# Patient Record
Sex: FEMALE | Race: BLACK OR AFRICAN AMERICAN | NOT HISPANIC OR LATINO | Employment: FULL TIME | URBAN - METROPOLITAN AREA
[De-identification: names, ages, dates, MRNs, and addresses within clinical notes are randomized per-mention and may not be internally consistent; named-entity substitution may affect disease eponyms.]

---

## 2017-04-12 ENCOUNTER — OFFICE VISIT (OUTPATIENT)
Dept: FAMILY MEDICINE | Facility: CLINIC | Age: 47
End: 2017-04-12

## 2017-04-12 VITALS
DIASTOLIC BLOOD PRESSURE: 142 MMHG | TEMPERATURE: 97.9 F | HEART RATE: 98 BPM | OXYGEN SATURATION: 100 % | SYSTOLIC BLOOD PRESSURE: 166 MMHG

## 2017-04-12 DIAGNOSIS — R30.0 DYSURIA: Primary | ICD-10-CM

## 2017-04-12 DIAGNOSIS — J45.42 MODERATE PERSISTENT ASTHMA WITH STATUS ASTHMATICUS: ICD-10-CM

## 2017-04-12 DIAGNOSIS — R82.81 PYURIA: ICD-10-CM

## 2017-04-12 DIAGNOSIS — N30.90 BLADDER INFECTION: ICD-10-CM

## 2017-04-12 DIAGNOSIS — J98.01 ACUTE BRONCHOSPASM: ICD-10-CM

## 2017-04-12 LAB
BACTERIA: NORMAL
BILIRUBIN UR: NEGATIVE
BLOOD UR: NEGATIVE
CASTS: NORMAL /LPF
CRYSTAL URINE: NORMAL /LPF
EPITHELIAL CELLS UR: NORMAL /LPF (ref 0–2)
GLUCOSE URINE: NEGATIVE
KETONES UR QL: NEGATIVE
LEUKOCYTE ESTERASE UR: ABNORMAL
MUCOUS URINE: NORMAL LPF
NITRITE UR QL STRIP: NEGATIVE
PH UR STRIP: 7 [PH] (ref 5–7)
PROTEIN UR: NEGATIVE
RBC URINE: NORMAL /HPF
SP GR UR STRIP: 1.01
UROBILINOGEN UR STRIP-ACNC: ABNORMAL
WBC URINE: NORMAL /HPF

## 2017-04-12 RX ORDER — FLUTICASONE PROPIONATE 220 UG/1
1 AEROSOL, METERED RESPIRATORY (INHALATION) 2 TIMES DAILY
Qty: 1 INHALER | Refills: 3 | Status: SHIPPED | OUTPATIENT
Start: 2017-04-12 | End: 2017-05-08

## 2017-04-12 RX ORDER — PREDNISONE 20 MG/1
40 TABLET ORAL DAILY
Qty: 10 TABLET | Refills: 0 | Status: SHIPPED | OUTPATIENT
Start: 2017-04-12 | End: 2017-04-17

## 2017-04-12 RX ORDER — NITROFURANTOIN 25; 75 MG/1; MG/1
100 CAPSULE ORAL 2 TIMES DAILY
Qty: 14 CAPSULE | Refills: 0 | Status: SHIPPED | OUTPATIENT
Start: 2017-04-12 | End: 2017-05-08

## 2017-04-12 RX ORDER — ALBUTEROL SULFATE 90 UG/1
2 AEROSOL, METERED RESPIRATORY (INHALATION) EVERY 4 HOURS PRN
Qty: 2 INHALER | Refills: 11 | Status: SHIPPED | OUTPATIENT
Start: 2017-04-12 | End: 2018-12-11

## 2017-04-12 NOTE — Clinical Note
Please review and close this encounter on behalf of the preceptor that is away for greater than 7 days. No additional attestation statement needed. Thank you, La

## 2017-04-12 NOTE — PROGRESS NOTES
Preceptor attestation:  Patient seen and discussed with the resident. Assessment and plan reviewed with resident and agreed upon.  Supervising physician: Rishi Latif  [facility]

## 2017-04-12 NOTE — LETTER
April 14, 2017      Kelly Harris  [address]  [place] [address]        Dear Kelly,    Please see below for your test results.  Your urine did not grow out any specific organisms, but keep taking the antibiotic and see if your burning with urination improves. Sometimes , we just can't get the bugs causing the infection to grow in the lab. If you continue having pain with urination even after taking the antibiotic for a week then please return to clinic. Hope you are well.        Resulted Orders   Urinalysis, Micro If ([facility])   Result Value Ref Range    Specific Gravity Urine 1.015 1.005 - 1.030    pH Urine 7.0 4.5 - 8.0    Leukocyte Esterase UR 2+ (A) NEGATIVE    Nitrite Urine Negative NEGATIVE    Protein UR Negative NEGATIVE    Glucose Urine Negative NEGATIVE    Ketones Urine Negative NEGATIVE    Urobilinogen mg/dL 0.2 E.U./dL 0.2 E.U./dL    Bilirubin UR Negative NEGATIVE    Blood UR Negative NEGATIVE   Urine Microscopic ([facility])   Result Value Ref Range    WBC Urine 10-25 <5 /hpf    RBC Urine 2-5 <5 /hpf    Epithelial Cells UR 10-25 0 - 2 /lpf    Mucous Urine None NONE lpf    Casts Urine None NONE /lpf    Crystal Urine None NONE /lpf    Bacteria Wet Prep Moderate None   Urine Culture ([facility])   Result Value Ref Range    Culture SEE RESULTS BELOW       Comment:      CULTURE, URINE   SOURCE: Urine, Random   CULTURE RESULTS:    No Growth      Narrative    Test performed by:  [facility]  [address] [place] [address]       If you have any questions, please call the clinic to make an appointment.    Sincerely,    Jesus Myers, DO

## 2017-04-12 NOTE — PATIENT INSTRUCTIONS
Bladder infection: macrobid 100 mg twice a day for one week  Asthma: prednisone 40 mg a day for 5 days  - flovent : use twice a day  - albuterol with spacer: using as needed      - follow up in 2-3 weeks for breahting

## 2017-04-12 NOTE — PROGRESS NOTES
There are no exam notes on file for this visit.  Chief Complaint   Patient presents with     Shortness of Breath     Pt. has had SOB really bad the last couple days, wheezing cant catch her breath      Urine Problem     Burning while urinating      Blood pressure (!) 166/142, pulse 98, temperature 97.9  F (36.6  C), temperature source Oral, SpO2 100 %.    Assessment and Plan     Continued dictation on Kelly Harris:     ASSESSMENT AND PLAN:     1.  Asthma exacerbation:  This seemed to be triggered by the recent weather.  There was a possible contribution from seasonal allergies, although these aren't typically worse for the patient in the spring. We'll recommend that patient use a spacer w/the albuterol inhaler.  We'll also give patient prednisone 40-50 mg to take daily x five days.  We'll start patient on maintenance inhaler, Flovent  with one puff b.i.d.   We'll also have patient use Zyrtec daily when her seasonal allergies flare-up.  I spoke with patient about signs and symptoms that should prompt her to return to clinic.  If asthma exacerbation resolves (which we expect), she should come back in two weeks for possible PFTs.   2.  Bladder infection.  Positive leukocyte esterase and pyuria were noted on UA.  UC will be sent.  We'll treat w/Macrobid 100 mg b.i.d. x one week.             Options for treatment and follow-up care were reviewed with the patient and/or guardian. Kelly Harris and/or guardian engaged in the decision making process and verbalized understanding of the options discussed and agreed with the final plan.  Patient discussed with Dr. Latif.   Jesus Myers,          HPI       Kelly Harris is a 46 year old  female SUBJECTIVE:  Kelly Harris is a 46-year-old female w/past medical history of asthma, who presented today for SOB, wheezing, and bladder pain.   Patient noted that  since January, she has used her albuterol inhaler two or three times a day.  She currently didn't  have a spacer.  She noted that she was here in November and given an albuterol inhaler and prednisone, both of which helped her breathing.  She said she was diagnosed with asthma as a very young kid, but she hadn't used an inhaler prior to 11/2016.  She said that her breathing was usually pretty good in the fall and spring, but it tended to be worse when it was very hot or very cold out or if she was around smoking.  She felt that she might have seasonal allergies in the summer or early fall, but not in the late fall.  It sounds like she took Flonase in the past for this.  No recent hospitalizations.  She didn't believe that she was ever intubated for the asthma.  She denied recent illnesses.  Currently, she only endorsed SOB and having trouble completing more than a few sentences in a row; however, there was no significant dizziness, lightheadedness, fevers, chills, vomiting, or diarrhea at this time.   Patient also noted about a week of dysuria and urinary frequency.  No history of bladder infection.  No flank pain.  No nausea, vomiting, fever, or chills.  She was hydrating well.            Patient Active Problem List   Diagnosis     Sickle-cell/Hb-C disease without crisis (H)     Bilateral low back pain with left-sided sciatica     Thrombosis of ovarian vein     Current Outpatient Prescriptions   Medication Sig Dispense Refill     albuterol (PROAIR HFA/PROVENTIL HFA/VENTOLIN HFA) 108 (90 BASE) MCG/ACT Inhaler Inhale 2 puffs into the lungs every 4 hours as needed for shortness of breath / dyspnea or wheezing 2 Inhaler 11     order for DME Equipment being ordered: spacer for inhaler, please fit io inhaler size 1 Device 1     fluticasone (FLOVENT HFA) 220 MCG/ACT Inhaler Inhale 1 puff into the lungs 2 times daily 1 Inhaler 3     nitrofurantoin, macrocrystal-monohydrate, (MACROBID) 100 MG capsule Take 1 capsule (100 mg) by mouth 2 times daily 14 capsule 0     predniSONE (DELTASONE) 20 MG tablet Take 2 tablets (40 mg)  by mouth daily for 5 days 10 tablet 0     gabapentin (NEURONTIN) 100 MG capsule Take 100 mg (1 capsule) in the morning, 100 mg (1 capsule) at noon and 300 mg (3 capsule) in the evening for back pain. 150 capsule 1     amitriptyline (ELAVIL) 50 MG tablet Take 1 tablet (50 mg) by mouth At Bedtime 90 tablet 3     acetaminophen (TYLENOL) 500 MG tablet Take 2 tablets (1,000 mg) by mouth 3 times daily 100 tablet 3     cetirizine (ZYRTEC) 10 MG tablet Take 1 tablet (10 mg) by mouth daily 90 tablet 3     fluticasone (FLONASE) 50 MCG/ACT nasal spray Spray 1-2 sprays into both nostrils daily 16 g 3     ASPIRIN PO        [DISCONTINUED] albuterol (PROAIR HFA, PROVENTIL HFA, VENTOLIN HFA) 108 (90 BASE) MCG/ACT inhaler Inhale 2 puffs into the lungs every 4 hours as needed for shortness of breath / dyspnea or wheezing 2 Inhaler 11     Allergies   Allergen Reactions     Iodine Rash     Family History   Problem Relation Age of Onset     DIABETES Father      Coronary Artery Disease Father      Hypertension Father      CANCER No family hx of      Results for orders placed or performed in visit on 04/12/17 (from the past 24 hour(s))   Urinalysis, Micro If ([facility])   Result Value Ref Range    Specific Gravity Urine 1.015 1.005 - 1.030    pH Urine 7.0 4.5 - 8.0    Leukocyte Esterase UR 2+ (A) NEGATIVE    Nitrite Urine Negative NEGATIVE    Protein UR Negative NEGATIVE    Glucose Urine Negative NEGATIVE    Ketones Urine Negative NEGATIVE    Urobilinogen mg/dL 0.2 E.U./dL 0.2 E.U./dL    Bilirubin UR Negative NEGATIVE    Blood UR Negative NEGATIVE   Urine Microscopic ([facility])   Result Value Ref Range    WBC Urine 10-25 <5 /hpf    RBC Urine 2-5 <5 /hpf    Epithelial Cells UR 10-25 0 - 2 /lpf    Mucous Urine None NONE lpf    Casts Urine None NONE /lpf    Crystal Urine None NONE /lpf    Bacteria Wet Prep Moderate None            Review of Systems:   As Above             Physical Exam:     Vitals:    04/12/17 1018   BP: (!) 166/142   Pulse: 98    Temp: 97.9  F (36.6  C)   TempSrc: Oral   SpO2: 100%     There is no height or weight on file to calculate BMI. BP on recheck after neb was 140s/84.    Continued dictation on Kelly Harris:     GENERAL: Prior to nebulization, patient was alert and in mild distress.  After nebulization, patient was doing well at rest and could walk up and down the hallway w/out trouble breathing.   HEENT:  No significant erythema of posterior pharynx.  No adenopathy, anteriorly or posteriorly.  Ear canals had no evidence for erythema around the TMs.   LUNGS:  Prior to nebulization, patient had diffused wheezing and much decreased air movement.  After nebulization, there was still mild wheezing, but air movement was much improved.  No increased work of breathing was noted.   HEART:  Regular rate and rhythm w/out murmurs.   ABDOMEN:  There was slight discomfort w/palpation over the bladder.  No CVA tenderness was noted.           Office Visit on 11/09/2015   Component Date Value Ref Range Status     WBC 11/09/2015 7.9  4.0 - 11.0 K/uL Final     RBC 11/09/2015 4.6  3.8 - 5.2 M/uL Final     Hemoglobin 11/09/2015 12.6  11.7 - 15.7 g/dL Final     Hematocrit 11/09/2015 38.4  35.0 - 47.0 % Final     MCV 11/09/2015 82.9  78.0 - 100.0 fL Final     MCH 11/09/2015 27.2  26.5 - 35.0 Final     MCHC 11/09/2015 32.8  32.0 - 36.0 g/dL Final     Platelets 11/09/2015 314.0  150.0 - 450.0 K/uL Final     Sodium 11/09/2015 143  136 - 145 mmol/L Final     Potassium 11/09/2015 4.5  3.5 - 5.0 mmol/L Final     Chloride 11/09/2015 108* 98 - 107 mmol/L Final     CO2, Total 11/09/2015 25  22 - 31 mmol/L Final     Anion Gap 11/09/2015 10  5 - 18 mmol/L Final     Glucose 11/09/2015 80  70 - 125 mg/dL Final     Calcium 11/09/2015 10.2  8.5 - 10.5 mg/dL Final     Urea Nitrogen 11/09/2015 14  8 - 22 mg/dL Final     Creatinine 11/09/2015 0.89  0.60 - 1.10 mg/dL Final     GFR Estimate If Black 11/09/2015 >60  >60 mL/min/1.73m2 Final     GFR Estimate 11/09/2015 >60   >60 mL/min/1.73m2 Final

## 2017-04-12 NOTE — MR AVS SNAPSHOT
After Visit Summary   2017    Kelly Harris    MRN: [id number]           Patient Information     Date Of Birth          1970        Visit Information        Provider Department      2017 10:20 AM Jesus Myers,  [facility]        Today's Diagnoses     Dysuria    -  1    Pyuria        Acute bronchospasm        Moderate persistent asthma with status asthmaticus        Bladder infection          Care Instructions    Bladder infection: macrobid 100 mg twice a day for one week  Asthma: prednisone 40 mg a day for 5 days  - flovent : use twice a day  - albuterol with spacer: using as needed      - follow up in 2-3 weeks for breahting          Follow-ups after your visit        Who to contact     Please call your clinic at [phone] to:    Ask questions about your health    Make or cancel appointments    Discuss your medicines    Learn about your test results    Speak to your doctor   If you have compliments or concerns about an experience at your clinic, or if you wish to file a complaint, please contact [facility] Patient Relations at [phone] or email us at [email or web address]         Additional Information About Your Visit        MyChart Information     [email or web address] is an electronic gateway that provides easy, online access to your medical records. With [email or web address], you can request a clinic appointment, read your test results, renew a prescription or communicate with your care team.     To sign up for [email or web address] visit the website at [email or web address]   You will be asked to enter the access code listed below, as well as some personal information. Please follow the directions to create your username and password.     Your access code is: [email or web address]  Expires: 2017 11:32 AM     Your access code will  in 90 days. If you need help or a new code, please contact your [facility] or call  [phone] for assistance.        Care EveryWhere ID     This is your Care EveryWhere ID. This could be used by other organizations to access your [facility] medical records  [id number]        Your Vitals Were     Pulse Temperature Pulse Oximetry             98 97.9  F (36.6  C) (Oral) 100%          Blood Pressure from Last 3 Encounters:   04/12/17 (!) 166/142   11/23/16 128/83   01/25/16 124/74    Weight from Last 3 Encounters:   01/25/16 141 lb (64 kg)   12/11/15 139 lb (63 kg)   11/20/15 140 lb 12.8 oz (63.9 kg)              We Performed the Following     Urinalysis, Micro If ([facility])     Urine Culture ([facility])     Urine Microscopic ([facility])          Today's Medication Changes          These changes are accurate as of: 4/12/17 11:35 AM.  If you have any questions, ask your nurse or doctor.               Start taking these medicines.        Dose/Directions    fluticasone 220 MCG/ACT Inhaler   Commonly known as:  FLOVENT HFA   Used for:  Moderate persistent asthma with status asthmaticus   Started by:  Jesus Myers DO        Dose:  1 puff   Inhale 1 puff into the lungs 2 times daily   Quantity:  1 Inhaler   Refills:  3       nitrofurantoin (macrocrystal-monohydrate) 100 MG capsule   Commonly known as:  MACROBID   Used for:  Bladder infection   Started by:  Jesus Myers DO        Dose:  100 mg   Take 1 capsule (100 mg) by mouth 2 times daily   Quantity:  14 capsule   Refills:  0       order for DME   Used for:  Acute bronchospasm   Started by:  Jesus Myers DO        Equipment being ordered: spacer for inhaler, please fit io inhaler size   Quantity:  1 Device   Refills:  1       predniSONE 20 MG tablet   Commonly known as:  DELTASONE   Used for:  Moderate persistent asthma with status asthmaticus, Acute bronchospasm   Started by:  Jesus Myers DO        Dose:  40 mg   Take 2 tablets (40 mg) by mouth daily for 5 days   Quantity:  10 tablet   Refills:  0             Where to get your medicines      These medications were sent to [id number] - [place], [place] - [address] AT [facility] [address], [place] [address]    Hours:  called pharm.  they do accept faxes Phone:  [phone]     albuterol 108 (90 BASE) MCG/ACT Inhaler    fluticasone 220 MCG/ACT Inhaler    nitrofurantoin (macrocrystal-monohydrate) 100 MG capsule    predniSONE 20 MG tablet         Some of these will need a paper prescription and others can be bought over the counter.  Ask your nurse if you have questions.     Bring a paper prescription for each of these medications     order for DME                Primary Care Provider Office Phone # Fax #    Misty Bernstein [phone]       [address] [address]        Thank you!     Thank you for choosing [facility]  for your care. Our goal is always to provide you with excellent care. Hearing back from our patients is one way we can continue to improve our services. Please take a few minutes to complete the written survey that you may receive in the mail after your visit with us. Thank you!             Your Updated Medication List - Protect others around you: Learn how to safely use, store and throw away your medicines at www.disposemymeds.org.          This list is accurate as of: 4/12/17 11:35 AM.  Always use your most recent med list.                   Brand Name Dispense Instructions for use    acetaminophen 500 MG tablet    TYLENOL    100 tablet    Take 2 tablets (1,000 mg) by mouth 3 times daily       albuterol 108 (90 BASE) MCG/ACT Inhaler    PROAIR HFA/PROVENTIL HFA/VENTOLIN HFA    2 Inhaler    Inhale 2 puffs into the lungs every 4 hours as needed for shortness of breath / dyspnea or wheezing       amitriptyline 50 MG tablet    ELAVIL    90 tablet    Take 1 tablet (50 mg) by mouth At Bedtime       ASPIRIN PO          cetirizine 10 MG  tablet    zyrTEC    90 tablet    Take 1 tablet (10 mg) by mouth daily       fluticasone 220 MCG/ACT Inhaler    FLOVENT HFA    1 Inhaler    Inhale 1 puff into the lungs 2 times daily       fluticasone 50 MCG/ACT spray    FLONASE    16 g    Spray 1-2 sprays into both nostrils daily       gabapentin 100 MG capsule    NEURONTIN    150 capsule    Take 100 mg (1 capsule) in the morning, 100 mg (1 capsule) at noon and 300 mg (3 capsule) in the evening for back pain.       nitrofurantoin (macrocrystal-monohydrate) 100 MG capsule    MACROBID    14 capsule    Take 1 capsule (100 mg) by mouth 2 times daily       order for DME     1 Device    Equipment being ordered: spacer for inhaler, please fit io inhaler size       predniSONE 20 MG tablet    DELTASONE    10 tablet    Take 2 tablets (40 mg) by mouth daily for 5 days

## 2017-04-13 LAB — CULTURE: NORMAL

## 2017-05-08 ENCOUNTER — OFFICE VISIT (OUTPATIENT)
Dept: FAMILY MEDICINE | Facility: CLINIC | Age: 47
End: 2017-05-08

## 2017-05-08 VITALS
TEMPERATURE: 98.5 F | OXYGEN SATURATION: 99 % | HEIGHT: 63 IN | SYSTOLIC BLOOD PRESSURE: 126 MMHG | WEIGHT: 143 LBS | HEART RATE: 103 BPM | BODY MASS INDEX: 25.34 KG/M2 | DIASTOLIC BLOOD PRESSURE: 86 MMHG

## 2017-05-08 DIAGNOSIS — J45.42 MODERATE PERSISTENT ASTHMA WITH STATUS ASTHMATICUS: ICD-10-CM

## 2017-05-08 DIAGNOSIS — R35.0 URINARY FREQUENCY: Primary | ICD-10-CM

## 2017-05-08 DIAGNOSIS — I82.890 THROMBOSIS OF OVARIAN VEIN: ICD-10-CM

## 2017-05-08 LAB
BACTERIA: NORMAL
BILIRUBIN UR: NEGATIVE
BLOOD UR: ABNORMAL
CASTS: NORMAL /LPF
CRYSTAL URINE: NORMAL /LPF
EPITHELIAL CELLS UR: NORMAL /LPF (ref 0–2)
GLUCOSE URINE: NEGATIVE
KETONES UR QL: NEGATIVE
LEUKOCYTE ESTERASE UR: ABNORMAL
MUCOUS URINE: NORMAL LPF
NITRITE UR QL STRIP: NEGATIVE
PH UR STRIP: 5 [PH] (ref 5–7)
PROTEIN UR: NEGATIVE
RBC URINE: NORMAL /HPF
SP GR UR STRIP: 1.01
UROBILINOGEN UR STRIP-ACNC: ABNORMAL
WBC URINE: NORMAL /HPF

## 2017-05-08 RX ORDER — PREDNISONE 20 MG/1
40 TABLET ORAL DAILY
Qty: 10 TABLET | Refills: 0 | Status: SHIPPED | OUTPATIENT
Start: 2017-05-08 | End: 2017-05-13

## 2017-05-08 RX ORDER — FLUTICASONE PROPIONATE 220 UG/1
2 AEROSOL, METERED RESPIRATORY (INHALATION) 2 TIMES DAILY
Qty: 1 INHALER | Refills: 11 | Status: SHIPPED | OUTPATIENT
Start: 2017-05-08 | End: 2017-11-29

## 2017-05-08 RX ORDER — ASPIRIN 325 MG
325 TABLET ORAL DAILY
COMMUNITY
Start: 2017-05-08 | End: 2022-04-07 | Stop reason: SINTOL

## 2017-05-08 RX ORDER — SULFAMETHOXAZOLE/TRIMETHOPRIM 800-160 MG
1 TABLET ORAL 2 TIMES DAILY
Qty: 6 TABLET | Refills: 0 | Status: SHIPPED | OUTPATIENT
Start: 2017-05-08 | End: 2017-05-11

## 2017-05-08 NOTE — LETTER
May 10, 2017      Kelly Harris  [address]  [place] [address]        Dear Kelly,    Please see below for your test results.  Your urine showed signs of infection on the UA but the culture did not grow and bacteria.  If you continue to have trouble with these symptoms recurrently we may need to have you see a urology specialist and get further tests as it may not be from an infection.  I would still complete all of the antibiotics as I am sure you already have.  Let me know if you have further questions.     Resulted Orders   Urinalysis, Micro If ([facility])   Result Value Ref Range    Specific Gravity Urine 1.010 1.005 - 1.030    pH Urine 5.0 4.5 - 8.0    Leukocyte Esterase UR 2+ (A) NEGATIVE    Nitrite Urine Negative NEGATIVE    Protein UR Negative NEGATIVE    Glucose Urine Negative NEGATIVE    Ketones Urine Negative NEGATIVE    Urobilinogen mg/dL 0.2 E.U./dL 0.2 E.U./dL    Bilirubin UR Negative NEGATIVE    Blood UR Trace-lysed (A) NEGATIVE   Urine Culture ([facility])   Result Value Ref Range    Culture SEE RESULTS BELOW       Comment:      CULTURE, URINE   SOURCE: Urine, Clean Catch   CULTURE RESULTS:    No Growth      Narrative    Test performed by:  [facility]  [address] [place] [address]   Urine Microscopic ([facility])   Result Value Ref Range    WBC Urine 10-25 <5 /hpf    RBC Urine None <5 /hpf    Epithelial Cells UR 5-10 0 - 2 /lpf    Mucous Urine None NONE lpf    Casts Urine None NONE /lpf    Crystal Urine None NONE /lpf    Bacteria Wet Prep Few None       If you have any questions, please call the clinic to make an appointment.    Sincerely,    Misty Bernstein MD

## 2017-05-08 NOTE — PATIENT INSTRUCTIONS
We will send you to allergist to check for more allergens.    Increase Flovent to 2 puffs twice a day.    Can continue albuterol as needed.    Bactrim twice a day for 3 days for urine symptoms.

## 2017-05-08 NOTE — PROGRESS NOTES
"    There are no exam notes on file for this visit.  Chief Complaint   Patient presents with     RECHECK     come here today to follow up from [facility] on Asthma per pt.      other     want to check for UTI because have to go to bathroom very often and have sharp pain when almost done pee per pt.      Blood pressure 126/86, pulse 103, temperature 98.5  F (36.9  C), temperature source Oral, height 5' 3\" (160 cm), weight 143 lb (64.9 kg), SpO2 99 %.                 HPI     Kelly Harris is a 46 year old  female with a PMH significant for:     Patient Active Problem List   Diagnosis     Sickle-cell/Hb-C disease without crisis (H)     Bilateral low back pain with left-sided sciatica     Thrombosis of ovarian vein     She presents with follow up of asthma.  Went to 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  She has to leave when they are cleaning the floors as well. Works at [employer].  No other history of allergies in the past expect to iodine.  Reviewed ER record in detail.  The ambulance gave her duo nebs ×2 and magnesium on the way to the hospital.  In the ER she also received Ativan and prednisone.  She was feeling better and discharged with 5 day course of prednisone and instructions to use albuterol 4 times a day.  She is also using Flovent more than twice a day because she was confused about how often to use it.  She thought they told her to use that one 4 times a day as well.  Discussed that twice a day is the maximum for Flovent.  Chest x-ray was also done and negative.    UTI sxs about a week ago.  Burning with urination when finishing and have to go more frequently.  No hematuria, odor, vaginal discharge.  Does not feel like she is emptying fully.  Hard to get it all out.  Her symptoms did go away for awhile with the last course of antibiotics last month, had macrobid and urine culture was negative.    PMH, Medications and Allergies " "were reviewed and updated as needed.                Physical Exam:     Vitals:    05/08/17 1403 05/08/17 1405   BP: (!) 124/94 126/86   Pulse: 103 103   Temp: 98.5  F (36.9  C)    TempSrc: Oral    SpO2: 99%    Weight: 143 lb (64.9 kg)    Height: 5' 3\" (160 cm)      Body mass index is 25.33 kg/(m^2).    Exam:  Constitutional: healthy, alert and no distress  Cardiovascular:RRR. No murmurs, clicks gallops or rub  Respiratory:  normal respiratory rate and rhythm, lungs with end expiratory wheezes bilaterally. No crackles.  Abdomen: +BS, soft, nontender, nondistended. Some suprapubic tenderness, neg CVA tenderness.  Extremities: No C/C/E in BLE. 2+DP pulses.    Skin: no rashes.  Psychiatric: mentation appears normal and affect normal/bright        Results for orders placed or performed in visit on 05/08/17   Urinalysis, Micro If ([facility])   Result Value Ref Range    Specific Gravity Urine 1.010 1.005 - 1.030    pH Urine 5.0 4.5 - 8.0    Leukocyte Esterase UR 2+ (A) NEGATIVE    Nitrite Urine Negative NEGATIVE    Protein UR Negative NEGATIVE    Glucose Urine Negative NEGATIVE    Ketones Urine Negative NEGATIVE    Urobilinogen mg/dL 0.2 E.U./dL 0.2 E.U./dL    Bilirubin UR Negative NEGATIVE    Blood UR Trace-lysed (A) NEGATIVE   Urine Culture ([facility])   Result Value Ref Range    Culture SEE RESULTS BELOW     Narrative    Test performed by:  [facility] [address] [place] [address]   Urine Microscopic ([facility])   Result Value Ref Range    WBC Urine 10-25 <5 /hpf    RBC Urine None <5 /hpf    Epithelial Cells UR 5-10 0 - 2 /lpf    Mucous Urine None NONE lpf    Casts Urine None NONE /lpf    Crystal Urine None NONE /lpf    Bacteria Wet Prep Few None       Assessment and Plan     Kelly was seen today for recheck and other.    Diagnoses and all orders for this visit:    Urinary frequency: sounds like UTI and UA with WBCs.  Treat with bactrim empirically.  Culture again as well.  If continues to be an issue " with negative cultures, may need urology work up.  -     Urinalysis, Micro If ([facility])  -     Urine Culture ([facility])  -     sulfamethoxazole-trimethoprim (BACTRIM DS/SEPTRA DS) 800-160 MG per tablet; Take 1 tablet by mouth 2 times daily for 3 days  -     Urine Microscopic ([facility])    Thrombosis of ovarian vein: on full aspirin daily.    Moderate persistent asthma with status asthmaticus: Still very tight and end expiratory wheezing today with deep breaths. Increase flovent to 2 puffs twice a day.  Input from allergy on triggers to avoid and additional medications that may help. Another steroid course now to help with exacerbation.  Filled out FMLA for recent absence and to cover if has more absences for appointments or flares at work due to chemicals.  Close follow up.  -     fluticasone (FLOVENT HFA) 220 MCG/ACT Inhaler; Inhale 2 puffs into the lungs 2 times daily  -     predniSONE (DELTASONE) 20 MG tablet; Take 2 tablets (40 mg) by mouth daily for 5 days  -     ALLERGY/ASTHMA ADULT REFERRAL; Future        Patient Instructions   We will send you to allergist to check for more allergens.    Increase Flovent to 2 puffs twice a day.    Can continue albuterol as needed.    Bactrim twice a day for 3 days for urine symptoms.    Follow up one week.     Options for treatment and/or follow-up care were reviewed with the patient. Kelly Harris was engaged and actively involved in the decision making process. She verbalized understanding of the options discussed and was satisfied with the final plan.    Misty Bernstein MD

## 2017-05-08 NOTE — MR AVS SNAPSHOT
After Visit Summary   2017    Kelly Harris    MRN: [id number]           Patient Information     Date Of Birth          1970        Visit Information        Provider Department      2017 1:50 PM Misty Bernstein MD [facility]        Today's Diagnoses     Urinary frequency    -  1    Thrombosis of ovarian vein        Moderate persistent asthma with status asthmaticus          Care Instructions    We will send you to allergist to check for more allergens.    Increase Flovent to 2 puffs twice a day.    Can continue albuterol as needed.    Bactrim twice a day for 3 days for urine symptoms.        Follow-ups after your visit        Who to contact     Please call your clinic at [phone] to:    Ask questions about your health    Make or cancel appointments    Discuss your medicines    Learn about your test results    Speak to your doctor   If you have compliments or concerns about an experience at your clinic, or if you wish to file a complaint, please contact [facility] Patient Relations at [phone] or email us at [email or web address]         Additional Information About Your Visit        MyChart Information     [employer] is an electronic gateway that provides easy, online access to your medical records. With [employer], you can request a clinic appointment, read your test results, renew a prescription or communicate with your care team.     To sign up for [employer] visit the website at [email or web address]   You will be asked to enter the access code listed below, as well as some personal information. Please follow the directions to create your username and password.     Your access code is: [email or web address]  Expires: 2017 11:32 AM     Your access code will  in 90 days. If you need help or a new code, please contact your [facility] or call [phone] for assistance.        Care EveryWhere ID     This is  "your Care EveryWhere ID. This could be used by other organizations to access your [place] medical records  [id number]        Your Vitals Were     Pulse Temperature Height Pulse Oximetry BMI (Body Mass Index)       103 98.5  F (36.9  C) (Oral) 5' 3\" (160 cm) 99% 25.33 kg/m2        Blood Pressure from Last 3 Encounters:   05/08/17 126/86   04/12/17 (!) 166/142   11/23/16 128/83    Weight from Last 3 Encounters:   05/08/17 143 lb (64.9 kg)   01/25/16 141 lb (64 kg)   12/11/15 139 lb (63 kg)              We Performed the Following     Urinalysis, Micro If ([facility])     Urine Culture ([facility])          Today's Medication Changes          These changes are accurate as of: 5/8/17  2:41 PM.  If you have any questions, ask your nurse or doctor.               Start taking these medicines.        Dose/Directions    predniSONE 20 MG tablet   Commonly known as:  DELTASONE   Used for:  Moderate persistent asthma with status asthmaticus   Started by:  Misty Bernstein MD        Dose:  40 mg   Take 2 tablets (40 mg) by mouth daily for 5 days   Quantity:  10 tablet   Refills:  0       sulfamethoxazole-trimethoprim 800-160 MG per tablet   Commonly known as:  BACTRIM DS/SEPTRA DS   Used for:  Urinary frequency   Started by:  Misty Bernstein MD        Dose:  1 tablet   Take 1 tablet by mouth 2 times daily for 3 days   Quantity:  6 tablet   Refills:  0         These medicines have changed or have updated prescriptions.        Dose/Directions    aspirin 325 MG tablet   This may have changed:    - medication strength  - how much to take  - when to take this   Used for:  Thrombosis of ovarian vein   Changed by:  Misty Bernstein MD        Dose:  325 mg   Take 1 tablet (325 mg) by mouth daily   Refills:  0       fluticasone 220 MCG/ACT Inhaler   Commonly known as:  FLOVENT HFA   This may have changed:  how much to take   Used for:  Moderate persistent asthma with status asthmaticus   Changed by:  Misty Bernstein MD        Dose:  2 " puff   Inhale 2 puffs into the lungs 2 times daily   Quantity:  1 Inhaler   Refills:  11         Stop taking these medicines if you haven't already. Please contact your care team if you have questions.     amitriptyline 50 MG tablet   Commonly known as:  ELAVIL   Stopped by:  Misty Bernstein MD           gabapentin 100 MG capsule   Commonly known as:  NEURONTIN   Stopped by:  Misty Bernstein MD           nitrofurantoin (macrocrystal-monohydrate) 100 MG capsule   Commonly known as:  MACROBID   Stopped by:  Misty Bernstein MD                Where to get your medicines      These medications were sent to [id number] - [place], [place] - [address] AT [facility] OF [facility] [address], [facility] [address]    Hours:  called pharm.  they do accept faxes Phone:  [phone]     fluticasone 220 MCG/ACT Inhaler    predniSONE 20 MG tablet    sulfamethoxazole-trimethoprim 800-160 MG per tablet                Primary Care Provider Office Phone # Fax #    Misty Bernstein [phone]       [address] [address]        Thank you!     Thank you for choosing [facility]  for your care. Our goal is always to provide you with excellent care. Hearing back from our patients is one way we can continue to improve our services. Please take a few minutes to complete the written survey that you may receive in the mail after your visit with us. Thank you!             Your Updated Medication List - Protect others around you: Learn how to safely use, store and throw away your medicines at www.disposemymeds.org.          This list is accurate as of: 5/8/17  2:41 PM.  Always use your most recent med list.                   Brand Name Dispense Instructions for use    acetaminophen 500 MG tablet    TYLENOL    100 tablet    Take 2 tablets (1,000 mg) by mouth 3 times daily       albuterol 108 (90 BASE) MCG/ACT Inhaler    PROAIR  HFA/PROVENTIL HFA/VENTOLIN HFA    2 Inhaler    Inhale 2 puffs into the lungs every 4 hours as needed for shortness of breath / dyspnea or wheezing       aspirin 325 MG tablet      Take 1 tablet (325 mg) by mouth daily       cetirizine 10 MG tablet    zyrTEC    90 tablet    Take 1 tablet (10 mg) by mouth daily       fluticasone 220 MCG/ACT Inhaler    FLOVENT HFA    1 Inhaler    Inhale 2 puffs into the lungs 2 times daily       fluticasone 50 MCG/ACT spray    FLONASE    16 g    Spray 1-2 sprays into both nostrils daily       order for DME     1 Device    Equipment being ordered: spacer for inhaler, please fit io inhaler size       predniSONE 20 MG tablet    DELTASONE    10 tablet    Take 2 tablets (40 mg) by mouth daily for 5 days       sulfamethoxazole-trimethoprim 800-160 MG per tablet    BACTRIM DS/SEPTRA DS    6 tablet    Take 1 tablet by mouth 2 times daily for 3 days

## 2017-05-09 LAB — CULTURE: NORMAL

## 2017-05-09 ASSESSMENT — ASTHMA QUESTIONNAIRES: ACT_TOTALSCORE: 9

## 2017-05-09 NOTE — PROGRESS NOTES
Your urine showed signs of infection on the UA but the culture did not grow and bacteria.  If you continue to have trouble with these symptoms recurrently we may need to have you see a urology specialist and get further tests as it may not be from an infection.  I would still complete all of the antibiotics as I am sure you already have.  Let me know if you have further questions.

## 2017-05-16 ENCOUNTER — OFFICE VISIT (OUTPATIENT)
Dept: FAMILY MEDICINE | Facility: CLINIC | Age: 47
End: 2017-05-16

## 2017-05-16 VITALS
OXYGEN SATURATION: 99 % | HEIGHT: 63 IN | SYSTOLIC BLOOD PRESSURE: 127 MMHG | WEIGHT: 143.6 LBS | DIASTOLIC BLOOD PRESSURE: 79 MMHG | BODY MASS INDEX: 25.45 KG/M2 | HEART RATE: 85 BPM | TEMPERATURE: 98.5 F

## 2017-05-16 DIAGNOSIS — J45.41 MODERATE PERSISTENT ASTHMA WITH ACUTE EXACERBATION: Primary | ICD-10-CM

## 2017-05-16 NOTE — MR AVS SNAPSHOT
After Visit Summary   5/16/2017    Kelly Harris    MRN: [id number]           Patient Information     Date Of Birth          1970        Visit Information        Provider Department      5/16/2017 3:30 PM Misty Bernstein MD [facility]        Today's Diagnoses     Moderate persistent asthma with acute exacerbation    -  1       Follow-ups after your visit        Additional Services     Pulmonary Function Test (PFT) Referral       Patient to stop at the [facility] Desk    Reason for Referral: Acute worsening of asthma. Full PFTs to rule out chronic obstruction.     needed: No  Language: English    May leave message on voicemail: Yes    ([place] Only) Referral should be tracked (Yes/No)?                  Follow-up notes from your care team     Return in about 4 weeks (around 6/13/2017) for Asthma follow up, come back sooner if worsening.      Future tests that were ordered for you today     Open Future Orders        Priority Expected Expires Ordered    Pulmonary Function Test (PFT) Referral Routine  5/16/2018 5/16/2017            Who to contact     Please call your clinic at [phone] to:    Ask questions about your health    Make or cancel appointments    Discuss your medicines    Learn about your test results    Speak to your doctor   If you have compliments or concerns about an experience at your clinic, or if you wish to file a complaint, please contact [facility] Patient Relations at [phone] or email us at [email or web address]         Additional Information About Your Visit        MyChart Information     [employer] is an electronic gateway that provides easy, online access to your medical records. With Electronic Payment and Services (EPS), you can request a clinic appointment, read your test results, renew a prescription or communicate with your care team.     To sign up for [employer] visit the website at [email or web address]   You will be asked to enter the  "access code listed below, as well as some personal information. Please follow the directions to create your username and password.     Your access code is: [email or web address]  Expires: 2017 11:32 AM     Your access code will  in 90 days. If you need help or a new code, please contact your [facility] or call [phone] for assistance.        Care EveryWhere ID     This is your Care EveryWhere ID. This could be used by other organizations to access your [place] medical records  [id number]        Your Vitals Were     Pulse Temperature Height Last Period Pulse Oximetry BMI (Body Mass Index)    85 98.5  F (36.9  C) 5' 2.99\" (160 cm) 2017 99% 25.44 kg/m2       Blood Pressure from Last 3 Encounters:   17 127/79   17 126/86   17 (!) 166/142    Weight from Last 3 Encounters:   17 143 lb 9.6 oz (65.1 kg)   17 143 lb (64.9 kg)   16 141 lb (64 kg)               Primary Care Provider Office Phone # Fax #    Misty Bernstein [phone]       [place] [address]        Thank you!     Thank you for choosing [facility]  for your care. Our goal is always to provide you with excellent care. Hearing back from our patients is one way we can continue to improve our services. Please take a few minutes to complete the written survey that you may receive in the mail after your visit with us. Thank you!             Your Updated Medication List - Protect others around you: Learn how to safely use, store and throw away your medicines at www.disposemymeds.org.          This list is accurate as of: 17  4:30 PM.  Always use your most recent med list.                   Brand Name Dispense Instructions for use    acetaminophen 500 MG tablet    TYLENOL    100 tablet    Take 2 tablets (1,000 mg) by mouth 3 times daily       albuterol 108 (90 BASE) MCG/ACT Inhaler    PROAIR HFA/PROVENTIL HFA/VENTOLIN HFA    2 " Inhaler    Inhale 2 puffs into the lungs every 4 hours as needed for shortness of breath / dyspnea or wheezing       aspirin 325 MG tablet      Take 1 tablet (325 mg) by mouth daily       cetirizine 10 MG tablet    zyrTEC    90 tablet    Take 1 tablet (10 mg) by mouth daily       fluticasone 220 MCG/ACT Inhaler    FLOVENT HFA    1 Inhaler    Inhale 2 puffs into the lungs 2 times daily       fluticasone 50 MCG/ACT spray    FLONASE    16 g    Spray 1-2 sprays into both nostrils daily       order for DME     1 Device    Equipment being ordered: spacer for inhaler, please fit io inhaler size

## 2017-05-16 NOTE — PROGRESS NOTES
"    There are no exam notes on file for this visit.  Chief Complaint   Patient presents with     Asthma     questions about referral     Blood pressure 127/79, pulse 85, temperature 98.5  F (36.9  C), height 5' 2.99\" (160 cm), weight 143 lb 9.6 oz (65.1 kg), last menstrual period 04/12/2017, SpO2 99 %.                 HPI     Kelly Harris is a 46 year old  female with a PMH significant for:     Patient Active Problem List   Diagnosis     Sickle-cell/Hb-C disease without crisis (H)     Bilateral low back pain with left-sided sciatica     Thrombosis of ovarian vein     She presents with follow up of recent asthma exacerbation.  She was doing much better until this AM she had a little relapse and needed her inhaler twice this AM.  Perhaps rainy humid weather was the trigger, but she is not sure.  She has not heard about allergist referral yet and would like to get that set up today.    Urinary sxs are better after the antibiotics. Discuss culture showed no growth.  If recurrent infection frequent may need urology follow up.    PMH, Medications and Allergies were reviewed and updated as needed.             Physical Exam:     Vitals:    05/16/17 1559   BP: 127/79   Pulse: 85   Temp: 98.5  F (36.9  C)   SpO2: 99%   Weight: 143 lb 9.6 oz (65.1 kg)   Height: 5' 2.99\" (160 cm)     Body mass index is 25.44 kg/(m^2).    Exam:  Constitutional: healthy, alert and no distress  Neck: Neck supple. No adenopathy.   Eyes:  conjunctiva are clear.  ENT: No nasal discharge. Oropharynx clear with no erythema or exudates.  Cardiovascular:RRR. No murmurs, clicks gallops or rub  Respiratory:  normal respiratory rate and rhythm, lungs a little tight with end expiratory wheezes intermittently, better than last visit.. No crackles.  Psychiatric: mentation appears normal and affect normal/bright    ACT Total Scores 5/8/2017 5/16/2017   ACT TOTAL SCORE (Goal Greater than or Equal to 20) 9 12   In the past 12 months, how many times did you " visit the emergency room for your asthma without being admitted to the hospital? 1 1   In the past 12 months, how many times were you hospitalized overnight because of your asthma? 0 0       Results for orders placed or performed in visit on 05/08/17   Urinalysis, Micro If ([facility])   Result Value Ref Range    Specific Gravity Urine 1.010 1.005 - 1.030    pH Urine 5.0 4.5 - 8.0    Leukocyte Esterase UR 2+ (A) NEGATIVE    Nitrite Urine Negative NEGATIVE    Protein UR Negative NEGATIVE    Glucose Urine Negative NEGATIVE    Ketones Urine Negative NEGATIVE    Urobilinogen mg/dL 0.2 E.U./dL 0.2 E.U./dL    Bilirubin UR Negative NEGATIVE    Blood UR Trace-lysed (A) NEGATIVE   Urine Culture ([facility])   Result Value Ref Range    Culture SEE RESULTS BELOW     Narrative    Test performed by:  [facility] [address] [place] [address]   Urine Microscopic ([facility])   Result Value Ref Range    WBC Urine 10-25 <5 /hpf    RBC Urine None <5 /hpf    Epithelial Cells UR 5-10 0 - 2 /lpf    Mucous Urine None NONE lpf    Casts Urine None NONE /lpf    Crystal Urine None NONE /lpf    Bacteria Wet Prep Few None       Assessment and Plan     Kelly was seen today for asthma.    Diagnoses and all orders for this visit:    Moderate persistent asthma with acute exacerbation: Severity of symptoms without clear triggers or asthma diagnosis in the past is concerning.  She is much better today but with a little wheezing.  Will get formal PFTs to ensure correct diagnosis.  Also should follow up with allergist as we have unclear triggers. Continue higher dose of controller medication.  -     Pulmonary Function Test (PFT) Referral; Future  Return in about 4 weeks (around 6/13/2017) for Asthma follow up, come back sooner if worsening.        Patient Instructions   [facility]  [phone]  Referral has been faxed with med list they will contact pt to schedule  Sindy Johnson 10:32 AM 5/17/2017             Options for  treatment and/or follow-up care were reviewed with the patient. Kelly Harris was engaged and actively involved in the decision making process. She verbalized understanding of the options discussed and was satisfied with the final plan.    Misty Bernstein MD

## 2017-05-16 NOTE — Clinical Note
I also wanted this patient to go to Allergy.  I discussed this with Viv whent he patient was here.  I just want to make sure this was done too.  It was ordered on 5/8.  The patient was frustrated that she called specifically about this and told that they didn't see the order for this. The order is there.  Please make sure that the patient knows about PFTs and Allergy referral.

## 2017-05-17 ASSESSMENT — ASTHMA QUESTIONNAIRES: ACT_TOTALSCORE: 12

## 2017-05-17 NOTE — PROGRESS NOTES
Allergy Referral  Per case information was given to patient in clinic to call and schedule  Sindy Johnson 10:55 AM 5/17/2017

## 2017-05-17 NOTE — PATIENT INSTRUCTIONS
[facility] Lab  [phone]  Referral has been faxed with med list they will contact pt to schedule  Sindy Johnson 10:32 AM 5/17/2017

## 2017-05-24 ENCOUNTER — OFFICE VISIT - HEALTHEAST (OUTPATIENT)
Dept: ALLERGY | Facility: CLINIC | Age: 47
End: 2017-05-24

## 2017-05-24 ENCOUNTER — TELEPHONE (OUTPATIENT)
Dept: FAMILY MEDICINE | Facility: CLINIC | Age: 47
End: 2017-05-24

## 2017-05-24 DIAGNOSIS — J45.40 MODERATE PERSISTENT ASTHMA, UNCOMPLICATED: ICD-10-CM

## 2017-05-24 DIAGNOSIS — R09.81 NASAL CONGESTION: ICD-10-CM

## 2017-05-24 ASSESSMENT — MIFFLIN-ST. JEOR: SCORE: 1234.59

## 2017-06-28 ENCOUNTER — OFFICE VISIT - HEALTHEAST (OUTPATIENT)
Dept: ALLERGY | Facility: CLINIC | Age: 47
End: 2017-06-28

## 2017-06-28 DIAGNOSIS — R06.02 SHORTNESS OF BREATH: ICD-10-CM

## 2017-06-29 ENCOUNTER — COMMUNICATION - HEALTHEAST (OUTPATIENT)
Dept: ADMINISTRATIVE | Facility: CLINIC | Age: 47
End: 2017-06-29

## 2017-08-02 ENCOUNTER — RECORDS - HEALTHEAST (OUTPATIENT)
Dept: ADMINISTRATIVE | Facility: OTHER | Age: 47
End: 2017-08-02

## 2017-08-02 ENCOUNTER — OFFICE VISIT - HEALTHEAST (OUTPATIENT)
Dept: ALLERGY | Facility: CLINIC | Age: 47
End: 2017-08-02

## 2017-08-02 DIAGNOSIS — J38.3 PARADOXICAL VOCAL CORD MOTION: ICD-10-CM

## 2017-08-02 DIAGNOSIS — J45.40 MODERATE PERSISTENT ASTHMA, UNCOMPLICATED: ICD-10-CM

## 2017-08-16 ENCOUNTER — OFFICE VISIT - HEALTHEAST (OUTPATIENT)
Dept: ALLERGY | Facility: CLINIC | Age: 47
End: 2017-08-16

## 2017-09-03 ENCOUNTER — HEALTH MAINTENANCE LETTER (OUTPATIENT)
Age: 47
End: 2017-09-03

## 2017-09-12 ENCOUNTER — RECORDS - HEALTHEAST (OUTPATIENT)
Dept: ADMINISTRATIVE | Facility: OTHER | Age: 47
End: 2017-09-12

## 2017-09-12 ENCOUNTER — COMMUNICATION - HEALTHEAST (OUTPATIENT)
Dept: ADMINISTRATIVE | Facility: CLINIC | Age: 47
End: 2017-09-12

## 2017-11-29 ENCOUNTER — OFFICE VISIT (OUTPATIENT)
Dept: FAMILY MEDICINE | Facility: CLINIC | Age: 47
End: 2017-11-29

## 2017-11-29 VITALS
TEMPERATURE: 98.4 F | SYSTOLIC BLOOD PRESSURE: 133 MMHG | HEIGHT: 64 IN | DIASTOLIC BLOOD PRESSURE: 82 MMHG | HEART RATE: 71 BPM | BODY MASS INDEX: 24.38 KG/M2 | WEIGHT: 142.8 LBS

## 2017-11-29 DIAGNOSIS — Z23 NEED FOR PROPHYLACTIC VACCINATION AND INOCULATION AGAINST INFLUENZA: ICD-10-CM

## 2017-11-29 DIAGNOSIS — Z12.31 VISIT FOR SCREENING MAMMOGRAM: ICD-10-CM

## 2017-11-29 DIAGNOSIS — Z00.00 ROUTINE GENERAL MEDICAL EXAMINATION AT A HEALTH CARE FACILITY: Primary | ICD-10-CM

## 2017-11-29 DIAGNOSIS — R53.83 FATIGUE, UNSPECIFIED TYPE: ICD-10-CM

## 2017-11-29 LAB
HCT VFR BLD AUTO: 41.6 % (ref 35–47)
HEMOGLOBIN: 13.1 G/DL (ref 11.7–15.7)
MCH RBC QN AUTO: 26.7 PG (ref 26.5–35)
MCHC RBC AUTO-ENTMCNC: 31.5 G/DL (ref 32–36)
MCV RBC AUTO: 84.8 FL (ref 78–100)
PLATELET # BLD AUTO: 374 K/UL (ref 150–450)
RBC # BLD AUTO: 4.9 M/UL (ref 3.8–5.2)
TSH SERPL DL<=0.05 MIU/L-ACNC: 0.65 UIU/ML (ref 0.3–5)
WBC # BLD AUTO: 8.4 K/UL (ref 4–11)

## 2017-11-29 RX ORDER — BUDESONIDE AND FORMOTEROL FUMARATE DIHYDRATE 160; 4.5 UG/1; UG/1
2 AEROSOL RESPIRATORY (INHALATION) 2 TIMES DAILY
COMMUNITY
End: 2019-01-11 | Stop reason: DRUGHIGH

## 2017-11-29 NOTE — PATIENT INSTRUCTIONS
Get your labs and mammogram.    If your periods are giving you more trouble, come back for a check up.  If your joints are giving you more trouble come back for irina work up.    Preventive Health Recommendations  Female Ages 40 to 49    Yearly exam:     See your health care provider every year in order to  1. Review health changes.   2. Discuss preventive care.    3. Review your medicines if your doctor prescribed any.      Get a Pap test every three years (unless you have an abnormal result and your provider advises testing more often).      If you get Pap tests with HPV test, you only need to test every 5 years, unless you have an abnormal result. You do not need a Pap test if your uterus was removed (hysterectomy) and you have not had cancer.      You should be tested each year for STDs (sexually transmitted diseases), if you're at risk.       Ask your doctor if you should have a mammogram.      Have a colonoscopy (test for colon cancer) if someone in your family has had colon cancer or polyps before age 50.       Have a cholesterol test every 5 years.       Have a diabetes test (fasting glucose) after age 45. If you are at risk for diabetes, you should have this test every 3 years.    Shots: Get a flu shot each year. Get a tetanus shot every 10 years.     Nutrition:     Eat at least 5 servings of fruits and vegetables each day.    Eat whole-grain bread, whole-wheat pasta and brown rice instead of white grains and rice.    Talk to your provider about Calcium and Vitamin D.     Lifestyle    Exercise at least 150 minutes a week (an average of 30 minutes a day, 5 days a week). This will help you control your weight and prevent disease.    Limit alcohol to one drink per day.    No smoking.     Wear sunscreen to prevent skin cancer.    See your dentist every six months for an exam and cleaning.    MAMMOGRAM REFERRAL:  Left voicemail message for patient to call referral clinic to schedule.  Viv  Nolvia  11/29/17    Left voicemail message for patient to call referral clinic to schedule.  Viv De Souza  11/30/17    [facility]  [address] [place] [address]  [phone]  Date:   Monday December 11, 2017  Time:  7:15 AM  Scheduled with patient via phone today.  Viv De Souza  11/30/17

## 2017-11-29 NOTE — LETTER
December 8, 2017      Kelly Harris  [address]  [place] [address]    Please see below for your test results.    Resulted Orders   CBC with Plt ([facility])   Result Value Ref Range    WBC 8.4 4.0 - 11.0 K/uL    RBC 4.9 3.8 - 5.2 M/uL    Hemoglobin 13.1 11.7 - 15.7 g/dL    Hematocrit 41.6 35.0 - 47.0 %    MCV 84.8 78.0 - 100.0 fL    MCH 26.7 26.5 - 35.0    MCHC 31.5 (L) 32.0 - 36.0 g/dL    Platelets 374.0 150.0 - 450.0 K/uL   Thyroid [place] ([facility])   Result Value Ref Range    TSH 0.65 0.30 - 5.00 uIU/mL    Narrative    Test performed by:  [facility] [address] [place] [address]   GYN Cytology ([facility])   Result Value Ref Range    Lab AP Case Report SEE RESULTS BELOW       Comment:      Gynecologic Cytology Report                       Case: [id number]                                     Authorizing Provider:  Misty Bernstein MD       Collected:             11/29/2017 0910              First Screen:          TIFFANIE Barrios   Received:              11/30/2017 1017                                     (ASCP)                                                                         Rescreen:              TIFFANIE Vasquez (ASCP)                                                       Specimen:    SUREPATH PAP, SCREENING, Endocervical/cervical                                               Lab AP Gyn Interpretation SEE RESULTS BELOW       Comment:      Negative for squamous intraepithelial lesion or malignancy  Electronically signed by TIFFANIE Vasquez (ASCP) on 12/6/2017 at  2:56 PM      Lab AP Malignant? Normal Normal    Specimen adequacy:       Satisfactory for evaluation, endocervical/transformation zone component present    HPV Reflex? Yes regardless of result     High Risk? No     Last Mens Period September 2017     Lab AP Abnormal Bleeding No     Lab AP Patient Status na     Lab AP Birth Control/Hormones None     Lab AP Previous Normal 2014     Lab AP Previous Abnl  none     Lab AP Cervical Appearance normal     Narrative    Test performed by:  [facility] [address] [place] [address]   HPV [place] PCR ([facility])   Result Value Ref Range    Interpretation No High Risk HPV Type(s) Detected No HPV Type(s) Detected, No High Risk HP     Mook Yang MD, [facility]       Comment:         Testing was performed at [facility], [address] [place] [address], with final verification at the indicated laboratory.    Interpretation was performed at [facility] [address] [place], [place] [id number].      Narrative    Test performed by:  [facility] [address] [place] [address]  No High Risk HPV Type(s) Detected  Interpretation: The sample is negative for the presence of DNA from one or   more of the following high risk HPV types (16, 18, 31, 33, 35, 39, 45, 51, 52,   56, 58, 59, 66, and 68) The following HPV type(s) are identified in the   submitted sample (53). High risk types are classified by the IARC as   carcinogenic, probably, or possibly carcinogenic to humans.  These results do   not exclude the possibility of additional low or high risk HPV types not   detected due to adequacy and representativeness of sampling or assay   sensitivity.  Comments: The absence of high risk HPV types reduces the collective risk for   the development of cervical dysplasia or neoplasia.  This result, in   association with the morphology assessment of the Pap sample are butt   information for the determination of follow-up testing and in the clinical   management of the patient.  Methodology:  Genomic DNA was extracted from the submitted specimen and   amplified by the polymerase chain reaction (PCR) using consensus   oligonucleotide primers for the L1 region of the human papillomavirus (HPV)   genome.  Concurrently, the integrity of the extracted DNA was evaluated by the   amplification of  beta-globin, a common housekeeping gene.  Result   interpretation is performed by analysis of peak height and size data generated   through fluorescence detection by automated electrophoresis.  HPV DNA   positive PCR products were subjected to digestion by the restriction   endonucleases Hae III, Pst 1, and Rsa 1.  Digested DNA fragments were    by automated electrophoresis.  Digital electropherograms and gel   images of data were generated and the specific HPV type was determined by   matching the restriction fragment patterns of the respective specimens to that   of known HPV restriction fragment patterns.  The analytical and performance   characteristics of this laboratory-developed test (LDT) were determined by   [facility] pursuant to Clinical Laboratory Improvement Amendments (CLIA 88)   requirements.  It has not been cleared or approved by the U.S. Food and Drug   Administration (FDA).  The FDA has determined that such clearance or approval   is not a requirement prior to use for clinical purposes.     Your pap smear and HPV test were both normal.  I recommend a repeat pap smear in 5 years.      If you have any questions, please call the clinic to make an appointment.    Sincerely,    Misty Bernstein MD

## 2017-11-29 NOTE — LETTER
November 30, 2017      Kelly Harris  [address]  [place] [address]        Dear Kelly,    Please see below for your test results.    Resulted Orders   CBC with Plt ([facility])   Result Value Ref Range    WBC 8.4 4.0 - 11.0 K/uL    RBC 4.9 3.8 - 5.2 M/uL    Hemoglobin 13.1 11.7 - 15.7 g/dL    Hematocrit 41.6 35.0 - 47.0 %    MCV 84.8 78.0 - 100.0 fL    MCH 26.7 26.5 - 35.0    MCHC 31.5 (L) 32.0 - 36.0 g/dL    Platelets 374.0 150.0 - 450.0 K/uL   Thyroid [place] ([facility])   Result Value Ref Range    TSH 0.65 0.30 - 5.00 uIU/mL    Narrative    Test performed by:  [facility] [address] [place] [address]     Your blood counts and thyroid function were all normal.  This is reassuring.  If your fatigue or joint issues get worse, please come back to see me.      If you have any questions, please call the clinic to make an appointment.    Sincerely,    Misty Bernstein MD

## 2017-11-29 NOTE — MR AVS SNAPSHOT
After Visit Summary   11/29/2017    Kelly Harris    MRN: [id number]           Patient Information     Date Of Birth          1970        Visit Information        Provider Department      11/29/2017 8:00 AM Misty Bernstein MD [facility]        Today's Diagnoses     Routine general medical examination at a health care facility    -  1    Visit for screening mammogram        Fatigue, unspecified type          Care Instructions    Get your labs and mammogram.    If your periods are giving you more trouble, come back for a check up.  If your joints are giving you more trouble come back for irina work up.    Preventive Health Recommendations  Female Ages 40 to 49    Yearly exam:     See your health care provider every year in order to  1. Review health changes.   2. Discuss preventive care.    3. Review your medicines if your doctor prescribed any.      Get a Pap test every three years (unless you have an abnormal result and your provider advises testing more often).      If you get Pap tests with HPV test, you only need to test every 5 years, unless you have an abnormal result. You do not need a Pap test if your uterus was removed (hysterectomy) and you have not had cancer.      You should be tested each year for STDs (sexually transmitted diseases), if you're at risk.       Ask your doctor if you should have a mammogram.      Have a colonoscopy (test for colon cancer) if someone in your family has had colon cancer or polyps before age 50.       Have a cholesterol test every 5 years.       Have a diabetes test (fasting glucose) after age 45. If you are at risk for diabetes, you should have this test every 3 years.    Shots: Get a flu shot each year. Get a tetanus shot every 10 years.     Nutrition:     Eat at least 5 servings of fruits and vegetables each day.    Eat whole-grain bread, whole-wheat pasta and brown rice instead of white grains and rice.    Talk to your provider about Calcium and  Vitamin D.     Lifestyle    Exercise at least 150 minutes a week (an average of 30 minutes a day, 5 days a week). This will help you control your weight and prevent disease.    Limit alcohol to one drink per day.    No smoking.     Wear sunscreen to prevent skin cancer.    See your dentist every six months for an exam and cleaning.          Follow-ups after your visit        Follow-up notes from your care team     Return in about 1 year (around 2018) for Physical Exam.      Future tests that were ordered for you today     Open Future Orders        Priority Expected Expires Ordered    PCS Use: Screening Digital Bilateral Mammogram Routine  2018            Who to contact     Please call your clinic at [phone] to:    Ask questions about your health    Make or cancel appointments    Discuss your medicines    Learn about your test results    Speak to your doctor   If you have compliments or concerns about an experience at your clinic, or if you wish to file a complaint, please contact [facility] Patient Relations at [phone] or email us at [email or web address]         Additional Information About Your Visit        [employer] Information     [employer] is an electronic gateway that provides easy, online access to your medical records. With [employer], you can request a clinic appointment, read your test results, renew a prescription or communicate with your care team.     To sign up for [employer] visit the website at [email or web address]   You will be asked to enter the access code listed below, as well as some personal information. Please follow the directions to create your username and password.     Your access code is: [email or web address]  Expires: 2018  8:55 AM     Your access code will  in 90 days. If you need help or a new code, please contact your [facility] Clinic or call [phone] for assistance.        Care  "EveryWhere ID     This is your Care EveryWhere ID. This could be used by other organizations to access your [facility] medical records  [id number]        Your Vitals Were     Pulse Temperature Height BMI (Body Mass Index)          71 98.4  F (36.9  C) 5' 3.5\" (161.3 cm) 24.9 kg/m2         Blood Pressure from Last 3 Encounters:   11/29/17 133/82   05/16/17 127/79   05/08/17 126/86    Weight from Last 3 Encounters:   11/29/17 142 lb 12.8 oz (64.8 kg)   05/16/17 143 lb 9.6 oz (65.1 kg)   05/08/17 143 lb (64.9 kg)              We Performed the Following     CBC with Plt ([facility])     GYN Cytology ([facility])     Thyroid [place] ([facility])          Today's Medication Changes          These changes are accurate as of: 11/29/17  8:55 AM.  If you have any questions, ask your nurse or doctor.               Stop taking these medicines if you haven't already. Please contact your care team if you have questions.     fluticasone 220 MCG/ACT Inhaler   Commonly known as:  FLOVENT HFA   Stopped by:  Misty Bernstein MD                    Primary Care Provider Office Phone # Fax #    Misty Bernstein [phone]       [place] [address]        Equal Access to Services     KELLY VIGIL AH: Hadii angie zavalao Soleonor, waaxda luqadaha, qaybta kaalmada martin, reyna arizmendi. So [facility] [phone].    ATENCIÓN: Si habla español, tiene a manzano disposición servicios gratuitos de asistencia lingüística. Llame al [phone].    We comply with applicable federal civil rights laws and [place] laws. We do not discriminate on the basis of race, color, national origin, age, disability, sex, sexual orientation, or gender identity.            Thank you!     Thank you for choosing [facility]  for your care. Our goal is always to provide you with excellent care. Hearing back from our patients is one way we can continue to improve our services. Please take a few " minutes to complete the written survey that you may receive in the mail after your visit with us. Thank you!             Your Updated Medication List - Protect others around you: Learn how to safely use, store and throw away your medicines at www.disposemymeds.org.          This list is accurate as of: 11/29/17  8:55 AM.  Always use your most recent med list.                   Brand Name Dispense Instructions for use Diagnosis    acetaminophen 500 MG tablet    TYLENOL    100 tablet    Take 2 tablets (1,000 mg) by mouth 3 times daily    Pain of left lower extremity, Left-sided low back pain with left-sided sciatica       albuterol 108 (90 BASE) MCG/ACT Inhaler    PROAIR HFA/PROVENTIL HFA/VENTOLIN HFA    2 Inhaler    Inhale 2 puffs into the lungs every 4 hours as needed for shortness of breath / dyspnea or wheezing    Acute bronchospasm       aspirin 325 MG tablet      Take 1 tablet (325 mg) by mouth daily    Thrombosis of ovarian vein       budesonide-formoterol 160-4.5 MCG/ACT Inhaler    SYMBICORT     Inhale 2 puffs into the lungs 2 times daily        cetirizine 10 MG tablet    zyrTEC    90 tablet    Take 1 tablet (10 mg) by mouth daily    Seasonal allergies       fluticasone 50 MCG/ACT spray    FLONASE    16 g    Spray 1-2 sprays into both nostrils daily    Seasonal allergies       order for DME     1 Device    Equipment being ordered: spacer for inhaler, please fit io inhaler size    Acute bronchospasm

## 2017-11-29 NOTE — PROGRESS NOTES
Female Physical Note         HPI         Concerns today: Knees and back have intermittent pain.  Knee recently got very swollen after 7 hour car trip, now better.  Back hurting more on the left side, better with stretching.  No injury. No other joints have pain or swelling.  Some more tiredness than usual.             Review of Systems:     CONSTITUTIONAL: increasing fatigue, no unexpected change in weight  SKIN: no worrisome rashes, no worrisome moles, no worrisome lesions  EYES: no acute vision problems or changes  ENT: no ear problems, no mouth problems, no throat problems  RESP: no significant cough, no shortness of breath  BREAST: no masses, no tenderness, no discharge  CV: no chest pain, no palpitations, no new or worsening peripheral edema  GI: no nausea, no vomiting, no constipation, no diarrhea  : no frequency, no dysuria, no hematuria  MS: no claudication, no myalgias, no joint aches  MUSCULOSKELETAL:as above  NEURO: no weakness, no dizziness, no syncope, no headaches  ENDOCRINE: no temperature intolerance, no skin/hair changes  HEME: no easy bruising, no bleeding problems  PSYCHIATRIC: NEGATIVE for changes in mood or affect      Sexually Active: No  Sexual concerns: No   Contraception:not needed  Pregnancy History: No obstetric history on file.  Menses: No LMP recorded. Patient is not currently having periods (Reason: Irregular Periods).  Irregular LMP September  STD History: Neg  Last Pap Smear Date: 2014? Normal.  2011 normal.    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  Social History   Substance Use Topics     Smoking status: Never Smoker     Smokeless tobacco: Not on file     Alcohol use No       Children ? yes two daughters ages 11 and 16.     Has anyone hurt you physically, for example by pushing, hitting, slapping or kicking you or forcing you to have sex? Denies  Do you feel threatened or controlled by a partner, ex-partner or anyone in your life? Denies    RISK BEHAVIORS AND HEALTHY HABITS:  Tobacco Use/Smoking: None  Illicit Drug Use: None  Do you use alcohol? No  Diet (5-7 servings of fruits/veg daily): Yes   Exercise (30 min accumulated most days):No  Dental Care: Yes   Calcium 1500 mg/d:  Yes  Seat Belt Use: Yes     Cholesterol Level (>44 yo or at risk):  Testing not indicated , ASA use (>3% risk in 5 y): on full dose for h/o ovarian vein thrombosis. and Pap/HPV cotest every 5 years for women 30-65   Recommended and patient accepted testing.  Breast CA Screening (>41 yo or 10 y before 1st degree relative diagnosis): Recommended and patient accepted testing.    Immunization History   Administered Date(s) Administered     HEPA 12/17/2008, 06/19/2009     Influenza (IIV3) PF 10/08/2010     Influenza Vaccine, 3 YRS +, IM (QUADRIVALENT W/PRESERVATIVES) 11/09/2015, 11/29/2017     MMR 03/06/2009, 06/17/2009     Meningococcal (Menomune ) 12/17/2008     Poliovirus, inactivated (IPV) 12/17/2008     TD (ADULT, 7+) 04/23/2015     Tdap (Adacel,Boostrix) 12/17/2008    Reviewed Immunization Record Today         Physical Exam:     /82  Pulse 71  Temp 98.4  F (36.9  C)  Ht 5' 3.5\" (161.3 cm)  Wt 142 lb 12.8 oz (64.8 kg)  BMI 24.9 kg/m2  GENERAL: healthy, alert and no distress  EYES: Eyes grossly normal to inspection, extraocular movements - intact, and PERRL  HENT: ear canals- normal; TMs- normal; Nose- normal; Mouth- no ulcers, no lesions  NECK: no tenderness, " no adenopathy, no asymmetry, no masses, no stiffness; thyroid- normal to palpation  RESP: lungs clear to auscultation - no rales, no rhonchi, no wheezes  BREAST: no masses, no tenderness, no nipple discharge, no palpable axillary masses or adenopathy  CV: regular rates and rhythm, normal S1 S2, no S3 or S4 and no murmur, no click or rub -  ABDOMEN: soft, no tenderness, no  hepatosplenomegaly, no masses, normal bowel sounds  MS: extremities- no gross deformities noted, no edema  SKIN: no suspicious lesions, no rashes  NEURO: strength and tone- normal, sensory exam- grossly normal, mentation- intact, speech- normal, reflexes- symmetric  BACK: no CVA tenderness, no paralumbar tenderness  - female: cervix- normal, adnexae- normal; uterus- normal, no masses, no discharge  PSYCH: Alert and oriented times 3; speech- coherent , normal rate and volume; able to articulate logical thoughts, able to abstract reason, no tangential thoughts, no hallucinations or delusions, affect- normal  LYMPHATICS: ant. cervical- normal, post. cervical- normal, axillary- normal, supraclavicular- normal, inguinal- normal    Assessment and Plan     Kelly was seen today for physical and radiology visit.    Diagnoses and all orders for this visit:    Routine general medical examination at a health care facility: otherwise up to date on screening.  Screen for diabetes next year due to family hx.  -     GYN Cytology ([facility])    Visit for screening mammogram  -     PCS Use: Screening Digital Bilateral Mammogram; Future    Fatigue, unspecified type: knee swelling likely from car trip.  Follow closely. H/o sickle cell trait.  Check cbc and thyroid due to fatigue.  -     CBC with Plt ([facility])  -     Thyroid [place] ([facility])    Need for prophylactic vaccination and inoculation against influenza  -     ADMIN VACCINE, INITIAL  -     FLU VAC QUADRIVLENT SPLIT VIRUS IM 0.5ml dosage      Return in about 1 year (around 11/29/2018) for Physical  Exam.    Total of 40 minutes was spent in face to face contact with patient with > 50% in counseling and coordination of care.  Options for treatment and/or follow-up care were reviewed with the patient. Kelly Harris was engaged and actively involved in the decision making process. She verbalized understanding of the options discussed and was satisfied with the final plan.    Misty Bernstein MD

## 2017-11-30 NOTE — PROGRESS NOTES
Your blood counts and thyroid function were all normal.  This is reassuring.  If your fatigue or joint issues get worse, please come back to see me.

## 2017-12-04 LAB
INTERPRETATION: NORMAL
INTERPRETER: NORMAL

## 2017-12-06 ENCOUNTER — RECORDS - HEALTHEAST (OUTPATIENT)
Dept: ADMINISTRATIVE | Facility: OTHER | Age: 47
End: 2017-12-06

## 2017-12-06 LAB
CYTOLOGY CVX/VAG DOC THIN PREP: NORMAL
HIGH RISK?: NO
HPV REFLEX?: NORMAL
LAB AP ABNORMAL BLEEDING: NO
LAB AP BIRTH CONTROL/HORMONES: NORMAL
LAB AP CASE REPORT: NORMAL
LAB AP CERVICAL APPEARANCE: NORMAL
LAB AP MALIGNANT?: NORMAL
LAB AP PATIENT STATUS: NORMAL
LAB AP PREVIOUS ABNL: NORMAL
LAB AP PREVIOUS NORMAL: 2014
LAST MENS PERIOD: NORMAL
SPECIMEN ADEQUACY:: NORMAL

## 2017-12-11 ENCOUNTER — RECORDS - HEALTHEAST (OUTPATIENT)
Dept: MAMMOGRAPHY | Facility: CLINIC | Age: 47
End: 2017-12-11

## 2017-12-11 DIAGNOSIS — Z12.31 ENCOUNTER FOR SCREENING MAMMOGRAM FOR MALIGNANT NEOPLASM OF BREAST: ICD-10-CM

## 2017-12-15 DIAGNOSIS — Z12.31 VISIT FOR SCREENING MAMMOGRAM: ICD-10-CM

## 2017-12-15 LAB — MAMMOGRAM: NORMAL

## 2017-12-15 NOTE — LETTER
December 19, 2017      Kelly Harris  [address]  [place] [address]        Dear Kelly,    Please see below for your test results.    Resulted Orders   PCS Use: Screening Digital Bilateral Mammogram   Result Value Ref Range    MAMMOGRAM         Your mammogram was negative.  I recommend yearly scans.    If you have any questions, please call the clinic to make an appointment.    Sincerely,    Misty Bernstein MD

## 2017-12-18 NOTE — PROGRESS NOTES
MAMMOGRAM REFERRAL  [facility]  Address   [address], [address]  [place] [address]    Telephone   [phone]    1/4/18 11:40 AM  No lotions, perfumes or powders.  Mailed to patient  Lima Medeiros CMA

## 2018-01-22 ENCOUNTER — OFFICE VISIT - HEALTHEAST (OUTPATIENT)
Dept: ALLERGY | Facility: CLINIC | Age: 48
End: 2018-01-22

## 2018-01-22 DIAGNOSIS — J38.3 VOCAL CORD DYSFUNCTION: ICD-10-CM

## 2018-01-22 DIAGNOSIS — J45.40 MODERATE PERSISTENT ASTHMA, UNCOMPLICATED: ICD-10-CM

## 2018-05-17 ENCOUNTER — OFFICE VISIT (OUTPATIENT)
Dept: FAMILY MEDICINE | Facility: CLINIC | Age: 48
End: 2018-05-17
Payer: COMMERCIAL

## 2018-05-17 VITALS
RESPIRATION RATE: 16 BRPM | OXYGEN SATURATION: 100 % | BODY MASS INDEX: 24.38 KG/M2 | SYSTOLIC BLOOD PRESSURE: 117 MMHG | HEART RATE: 84 BPM | WEIGHT: 142.8 LBS | TEMPERATURE: 98.3 F | HEIGHT: 64 IN | DIASTOLIC BLOOD PRESSURE: 75 MMHG

## 2018-05-17 DIAGNOSIS — J30.2 CHRONIC SEASONAL ALLERGIC RHINITIS, UNSPECIFIED TRIGGER: ICD-10-CM

## 2018-05-17 DIAGNOSIS — J45.31 ASTHMA, CHRONIC, MILD PERSISTENT, WITH ACUTE EXACERBATION: Primary | ICD-10-CM

## 2018-05-17 DIAGNOSIS — M79.641 PAIN OF RIGHT HAND: ICD-10-CM

## 2018-05-17 RX ORDER — EPINASTINE HCL 0.05 %
1 DROPS OPHTHALMIC (EYE) 2 TIMES DAILY
Qty: 1 BOTTLE | Refills: 11 | Status: SHIPPED | OUTPATIENT
Start: 2018-05-17 | End: 2019-05-22

## 2018-05-17 RX ORDER — PREDNISONE 20 MG/1
40 TABLET ORAL DAILY
Qty: 10 TABLET | Refills: 0 | Status: SHIPPED | OUTPATIENT
Start: 2018-05-17 | End: 2019-02-13

## 2018-05-17 RX ORDER — FLUTICASONE PROPIONATE 50 MCG
1-2 SPRAY, SUSPENSION (ML) NASAL DAILY
Qty: 16 G | Refills: 11 | Status: SHIPPED | OUTPATIENT
Start: 2018-05-17 | End: 2019-02-13

## 2018-05-17 NOTE — PROGRESS NOTES
"    There are no exam notes on file for this visit.  Chief Complaint   Patient presents with     Musculoskeletal Problem     right hand pain     Allergies     nasal drainage, throat scratchy, eyes itchy and red.     Blood pressure 117/75, pulse 84, temperature 98.3  F (36.8  C), resp. rate 16, height 5' 3.5\" (161.3 cm), weight 142 lb 12.8 oz (64.8 kg), SpO2 100 %.                 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  Felt like a paper cut.  Now it is still hurting and hurting with grasping and lifting. Tender to the touch.  Pain is between the right index and middle finger finger webspace.  Non-radiating.  No injury that she remembers, never had this before. It is flaring up and worsening. Does wake at night at times. No weakness, numbness or tingling.  No skin rashes.  No swelling but thinks she may have a felt a cystic structure there periodically. She is left handed, but writes with her right hand.    Allergies and asthma is out of control.  Symbicort two puffs twice a day.  Albuterol up to four times a day for the last week.  Allergies flaring over the past few weeks and flaring a lot this week especially. Using zyrtec but stopped flonase since her allergy symptoms stopped over the winter.  She is having nasal congestion, watery itching eyes,     PMH, Medications and Allergies were reviewed and updated as needed.                Physical Exam:     Vitals:    05/17/18 1113   BP: 117/75   Pulse: 84   Resp: 16   Temp: 98.3  F (36.8  C)   SpO2: 100%   Weight: 142 lb 12.8 oz (64.8 kg)   Height: 5' 3.5\" (161.3 cm)     Body mass index is 24.9 kg/(m^2).    Exam:  Constitutional: Clearly having tight breathing.  Audible wheeze while talking to her.  Short " phrases of speech.  Neck: Neck supple. No adenopathy. Thyroid symmetric, normal size,  Eyes: PERRLA, EOMI, conjunctiva are clear. Some cobble stoning of lower lids bilaterally. Mild conjunctival erythema, minimal clear discharge.  ENT: Clear nasal discharge, turbinates swollen and erythema. TMs clear, Oropharynx clear with no erythema or exudates.  Cardiovascular:RRR. No murmurs, clicks gallops or rub  Respiratory:  normal respiratory rate and rhythm, Lungs very tight.  Prolonged expiratory phase.  Expiratory wheezing through all lung fields. Wheezes cleared with albuterol neb in clinic.  Right hand: No skin findings.  Full ROM but pain and tightness with flexion.  Tender to touch between index and middle MCPs on right hand,  Feels rincon in that area than the left side. No firmness or masses, no clear cyst palpated.  No tenderness or erythema of joints.    Psychiatric: mentation appears normal and affect normal/bright    ACT Total Scores 5/8/2017 5/16/2017 5/17/2018   ACT TOTAL SCORE (Goal Greater than or Equal to 20) 9 12 9   In the past 12 months, how many times did you visit the emergency room for your asthma without being admitted to the hospital? 1 1 -   In the past 12 months, how many times were you hospitalized overnight because of your asthma? 0 0 -     No flowsheet data found.  Results for orders placed or performed in visit on 12/15/17   PCS Use: Screening Digital Bilateral Mammogram   Result Value Ref Range    MAMMOGRAM         Assessment and Plan     Kelly was seen today for musculoskeletal problem and allergies.    Diagnoses and all orders for this visit:    Asthma, chronic, mild persistent, with acute exacerbation:  Asthma exacerbation today lungs better with albuterol  Steroid burst schedule albuterol 4 times a day for the next few days.  Continue symbicort.  Treating allergies. Consider singulair in the future.  -     predniSONE (DELTASONE) 20 MG tablet; Take 2 tablets (40 mg) by mouth daily for 5  days    Chronic seasonal allergic rhinitis, unspecified trigger: continue zyrtec. Restart flonase, add antihistamine eye drop.  Prednisone should also help calm these symptoms.   -     fluticasone (FLONASE) 50 MCG/ACT spray; Spray 1-2 sprays into both nostrils daily  -     epinastine HCl (ELESTAT) 0.05 % SOLN; Place 1 drop into both eyes 2 times daily    Pain of right hand: likely ganglion cyst causing mass effect type pain.  Xray not likely helpful as does not seem to be a bony problem.  Orthopedic hand for their opinion and possible MRI to confirm what is causing pain.  If it is a cyst it is quite symptomatic and will need removal.  -     ORTHOPEDICS ADULT REFERRAL; Future      Patient Instructions   -Restart Flonase, use every day.  -Start eyedrop for allergies as well.  -Will give you Prednisone 40mg for 5 days for your asthma  -Will send you to the hand specialist    RTC prn     Options for treatment and/or follow-up care were reviewed with the patient. Kelly Harris was engaged and actively involved in the decision making process. She verbalized understanding of the options discussed and was satisfied with the final plan.    Misty Bernstein MD

## 2018-05-17 NOTE — PATIENT INSTRUCTIONS
-Restart Flonase, use every day.    -Start eyedrop for allergies as well.    -Will give you Prednisone 40mg for 5 days for your asthma    -Will send you to the hand specialist.    -Use Albuterol 4 times a day scheduled while on the Prednisone.    [facility]  Main number:[phone]    [facility]  [address] [place] [address]    Appointment  Date:5/25/18  Time:12:45pm arrival   Provider:melissa     Please contact the above clinic if you need to cancel or reschedule. Feel free to contact me with any questions. Thanks!    Heidy  Care Coordinator  [phone]

## 2018-05-17 NOTE — MR AVS SNAPSHOT
After Visit Summary   5/17/2018    Kelly Harris    MRN: [id number]           Patient Information     Date Of Birth          1970        Visit Information        Provider Department      5/17/2018 11:00 AM Misty Bernstein MD [facility]        Today's Diagnoses     Asthma, chronic, mild persistent, with acute exacerbation    -  1    Chronic seasonal allergic rhinitis, unspecified trigger        Pain of right hand          Care Instructions    -Restart Flonase, use every day.    -Start eyedrop for allergies as well.    -Will give you Prednisone 40mg for 5 days for your asthma    -Will send you to the hand specialist.    -Use Albuterol 4 times a day scheduled while on the Prednisone.              Follow-ups after your visit        Additional Services     ORTHOPEDICS ADULT REFERRAL       Patient prefers to be called    Reason for Referral: Pain in between index and middle finger mcp right, suspect cyst due to pain with bending.  Referral Location: [facility]: [phone]     needed: No  Language: English    May leave message on voicemail: Yes    ([place] Only) Referral should be tracked (Yes/No)?                  Future tests that were ordered for you today     Open Future Orders        Priority Expected Expires Ordered    ORTHOPEDICS ADULT REFERRAL Routine  8/15/2018 5/17/2018            Who to contact     Please call your clinic at [phone] to:    Ask questions about your health    Make or cancel appointments    Discuss your medicines    Learn about your test results    Speak to your doctor            Additional Information About Your Visit        MyChart Information     NewsiTt is an electronic gateway that provides easy, online access to your medical records. With [employer], you can request a clinic appointment, read your test results, renew a prescription or communicate with your care team.     To sign up for NewsiTt visit the website at [email or web address]  "  You will be asked to enter the access code listed below, as well as some personal information. Please follow the directions to create your username and password.     Your access code is: [email or web address]  Expires: 8/15/2018 11:51 AM     Your access code will  in 90 days. If you need help or a new code, please contact your [facility] or call [phone] for assistance.        Care EveryWhere ID     This is your Care EveryWhere ID. This could be used by other organizations to access your [place] medical records  [id number]        Your Vitals Were     Pulse Temperature Respirations Height Pulse Oximetry BMI (Body Mass Index)    84 98.3  F (36.8  C) 16 5' 3.5\" (161.3 cm) 100% 24.9 kg/m2       Blood Pressure from Last 3 Encounters:   18 117/75   17 133/82   17 127/79    Weight from Last 3 Encounters:   18 142 lb 12.8 oz (64.8 kg)   17 142 lb 12.8 oz (64.8 kg)   17 143 lb 9.6 oz (65.1 kg)              We Performed the Following     ALBUTEROL UNIT DOSE, 1 MG     INHALATION/NEBULIZER TREATMENT, INITIAL          Today's Medication Changes          These changes are accurate as of 18 11:51 AM.  If you have any questions, ask your nurse or doctor.               Start taking these medicines.        Dose/Directions    epinastine HCl 0.05 % Soln   Commonly known as:  ELESTAT   Used for:  Chronic seasonal allergic rhinitis, unspecified trigger   Started by:  Misty Bernstein MD        Dose:  1 drop   Place 1 drop into both eyes 2 times daily   Quantity:  1 Bottle   Refills:  11       predniSONE 20 MG tablet   Commonly known as:  DELTASONE   Used for:  Asthma, chronic, mild persistent, with acute exacerbation   Started by:  Misty Bernstein MD        Dose:  40 mg   Take 2 tablets (40 mg) by mouth daily for 5 days   Quantity:  10 tablet   Refills:  0            Where to get your medicines      These medications were sent to [id number] - " [place], [place] - [address] AT [facility] [address]  [address], [place] [address]    Hours:  called pharm.  they do accept faxes Phone:  [phone]     epinastine HCl 0.05 % Soln    fluticasone 50 MCG/ACT spray    predniSONE 20 MG tablet                Primary Care Provider Office Phone # Fax #    Misty Bernstein [phone]       [address] [place] [id number]        Equal Access to Services     [facility]: Hadii aad ku hadasho Soomaali, waaxda luqadaha, qaybta kaalmada adeegyada, waxelena esteban haykelvin matt . So [facility] [phone].    ATENCIÓN: Si habla español, tiene a manzano disposición servicios gratuitos de asistencia lingüística. [facility] [phone].    We comply with applicable federal civil rights laws and [place] laws. We do not discriminate on the basis of race, color, national origin, age, disability, sex, sexual orientation, or gender identity.            Thank you!     Thank you for choosing [facility]  for your care. Our goal is always to provide you with excellent care. Hearing back from our patients is one way we can continue to improve our services. Please take a few minutes to complete the written survey that you may receive in the mail after your visit with us. Thank you!             Your Updated Medication List - Protect others around you: Learn how to safely use, store and throw away your medicines at www.disposemymeds.org.          This list is accurate as of 5/17/18 11:51 AM.  Always use your most recent med list.                   Brand Name Dispense Instructions for use Diagnosis    acetaminophen 500 MG tablet    TYLENOL    100 tablet    Take 2 tablets (1,000 mg) by mouth 3 times daily    Pain of left lower extremity, Left-sided low back pain with left-sided sciatica       albuterol 108 (90 Base) MCG/ACT Inhaler    PROAIR HFA/PROVENTIL HFA/VENTOLIN HFA    2 Inhaler    Inhale 2 puffs into the lungs  every 4 hours as needed for shortness of breath / dyspnea or wheezing    Acute bronchospasm       aspirin 325 MG tablet      Take 1 tablet (325 mg) by mouth daily    Thrombosis of ovarian vein       budesonide-formoterol 160-4.5 MCG/ACT Inhaler    SYMBICORT     Inhale 2 puffs into the lungs 2 times daily        cetirizine 10 MG tablet    zyrTEC    90 tablet    Take 1 tablet (10 mg) by mouth daily    Seasonal allergies       epinastine HCl 0.05 % Soln    ELESTAT    1 Bottle    Place 1 drop into both eyes 2 times daily    Chronic seasonal allergic rhinitis, unspecified trigger       fluticasone 50 MCG/ACT spray    FLONASE    16 g    Spray 1-2 sprays into both nostrils daily    Chronic seasonal allergic rhinitis, unspecified trigger       order for DME     1 Device    Equipment being ordered: spacer for inhaler, please fit io inhaler size    Acute bronchospasm       PLAVIX PO           predniSONE 20 MG tablet    DELTASONE    10 tablet    Take 2 tablets (40 mg) by mouth daily for 5 days    Asthma, chronic, mild persistent, with acute exacerbation

## 2018-12-07 ENCOUNTER — COMMUNICATION - HEALTHEAST (OUTPATIENT)
Dept: ALLERGY | Facility: CLINIC | Age: 48
End: 2018-12-07

## 2018-12-07 DIAGNOSIS — R06.02 SHORTNESS OF BREATH: ICD-10-CM

## 2018-12-10 DIAGNOSIS — J98.01 ACUTE BRONCHOSPASM: ICD-10-CM

## 2018-12-11 DIAGNOSIS — J98.01 ACUTE BRONCHOSPASM: ICD-10-CM

## 2018-12-11 RX ORDER — ALBUTEROL SULFATE 90 UG/1
2 AEROSOL, METERED RESPIRATORY (INHALATION) EVERY 4 HOURS PRN
Qty: 2 INHALER | Refills: 11 | Status: SHIPPED | OUTPATIENT
Start: 2018-12-11 | End: 2018-12-14

## 2018-12-14 RX ORDER — ALBUTEROL SULFATE 90 UG/1
2 AEROSOL, METERED RESPIRATORY (INHALATION) EVERY 4 HOURS PRN
Qty: 2 INHALER | Refills: 11 | Status: SHIPPED | OUTPATIENT
Start: 2018-12-14 | End: 2020-02-10

## 2018-12-27 ENCOUNTER — COMMUNICATION - HEALTHEAST (OUTPATIENT)
Dept: ALLERGY | Facility: CLINIC | Age: 48
End: 2018-12-27

## 2018-12-27 DIAGNOSIS — R06.02 SHORTNESS OF BREATH: ICD-10-CM

## 2019-01-11 ENCOUNTER — OFFICE VISIT (OUTPATIENT)
Dept: FAMILY MEDICINE | Facility: CLINIC | Age: 49
End: 2019-01-11
Payer: COMMERCIAL

## 2019-01-11 VITALS
HEIGHT: 64 IN | DIASTOLIC BLOOD PRESSURE: 74 MMHG | HEART RATE: 73 BPM | OXYGEN SATURATION: 99 % | WEIGHT: 140.8 LBS | TEMPERATURE: 97.9 F | RESPIRATION RATE: 14 BRPM | SYSTOLIC BLOOD PRESSURE: 127 MMHG | BODY MASS INDEX: 24.04 KG/M2

## 2019-01-11 DIAGNOSIS — Z00.00 ROUTINE GENERAL MEDICAL EXAMINATION AT A HEALTH CARE FACILITY: Primary | ICD-10-CM

## 2019-01-11 DIAGNOSIS — G89.29 CHRONIC LEFT-SIDED LOW BACK PAIN WITH LEFT-SIDED SCIATICA: ICD-10-CM

## 2019-01-11 DIAGNOSIS — J45.40 MODERATE PERSISTENT ASTHMA WITHOUT COMPLICATION: ICD-10-CM

## 2019-01-11 DIAGNOSIS — Z23 NEED FOR PROPHYLACTIC VACCINATION AND INOCULATION AGAINST INFLUENZA: ICD-10-CM

## 2019-01-11 DIAGNOSIS — M54.42 CHRONIC LEFT-SIDED LOW BACK PAIN WITH LEFT-SIDED SCIATICA: ICD-10-CM

## 2019-01-11 PROBLEM — J45.50 SEVERE PERSISTENT ASTHMA WITHOUT COMPLICATION (H): Status: ACTIVE | Noted: 2019-01-11

## 2019-01-11 RX ORDER — FLUTICASONE PROPIONATE AND SALMETEROL XINAFOATE 230; 21 UG/1; UG/1
2 AEROSOL, METERED RESPIRATORY (INHALATION) 2 TIMES DAILY
Qty: 1 INHALER | Refills: 11 | Status: SHIPPED | OUTPATIENT
Start: 2019-01-11 | End: 2019-02-13

## 2019-01-11 ASSESSMENT — MIFFLIN-ST. JEOR: SCORE: 1240.72

## 2019-01-11 NOTE — NURSING NOTE
Injectable influenza vaccine documentation    1. Has the patient received the information for the influenza vaccine? YES    2. Does the patient have a severe allergy to eggs (Patients with a severe egg allergy should be assessed by a medical provider, RN, or clinical pharmacist. If they receive the influenza vaccine, please have them observed for 15 minutes.)? No    3. Has the patient had an allergic reaction to previous influenza vaccines? No    4. Has the patient had any severe allergic reactions to past influenza vaccines ? No       5. Does patient have a history of Guillain-Frederick syndrome? No      Based on responses above, I administered the influenza vaccine.  Patt Lozano, CMA

## 2019-01-11 NOTE — PATIENT INSTRUCTIONS
Preventive Health Recommendations  Female Ages 40 to 49    Yearly exam:     See your health care provider every year in order to  1. Review health changes.   2. Discuss preventive care.    3. Review your medicines if your doctor prescribed any.      Get a Pap test every three years (unless you have an abnormal result and your provider advises testing more often).      If you get Pap tests with HPV test, you only need to test every 5 years, unless you have an abnormal result. You do not need a Pap test if your uterus was removed (hysterectomy) and you have not had cancer.      You should be tested each year for STDs (sexually transmitted diseases), if you're at risk.     Ask your doctor if you should have a mammogram.      Have a colonoscopy (test for colon cancer) if someone in your family has had colon cancer or polyps before age 50.       Have a cholesterol test every 5 years.       Have a diabetes test (fasting glucose) after age 45. If you are at risk for diabetes, you should have this test every 3 years.    Shots: Get a flu shot each year. Get a tetanus shot every 10 years.     Nutrition:     Eat at least 5 servings of fruits and vegetables each day.    Eat whole-grain bread, whole-wheat pasta and brown rice instead of white grains and rice.    Get adequate Calcium and Vitamin D.      Lifestyle    Exercise at least 150 minutes a week (an average of 30 minutes a day, 5 days a week). This will help you control your weight and prevent disease.    Limit alcohol to one drink per day.    No smoking.     Wear sunscreen to prevent skin cancer.    See your dentist every six months for an exam and cleaning.               Low Back Pain            What is low back pain?   Low back pain is pain and stiffness in the lower back. It is one of the most common reasons people miss work.   How does it occur?   Your lower back is called your lumbar spine. It is made up of 5 bones called lumbar vertebrae. In between the vertebrae  are shock absorbers called disks. Back pain can occur from an injury to the vertebrae or when a disk bulges or herniates.   Low back pain is usually caused when a ligament or muscle holding a vertebra in its proper position is strained. Vertebrae are bones that make up the spinal column through which the spinal cord passes. When these muscles or ligaments become weak or strained, the spine loses its stability, resulting in pain.   Low back pain can occur if your job involves lifting and carrying heavy objects, or if you spend a lot of time sitting or standing in one position or bending over. It can be caused by a fall or by unusually strenuous exercise. It can be brought on by the tension and stress that cause headaches in some people. It can even be brought on by violent sneezing or coughing.   People who are overweight may have low back pain because of the added stress on their back.   Back pain may occur when the muscles, joints, bones, and connective tissues of the back become inflamed as a result of an infection or an immune system problem. Arthritic disorders as well as some congenital and degenerative conditions may cause back pain.   Back pain accompanied by loss of bladder or bowel control, trouble moving your legs, or numbness or tingling in your arms or legs requires immediate medical treatment.   What are the symptoms?   Symptoms include:   pain in the back or legs   stiffness, spasm, or limited motion   The pain may be constant or may happen only in certain positions. It may get worse when you cough, sneeze, bend, twist, or strain during a bowel movement. The pain may be in only one spot or may spread to other areas, most commonly down the buttocks and into the back of the thigh.   A low back strain typically does not produce pain past the knee into the calf or foot. Tingling or numbness in the calf or foot may indicate a herniated disk or pinched nerve.   Be sure to see your healthcare provider if:    You have weakness in your leg, especially if you cannot lift your foot, because this may be a sign of nerve damage.   You have new bowel or bladder problems as well as back pain, which may be a sign of severe injury to your spinal cord.   You have pain that gets worse despite treatment.   How is it diagnosed?   Your healthcare provider will review your medical history and examine you. You may have X-rays, an MRI, CT scan, or a bone scan.   How is it treated?   To treat this condition:   Put an ice pack, gel pack, or package of frozen vegetables, wrapped in a cloth on the area every 3 to 4 hours, for up to 20 minutes at a time for the first 2 or 3 days.   Use a heating pad or hot water bottle. Don't let the heating pad get too hot, and don't fall asleep with it. You could get a burn.   Rest in bed on a firm mattress. Often it helps to lie on your back with your knees raised on a pillow. However, some people prefer to lie on their side with their knees bent. It's best to try to stay active, so try not to rest in bed longer than 1 to 2 days.   Take muscle relaxants as recommended by your healthcare provider.   Take an anti-inflammatory such as ibuprofen, or other medicine as directed by your provider. Nonsteroidal anti-inflammatory medicines (NSAIDs) may cause stomach bleeding and other problems. These risks increase with age. Read the label and take as directed. Unless recommended by your healthcare provider, do not take for more than 10 days.   Get a back massage by a trained person.   Wear a belt or corset to support your back.   Do the exercises recommended by your provider. Your provider may also prescribe physical therapy.   Talk with a counselor, if your back pain is related to tension caused by emotional problems.   When the pain is gone, ask your healthcare provider about starting an exercise program such as the following:   Exercise moderately every day, using stretching and warm-up exercises suggested by  your provider or physical therapist.   Exercise vigorously for about 30 minutes 3 times a week by walking, swimming, using a stationary bicycle, or doing low-impact aerobics.   Exercising regularly will not only help your back, it will also help keep you healthier overall.   How long will the effects last?   The effects of back pain last as long as the cause exists or until your body recovers from the strain, usually a day or two but sometimes weeks.   How can I take care of myself?   In addition to the treatment described above, keep in mind these suggestions:   Practice good posture. Stand with your head up, shoulders straight, chest forward, weight balanced evenly on both feet, and pelvis tucked in.   Lose weight if you are overweight   Keep your core muscles strong. These are your abdominal and back muscles.   Sleep without a pillow under your head.   Pain is the best way to  the pace you should set in increasing your activity and exercise. Minor discomfort, stiffness, soreness, and mild aches need not interfere with activity. However, limit your activities temporarily if:   Your symptoms return.   The pain increases when you are more active.   The pain increases within 24 hours after a new or higher level of activity.   When can I return to my normal activities?   Everyone recovers from an injury at a different rate. Return to your activities depends on how soon your back recovers, not by how many days or weeks it has been since your injury has occurred. In general, the longer you have symptoms before you start treatment, the longer it will take to get better. The goal is to return to your normal activities as soon as is safely possible. If you return too soon you may worsen your injury.   It is important that you have fully recovered from your low back pain before you return to any strenuous activity. You must be able to have the same range of motion that you had before your injury. You must be able to  walk and twist without pain.   What can I do to help prevent low back pain?   You can reduce the strain on your back by doing the following:   Don't push with your arms when you move a heavy object. Turn around and push backwards so the strain is taken by your legs.   Whenever you sit, sit in a straight-backed chair and hold your spine against the back of the chair.   Bend your knees and hips and keep your back straight when you lift a heavy object.   Avoid lifting heavy objects higher than your waist.   Hold packages you carry close to your body, with your arms bent.   Use a footrest for one foot when you stand or sit in one spot for a long time. This keeps your back straight.   Bend your knees when you bend over.   Sit close to the pedals when you drive and use your seat belt and a hard backrest or pillow.   Lie on your side with your knees bent when you sleep or rest. It may help to put a pillow between your knees.   Put a pillow under your knees when you sleep on your back.   Raise the foot of the bed 8 inches to discourage sleeping on your stomach unless you have other problems that require that you keep your head elevated.   To rest your back, hold each of these positions for 5?minutes or longer:   Lie on your back, bend your knees, and put pillows under your knees.   Lie on your back on the floor with a pillow under your neck. Bend your knees to a 90-degree angle, and put your lower legs and feet on a chair.   Lie on your back, bend your knees, and bring one knee up to your chest and hold it there. Repeat with the other knee, then bring both knees to your chest. When holding your knee to your chest, grab your thigh rather than your lower leg to avoid over flexing your knee.     Published by [employer].  This content is reviewed periodically and is subject to change as new health information becomes available. The information is intended to inform and educate and is not a replacement for medical evaluation,  advice, diagnosis or treatment by a healthcare professional.   Developed by Guadalupe Good RN, [place], and [facility].   ? 2010 [facility] and/or its affiliates. All Rights Reserved.           Low Back Pain Exercise          Standing hamstring stretch: Put the heel of one leg on a stool about 15 inches high. Keep your leg straight. Lean forward, bending at the hips until you feel a mild stretch in the back of your thigh. Make sure you do not roll your shoulders or bend at the waist when doing this. You want to stretch your leg, not your lower back. Hold the stretch for 15 to 30 seconds. Repeat with each leg 3 times.   Cat and camel: Get down on your hands and knees. Let your stomach sag, allowing your back to curve downward. Hold this position for 5 seconds. Then arch your back and hold for 5 seconds. Do 3 sets of 10.   Quadruped arm and leg raise: Get down on your hands and knees. Pull in your belly button and tighten your abdominal muscles to stiffen your spine. While keeping your abdominals tight, raise one arm and the opposite leg away from you. Hold this position for 5 seconds. Lower your arm and leg slowly and change sides. Do this 10 times on each side.   Pelvic tilt: Lie on your back with your knees bent and your feet flat on the floor. Tighten your abdominal muscles and push your lower back into the floor. Hold this position for 5 seconds, then relax. Do 3 sets of 10.   Partial curl: Lie on your back with your knees bent and your feet flat on the floor. Tighten your stomach muscles. Tuck your chin to your chest. With your hands stretched out in front of you, curl your upper body forward until your shoulders clear the floor. Hold this position for 3 seconds. Don't hold your breath. It helps to breathe out as you lift your shoulders up. Relax back to the floor. Repeat 10 times. Build to 3 sets of 10. To challenge yourself, clasp your hands behind your head and keep your elbows out to the side.   Gluteal  stretch: Lie on your back with both knees bent. Rest the ankle of one leg over the knee of your other leg. Grasp the thigh of the bottom leg and pull toward your chest. You will feel a stretch along the buttocks and possibly along the outside of your hip. Hold the stretch for 15 to 30 seconds. Repeat 3 times with each leg.   Extension exercise:   0. Lie face down on the floor for 5 minutes. If this hurts too much, lie face down with a pillow under your stomach. This should relieve your leg or back pain. When you can lie on your stomach for 5 minutes without a pillow, you can continue with Part B of this exercise.   0. After lying on your stomach for 5 minutes, prop yourself up on your elbows for another 5 minutes. If you can do this without having more leg or buttock pain, you can start doing part C of this exercise.   0. Lie on your stomach with your hands under your shoulders. Then press down on your hands and extend your elbows while keeping your hips flat on the floor. Hold for 1 second and lower yourself to the floor. Do 3 to 5 sets of 10 repetitions. Rest for 1 minute between sets. You should have no pain in your legs when you do this, but it is normal to feel some pain in your lower back.   Do this exercise several times a day.   Side plank: Lie on your side with your legs, hips, and shoulders in a straight line. Prop yourself up onto your forearm so your elbow is directly under your shoulder. Lift your hips off the floor and balance on your forearm and the outside of your foot. Try to hold this position for 15 seconds, then slowly lower your hip to the ground. Switch sides and repeat. Work up to holding for 1 minute or longer. This exercise can be made easier by starting with your knees and hips flexed toward your chest.   Published by [employer].  This content is reviewed periodically and is subject to change as new health information becomes available. The information is intended to inform and educate and  is not a replacement for medical evaluation, advice, diagnosis or treatment by a healthcare professional.   Written by Odalys Bautista, MS, PT, and Guadalupe Correa PT, [facility], [facility], for [facility]   ? 2010 [facility] and/or its affiliates. All Rights Reserved.         Copyright   [employer] 2011

## 2019-01-11 NOTE — PROGRESS NOTES
Female Physical Note    Concerns today: LEft back pain and asthma.    Saw Allergist, Dr. Noel, last in July.Did speech therapy and still doing home exercises.  Stated consider ENT consult if vocal cord dysfunction continues to be an issue.  Using albuterol quite a bit and it does help SOB.      Left back pain chronic, but worsening over the last few weeks.  No new injury. No weakness, numbness tingling.  Normal bowel and bladder function.    ROS:  CONSTITUTIONAL: no fatigue, no unexpected change in weight  SKIN: no worrisome rashes, no worrisome moles, no worrisome lesions  EYES: no acute vision problems or changes  ENT: no ear problems, no mouth problems, no throat problems  RESP: no significant cough, no shortness of breath  BREAST: no masses, no tenderness, no discharge  CV: no chest pain, no palpitations, no new or worsening peripheral edema  GI: no nausea, no vomiting, no constipation, no diarrhea  : no frequency, no dysuria, no hematuria  MS: no claudication, no myalgias, no joint aches  MUSCULOSKELETAL:see above  NEURO: no weakness, no dizziness, no syncope, no headaches  ENDOCRINE: no temperature intolerance, no skin/hair changes  HEME: no easy bruising, no bleeding problems  PSYCHIATRIC: NEGATIVE for changes in mood or affect    Sexually Active: No  Sexual concerns: No   Contraception:not needed   P: 2   No LMP recorded. Patient is not currently having periods (Reason: Irregular Periods). 2 periods in 2018.   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)     Bilateral low back pain with left-sided sciatica     Thrombosis of ovarian vein     Moderate persistent asthma without complication       Current Outpatient Medications   Medication Sig Dispense Refill     acetaminophen (TYLENOL) 500 MG tablet Take 2 tablets (1,000 mg) by mouth 3 times daily 100 tablet 3     albuterol  (PROAIR HFA/PROVENTIL HFA/VENTOLIN HFA) 108 (90 Base) MCG/ACT inhaler Inhale 2 puffs into the lungs every 4 hours as needed for shortness of breath / dyspnea or wheezing 2 Inhaler 11     aspirin 325 MG tablet Take 1 tablet (325 mg) by mouth daily       cetirizine (ZYRTEC) 10 MG tablet Take 1 tablet (10 mg) by mouth daily 90 tablet 3     epinastine HCl (ELESTAT) 0.05 % SOLN Place 1 drop into both eyes 2 times daily 1 Bottle 11     fluticasone (FLONASE) 50 MCG/ACT spray Spray 1-2 sprays into both nostrils daily 16 g 11     fluticasone-salmeterol (ADVAIR-HFA) 230-21 MCG/ACT inhaler Inhale 2 puffs into the lungs 2 times daily 1 Inhaler 11     order for DME Equipment being ordered: spacer for inhaler, please fit io inhaler size 1 Device 1       Past Medical History:   Diagnosis Date     Uncomplicated asthma         Family History     Problem (# of Occurrences) Relation (Name,Age of Onset)    Asthma (2) Daughter (Jess), Daughter (Rosaura)    Coronary Artery Disease (1) Father    Diabetes (1) Father    Heart Disease (1) Daughter (Rosaura): Heart valve regurgitation, abnormal vein with shunt    Hypertension (1) Father    Sickle Cell Trait (2) Daughter (Jess): S/C, Daughter (Rosaura): S/C       Negative family history of: Cancer          Problem List Medication List and Allergy List were reviewed.    Patient is an established patient of this clinic..    Social History     Tobacco Use     Smoking status: Never Smoker     Smokeless tobacco: Never Used   Substance Use Topics     Alcohol use: No     Single  Children ? yes 2 daughters    Has anyone hurt you physically, for example by pushing, hitting, slapping or kicking you or forcing you to have sex? Denies  Do you feel threatened or controlled by a partner, ex-partner or anyone in your life? Denies    RISK BEHAVIORS AND HEALTHY HABITS:  Tobacco Use/Smoking: None  Illicit Drug Use: None  Do you use alcohol? No  Diet (5-7 servings of fruits/veg daily): Yes   Exercise (30 min  "accumulated most days):Yes  Dental Care: Yes   Calcium 1500 mg/d:  Yes  Seat Belt Use: Yes     Cholesterol Level (>46 yo or at risk):  Date done 2015  Result(s) normal, ASA use (>3% risk in 5 y):  Testing not indicated , Pap/HPV cotest every 5 years for women 30-65   Date done 2017  Result(s) normal and HIV screening:  Date done 2018  Result(s) negative  Breast CA Screening (>41 yo or 10 y before 1st degree relative diagnosis): Date done 2017  Result(s) normal, due later this year.      Immunization History   Administered Date(s) Administered     HEPA 12/17/2008, 06/19/2009     Influenza (IIV3) PF 10/08/2010     Influenza Vaccine IM 3yrs+ 4 Valent IIV4 01/11/2019     Influenza Vaccine, 3 YRS +, IM (QUADRIVALENT W/PRESERVATIVES) 11/09/2015, 11/29/2017     MMR 03/06/2009, 06/17/2009     Meningococcal (Menomune ) 12/17/2008     Poliovirus, inactivated (IPV) 12/17/2008     TD (ADULT, 7+) 04/23/2015     Tdap (Adacel,Boostrix) 12/17/2008    Reviewed Immunization Record Today    EXAMINATION:   /74   Pulse 73   Temp 97.9  F (36.6  C)   Resp 14   Ht 1.613 m (5' 3.5\")   Wt 63.9 kg (140 lb 12.8 oz)   SpO2 99%   BMI 24.55 kg/m    GENERAL: healthy, alert and no distress  EYES: Eyes grossly normal to inspection, extraocular movements - intact, and PERRL  HENT: ear canals- normal; TMs- normal; Nose- normal; Mouth- no ulcers, no lesions  NECK: no tenderness, no adenopathy, no asymmetry, no masses, no stiffness; thyroid- normal to palpation  RESP: lungs clear to auscultation - no rales, no rhonchi, no wheezes  BREAST: no masses, no tenderness, no nipple discharge, no palpable axillary masses or adenopathy  CV: regular rates and rhythm, normal S1 S2, no S3 or S4 and no murmur, no click or rub -  ABDOMEN: soft, no tenderness, no  hepatosplenomegaly, no masses, normal bowel sounds  MS: extremities- no gross deformities noted, no edema  SKIN: no suspicious lesions, no rashes  NEURO: strength and tone- normal, sensory exam- " grossly normal, mentation- intact, speech- normal, reflexes- symmetric  BACK: no CVA tenderness, Left lumbar paraspinal muscle tenderness.  Left SI joint and sciatic area tenderness to deep palpation.  Negative straight leg raises.  PSYCH: Alert and oriented times 3; speech- coherent , normal rate and volume; able to articulate logical thoughts, able to abstract reason, no tangential thoughts, no hallucinations or delusions, affect- normal  LYMPHATICS: ant. cervical- normal, post. cervical- normal, axillary- normal, supraclavicular- normal, inguinal- normal    Assessment/Plan:    Kelly was seen today for physical and pain.    Diagnoses and all orders for this visit:    Routine general medical examination at a health care facility: up to date on screening tests. Normal exam today. Mammo due later this year. Health maintenance is up to date.    Moderate persistent asthma without complication: Increase controller inhaler from medium to high dose ICS.  Consider ENT and/or adding Singulair at future visit to improve control.  -     fluticasone-salmeterol (ADVAIR-HFA) 230-21 MCG/ACT inhaler; Inhale 2 puffs into the lungs 2 times daily    Chronic left-sided low back pain with left-sided sciatica: No red flags.  Work on home exercises, heat and ice.  Formal PT if not helping.  IF worsening, consider MRI in follow up.    Need for prophylactic vaccination and inoculation against influenza  -     ADMIN VACCINE, INITIAL  -     FLU VAC PRESRV FREE QUAD SPLIT VIR IM, 0.5 mL dosage    Return in about 4 weeks (around 2/8/2019) for Back pain and Asthma.    Misty Bernstein MD

## 2019-01-12 ASSESSMENT — ASTHMA QUESTIONNAIRES: ACT_TOTALSCORE: 11

## 2019-01-30 ENCOUNTER — OFFICE VISIT (OUTPATIENT)
Dept: FAMILY MEDICINE | Facility: CLINIC | Age: 49
End: 2019-01-30
Payer: COMMERCIAL

## 2019-01-30 VITALS
TEMPERATURE: 99.7 F | SYSTOLIC BLOOD PRESSURE: 111 MMHG | RESPIRATION RATE: 20 BRPM | HEART RATE: 106 BPM | DIASTOLIC BLOOD PRESSURE: 76 MMHG | OXYGEN SATURATION: 97 %

## 2019-01-30 DIAGNOSIS — J10.1 INFLUENZA A: ICD-10-CM

## 2019-01-30 DIAGNOSIS — R06.2 WHEEZING: ICD-10-CM

## 2019-01-30 DIAGNOSIS — M79.10 MYALGIA: Primary | ICD-10-CM

## 2019-01-30 LAB
FLUAV AG UPPER RESP QL IA.RAPID: POSITIVE
FLUBV AG UPPER RESP QL IA.RAPID: NEGATIVE

## 2019-01-30 RX ORDER — PREDNISONE 20 MG/1
40 TABLET ORAL DAILY
Qty: 10 TABLET | Refills: 0 | Status: SHIPPED | OUTPATIENT
Start: 2019-01-30 | End: 2019-02-13

## 2019-01-30 RX ORDER — OSELTAMIVIR PHOSPHATE 75 MG/1
75 CAPSULE ORAL 2 TIMES DAILY
Qty: 10 CAPSULE | Refills: 0 | Status: SHIPPED | OUTPATIENT
Start: 2019-01-30 | End: 2019-02-13

## 2019-01-30 NOTE — PROGRESS NOTES
Preceptor Attestation:   Patient seen, evaluated and discussed with the resident. I have verified the content of the note, which accurately reflects my assessment of the patient and the plan of care.   Supervising Physician:  Lopez Ocasio MD

## 2019-01-30 NOTE — LETTER
To whom it may concern,      Kelly Steven needs to be excused from work for medical reasons from 1/28 to 2/3/19. OK to return on 2/4/19. Please contact our clinic if any concerns.      Sincerely,          Dr. Anca Mirza

## 2019-01-30 NOTE — PATIENT INSTRUCTIONS
Dr. Anca Mirza      2. Influenza A    - oseltamivir (TAMIFLU) 75 MG capsule; Take 1 capsule (75 mg) by mouth 2 times daily  Dispense: 10 capsule; Refill: 0    - predniSONE (DELTASONE) 20 MG tablet; Take 40 mg by mouth daily for 5 days.  Dispense: 10 tablet; Refill: 0

## 2019-01-31 ASSESSMENT — ASTHMA QUESTIONNAIRES: ACT_TOTALSCORE: 11

## 2019-02-13 ENCOUNTER — OFFICE VISIT (OUTPATIENT)
Dept: FAMILY MEDICINE | Facility: CLINIC | Age: 49
End: 2019-02-13
Payer: COMMERCIAL

## 2019-02-13 VITALS
SYSTOLIC BLOOD PRESSURE: 107 MMHG | HEART RATE: 84 BPM | DIASTOLIC BLOOD PRESSURE: 72 MMHG | OXYGEN SATURATION: 98 % | TEMPERATURE: 98.8 F | BODY MASS INDEX: 23.78 KG/M2 | WEIGHT: 136.4 LBS | RESPIRATION RATE: 16 BRPM

## 2019-02-13 DIAGNOSIS — J45.40 MODERATE PERSISTENT ASTHMA WITHOUT COMPLICATION: ICD-10-CM

## 2019-02-13 DIAGNOSIS — J30.89 SEASONAL ALLERGIC RHINITIS DUE TO OTHER ALLERGIC TRIGGER: Primary | ICD-10-CM

## 2019-02-13 DIAGNOSIS — J38.3 VOCAL CORD DYSFUNCTION: ICD-10-CM

## 2019-02-13 RX ORDER — MONTELUKAST SODIUM 10 MG/1
10 TABLET ORAL AT BEDTIME
Qty: 90 TABLET | Refills: 3 | Status: SHIPPED | OUTPATIENT
Start: 2019-02-13 | End: 2020-02-10

## 2019-02-13 RX ORDER — FLUTICASONE PROPIONATE AND SALMETEROL XINAFOATE 230; 21 UG/1; UG/1
2 AEROSOL, METERED RESPIRATORY (INHALATION) 2 TIMES DAILY
Qty: 1 INHALER | Refills: 11 | Status: SHIPPED | OUTPATIENT
Start: 2019-02-13 | End: 2020-10-12

## 2019-02-13 RX ORDER — FLUTICASONE PROPIONATE 50 MCG
2 SPRAY, SUSPENSION (ML) NASAL 2 TIMES DAILY
Qty: 16 G | Refills: 1 | Status: SHIPPED | OUTPATIENT
Start: 2019-02-13 | End: 2020-02-13

## 2019-02-13 NOTE — PATIENT INSTRUCTIONS
1. Seasonal allergic rhinitis due to other allergic trigger  Back to ENT in 1 month if not getting better    - montelukast (SINGULAIR) 10 MG tablet; Take 1 tablet (10 mg) by mouth At Bedtime  Dispense: 90 tablet; Refill: 3  - fluticasone (FLONASE) 50 MCG/ACT nasal spray; Spray 2 sprays into both nostrils 2 times daily  Dispense: 16 g; Refill: 1

## 2019-02-13 NOTE — PROGRESS NOTES
Preceptor Attestation:   Patient seen, evaluated and discussed with the resident. I have verified the content of the note, which accurately reflects my assessment of the patient and the plan of care.   Supervising Physician:  Juwan Ruiz MD

## 2019-02-13 NOTE — PROGRESS NOTES
SUBJECTIVE     Chief Complaint   Patient presents with     Asthma     Pt is here to follow up on her asthma today.     Flu     Pt is also here to follow up on influenza today.     Medication Reconciliation     Complete.        Subjective: Kelly Harris is a 48 year old female with hemoglobin S.C. disease, moderate persistent asthma possibly, and vocal cord dysfunction here for follow-up of back pain and asthma.      I recently saw patient on January 30, she tested positive for the flu, and I gave her a burst of prednisone as well.  No one else in her family is gotten sick, she feels much better, she feels like her prednisone really helped, no more fevers.    Her back pain is also significantly improved and she continues to do exercises.  It does not stop her from what she needs to do during the day.    Her shortness of breath is still a problem for her.  On January 11, her primary doctor increased her to high dose inhaled corticosteroids.  She thinks this does help.  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  She had a bad episode on Friday where she had to leave the office and the episode resolved in a couple hours.  Her albuterol inhaler is very helpful and she does use it multiple times per day.  She also wakes up every night feeling short of breath and wheezy, again the albuterol inhaler helps.  She notes that she has a sensation of fluid running down her throat night that wakes her up.  She does use a daily Zyrtec and occasionally uses Flonase.  She also has itchy eyes daily.  Triggers for her include cold, dust.    She did have formal PFTs done in 2017 without evidence of significant obstructive or restrictive pathology.  She was diagnosed with vocal cord dysfunction by ENT, she was seeing speech therapy for this and did have some benefit, but has not seen them in a while.    ACT of 12 today.    General: No  fevers or weight loss  Skin: No new rashes or lesions.  CV: No chest pain or palpitations.    PMH/PSH, FamHx, Meds, Allergies reviewed and updated as needed.    Social History     Socioeconomic History     Marital status:      Spouse name: None     Number of children: None     Years of education: None     Highest education level: None   Social Needs     Financial resource strain: None     Food insecurity - worry: None     Food insecurity - inability: None     Transportation needs - medical: None     Transportation needs - non-medical: None   Occupational History     None   Tobacco Use     Smoking status: Never Smoker     Smokeless tobacco: Never Used   Substance and Sexual Activity     Alcohol use: No     Drug use: No     Sexual activity: Yes     Partners: Male   Other Topics Concern     None   Social History Narrative     None           OBJECTIVE     /72 (BP Location: Left arm, Patient Position: Sitting, Cuff Size: Adult Regular)   Pulse 84   Temp 98.8  F (37.1  C) (Oral)   Resp 16   Wt 61.9 kg (136 lb 6.4 oz)   SpO2 98%   Breastfeeding? No   BMI 23.78 kg/m    Body mass index is 23.78 kg/m .    Physical exam:  Gen: No acute distress  HEENT: No conjunctival injection. +hyperemia and enlarged boggy middle turbinates of nose bilaterally, no discharge. +mucoid drainage in oropharynx, no pharyngeal erythema. No visible tonsils.   Neck: No cervical lymphadenopathy. Thyroid is normal to palpation.  CV: Regular rate and rhythm, no murmurs/rubs/gallops  Resp: Clear to auscultation bilaterally, no crackles or wheezes  Skin: no suspicious lesions or rashes  Psych: Mood and affect appropriate, mentation appears normal.      No results found for this or any previous visit (from the past 24 hour(s)).      ASSESSMENT AND PLAN     Kelly Harris is a 48 year old female here for shortness of breath.     1. Seasonal allergic rhinitis due to other allergic trigger  2. Moderate persistent asthma without  complication  3. Vocal cord dysfunction  Has had normal PFTs in the last 2 years but did see some relief with increased ICS to high dose. Will continue this. Does endorse significant allergic symptoms, suspect environmental allergies with worsening at work so will add singulair and increase flonase with more use at night to see if this helps. If no improvement in the next month, she will call her ENT provider and schedule a follow-up.     - montelukast (SINGULAIR) 10 MG tablet; Take 1 tablet (10 mg) by mouth At Bedtime  Dispense: 90 tablet; Refill: 3  - fluticasone (FLONASE) 50 MCG/ACT nasal spray; Spray 2 sprays into both nostrils 2 times daily  Dispense: 16 g; Refill: 1  - fluticasone-salmeterol (ADVAIR-HFA) 230-21 MCG/ACT inhaler; Inhale 2 puffs into the lungs 2 times daily  Dispense: 1 Inhaler; Refill: 11      Anca Mirza MD, PGY-3  I precepted today with Juwan Ruiz MD.

## 2019-02-14 ASSESSMENT — ASTHMA QUESTIONNAIRES: ACT_TOTALSCORE: 12

## 2019-05-20 DIAGNOSIS — J30.2 CHRONIC SEASONAL ALLERGIC RHINITIS: ICD-10-CM

## 2019-05-22 RX ORDER — EPINASTINE HCL 0.05 %
1 DROPS OPHTHALMIC (EYE) 2 TIMES DAILY
Qty: 1 BOTTLE | Refills: 11 | Status: SHIPPED | OUTPATIENT
Start: 2019-05-22 | End: 2020-10-12

## 2020-01-02 ENCOUNTER — TRANSFERRED RECORDS (OUTPATIENT)
Dept: MULTI SPECIALTY CLINIC | Facility: CLINIC | Age: 50
End: 2020-01-02

## 2020-01-02 ENCOUNTER — OFFICE VISIT (OUTPATIENT)
Dept: FAMILY MEDICINE | Facility: CLINIC | Age: 50
End: 2020-01-02
Payer: COMMERCIAL

## 2020-01-02 ENCOUNTER — RECORDS - HEALTHEAST (OUTPATIENT)
Dept: ADMINISTRATIVE | Facility: OTHER | Age: 50
End: 2020-01-02

## 2020-01-02 VITALS
OXYGEN SATURATION: 99 % | SYSTOLIC BLOOD PRESSURE: 123 MMHG | WEIGHT: 140.8 LBS | RESPIRATION RATE: 20 BRPM | TEMPERATURE: 98.2 F | HEART RATE: 73 BPM | BODY MASS INDEX: 24.95 KG/M2 | HEIGHT: 63 IN | DIASTOLIC BLOOD PRESSURE: 73 MMHG

## 2020-01-02 DIAGNOSIS — Z23 NEED FOR PROPHYLACTIC VACCINATION AND INOCULATION AGAINST INFLUENZA: ICD-10-CM

## 2020-01-02 DIAGNOSIS — J45.40 MODERATE PERSISTENT ASTHMA WITHOUT COMPLICATION: ICD-10-CM

## 2020-01-02 DIAGNOSIS — Z00.00 ENCOUNTER FOR PREVENTATIVE ADULT HEALTH CARE EXAMINATION: Primary | ICD-10-CM

## 2020-01-02 RX ORDER — PREDNISONE 20 MG/1
40 TABLET ORAL DAILY
Qty: 10 TABLET | Refills: 1 | Status: SHIPPED | OUTPATIENT
Start: 2020-01-02 | End: 2020-01-07

## 2020-01-02 ASSESSMENT — MIFFLIN-ST. JEOR: SCORE: 1227.79

## 2020-01-02 NOTE — LETTER
February 13, 2020      Kelly Harris  [address]  [place] [address]        Dear Kelly,    Good news! Your colonoscopy was normal.  We should repeat it in 10 years.    Sincerely,    Misty Bernstein MD

## 2020-01-02 NOTE — PROGRESS NOTES
Female Physical Note         HPI         Concerns today: Asthma flared up since it is winter.  She is using her controllers as prescribed.  Also has history of vocal cord dysfunction for which she had some speech therapy.  This has helped.  She is not sure what all of her triggers are.  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  She is wondering if there is chemical exposures there.               Review of Systems:     CONSTITUTIONAL: no fatigue, no unexpected change in weight  SKIN: no worrisome rashes, no worrisome moles, no worrisome lesions  EYES: no acute vision problems or changes  ENT: no ear problems, no mouth problems, no throat problems  RESP: no significant cough, no shortness of breath  BREAST: no masses, no tenderness, no discharge  CV: no chest pain, no palpitations, no new or worsening peripheral edema  GI: no nausea, no vomiting, no constipation, no diarrhea  : no frequency, no dysuria, no hematuria  MS: no claudication, no myalgias, no joint aches  MUSCULOSKELETAL:some intermittent joint aches.  NEURO: no weakness, no dizziness, no syncope, no headaches  ENDOCRINE: no temperature intolerance, no skin/hair changes  HEME: no easy bruising, no bleeding problems  PSYCHIATRIC: NEGATIVE for changes in mood or affect      Sexually Active: No  Sexual concerns: No   Contraception:not needed  Pregnancy History: No obstetric history on file.  Menses: Patient's last menstrual period was 04/12/2017.   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)     Bilateral low back pain with left-sided sciatica     Thrombosis of ovarian vein     Moderate persistent asthma without complication     Vocal cord dysfunction       Past Medical History:   Diagnosis Date     Uncomplicated asthma        Past Surgical History:   Procedure Laterality Date      MOUTH SURGERY  09/07/2019    Removed benign mouth tumor        Family History   Problem Relation Age of Onset     Diabetes Father      Coronary Artery Disease Father      Hypertension Father      Asthma Daughter      Sickle Cell Trait Daughter         S/C     Asthma Daughter      Sickle Cell Trait Daughter         S/C     Heart Disease Daughter         Heart valve regurgitation, abnormal vein with shunt     Hypertension Mother      Cancer No family hx of      Reviewed no other significant FH    Family History and past Medical History reviewed and unchanged/updated.  Social History     Tobacco Use     Smoking status: Never Smoker     Smokeless tobacco: Never Used   Substance Use Topics     Alcohol use: No       Children ? yes 2 daughter    Has anyone hurt you physically, for example by pushing, hitting, slapping or kicking you or forcing you to have sex? Denies  Do you feel threatened or controlled by a partner, ex-partner or anyone in your life? Denies    RISK BEHAVIORS AND HEALTHY HABITS:  Tobacco Use/Smoking: None  Illicit Drug Use: None  Do you use alcohol? No  Diet (5-7 servings of fruits/veg daily): Yes   Exercise (30 min accumulated most days):Yes  Dental Care: Yes   Calcium 1500 mg/d:  Yes  Seat Belt Use: Yes     Cholesterol Level (>46 yo or at risk):  Testing not indicated , ASA use (>3% risk in 5 y):  Recommended and patient accepted testing., Pap/HPV cotest every 5 years for women 30-65   Testing not indicated  and HIV screening:  Testing not indicated   Colon CA Screening (>50-75 ):  Recommended and patient accepted testing. and Breast CA Screening (>41 yo or 10 y before 1st degree relative diagnosis): Recommended and patient accepted testing.    Immunization History   Administered Date(s) Administered     HEPA 12/17/2008, 06/19/2009     Influenza (IIV3) PF 10/08/2010     Influenza Vaccine IM > 6 months Valent IIV4 01/11/2019     Influenza Vaccine, 3 YRS +, IM (QUADRIVALENT W/PRESERVATIVES)  "11/09/2015, 11/29/2017, 01/02/2020     MMR 03/06/2009, 06/17/2009     Meningococcal (Menomune ) 12/17/2008     Pneumococcal 23 valent 01/02/2020     Poliovirus, inactivated (IPV) 12/17/2008     TD (ADULT, 7+) 04/23/2015     Tdap (Adacel,Boostrix) 12/17/2008             Physical Exam:     /73   Pulse 73   Temp 98.2  F (36.8  C)   Resp 20   Ht 1.6 m (5' 3\")   Wt 63.9 kg (140 lb 12.8 oz)   LMP 04/12/2017   SpO2 99%   BMI 24.94 kg/m    GENERAL: healthy, alert and no distress  EYES: Eyes grossly normal to inspection, extraocular movements - intact, and PERRL  HENT: ear canals- normal; TMs- normal; Nose- normal; Mouth- no ulcers, no lesions  NECK: no tenderness, no adenopathy, no asymmetry, no masses, no stiffness; thyroid- normal to palpation  RESP: lungs clear to auscultation - no rales, no rhonchi, no wheezes  CV: regular rates and rhythm, normal S1 S2, no S3 or S4 and no murmur, no click or rub -  ABDOMEN: soft, no tenderness, no  hepatosplenomegaly, no masses, normal bowel sounds  MS: extremities- no gross deformities noted, no edema  SKIN: no suspicious lesions, no rashes  NEURO: strength and tone- normal, sensory exam- grossly normal, mentation- intact, speech- normal, reflexes- symmetric  BACK: no CVA tenderness, no paralumbar tenderness  LYMPHATICS: ant. cervical- normal, post. cervical- normal, axillary- normal, supraclavicular- normal, inguinal- normal    Assessment and Plan     Kelly was seen today for physical, asthma and imm/inj.    Diagnoses and all orders for this visit:    Encounter for preventative adult health care examination: Overall normal exam.  Due for mammogram and colorectal cancer screening.  She is agrees to both of these although will likely do the colonoscopy  until a few months from now.  -     PCS Use: Screening Digital Bilateral Mammogram; Future  -     GASTROENTEROLOGY ADULT REF PROCEDURE ONLY Other    Moderate persistent asthma without complication: A little bit worse " this winter but overall doing well.  We will give her prednisone to have on hand for exacerbation.  -     predniSONE (DELTASONE) 20 MG tablet; Take 2 tablets (40 mg) by mouth daily for 5 days    Need for prophylactic vaccination and inoculation against influenza  -     Fluzone quad, multidose 0.5ml, 6+ months [05236]    Other orders  -     Pneumococcal vaccine 23 valent PPSV23  (Pneumovax) [58987]      Follow-up 1 year sooner if problems.    Total of 40 minutes was spent in face to face contact with patient with > 50% in counseling and coordination of care.  Options for treatment and/or follow-up care were reviewed with the patient. Kelly Harris was engaged and actively involved in the decision making process. She verbalized understanding of the options discussed and was satisfied with the final plan.    Misty Bernstein MD

## 2020-01-02 NOTE — PATIENT INSTRUCTIONS
My Asthma Action Plan  Name: Kelly Harris  YOB: 1970  Date: 1/2/2020   My doctor: Misty Bernstein   My clinic:   [facility] [address] [place] [address]  [phone]    My Asthma Severity: Moderate Persistent Avoid your asthma triggers: strong odors and fumes and cold air      GREEN ZONE   Good Control    I feel good    No cough or wheeze    Can work, sleep and play without asthma symptoms       Take your asthma control medicine every day.  Take the medications listed below daily.    Advair  /21 mcg 2 puffs twice a day  Singulair  Tablet 10 mg once daily    1. If exercise triggers your asthma, take your rescue medication (2 puffs of albuterol, Ventolin/Pro-Air) 15 minutes before exercise or sports, and during exercise if you have asthma symptoms.  2. Spacer to use with inhaler: If you have a spacer, make sure to use it with your inhaler.              YELLOW ZONE Getting Worse  I have ANY of these:    I do not feel good    Cough or wheeze    Chest feels tight    Wake up at night   1. Keep taking your Green Zone medications.  2. Start taking your rescue medicine (1-2 puffs of albuterol - Ventolin/Pro-Air) every 4-6 hours as needed.  3. If symptoms are not controlled with above, can take 2 puffs every 20 minutes for up to 1 hour, then continue every 4 hours if needed.   4. If you do not return to the Green Zone in 12-24 hours or you get worse, call the clinic.         RED ZONE Medical Alert - Get Help  I have ANY of these:    I feel awful    Medicine is not helping    Breathing getting harder    Trouble walking or talking    Nose opens wide to breathe       1. Take your rescue medicine NOW (6-8 puffs of albuterol - Ventolin/Pro-Air) for every 20 minutes for up to 1 hour.  2. If your provider has prescribed an oral steroid medicine (Prednisone 40 mg), start taking it NOW.  3. Call your doctor NOW.  4. If you are still in the Red Zone after 20 minutes and you have not reached your  doctor:    Take your rescue medicine again (6-8 puffs of albuterol - Ventolin/Pro-Air) and    Call 911 or go to the emergency room right away    See your regular doctor within 1 weeks of an Emergency Room or Urgent Care visit for follow-up treatment.        This Asthma Action Plan provides authorization for the administration of medication described in the AAP.  YES  This child has the knowledge and skills to self-administer rescue medication at school or  with approval of the school nurse.  YES    Electronically signed by: Misty Bernstein MD    Annual Reminders:  Meet with Asthma Educator,  Flu Shot in the Fall, Pneumonia Shot  Pharmacy:    [id number] - [place], [place] - [address] [place] [address] DRUG STORE #[id number] [facility] [address] AT [facility] [address]    20   MAMMO SCREENING  [facility]   [phone]  Fax Orders to [phone]     Order faxed to [phone], they will contact patient to schedule.     20   GASTROENTEROLOGY ADULT REF PROCEDURE ONLY  Online referral placed with [facility] who will contact patient to schedule.     [facility]  Phone [phone]  Fax: [phone]    Dali Hammonds

## 2020-01-03 ASSESSMENT — ASTHMA QUESTIONNAIRES: ACT_TOTALSCORE: 11

## 2020-01-23 ENCOUNTER — RECORDS - HEALTHEAST (OUTPATIENT)
Dept: MAMMOGRAPHY | Facility: CLINIC | Age: 50
End: 2020-01-23

## 2020-01-23 DIAGNOSIS — Z12.31 ENCOUNTER FOR SCREENING MAMMOGRAM FOR MALIGNANT NEOPLASM OF BREAST: ICD-10-CM

## 2020-01-24 DIAGNOSIS — Z00.00 ENCOUNTER FOR PREVENTATIVE ADULT HEALTH CARE EXAMINATION: ICD-10-CM

## 2020-01-24 LAB — MAMMOGRAM: NORMAL

## 2020-01-24 NOTE — LETTER
January 24, 2020      Kelly Harris  [address]  [place] [address]        Dear Kelly,      Your mammogram was normal and reassuring. I recommend continuing to have mammogram every year.    Please see below for your test results.    Resulted Orders   PCS Use: Screening Digital Bilateral Mammogram   Result Value Ref Range    MAMMOGRAM         If you have any questions, please call the clinic to make an appointment.    Sincerely,    Misty Bernstein MD

## 2020-02-10 DIAGNOSIS — J98.01 ACUTE BRONCHOSPASM: ICD-10-CM

## 2020-02-10 DIAGNOSIS — J30.89 SEASONAL ALLERGIC RHINITIS DUE TO OTHER ALLERGIC TRIGGER: ICD-10-CM

## 2020-02-10 RX ORDER — MONTELUKAST SODIUM 10 MG/1
10 TABLET ORAL AT BEDTIME
Qty: 90 TABLET | Refills: 3 | Status: SHIPPED | OUTPATIENT
Start: 2020-02-10 | End: 2020-10-12 | Stop reason: SINTOL

## 2020-02-10 RX ORDER — ALBUTEROL SULFATE 90 UG/1
AEROSOL, METERED RESPIRATORY (INHALATION)
Qty: 17 G | Refills: 11 | Status: SHIPPED | OUTPATIENT
Start: 2020-02-10 | End: 2020-10-12

## 2020-04-03 ENCOUNTER — TELEPHONE (OUTPATIENT)
Dept: FAMILY MEDICINE | Facility: CLINIC | Age: 50
End: 2020-04-03

## 2020-04-03 NOTE — TELEPHONE ENCOUNTER
Reached out to patient during COVID19 Clinic outreach. Reassured patient that [facility] is still open and has started implementing phone and video appointments to help patient remain safe at home.           Offered MyChart. Patient will call back if wants to sign up        Patt Lozano [facility]

## 2020-10-12 ENCOUNTER — OFFICE VISIT (OUTPATIENT)
Dept: FAMILY MEDICINE | Facility: CLINIC | Age: 50
End: 2020-10-12
Payer: COMMERCIAL

## 2020-10-12 VITALS
TEMPERATURE: 98.2 F | BODY MASS INDEX: 25.33 KG/M2 | WEIGHT: 143 LBS | SYSTOLIC BLOOD PRESSURE: 132 MMHG | HEART RATE: 68 BPM | DIASTOLIC BLOOD PRESSURE: 83 MMHG | OXYGEN SATURATION: 99 % | RESPIRATION RATE: 20 BRPM

## 2020-10-12 DIAGNOSIS — Z23 NEED FOR PROPHYLACTIC VACCINATION AND INOCULATION AGAINST INFLUENZA: ICD-10-CM

## 2020-10-12 DIAGNOSIS — J30.89 SEASONAL ALLERGIC RHINITIS DUE TO OTHER ALLERGIC TRIGGER: ICD-10-CM

## 2020-10-12 DIAGNOSIS — J98.01 ACUTE BRONCHOSPASM: ICD-10-CM

## 2020-10-12 DIAGNOSIS — J30.2 CHRONIC SEASONAL ALLERGIC RHINITIS: ICD-10-CM

## 2020-10-12 DIAGNOSIS — J45.40 MODERATE PERSISTENT ASTHMA WITHOUT COMPLICATION: Primary | ICD-10-CM

## 2020-10-12 DIAGNOSIS — J30.2 SEASONAL ALLERGIES: ICD-10-CM

## 2020-10-12 PROCEDURE — 90471 IMMUNIZATION ADMIN: CPT | Performed by: STUDENT IN AN ORGANIZED HEALTH CARE EDUCATION/TRAINING PROGRAM

## 2020-10-12 PROCEDURE — 99214 OFFICE O/P EST MOD 30 MIN: CPT | Mod: 25 | Performed by: STUDENT IN AN ORGANIZED HEALTH CARE EDUCATION/TRAINING PROGRAM

## 2020-10-12 PROCEDURE — 90686 IIV4 VACC NO PRSV 0.5 ML IM: CPT | Performed by: STUDENT IN AN ORGANIZED HEALTH CARE EDUCATION/TRAINING PROGRAM

## 2020-10-12 RX ORDER — FLUTICASONE PROPIONATE 50 MCG
1 SPRAY, SUSPENSION (ML) NASAL DAILY
Qty: 15.8 ML | Refills: 3 | Status: SHIPPED | OUTPATIENT
Start: 2020-10-12 | End: 2021-03-08

## 2020-10-12 RX ORDER — CETIRIZINE HYDROCHLORIDE 10 MG/1
10 TABLET ORAL DAILY
Qty: 90 TABLET | Refills: 3 | Status: SHIPPED | OUTPATIENT
Start: 2020-10-12 | End: 2022-02-28

## 2020-10-12 RX ORDER — FLUTICASONE PROPIONATE AND SALMETEROL XINAFOATE 230; 21 UG/1; UG/1
2 AEROSOL, METERED RESPIRATORY (INHALATION) 2 TIMES DAILY
Qty: 1 INHALER | Refills: 11 | Status: SHIPPED | OUTPATIENT
Start: 2020-10-12 | End: 2022-04-07

## 2020-10-12 RX ORDER — EPINASTINE HCL 0.05 %
1 DROPS OPHTHALMIC (EYE) 2 TIMES DAILY
Qty: 1 BOTTLE | Refills: 11 | Status: SHIPPED | OUTPATIENT
Start: 2020-10-12 | End: 2021-12-08

## 2020-10-12 RX ORDER — ALBUTEROL SULFATE 90 UG/1
AEROSOL, METERED RESPIRATORY (INHALATION)
Qty: 17 G | Refills: 11 | Status: SHIPPED | OUTPATIENT
Start: 2020-10-12 | End: 2021-12-08

## 2020-10-12 NOTE — PROGRESS NOTES
"       SUBJECTIVE       Kelly Harris is a 49 year old  female with a PMH significant for:     Patient Active Problem List   Diagnosis     Sickle-cell/Hb-C disease without crisis (H)     Bilateral low back pain with left-sided sciatica     Thrombosis of ovarian vein     Moderate persistent asthma without complication     Vocal cord dysfunction     She presents with follow up of asthma.    She has been having issues with her asthma. Her insurance was messed up during COVID. She has been having trouble breathing for the past few weeks. She realized she's been sayting every day is \"one of those days\" for too long. She has been using her albuterol 4 times a day. She is out of all of her other medications. She has been out of her Adviair for about 2 months because she lost her insurance before she could get a refill. She now has insurance again, [employer].      She would not like to continue on her Singulair as she states it is activating for her. She worked with Dr. Bernstein to change the timing to morning but still finds it impossible to sleep on the days she takes this medication.    No fevers or chills, no coughing. No nausea or vomiting. Nasal drainainge and sore sinuses and ears when the weather changes/seasonal allergies.     Needs to see an oncologist to follow up on needing Aspirin. She is working with her PCP, Dr. Bernstein on this. She would not like a refill on Aspirin at this time.    She does not smoke and is not around anyone who smokes.     PMH, Medications and Allergies were reviewed and updated as needed.        REVIEW OF SYSTEMS     Pertinent systems discussed in HPI.        OBJECTIVE     Vitals:    10/12/20 1008   BP: 132/83   BP Location: Left arm   Patient Position: Sitting   Cuff Size: Adult Regular   Pulse: 68   Resp: 20   Temp: 98.2  F (36.8  C)   TempSrc: Oral   SpO2: 99%   Weight: 64.9 kg (143 lb)     Body mass index is 25.33 kg/m .    Gen: Alert, oriented, well appearing, no distress. " Speaks in full, but short sentences.  CV: RRR, no rubs/murmurs/gallops, 2+ radial pulses  Lungs: CTAB, normal work of breathing. No wheezes. Short expiratory burst. Able to speak in full sentences.   Abd: NBS, soft, non-tender, non-distended      No results found for this or any previous visit (from the past 24 hour(s)).      ASSESSMENT AND PLAN     1. Moderate persistent asthma without complication  2. Acute bronchospasm  Kelly has uncontrolled asthma because she has not been on her controller medication for over 2 months. She is on a high dose ICS/LABA dose and she was able to state her correct regimen. Will have patient return in 6 weeks and do ACT at that time when she has been on her regimen. Singulair is not normally an activating medicine but will discontinue as per patient preference and because it is not currently indicated. If patient's symptoms uncontrolled in 6 weeks, will consider adding a tiotropium or other LAMA.  - fluticasone-salmeterol (ADVAIR-HFA) 230-21 MCG/ACT inhaler; Inhale 2 puffs into the lungs 2 times daily  Dispense: 1 Inhaler; Refill: 11  - albuterol (PROAIR HFA/PROVENTIL HFA/VENTOLIN HFA) 108 (90 Base) MCG/ACT inhaler; INHALE 2 PUFFS INTO THE LUNGS EVERY 4 HOURS AS NEEDED FOR SHORTNESS OF BREATH OR DIFFICULT BREATHING OR WHEEZING  Dispense: 17 g; Refill: 11    3. Seasonal allergies  4. Chronic seasonal allergic rhinitis  5. Seasonal allergic rhinitis due to other allergic trigger  Kelly describes a post nasal drip along with sinus tenderness and plugged ears. Will add a intranasal steroid to her allergy regimen.  - cetirizine (ZYRTEC) 10 MG tablet; Take 1 tablet (10 mg) by mouth daily  Dispense: 90 tablet; Refill: 3  - epinastine HCl (ELESTAT) 0.05 % SOLN; Place 1 drop into both eyes 2 times daily  Dispense: 1 Bottle; Refill: 11  - fluticasone (FLONASE) 50 MCG/ACT nasal spray; Spray 1 spray into both nostrils daily  Dispense: 15.8 mL; Refill: 3    6. Need for prophylactic vaccination  and inoculation against influenza  - INFLUENZA VACCINE IM > 6 MONTHS VALENT IIV4 [69030]      RTC in 6 weeks for follow up of asthma or sooner if develops new or worsening symptoms.    This patient was discussed with Juwan Ruiz MD, who agrees with the above assessment and plan.    Suad Macias, PGY2  [facility]

## 2020-10-12 NOTE — PATIENT INSTRUCTIONS
Thank you for discussing your health with us today!    We discussed the following during your visit:    1. Asthma Control: I have refilled your Advair and stopped your Singulair. Please take your Advair as 2 puffs, twice a day. It may take a week for you to see the full effects. Please follow up within us in about 6 weeks to discuss your asthma.  2. Allergy control: I have refilled your medications and added Flonase nasal spray.    Please make an appointment in 6-8 weks to follow up on asthma.    As always, please call the clinic if you have any questions or concerns.

## 2020-10-12 NOTE — PROGRESS NOTES
Preceptor Attestation:    Patient seen and evaluated in person. I discussed the patient with the resident. I have verified the content of the note, which accurately reflects my assessment of the patient and the plan of care.   Supervising Physician:  Juwan Ruiz MD.

## 2020-10-13 ASSESSMENT — ASTHMA QUESTIONNAIRES: ACT_TOTALSCORE: 8

## 2020-12-04 ENCOUNTER — VIRTUAL VISIT (OUTPATIENT)
Dept: FAMILY MEDICINE | Facility: CLINIC | Age: 50
End: 2020-12-04
Payer: COMMERCIAL

## 2020-12-04 ENCOUNTER — RECORDS - HEALTHEAST (OUTPATIENT)
Dept: ADMINISTRATIVE | Facility: OTHER | Age: 50
End: 2020-12-04

## 2020-12-04 DIAGNOSIS — J30.89 SEASONAL ALLERGIC RHINITIS DUE TO OTHER ALLERGIC TRIGGER: ICD-10-CM

## 2020-12-04 DIAGNOSIS — G44.209 TENSION HEADACHE: ICD-10-CM

## 2020-12-04 DIAGNOSIS — J38.3 VOCAL CORD DYSFUNCTION: ICD-10-CM

## 2020-12-04 DIAGNOSIS — J45.40 MODERATE PERSISTENT ASTHMA WITHOUT COMPLICATION: Primary | ICD-10-CM

## 2020-12-04 DIAGNOSIS — D57.20 SICKLE-CELL/HB-C DISEASE WITHOUT CRISIS (H): ICD-10-CM

## 2020-12-04 PROCEDURE — 99214 OFFICE O/P EST MOD 30 MIN: CPT | Mod: 95 | Performed by: FAMILY MEDICINE

## 2020-12-04 NOTE — PATIENT INSTRUCTIONS
Ask to change to oral COVID tests to help prevent headaches.    Keep using Advair as your are and add on Tiotropium to help with control of asthma.    Pharmacist will call to schedule a video visit to go through your medicines and teach you the new inhaler.    12/11/20   PULMONARY REFERRAL    [facility]  [address] [place] [address], [address]  [place] [address]  Phone: [phone]  Fax: [phone]    Referral, demographics, medication list and office notes faxed to [facility] at [phone]. They will contact pt to schedule.     Dali Hammonds

## 2020-12-04 NOTE — PROGRESS NOTES
"Family Medicine Telephone Visit Note               Telephone Visit Consent   Patient was verbally read the following and verbal consent was obtained.    \"Telephone visits are billed at different rates depending on your insurance coverage. During this emergency period, for some insurers they may be billed the same as an in-person visit.  Please reach out to your insurance provider with any questions.  If during the course of the call the physician/provider feels a telephone visit is not appropriate, you will not be charged for this service.\"    Name person giving consent:  Patient   Date verbal consent given:  12/4/2020  Time verbal consent given:  4:32 PM           Chief Complaint   Patient presents with     RECHECK     Followup.  One medication is giving headaches     Sleep Problem                 HPI   Patients name: Kelly  Appointment start time:  4:55 PM    Headache everyday not sure if it is allergy.  Taking allergy medicine every day.   Covid test causing headaches?  Having NP swabs 4 times a week. Headaches happen right after swabs. This is a significant increase in headaches. Tylenol helps her some but headaches.   Right forehead and cheek are tender sometimes.  Work in a nursing home.      Trouble with sob at night and wheezing 4 nights a weeks.  Asthma still not well controlled. She is on maximum ICS/Laba. Vocal cord issue better, this contributed to her SOB sxs in the past..  But previously has caused issues with breathing and wheezing/stridor.    Sickle cell has been stable.  She has some muscle aches occassionally but no ongoing symptoms.         Review of Systems:     Constitutional, HEENT, cardiovascular, pulmonary, gi and gu systems are negative, except as otherwise noted.         Physical Exam:     [facility] 04/12/2017   Estimated body mass index is 25.33 kg/m  as calculated from the following:    Height as of 1/2/20: 1.6 m (5' 3\").    Weight as of 10/12/20: 64.9 kg (143 lb).    Exam:  Constitutional: " healthy, alert and no distress  Psychiatric: mentation appears normal and affect normal/bright            Assessment and Plan   Kelly was seen today for recheck and sleep problem.    Diagnoses and all orders for this visit:    Moderate persistent asthma without complication: still poorly controlled. Had to stop singuliair due to sleep issue.  Will add Spiriva and have her come for MTM and inhaler teaching with PharmD.  -     tiotropium (SPIRIVA RESPIMAT) 2.5 MCG/ACT inhaler; Inhale 2 puffs into the lungs daily  -     MED THERAPY MANAGE REFERRAL    Vocal cord dysfunction: States it is much better but potentially could be still contributing to SOB sxs.    Sickle-cell/Hb-C disease without crisis (H): stable. Continue to follow. Get labs to follow hgb at next in person visit.    Tension headache: Likely due to COVID swabs.  Ask if she can it a OP or saliva swab instead due to medical reasons. Continue prn medication.    Seasonal allergic rhinitis due to other allergic trigger: could be contributing to headache Continue current meds.        After Visit Information:  Will print and mail AVS     Return in about 6 weeks (around 1/15/2021) for Follow up, with me, using a video visit asthma and headaches.    Appointment end time: 5:19 PM  This is a telephone visit that took 24 minutes.      Clinician location:  [facility]     Misty Bernstein MD

## 2020-12-10 ENCOUNTER — RECORDS - HEALTHEAST (OUTPATIENT)
Dept: ADMINISTRATIVE | Facility: OTHER | Age: 50
End: 2020-12-10

## 2020-12-10 ENCOUNTER — OFFICE VISIT (OUTPATIENT)
Dept: PHARMACY | Facility: CLINIC | Age: 50
End: 2020-12-10

## 2020-12-10 VITALS
OXYGEN SATURATION: 95 % | SYSTOLIC BLOOD PRESSURE: 129 MMHG | DIASTOLIC BLOOD PRESSURE: 83 MMHG | TEMPERATURE: 98.4 F | RESPIRATION RATE: 16 BRPM | HEART RATE: 66 BPM

## 2020-12-10 DIAGNOSIS — J45.40 MODERATE PERSISTENT ASTHMA WITHOUT COMPLICATION: Primary | ICD-10-CM

## 2020-12-10 DIAGNOSIS — J30.89 SEASONAL ALLERGIC RHINITIS DUE TO OTHER ALLERGIC TRIGGER: ICD-10-CM

## 2020-12-10 DIAGNOSIS — D57.20 SICKLE-CELL/HB-C DISEASE WITHOUT CRISIS (H): ICD-10-CM

## 2020-12-10 PROCEDURE — 99606 MTMS BY PHARM EST 15 MIN: CPT | Performed by: PHARMACIST

## 2020-12-10 PROCEDURE — 99605 MTMS BY PHARM NP 15 MIN: CPT | Performed by: PHARMACIST

## 2020-12-10 NOTE — PATIENT INSTRUCTIONS
Use Spiriva Respimat daily at night to control asthma.    Use Albuterol inhaler for when experiencing shortness or breath or wheezing.

## 2020-12-11 ENCOUNTER — RECORDS - HEALTHEAST (OUTPATIENT)
Dept: ADMINISTRATIVE | Facility: OTHER | Age: 50
End: 2020-12-11

## 2020-12-15 ENCOUNTER — COMMUNICATION - HEALTHEAST (OUTPATIENT)
Dept: PULMONOLOGY | Facility: OTHER | Age: 50
End: 2020-12-15

## 2020-12-24 ENCOUNTER — TELEPHONE (OUTPATIENT)
Dept: FAMILY MEDICINE | Facility: CLINIC | Age: 50
End: 2020-12-24

## 2021-02-08 ENCOUNTER — OFFICE VISIT - HEALTHEAST (OUTPATIENT)
Dept: ALLERGY | Facility: CLINIC | Age: 51
End: 2021-02-08

## 2021-02-08 DIAGNOSIS — J45.40 MODERATE PERSISTENT ASTHMA WITHOUT COMPLICATION: ICD-10-CM

## 2021-03-01 ENCOUNTER — OFFICE VISIT (OUTPATIENT)
Dept: FAMILY MEDICINE | Facility: CLINIC | Age: 51
End: 2021-03-01
Payer: COMMERCIAL

## 2021-03-01 VITALS
HEART RATE: 94 BPM | HEIGHT: 63 IN | TEMPERATURE: 98.1 F | SYSTOLIC BLOOD PRESSURE: 144 MMHG | DIASTOLIC BLOOD PRESSURE: 88 MMHG | BODY MASS INDEX: 24.52 KG/M2 | RESPIRATION RATE: 20 BRPM | WEIGHT: 138.4 LBS | OXYGEN SATURATION: 99 %

## 2021-03-01 DIAGNOSIS — S46.819A STRAIN OF TRAPEZIUS MUSCLE, UNSPECIFIED LATERALITY, INITIAL ENCOUNTER: ICD-10-CM
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
## 2021-03-01 DIAGNOSIS — H53.022 REFRACTIVE AMBLYOPIA OF LEFT EYE: ICD-10-CM

## 2021-03-01 PROCEDURE — 99214 OFFICE O/P EST MOD 30 MIN: CPT | Mod: GC | Performed by: STUDENT IN AN ORGANIZED HEALTH CARE EDUCATION/TRAINING PROGRAM

## 2021-03-01 RX ORDER — TIZANIDINE HYDROCHLORIDE 4 MG/1
4 CAPSULE, GELATIN COATED ORAL 3 TIMES DAILY PRN
Qty: 30 CAPSULE | Refills: 0 | Status: SHIPPED | OUTPATIENT
Start: 2021-03-01 | End: 2021-03-08

## 2021-03-01 ASSESSMENT — MIFFLIN-ST. JEOR: SCORE: 1215.52

## 2021-03-01 ASSESSMENT — PAIN SCALES - GENERAL: PAINLEVEL: EXTREME PAIN (8)
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
## 2021-03-02 NOTE — PROGRESS NOTES
Preceptor Attestation:    I discussed the patient with the resident and evaluated the patient in person. I have verified the content of the note, which accurately reflects my assessment of the patient and the plan of care.   Supervising Physician:  Juwan Ruiz MD.

## 2021-03-04 ENCOUNTER — MEDICAL CORRESPONDENCE (OUTPATIENT)
Dept: HEALTH INFORMATION MANAGEMENT | Facility: CLINIC | Age: 51
End: 2021-03-04

## 2021-03-06 DIAGNOSIS — J30.2 CHRONIC SEASONAL ALLERGIC RHINITIS: ICD-10-CM

## 2021-03-06 DIAGNOSIS — J30.89 SEASONAL ALLERGIC RHINITIS DUE TO OTHER ALLERGIC TRIGGER: ICD-10-CM

## 2021-03-08 ENCOUNTER — OFFICE VISIT (OUTPATIENT)
Dept: FAMILY MEDICINE | Facility: CLINIC | Age: 51
End: 2021-03-08
Payer: COMMERCIAL

## 2021-03-08 VITALS
OXYGEN SATURATION: 97 % | DIASTOLIC BLOOD PRESSURE: 83 MMHG | RESPIRATION RATE: 18 BRPM | TEMPERATURE: 98.5 F | SYSTOLIC BLOOD PRESSURE: 124 MMHG | BODY MASS INDEX: 24.83 KG/M2 | HEART RATE: 102 BPM | WEIGHT: 141.2 LBS

## 2021-03-08 DIAGNOSIS — S46.819A STRAIN OF TRAPEZIUS MUSCLE, UNSPECIFIED LATERALITY, INITIAL ENCOUNTER: ICD-10-CM
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
## 2021-03-08 PROCEDURE — 99213 OFFICE O/P EST LOW 20 MIN: CPT | Mod: GC | Performed by: STUDENT IN AN ORGANIZED HEALTH CARE EDUCATION/TRAINING PROGRAM

## 2021-03-08 RX ORDER — NAPROXEN 500 MG/1
500 TABLET ORAL 2 TIMES DAILY WITH MEALS
Qty: 14 TABLET | Refills: 0 | Status: SHIPPED | OUTPATIENT
Start: 2021-03-08 | End: 2021-05-17

## 2021-03-08 RX ORDER — TIZANIDINE HYDROCHLORIDE 4 MG/1
4 CAPSULE, GELATIN COATED ORAL 3 TIMES DAILY PRN
Qty: 21 CAPSULE | Refills: 0 | Status: SHIPPED | OUTPATIENT
Start: 2021-03-08 | End: 2021-05-17

## 2021-03-08 RX ORDER — FLUTICASONE PROPIONATE 50 MCG
SPRAY, SUSPENSION (ML) NASAL
Qty: 16 ML | Refills: 3 | Status: SHIPPED | OUTPATIENT
Start: 2021-03-08 | End: 2022-05-25

## 2021-03-08 NOTE — PATIENT INSTRUCTIONS
1. START naproxen 500 mg (1 tablet), two times a day. Take with food (breakfast and dinner)    2. Can continue to use the muscle relaxant as needed. Be careful, it can make you sleepy. Do not use prior to driving.    3. Physical therapy will call you    4. Our behavioral health team will call you    5. Watching for any symptoms of worsening weakness in your hands and arms.     Thank you for coming to [facility] for your care. It was a pleasure to take care of you!    Sudha Melchor MD    03/10/21   MENTAL HEALTH REFERRAL    Mental Health referral routed to behavioral health team for recommendations. See Documentation Only encounter for more information.    Dali Hammonds

## 2021-03-08 NOTE — PROGRESS NOTES
Preceptor Attestation:    I discussed the patient with the resident and evaluated the patient in person. I have verified the content of the note, which accurately reflects my assessment of the patient and the plan of care.   Supervising Physician:  Fermin Grey MD.

## 2021-03-09 ENCOUNTER — AMBULATORY - HEALTHEAST (OUTPATIENT)
Dept: ADMINISTRATIVE | Facility: REHABILITATION | Age: 51
End: 2021-03-09

## 2021-03-09 DIAGNOSIS — S46.819A STRAIN OF TRAPEZIUS MUSCLE, UNSPECIFIED LATERALITY, INITIAL ENCOUNTER: ICD-10-CM

## 2021-03-10 ENCOUNTER — DOCUMENTATION ONLY (OUTPATIENT)
Dept: PSYCHOLOGY | Facility: CLINIC | Age: 51
End: 2021-03-10

## 2021-03-10 NOTE — PROGRESS NOTES
Behavioral Health Team,    Patient is being referred for mental health services by their provider, Dr. Melchor.  Please advise if we are able to see patient for in house treatment or if a community option would be best.    Thank you,    Dali Hammonds  3/10/2021
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
## 2021-03-12 ENCOUNTER — OFFICE VISIT - HEALTHEAST (OUTPATIENT)
Dept: PHYSICAL THERAPY | Facility: REHABILITATION | Age: 51
End: 2021-03-12

## 2021-03-12 DIAGNOSIS — M54.2 ACUTE NECK PAIN: ICD-10-CM

## 2021-03-12 DIAGNOSIS — M79.18 MYOFASCIAL PAIN: ICD-10-CM

## 2021-03-12 DIAGNOSIS — R29.3 POOR POSTURE: ICD-10-CM

## 2021-03-15 ENCOUNTER — TRANSFERRED RECORDS (OUTPATIENT)
Dept: HEALTH INFORMATION MANAGEMENT | Facility: CLINIC | Age: 51
End: 2021-03-15

## 2021-03-17 ENCOUNTER — OFFICE VISIT - HEALTHEAST (OUTPATIENT)
Dept: PHYSICAL THERAPY | Facility: REHABILITATION | Age: 51
End: 2021-03-17

## 2021-03-17 DIAGNOSIS — M54.2 ACUTE NECK PAIN: ICD-10-CM

## 2021-03-17 DIAGNOSIS — R29.3 POOR POSTURE: ICD-10-CM

## 2021-03-17 DIAGNOSIS — M79.18 MYOFASCIAL PAIN: ICD-10-CM

## 2021-03-17 NOTE — PROGRESS NOTES
Patient scheduled    Appointment: Wednesday March 31st  Arrival Time: 8:00am   Provider: Dr. Mir

## 2021-03-18 ENCOUNTER — TRANSFERRED RECORDS (OUTPATIENT)
Dept: HEALTH INFORMATION MANAGEMENT | Facility: CLINIC | Age: 51
End: 2021-03-18

## 2021-03-29 ENCOUNTER — AMBULATORY - HEALTHEAST (OUTPATIENT)
Dept: ALLERGY | Facility: CLINIC | Age: 51
End: 2021-03-29

## 2021-03-29 ENCOUNTER — HOSPITAL ENCOUNTER (OUTPATIENT)
Dept: RESPIRATORY THERAPY | Facility: CLINIC | Age: 51
Discharge: HOME | End: 2021-03-29
Attending: ALLERGY & IMMUNOLOGY | Admitting: ALLERGY & IMMUNOLOGY

## 2021-03-29 ENCOUNTER — COMMUNICATION - HEALTHEAST (OUTPATIENT)
Dept: ALLERGY | Facility: CLINIC | Age: 51
End: 2021-03-29

## 2021-03-29 DIAGNOSIS — J45.30 MILD PERSISTENT ASTHMA WITHOUT COMPLICATION: ICD-10-CM

## 2021-03-29 DIAGNOSIS — J45.40 MODERATE PERSISTENT ASTHMA WITHOUT COMPLICATION: ICD-10-CM

## 2021-03-29 LAB — HGB BLD-MCNC: 11.4 G/DL (ref 12–16)

## 2021-03-31 ENCOUNTER — OFFICE VISIT (OUTPATIENT)
Dept: PSYCHOLOGY | Facility: CLINIC | Age: 51
End: 2021-03-31
Payer: COMMERCIAL

## 2021-03-31 DIAGNOSIS — F43.0 ACUTE STRESS DISORDER: Primary | ICD-10-CM

## 2021-03-31 PROCEDURE — 90791 PSYCH DIAGNOSTIC EVALUATION: CPT | Mod: HN | Performed by: STUDENT IN AN ORGANIZED HEALTH CARE EDUCATION/TRAINING PROGRAM

## 2021-03-31 ASSESSMENT — ANXIETY QUESTIONNAIRES
GAD7 TOTAL SCORE: 5
IF YOU CHECKED OFF ANY PROBLEMS ON THIS QUESTIONNAIRE, HOW DIFFICULT HAVE THESE PROBLEMS MADE IT FOR YOU TO DO YOUR WORK, TAKE CARE OF THINGS AT HOME, OR GET ALONG WITH OTHER PEOPLE: SOMEWHAT DIFFICULT
1. FEELING NERVOUS, ANXIOUS, OR ON EDGE: SEVERAL DAYS
2. NOT BEING ABLE TO STOP OR CONTROL WORRYING: NOT AT ALL
6. BECOMING EASILY ANNOYED OR IRRITABLE: NOT AT ALL
3. WORRYING TOO MUCH ABOUT DIFFERENT THINGS: SEVERAL DAYS
5. BEING SO RESTLESS THAT IT IS HARD TO SIT STILL: NOT AT ALL
4. TROUBLE RELAXING: SEVERAL DAYS
7. FEELING AFRAID AS IF SOMETHING AWFUL MIGHT HAPPEN: MORE THAN HALF THE DAYS

## 2021-03-31 ASSESSMENT — PATIENT HEALTH QUESTIONNAIRE - PHQ9: SUM OF ALL RESPONSES TO PHQ QUESTIONS 1-9: 5

## 2021-03-31 NOTE — PATIENT INSTRUCTIONS
Thank you for coming in today.    Just a reminder that if you experience an increase in symptoms or feel you are in crisis, you can call the clinic number ([phone]).      If you are having an acute psychiatric emergency, please call 911 or have someone drive you directly to the nearest emergency room or call 911.  Please follow-up in 2 weeks.  Again thank you for choosing [facility] and please let us know how we can best partner with you to improve your and your family's health.    If you have thoughts about self harm or if you just need additional support and care, here are some resources for you:    Crisis Lines:    [facility] Adult Crisis:  [phone]     [facility] Adult Crisis:  [phone]    Crisis Text Line: Text [place] to 488160. Free support at your fingertips 24/7  People who text [place] to 707094 will be connected with a counselor. Crisis Text Line is available 24 hours a day, seven days a week.    You can also consider going to the [facility] at the following address.  Walk ins are welcome:    [address] [place] [address]   [phone] (for 24-hour crisis consultation)    Monday - Friday 8:00am - 7:00pm  Saturday:  11:00am - 3:00pm  Sunday and Holidays Closed    COVID-19 UPDATE (3/20/2020): Hours are: Monday - Friday 7:30am - 5:00 pm  Weekends and holidays closed    If you feel at risk of immediate harm, go directly to the Emergency Department.      Deep Breathing    What Is Deep Breathing?  Deep breathing involves using your diaphragm muscle to help bring about a state of physiological relaxation. The diaphragm is a large muscle that rests across the bottom of your rib cage. When you inhale, the diaphragm muscle drops, opening up space so air can come in. When watching someone do this it looks like your stomach is filling with air. This type of breathing helps activate the part of your nervous system that controls relaxation. It can lead to  decreased heart rate, blood pressure, decreased muscle tension, and overall feelings of relaxation.     Why Be Concerned With How I m Breathing?    To increase your awareness of the role that breathing plays in increased physical tension and in contributing to increasing your body s stress response.    To lower your level of stress-related arousal and tension.    To give you a method of taking calm, relaxing breaths to break the cycle of increasing arousal during stressful situations.    What Is the Best Way To Use Deep Breathing Exercises?    Use deep breathing frequently.    Take deep breaths at the first signs of stress, anxiety, physical tension, or other symptoms.    Schedule time for relaxation. My scheduled time for deep breathing will be _________.    TIFF Ross., AMINA Canales., Devonte, M. S., & MALLORIE Ocampo (2009). Integrated Behavioral Pavan in Primary Care: Step-by-Step Guidance for Assessment and Intervention. American Psychological Association.     Today we talked about using deep breathing as one strategy to help you to relax and manage your anxiety better. Here's a reminder of the exercise we did together in the visit today:    Sit or lay down comfortably.  You can close your eyes if you want or keep them open.  Start by taking a nice, gentle breath in. Don't force it. Stop when you feel full.  Follow by taking a nice, deep breath out until you feel empty. The breath out should be slightly longer than your breath in most of the time.  Just notice how that feels.  We talked about starting with 5-10 minutes of practice 3-4 days per week.   We talked about practicing at a calm location  Our measure for success is the practice, not the outcome (feeling better).     You don't have to like it, you don't have to be good at it, you just have to do it! :)
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
## 2021-04-01 ENCOUNTER — TELEPHONE (OUTPATIENT)
Dept: FAMILY MEDICINE | Facility: CLINIC | Age: 51
End: 2021-04-01

## 2021-04-01 ASSESSMENT — ANXIETY QUESTIONNAIRES: GAD7 TOTAL SCORE: 5
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
## 2021-04-05 ENCOUNTER — RECORDS - HEALTHEAST (OUTPATIENT)
Dept: MAMMOGRAPHY | Facility: CLINIC | Age: 51
End: 2021-04-05

## 2021-04-05 DIAGNOSIS — Z12.31 ENCOUNTER FOR SCREENING MAMMOGRAM FOR MALIGNANT NEOPLASM OF BREAST: ICD-10-CM

## 2021-04-05 NOTE — PROGRESS NOTES
I have reviewed and agree with the behavioral health fellow's documentation for this visit.  I did not personally see the patient.  Michelle Wagner, PhD., LP     12-Jan-2018 05:38

## 2021-04-06 ENCOUNTER — RECORDS - HEALTHEAST (OUTPATIENT)
Dept: ADMINISTRATIVE | Facility: OTHER | Age: 51
End: 2021-04-06

## 2021-04-06 ENCOUNTER — MEDICAL CORRESPONDENCE (OUTPATIENT)
Dept: HEALTH INFORMATION MANAGEMENT | Facility: CLINIC | Age: 51
End: 2021-04-06

## 2021-04-06 ENCOUNTER — RECORDS - HEALTHEAST (OUTPATIENT)
Dept: MAMMOGRAPHY | Facility: CLINIC | Age: 51
End: 2021-04-06

## 2021-04-06 ENCOUNTER — HOSPITAL ENCOUNTER (OUTPATIENT)
Dept: MAMMOGRAPHY | Facility: CLINIC | Age: 51
Discharge: HOME | End: 2021-04-06
Attending: FAMILY MEDICINE

## 2021-04-06 DIAGNOSIS — Z12.31 VISIT FOR SCREENING MAMMOGRAM: Primary | ICD-10-CM

## 2021-04-06 DIAGNOSIS — R92.0 BREAST MICROCALCIFICATIONS: ICD-10-CM

## 2021-04-06 DIAGNOSIS — Z12.31 VISIT FOR SCREENING MAMMOGRAM: ICD-10-CM

## 2021-04-06 LAB — MAMMOGRAM: NORMAL

## 2021-04-06 NOTE — LETTER
April 8, 2021      Kelly Harris  [address]  [place] [address]        Dear ,    We are writing to inform you of your test results.    They saw what is most likely benign calcifications on the extra mammogram pictures.  They recommend repeating the mammogram in 6 months to make sure this does not change.  Let me know if you have questions.    Resulted Orders   MA SCREENING DIGITAL BILAT   Result Value Ref Range    MAMMOGRAM         If you have any questions or concerns, please call the clinic at the number listed above.       Sincerely,      Misty Bernstein MD

## 2021-04-06 NOTE — RESULT ENCOUNTER NOTE
The breast center recommends some additional views of your right breast.  They will follow up to schedule this if they have not already done so.

## 2021-04-07 ENCOUNTER — OFFICE VISIT - HEALTHEAST (OUTPATIENT)
Dept: PHYSICAL THERAPY | Facility: REHABILITATION | Age: 51
End: 2021-04-07

## 2021-04-07 DIAGNOSIS — R29.3 POOR POSTURE: ICD-10-CM

## 2021-04-07 DIAGNOSIS — M54.2 ACUTE NECK PAIN: ICD-10-CM

## 2021-04-07 DIAGNOSIS — M79.18 MYOFASCIAL PAIN: ICD-10-CM

## 2021-04-08 ENCOUNTER — MEDICAL CORRESPONDENCE (OUTPATIENT)
Dept: HEALTH INFORMATION MANAGEMENT | Facility: CLINIC | Age: 51
End: 2021-04-08

## 2021-04-08 NOTE — RESULT ENCOUNTER NOTE
They saw what is most likely benign calcifications on the extra mammogram pictures.  They recommend repeating the mammogram in 6 months to make sure this does not change.  Let me know if you have questions.

## 2021-04-14 ENCOUNTER — OFFICE VISIT (OUTPATIENT)
Dept: PSYCHOLOGY | Facility: CLINIC | Age: 51
End: 2021-04-14
Payer: COMMERCIAL

## 2021-04-14 DIAGNOSIS — F43.0 ACUTE STRESS DISORDER: Primary | ICD-10-CM

## 2021-04-14 PROCEDURE — 90834 PSYTX W PT 45 MINUTES: CPT | Mod: HN | Performed by: STUDENT IN AN ORGANIZED HEALTH CARE EDUCATION/TRAINING PROGRAM
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
## 2021-04-16 ENCOUNTER — OFFICE VISIT - HEALTHEAST (OUTPATIENT)
Dept: PHYSICAL THERAPY | Facility: REHABILITATION | Age: 51
End: 2021-04-16

## 2021-04-16 ENCOUNTER — RECORDS - HEALTHEAST (OUTPATIENT)
Dept: ADMINISTRATIVE | Facility: OTHER | Age: 51
End: 2021-04-16

## 2021-04-16 DIAGNOSIS — R29.3 POOR POSTURE: ICD-10-CM

## 2021-04-16 DIAGNOSIS — M79.18 MYOFASCIAL PAIN: ICD-10-CM

## 2021-04-16 DIAGNOSIS — M54.2 ACUTE NECK PAIN: ICD-10-CM

## 2021-04-23 ENCOUNTER — OFFICE VISIT - HEALTHEAST (OUTPATIENT)
Dept: PHYSICAL THERAPY | Facility: REHABILITATION | Age: 51
End: 2021-04-23

## 2021-04-23 DIAGNOSIS — R29.3 POOR POSTURE: ICD-10-CM

## 2021-04-23 DIAGNOSIS — M54.2 ACUTE NECK PAIN: ICD-10-CM

## 2021-04-23 DIAGNOSIS — M79.18 MYOFASCIAL PAIN: ICD-10-CM

## 2021-04-27 NOTE — PROGRESS NOTES
I have reviewed and agree with the behavioral health fellow's documentation for this visit.  I did not personally see the patient.  Michelle Wagner, PhD., LP

## 2021-04-30 ENCOUNTER — OFFICE VISIT - HEALTHEAST (OUTPATIENT)
Dept: PHYSICAL THERAPY | Facility: REHABILITATION | Age: 51
End: 2021-04-30

## 2021-04-30 DIAGNOSIS — M79.18 MYOFASCIAL PAIN: ICD-10-CM

## 2021-04-30 DIAGNOSIS — M54.2 ACUTE NECK PAIN: ICD-10-CM

## 2021-04-30 DIAGNOSIS — R29.3 POOR POSTURE: ICD-10-CM

## 2021-05-05 ENCOUNTER — OFFICE VISIT (OUTPATIENT)
Dept: PSYCHOLOGY | Facility: CLINIC | Age: 51
End: 2021-05-05
Payer: COMMERCIAL

## 2021-05-05 DIAGNOSIS — F43.0 ACUTE STRESS DISORDER: Primary | ICD-10-CM

## 2021-05-05 PROCEDURE — 90834 PSYTX W PT 45 MINUTES: CPT | Mod: HN | Performed by: STUDENT IN AN ORGANIZED HEALTH CARE EDUCATION/TRAINING PROGRAM
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
## 2021-05-12 ENCOUNTER — OFFICE VISIT - HEALTHEAST (OUTPATIENT)
Dept: PHYSICAL THERAPY | Facility: REHABILITATION | Age: 51
End: 2021-05-12

## 2021-05-12 DIAGNOSIS — M54.2 ACUTE NECK PAIN: ICD-10-CM

## 2021-05-12 DIAGNOSIS — M79.18 MYOFASCIAL PAIN: ICD-10-CM

## 2021-05-12 DIAGNOSIS — R29.3 POOR POSTURE: ICD-10-CM

## 2021-05-17 ENCOUNTER — OFFICE VISIT (OUTPATIENT)
Dept: FAMILY MEDICINE | Facility: CLINIC | Age: 51
End: 2021-05-17
Payer: COMMERCIAL

## 2021-05-17 VITALS
SYSTOLIC BLOOD PRESSURE: 148 MMHG | RESPIRATION RATE: 16 BRPM | DIASTOLIC BLOOD PRESSURE: 96 MMHG | TEMPERATURE: 98.2 F | BODY MASS INDEX: 24.87 KG/M2 | WEIGHT: 141.4 LBS | OXYGEN SATURATION: 98 % | HEART RATE: 93 BPM

## 2021-05-17 DIAGNOSIS — G89.29 CHRONIC BILATERAL LOW BACK PAIN WITH LEFT-SIDED SCIATICA: ICD-10-CM

## 2021-05-17 DIAGNOSIS — Z00.00 ENCOUNTER FOR ROUTINE ADULT HEALTH EXAMINATION WITHOUT ABNORMAL FINDINGS: Primary | ICD-10-CM

## 2021-05-17 DIAGNOSIS — H35.63 RETINAL HEMORRHAGE OF BOTH EYES: ICD-10-CM

## 2021-05-17 DIAGNOSIS — M53.3 SACROILIAC JOINT PAIN: ICD-10-CM

## 2021-05-17 DIAGNOSIS — M54.42 CHRONIC BILATERAL LOW BACK PAIN WITH LEFT-SIDED SCIATICA: ICD-10-CM

## 2021-05-17 DIAGNOSIS — Z23 NEED FOR VACCINATION: ICD-10-CM

## 2021-05-17 DIAGNOSIS — J45.40 MODERATE PERSISTENT ASTHMA WITHOUT COMPLICATION: ICD-10-CM

## 2021-05-17 DIAGNOSIS — D57.20 SICKLE-CELL/HB-C DISEASE WITHOUT CRISIS (H): ICD-10-CM

## 2021-05-17 LAB
CHOLEST SERPL-MCNC: 168 MG/DL
ERYTHROCYTE [DISTWIDTH] IN BLOOD BY AUTOMATED COUNT: 14 % (ref 10–15)
FASTING?: NORMAL
HCT VFR BLD AUTO: 34 % (ref 35–47)
HDLC SERPL-MCNC: 67 MG/DL
HEMOGLOBIN: 12.2 G/DL (ref 11.7–15.7)
LDLC SERPL CALC-MCNC: 84 MG/DL
MCH RBC QN AUTO: 26.2 PG (ref 26.5–35)
MCHC RBC AUTO-ENTMCNC: 35.9 G/DL (ref 32–36)
MCV RBC AUTO: 73.1 FL (ref 78–100)
PLATELET # BLD AUTO: 334 10E9/L (ref 150–450)
RBC # BLD AUTO: 4.7 10E12/L (ref 3.8–5.2)
TRIGL SERPL-MCNC: 83 MG/DL
WBC # BLD AUTO: 9 10E9/L (ref 4–11)

## 2021-05-17 PROCEDURE — 90471 IMMUNIZATION ADMIN: CPT | Performed by: FAMILY MEDICINE

## 2021-05-17 PROCEDURE — 85027 COMPLETE CBC AUTOMATED: CPT | Performed by: FAMILY MEDICINE

## 2021-05-17 PROCEDURE — 36415 COLL VENOUS BLD VENIPUNCTURE: CPT | Performed by: FAMILY MEDICINE

## 2021-05-17 PROCEDURE — 90670 PCV13 VACCINE IM: CPT | Performed by: FAMILY MEDICINE

## 2021-05-17 PROCEDURE — 99396 PREV VISIT EST AGE 40-64: CPT | Mod: 25 | Performed by: FAMILY MEDICINE

## 2021-05-17 ASSESSMENT — ASTHMA QUESTIONNAIRES
QUESTION_5 LAST FOUR WEEKS HOW WOULD YOU RATE YOUR ASTHMA CONTROL: SOMEWHAT CONTROLLED
QUESTION_2 LAST FOUR WEEKS HOW OFTEN HAVE YOU HAD SHORTNESS OF BREATH: ONCE OR TWICE A WEEK
ACT_TOTALSCORE: 17
QUESTION_4 LAST FOUR WEEKS HOW OFTEN HAVE YOU USED YOUR RESCUE INHALER OR NEBULIZER MEDICATION (SUCH AS ALBUTEROL): TWO OR THREE TIMES PER WEEK
QUESTION_3 LAST FOUR WEEKS HOW OFTEN DID YOUR ASTHMA SYMPTOMS (WHEEZING, COUGHING, SHORTNESS OF BREATH, CHEST TIGHTNESS OR PAIN) WAKE YOU UP AT NIGHT OR EARLIER THAN USUAL IN THE MORNING: ONCE OR TWICE
QUESTION_1 LAST FOUR WEEKS HOW MUCH OF THE TIME DID YOUR ASTHMA KEEP YOU FROM GETTING AS MUCH DONE AT WORK, SCHOOL OR AT HOME: SOME OF THE TIME

## 2021-05-17 NOTE — LETTER
May 19, 2021      Kelly Harris  [address]  [place] [address]        Dear ,    We are writing to inform you of your test results.    Overall your blood tests were normal and reassuring.  Your red blood cell counts were normal but the cell size was a little smaller than normal (MCV 73).  This could be from your Hgb s/C or from a little bit or iron deficiency.  This is very mild.  I recommend you make sure to get enough iron in your diet.  We can follow this up every 6 to 12 months to make sure it is improving.    Resulted Orders   Hepatitis C Antibody ([employer])   Result Value Ref Range    Hepatitis C Antibody Screen Negative Negative    Narrative    Test performed by:  [facility]  [address] [place] [address]   CBC with Plt ([facility])   Result Value Ref Range    WBC 9.0 4.0 - 11.0 10e9/L    RBC 4.7 3.8 - 5.2 10e12/L    Hemoglobin 12.2 11.7 - 15.7 g/dL    Hematocrit 34.0 (L) 35.0 - 47.0 %    MCV 73.1 (L) 78.0 - 100.0 fL    MCH 26.2 (L) 26.5 - 35.0 pg    MCHC 35.9 32.0 - 36.0 g/dL    RDW 14 10 - 15 %    Platelets 334.0 150.0 - 450.0 10e9/L   Lipid [place] [facility] - Results > 1 hr   Result Value Ref Range    Cholesterol 168 <=199 mg/dL    Triglycerides 83 <=149 mg/dL    HDL Cholesterol 67 >=50 mg/dL    LDL Cholesterol Calculated 84 <=129 mg/dL    Fasting? Unknown     Narrative    Test performed by:  [facility]  [address] [place] [address]       If you have any questions or concerns, please call the clinic at the number listed above.       Sincerely,      Misty Bernstein MD

## 2021-05-17 NOTE — PROGRESS NOTES
Female Physical Note    Assessment and Plan     Kelly was seen today for physical.    Diagnoses and all orders for this visit:    Encounter for routine adult health examination without abnormal findings  Repeat mammo for slight abnormality due in October. Screening labs ordered.  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 Recheck today due to this and h/o sickle cell/hb-C  -     CBC with Plt ([facility])    Retinal hemorrhage of both eyes: following closely with retinal specialist and should see them in 6 months as well.    Asthma Moderate persistent: much better control than in the past and normal exam today even though it is allergy season. She is on maximum asthma medications and we will leave those the same.  Elevated BP without HTN: recheck in two weeks with virtual visit with home BP readings done regularly.     SI joint pain vs left sciatica: Gave back exercises to restart.  Need for vaccination  -     Pneumococcal vaccine 13 valent PCV13 IM (Prevnar) [[id number]]        Follow up virtual visit in 2 weeks for BP.              Misty Bernstein MD         HPI         Concerns today: No special concerns today.             Review of Systems:     CONSTITUTIONAL: no fatigue, no unexpected change in weight  SKIN: no worrisome rashes, no worrisome moles, no worrisome lesions  EYES: vision not as good especially in left eye, following with eye doctor.  ENT: no ear problems, no mouth problems, no throat problems  RESP: no significant cough, no shortness of breath  BREAST: no masses, no tenderness, no discharge  CV: no chest pain, no palpitations, no new or worsening peripheral edema  GI: no nausea, no vomiting, no constipation, no diarrhea  : no frequency, no dysuria, no hematuria  MS: no claudication, no myalgias, Chronic left SI joint pain with some radiation down the left  leg.  NEURO: no weakness, no dizziness, no syncope, no headaches  ENDOCRINE: no temperature intolerance, no skin/hair changes  HEME: no easy bruising, no bleeding problems  PSYCHIATRIC: NEGATIVE for changes in mood or affect      Sexually Active: Yes  Sexual concerns: No   Contraception:not needed  Pregnancy History: No obstetric history on file.  Menses: Patient's last menstrual period was 04/12/2017.   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 No pertinent past medical history.    Past Surgical History:   Procedure Laterality Date     MOUTH SURGERY  09/07/2019    Removed benign mouth tumor        Family History   Problem Relation Age of Onset     Diabetes Father      Coronary Artery Disease Father      Hypertension Father      Asthma Daughter      Sickle Cell Trait Daughter         S/C     Asthma Daughter      Sickle Cell Trait Daughter         S/C     Heart Disease Daughter         Heart valve regurgitation, abnormal vein with shunt     Hypertension Mother      Cancer No family hx of      Reviewed no other significant FH    Family History and past Medical History reviewed and unchanged/updated.  Social History     Tobacco Use     Smoking status: Never Smoker     Smokeless tobacco: Never Used   Substance Use Topics     Alcohol use: No     Single  Children ? yes 2 daughter    Has anyone hurt you physically, for example by pushing, hitting, slapping or kicking you or forcing you to have sex? Denies  Do you feel threatened or controlled by a partner,  ex-partner or anyone in your life? Denies    RISK BEHAVIORS AND HEALTHY HABITS:  Tobacco Use/Smoking: None  Illicit Drug Use: None  Do you use alcohol? No  Diet (5-7 servings of fruits/veg daily): Yes   Exercise (30 min accumulated most days):Yes  Dental Care: Yes   Calcium 1500 mg/d:  Yes  Seat Belt Use: Yes     Cholesterol Level (>44 yo or at risk):  Recommended and patient accepted testing., Pap/HPV cotest every 5 years for women 30-65   Testing not indicated  and HIV screening:  Recommended and patient accepted testing.  Colon CA Screening (>50-75 ):  Testing not indicated  and Breast CA Screening (>39 yo or 10 y before 1st degree relative diagnosis): Testing not indicated     Immunization History   Administered Date(s) Administered     HEPA 12/17/2008, 06/19/2009     Influenza (IIV3) PF 10/08/2010     Influenza Vaccine IM > 6 months Valent IIV4 01/11/2019, 10/12/2020     Influenza Vaccine, 6+MO IM (QUADRIVALENT W/PRESERVATIVES) 11/09/2015, 11/29/2017, 01/02/2020     MMR 03/06/2009, 06/17/2009     Meningococcal (Menomune ) 12/17/2008     Pneumo Conj 13-V (2010&after) 05/17/2021     Pneumococcal 23 valent 01/02/2020     Poliovirus, inactivated (IPV) 12/17/2008     TD (ADULT, 7+) 04/23/2015     Tdap (Adacel,Boostrix) 12/17/2008    Reviewed Immunization Record Today         Physical Exam:     BP (!) 148/96 (BP Location: Left arm, Patient Position: Sitting, Cuff Size: Adult Regular)   Pulse 93   Temp 98.2  F (36.8  C) (Oral)   Resp 16   Wt 64.1 kg (141 lb 6.4 oz)   LMP 04/12/2017   SpO2 98%   BMI 24.87 kg/m    GENERAL: healthy, alert and no distress  EYES: Eyes grossly normal to inspection, extraocular movements - intact, and PERRL  HENT: ear canals- normal; TMs- normal; Nose- normal; Mouth- no ulcers, no lesions  NECK: no tenderness, no adenopathy, no asymmetry, no masses, no stiffness; thyroid- normal to palpation  RESP: lungs clear to auscultation - no rales, no rhonchi, no wheezes  BREAST: no masses, no  tenderness, no nipple discharge, no palpable axillary masses or adenopathy  CV: regular rates and rhythm, normal S1 S2, no S3 or S4 and no murmur, no click or rub -  ABDOMEN: soft, no tenderness, no  hepatosplenomegaly, no masses, normal bowel sounds  MS: extremities- no gross deformities noted, no edema  SKIN: no suspicious lesions, no rashes  NEURO: strength and tone- normal, sensory exam- grossly normal, mentation- intact, speech- normal, reflexes- symmetric  BACK: no CVA tenderness, no paralumbar tenderness. Left SI joint tender to palpation.  +REBECA on left. +SLR on the left, neg on right.  PSYCH: Alert and oriented times 3; speech- coherent , normal rate and volume; able to articulate logical thoughts, able to abstract reason, no tangential thoughts, no hallucinations or delusions, affect- normal  LYMPHATICS: ant. cervical- normal, post. cervical- normal, axillary- normal, supraclavicular- normal, inguinal- normal

## 2021-05-18 LAB — HCV AB SER QL: NEGATIVE

## 2021-05-18 ASSESSMENT — ASTHMA QUESTIONNAIRES: ACT_TOTALSCORE: 17

## 2021-05-19 ENCOUNTER — COMMUNICATION - HEALTHEAST (OUTPATIENT)
Dept: PHYSICAL THERAPY | Facility: REHABILITATION | Age: 51
End: 2021-05-19

## 2021-05-19 ENCOUNTER — OFFICE VISIT - HEALTHEAST (OUTPATIENT)
Dept: PHYSICAL THERAPY | Facility: REHABILITATION | Age: 51
End: 2021-05-19

## 2021-05-19 ENCOUNTER — OFFICE VISIT (OUTPATIENT)
Dept: PSYCHOLOGY | Facility: CLINIC | Age: 51
End: 2021-05-19
Payer: COMMERCIAL

## 2021-05-19 DIAGNOSIS — F43.0 ACUTE STRESS DISORDER: Primary | ICD-10-CM

## 2021-05-19 DIAGNOSIS — M79.18 MYOFASCIAL PAIN: ICD-10-CM

## 2021-05-19 DIAGNOSIS — M54.2 ACUTE NECK PAIN: ICD-10-CM

## 2021-05-19 DIAGNOSIS — R29.3 POOR POSTURE: ICD-10-CM

## 2021-05-19 PROCEDURE — 90834 PSYTX W PT 45 MINUTES: CPT | Mod: U7 | Performed by: STUDENT IN AN ORGANIZED HEALTH CARE EDUCATION/TRAINING PROGRAM

## 2021-05-19 NOTE — PATIENT INSTRUCTIONS
I hope that you have a good trip home with family! Get lots of rest.     Grounding Technique:    After a trauma, it s normal to experience flashbacks, anxiety, and other uncomfortable symptoms. Grounding techniques help control these symptoms by turning attention away from thoughts, memories, or worries, and refocusing on the present moment. In this article, you will learn four powerful grounding techniques for managing the symptoms of trauma.    5-4-3-2-1 Technique  Using the 5-4-3-2-1 technique, you will purposefully take in the details of your surroundings using each of your senses. Strive to notice small details that your mind would usually tune out, such as distant sounds, or the texture of an ordinary object.    In this, you identify     5 things you can see  4 things you can feel  3 things you can hear  2 things you can smell  1 thing you can taste  Taste is sometimes hard to identify, so you could substitute that by thinking of your favorite thing to taste. Some versions of the 71[address] grounding method say to name one thing you like about yourself. Regardless of how you approach this, the goal is to identify elements in the world around you. As your mind begins to focus on these things, it will be less focused on the sudden rush of anxiety. This will help slow your heart rate, control your breathing, and make you feel better overall.    Talk Yourself through the Anxiety Attack  Another form of grounding involves self-therapy. When you feel the anxiety attack coming on, talk to yourself (either out oud or in your head). Tell yourself that you are having an anxiety attack and that it is going to be OK. You have gone through this before, and you can get through it again. You are strong enough to handle your emotions, and the anxiety attack will not last long. Keep repeating these positive statements until you feel yourself calming down.    Play a Game with Yourself  You could get your mind off the anxiety attack  by playing a quick game. Ask yourself a question that has several answers:    Name as many states as you can  Name as many dog breeds as you can  Name as many cities as you can  Recite the alphabet backwards  Practice simple times tables  You may respond better to some questions than others, but the idea is to make yourself think about something other than anxiety. All grounding methods for anxiety attacks are mind over matter, but they do work.

## 2021-05-19 NOTE — RESULT ENCOUNTER NOTE
Overall your blood tests were normal and reassuring.  Your red blood cell counts were normal but the cell size was a little smaller than normal (MCV 73).  This could be from your Hgb s/C or from a little bit or iron deficiency.  This is very mild.  I recommend you make sure to get enough iron in your diet.  We can follow this up every 6 to 12 months to make sure it is improving.
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
## 2021-05-25 NOTE — PROGRESS NOTES
I have reviewed and agree with the behavioral health fellow's documentation for this visit.  I did not personally see the patient.  Michelle Wagner, ., LP

## 2021-05-26 ENCOUNTER — OFFICE VISIT - HEALTHEAST (OUTPATIENT)
Dept: PHYSICAL THERAPY | Facility: REHABILITATION | Age: 51
End: 2021-05-26

## 2021-05-26 DIAGNOSIS — M79.18 MYOFASCIAL PAIN: ICD-10-CM

## 2021-05-26 DIAGNOSIS — M54.2 ACUTE NECK PAIN: ICD-10-CM

## 2021-05-26 DIAGNOSIS — R29.3 POOR POSTURE: ICD-10-CM

## 2021-05-31 ENCOUNTER — RECORDS - HEALTHEAST (OUTPATIENT)
Dept: ADMINISTRATIVE | Facility: CLINIC | Age: 51
End: 2021-05-31

## 2021-05-31 VITALS — BODY MASS INDEX: 25.4 KG/M2 | HEIGHT: 63 IN

## 2021-05-31 VITALS — BODY MASS INDEX: 25.3 KG/M2 | HEIGHT: 63 IN

## 2021-05-31 VITALS — BODY MASS INDEX: 25.41 KG/M2 | HEIGHT: 63 IN | WEIGHT: 143.4 LBS

## 2021-06-01 ENCOUNTER — RECORDS - HEALTHEAST (OUTPATIENT)
Dept: ADMINISTRATIVE | Facility: CLINIC | Age: 51
End: 2021-06-01

## 2021-06-04 ENCOUNTER — VIRTUAL VISIT (OUTPATIENT)
Dept: FAMILY MEDICINE | Facility: CLINIC | Age: 51
End: 2021-06-04
Payer: COMMERCIAL

## 2021-06-04 DIAGNOSIS — R03.0 ELEVATED BP WITHOUT DIAGNOSIS OF HYPERTENSION: Primary | ICD-10-CM

## 2021-06-04 DIAGNOSIS — D57.20 SICKLE-CELL/HB-C DISEASE WITHOUT CRISIS (H): ICD-10-CM

## 2021-06-04 PROCEDURE — 99213 OFFICE O/P EST LOW 20 MIN: CPT | Mod: 95 | Performed by: FAMILY MEDICINE

## 2021-06-04 NOTE — PROGRESS NOTES
Kelly is a 50 year old who is being evaluated via a billable video visit.      How would you like to obtain your AVS? Mail a copy  If the video visit is dropped, the invitation should be resent by: Text to cell phone: [phone]   Will anyone else be joining your video visit? No      Video Start Time: 8:43 AM, patient's internet did not support, converted to phone visit.  Phone Visit was completed.    Assessment & Plan     Elevated BP without diagnosis of hypertension  Ambulatory pressure are mostly normal.  She has strong association with stress and higher BPs.  No treatment at this time. Discussed DASH diet, exercise and health lifestyle to control BP. Take BP when relax and low stress time for most accurate baseline.    Sickle cell/HgbC disease: No anemia at this time, MCV is a little low likely form hemoglobinopathy. Follow blood counts regularly. Discussed lab results in detail.                   Follow up prn if BPs remain high.  Misty Bernstein MD  [facility]   Kelly is a 50 year old who presents for the following health issues     HPI     Hypertension Follow-up:       Do you check your blood pressure regularly outside of the clinic? Yes     Are you following a low salt diet? Yes    Are your blood pressures ever more than 140 on the top number (systolic) OR more   than 90 on the bottom number (diastolic), for example 140/90? Yes 140/80s at times.    Most of the time in the 120/70s.  LAst one: 127/78.  She takes it most days at work.  Works at a nursing home.  She has been stressed with work and with father's recent death and  preparations.  She thinks that these factors have caused the occasional high BPs.      Discussed labs including cbc and follow up plan.      Review of Systems         Objective         Vitals:  Patient reported /78    Physical Exam   GENERAL: Healthy, alert and no distress  PSYCH: Mentation appears normal, affect normal/bright,  judgement and insight intact, normal speech and appearance well-groomed.    Office Visit on 05/17/2021   Component Date Value Ref Range Status     Hepatitis C Antibody Screen 05/17/2021 Negative  Negative Final     WBC 05/17/2021 9.0  4.0 - 11.0 10e9/L Final     RBC 05/17/2021 4.7  3.8 - 5.2 10e12/L Final     Hemoglobin 05/17/2021 12.2  11.7 - 15.7 g/dL Final     Hematocrit 05/17/2021 34.0* 35.0 - 47.0 % Final     MCV 05/17/2021 73.1* 78.0 - 100.0 fL Final     MCH 05/17/2021 26.2* 26.5 - 35.0 pg Final     MCHC 05/17/2021 35.9  32.0 - 36.0 g/dL Final     RDW 05/17/2021 14  10 - 15 % Final     Platelets 05/17/2021 334.0  150.0 - 450.0 10e9/L Final     Cholesterol 05/17/2021 168  <=199 mg/dL Final     Triglycerides 05/17/2021 83  <=149 mg/dL Final     HDL Cholesterol 05/17/2021 67  >=50 mg/dL Final     LDL Cholesterol Calculated 05/17/2021 84  <=129 mg/dL Final     Fasting? 05/17/2021 Unknown   Final                Phone Visit time: 8 min

## 2021-06-10 NOTE — PROGRESS NOTES
Assessment:    History of persistent asthma with current poor control.  Triggers include illness, exertion, cold air, air pollution    Plan:    Hold the Flovent and replaced with Symbicort 1 60-2 puffs a.m. and p.m.  Albuterol 2 puffs every 4 hours as needed    Return in 4 weeks for repeat spirometry.  ____________________________________________________________________________     Kelly is here because her asthma has worsened over the past year.  She reports having a long history of asthma.  She describes frequent coughing, wheezing and shortness of breath.  Recently her asthma has significantly worsened.  Beginning of April she was started have increased symptoms and was placed on prednisone by her primary.  It did seem to improve however on April 27 symptoms worsened and she went to the emergency room.  There she was given albuterol via nebulizer, prednisone and a chest x-ray was done.  No report of pneumonia.  Over the past month she is also placed on unknown antibiotic which did seem to help.  She does have chronic nasal congestion, rhinorrhea and headaches that seem to be worse at night.  She identifies exertion, strong smells such as chemicals, cold air, cigarette smoke in her workplace as triggers.  No previous hospitalization.  It has been approximately 1 year since she has been on prednisone prior to this.  She does have an albuterol inhaler which helps somewhat.  She has been on Flovent since April which helps somewhat.  She takes Zyrtec off and on for allergy symptoms.    Review of symptoms: Headache.  As above, otherwise negative    Past medical history: Sickle cell trait    Allergies: No known allergies to medications, latex, foods or hymenoptera venom    Family history: Uncle with asthma.  A mother and sister with allergies.    Social history: Currently lives in a house that was built in 2005.  She has been in this house for 1 year.  There is new carpets placed before moving in.  No visible mold in  the home.  No pets in the home.  Patient works as a nutritionist.  Her workplace is an older building.  She is not a cigarette smoker.    Medications: reviewed in chart    Physical Exam:  General:  Alert and Oriented X 3.  Eyes:  Sclera clear.  Ears: TMs translucent grey with bony landmarks visible. Nose: Pale, boggy mucosal membranes.  Throat: Pink, moist.  No lesions.  Neck: Supple.  No lymphadenopathy.  Lungs: CTA.  CV: Regular rate and rhythm. Extremities: Well perfused.  No clubbing or cyanosis. Skin: No rash    Spirometry: FEV1 to FVC ratio is 76%.  FEV1 is 1.59 L which is 69% of predicted.  FVC is 2.08 L which is 73% of predicted.  There are concerns regarding reproducibility of spirometry and accuracy of testing.    Physical Exam:  General:  Alert and Oriented X 3.  Eyes:  Sclera clear.  Ears: TMs translucent grey with bony landmarks visible. Nose: Pale, boggy mucosal membranes.  Throat: Pink, moist.  No lesions.  Neck: Supple.  No lymphadenopathy.  Lungs: CTA.  CV: Regular rate and rhythm. Extremities: Well perfused.  No clubbing or cyanosis. Skin: No rash    Last Percutaneous Allergy Test Results  Trees  [place], White  1:20 H  (W/F in mm): 0 (05/24/17 1525)  Birch Mix 1:20 H (W/F in mm): 0 (05/24/17 1525)  [place], Common 1:20 H (W/F in mm): 0 (05/24/17 1525)  Elm, American 1:20 H (W/F in mm): 0 (05/24/17 1525)  [place], Shagbark 1:20 H (W/F in mm): 0 (05/24/17 1525)  Maple, Hard/Sugar 1:20 H (W/F in mm): 0 (05/24/17 1525)  [place] Mix 1:20 H (W/F in mm): 0 (05/24/17 1525)  [place], Red 1:20 H (W/F in mm): 0 (05/24/17 1525)  [place], American 1:20 H (W/F in mm): 0 (05/24/17 1525)  [place] Tree 1:20 H (W/F in mm): 0 (05/24/17 1525)  Dust Mites  D. Pteronyssinus Mite 30,000 AU/ML H (W/F in mm): 0 (05/24/17 1525)  D. Farinae Mite 30,000 AU/ML H (W/F in mm: 0 (05/24/17 1525)  Grasses  Grass Mix #4 10,000 BAU/ML H: 0 (05/24/17 1525)  Mazin Grass 1:20 H (W/F in mm): 0 (05/24/17 1525)  Cockroach  Cockroach Mix  1:10 H (W/F in mm): 0 (05/24/17 1525)  Molds/Fungi  Alternaria Tenuis 1:10 H (W/F in mm): 0 (05/24/17 1525)  Aspergillus Fumigatus 1:10 H (W/F in mm): 0 (05/24/17 1525)  Homodendrum Cladosporioides 1:10 H (W/F in mm): 0 (05/24/17 1525)  Penicillin Notatum 1:10 H (W/F in mm): 0 (05/24/17 1525)  Epicoccum 1:10 H (W/F in mm): 0 (05/24/17 1525)  Weeds  Ragweed, Short 1:20 H (W/F in mm): 0 (05/24/17 1525)  Dock, Sorrel 1:20 H (W/F in mm): 0 (05/24/17 1525)  Lamb's Quarter 1:20 H (W/F in mm): 0 (05/24/17 1525)  Pigweed, Rough Red Root 1:20 H  (W/F in mm): 0 (05/24/17 1525)  Plantain, English 1:20 H  (W/F in mm): 0 (05/24/17 1525)  Sagebrush, Mugwort 1:20 H  (W/F in mm): 0 (05/24/17 1525)  Animal  Cat 10,000 BAU/ML H (W/F in mm): 0 (05/24/17 1525)  Dog 1:10 H (W/F in mm): 0 (05/24/17 1525)  Controls  Device Type: QUINTIP (05/24/17 1525)  Neg. control: 50% Glycerine/Saline H (W/F in mm): 0/0 (05/24/17 1525)  Pos. control: Histamine 6mg/ML (W/F in mms): 8/13 (05/24/17 1525)     45 min spent in direct contact with the patient.  More than 50% in counseling and coordination of care.  This transcription uses voice recognition software, which may contain typographical errors.

## 2021-06-11 NOTE — PROGRESS NOTES
Assessment:    History of asthma with exacerbation today.  Restrictive process on spirometry.  History concerning for vocal cord dysfunction.  I believe she has both asthma and vocal cord dysfunction.    Plan:     Recommend continuing Symbicort 160- 60-2 puffs twice daily.  Will send a new prescription    Prednisone 40 mg daily for 3 days.    Albuterol 2 puffs every 4 hours as needed    Chest x-ray PA and lateral.    Follow-up in 4-6 months.  Again repeat spirometry.  If persistent restrictive process consider pulmonary evaluation.  ____________________________________________________________________________     Kelly is here for follow-up.  She has a history of persistent asthma with recent poor control.  She was last seen on May 24.  At that time she was started on Symbicort 1 60-2 puffs twice a day.  Previously had been on Flovent.  Patient reports that over the past month since seen her asthma has been improved significantly however in the past day she has had significant flare with wheezing and shortness of breath.  She is used albuterol 4 times today.  She feels that the humidity today has triggered symptoms.  She does report hoarse voice when she is short of breath and throat tightness.  No recent illness.  No significant flare of allergies.  No fever.  Over the past month she has been using it rarely.  She did undergo pulmonary function testing since last seen    .IMPRESSIONS  Normal spirometry and flow-volume loop. There is an elevation in residual volume, suggesting an element of air trapping. There is a reduction in diffusion capacity which could be related to the patient's inability to perform testing. A pulmonary vascular interstitial process is not ruled out. Clinical correlation is necessary. The patient was apparently audibly wheezing and complaining of chest tightness with this. There is some fluttering of the flow-volume loop, suggesting a component of vocal cord dysfunction. Clinical correlation  is necessary.     Physical Exam:  General:  Alert and Oriented X 3.  Eyes:  Sclera clear. Nose: pale boggy mucosal membranes.  Throat:  pink moist, no lesions.  Lungs: Wheezing heard diffusely.  Good air volume movement    Chest x-ray PA and lateral obtained.  Film within normal limits.  Spirometry today shows FEV1 to FVC ratio of 83%.  FEV1 is 1.68 L which is 73% of predicted.  FVC is 2.02 L which is 71% of predicted.  This is consistent with a Mild restrictive process.  Inspiratory loop done without evidence of flattening seen.    25 min spent in direct contact with the patient.  More than 50% in counseling and coordination of care.  This transcription uses voice recognition software, which may contain typographical errors.

## 2021-06-12 NOTE — PROGRESS NOTES
Assessment:     Moderate persistent asthma.  Vocal cord dysfunction    Currently active symptoms of vocal cord dysfunction.  History of GERD which may be contributing.  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     Plan:      Recommend continuing Symbicort 160-2 puffs twice daily.       Albuterol 2 puffs every 4 hours as needed     Letter for work.    Referral to speech therapy for evaluation and treatment.    Ranitidine 150 mg daily    Follow-up in 4 months if doing well.  Follow-up in 1-2 weeks if no improvement    ____________________________________________________________________________       Kelly comes in today for scheduled follow-up however she has had an exacerbation in her symptoms over the last day.  Since last seen she started using Symbicort which seemed to help.  In the past day her symptoms have worsened.  She feels that recent remodeling and construction work seem to be triggering symptoms.  She did describes shortness of breath.  She describes a hoarse voice.  She describes throat tightness.  Symptoms are not very responsive to albuterol.  No fever but she does report some fatigue.  She does have a history of reflux approximately weekly.    Physical Exam:  General:  Alert and Oriented X 3.  Eyes:  Sclera clear. Nose: pale boggy mucosal membranes.  Throat:  pink moist, no lesions.  Lungs:  clear to auscultation    Spirometry: FEV1 to FVC ratio is 73%.  FEV1 is 1.69 L which is 73% of predicted.  FVC is 2.31 L which is 81% predicted.  This is borderline mild airway obstruction.

## 2021-06-12 NOTE — PROGRESS NOTES
Assessment:      Moderate persistent asthma.  Currently active symptoms of vocal cord dysfunction.  History of GERD which may be contributing.  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      Plan:       Symbicort 160-2 puffs twice daily.      Albuterol 2 puffs every 4 hours as needed  FMLA paperwork completed  Referral to speech therapy for evaluation and treatment-appointment is scheduled.  Patient has a 1 month weight.     Stop ranitidine and start Prilosec 20 mg daily.  Ranitidine caused nausea.   Follow-up in 4 months if doing well.  Follow-up in 1-2 weeks if no improvement  ____________________________________________________________________________     Kelly returns today with continued shortness of breath hoarse voice and throat tightness.  Does come and go.  Talking seems to exacerbated.  When I last saw her she trialed ranitidine however it caused nausea and she only used it for 4 days.  She does have a speech therapy appointment scheduled for September 12.  She continues to use Symbicort daily.    Physical Exam:  General:  Alert and Oriented X 3.  Eyes:  Sclera clear. Nose: pale boggy mucosal membranes.  Throat:  pink moist, no lesions.  Lungs:  clear to auscultation    This transcription uses voice recognition software, which may contain typographical errors.

## 2021-06-15 NOTE — PROGRESS NOTES
Kelly Harris is a 50 y.o. female who is being evaluated via a billable video visit.      How would you like to obtain your AVS?mail  If dropped from the video visit, the video invitation should be resent by: [id number]  Will anyone else be joining your video visit? no      Video Start Time:815      Subjective     Kelly Harris is 50 y.o. and presents to clinic today for the following health issues   HPI   Chief complaint: Follow-up asthma      History of present illness: This is a pleasant 50-year-old woman previous patient of Dr. Noel here today for follow-up of asthma.  Last seen by Dr. Noel in January 2018.  She reports that she has been doing well with her asthma into the last few months.  She states that in November or December she started to have difficulty with her asthma.  She reported that she was using albuterol 4-5 times per day.  She states she was waking up at night short of breath.  She was on Advair 230/21 2 puffs twice daily.  Spiriva was then added to her regimen 2 puffs daily.  With this, she feels quite a bit better and is no longer having any difficulty at night.  She states she still using albuterol inhaler, however, 2 times per day.  She reports that she becomes breathless with talking for long periods of time and sometimes with small activities.  She states follow-up this summer is worse for her.  States she does better in the summer typically.  She has not had a recent breathing test.  Does have a previous history of vocal cord dysfunction.  No ER visits or hospitalizations since he was last seen.  She does report that prednisone does improve her symptoms greatly but she has not been on this in quite some time.          Review of Systems  Performed and otherwise negative      Objective       Vitals:  No vitals were obtained today due to virtual visit.    Physical Exam  Gen: Pleasant female not in acute distress  HEENT: Eyes no erythema of the bulbar or palpebral conjunctiva, no  edema.  Skin: No rashes or lesions  Psych: Alert and oriented times 3      Impression report and plan:  1.  Moderate persistent asthma  2.  History of vocal cord dysfunction    Continue with Advair and Spiriva.  Of left obtain a complete primary function test.  Pending this, could consider evaluation for vocal cord dysfunction or treatment for reflux if she is still using albuterol 2 times per day.  Goal use for albuterol is 2 times per week.  I will contact the patient once testing returns.    Video-Visit Details    Type of service:  Video Visit    Video End Time (time video stopped): 836  Originating Location (pt. Location): home    Distant Location (provider location):  [facility]     Platform used for Video Visit: charleswell

## 2021-06-15 NOTE — PATIENT INSTRUCTIONS - HE
Advair 2 puffs twice daily    Spiriva 2 puffs daily     Pulmonary function test    ?vocal cord dysfunction/reflux/sinus disease causing worsening symptoms    Goal use albuterol less than 2 times per week
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
## 2021-06-15 NOTE — PROGRESS NOTES
Assessment:      Moderate persistent asthma and vocal cord dysfunction.  I am suspicious of silent reflux as triggering both asthma and VCD at night.        Plan:       Symbicort 160-2 puffs twice daily.      Albuterol 2 puffs every 4 hours as needed   Prednisone 20 mg twice daily for 5 days.    Prilosec 20 mg daily.      Follow-up in 6 months if doing well.  Follow-up in 1-2 weeks if no improvement.  Consider ENT evaluation.  Consider return to [facility].  ____________________________________________________________________________     Kelly comes in today with increased shortness of breath.  She is waking up at night often 2-3 times with coughing and shortness of breath.  She feels tightness in her chest and her throat.  She has a history of both asthma and vocal cord dysfunction.  She was last seen this summer.  Since then she seen speech therapy clinic with video stroboscopy.  They identified vocal cord dysfunction.  They taught Kelly techniques for alleviating her vocal cord dysfunction symptoms.  She said since then her symptoms have been under pretty good control.  No clear trigger with this recent flare.    Physical Exam:  General:  Alert and Oriented.  Eyes:  Sclera clear. Nose: pale boggy mucosal membranes.  Throat:  pink moist, no lesions.  Lungs: Expiratory wheezes heard.  Unclear whether this may be coming from her upper airway.   Skin:  no rashes    Spirometry: FEV1 to FVC ratio is 82%.  FEV1 is 1.91 L which is 84% of predicted.  FVC is 2.35 L which is 83% of predicted. Unfortunately we are unable to get reproducible spirometry readings.    Nitric oxide: 16 ppb    25 min spent in direct contact with the patient apart from testing.  More than 50% in counseling and coordination of care.
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
## 2021-06-16 NOTE — TELEPHONE ENCOUNTER
Patient feeling better on Spiriva.  PFT normal.  Continue Advair and Spiriva for 6 months.  Patient to check in with primary for anemia.
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
## 2021-06-16 NOTE — TELEPHONE ENCOUNTER
Dr. Archuleta    This person called and is requesting a call back:    Name Of Person Who Called: Patient    Why Did The Person Call: Patient returning Dr Archuleta's call about PFT results    Best Phone Number To Call Back: [phone]    Okay To Leave A Detailed Voicemail? Yes    Thank you.
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
## 2021-06-16 NOTE — PROGRESS NOTES
RESPIRATORY CARE NOTE     Patient Name: Kelly Harris  Today's Date: 3/29/2021     Complete PFT done. Pt performed tests with good effort. Test results meet ATS criteria. Results scanned into epic. Pt left in no distress.       Luz Polk RRT
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
## 2021-06-17 NOTE — TELEPHONE ENCOUNTER
Left message regarding patient's no show PT visit today. Reminded patient of next appointment.    Chandrakant Argueta, PT, DPT

## 2021-06-18 NOTE — PATIENT INSTRUCTIONS - HE
Patient Instructions by Chandrakant Argueta PT at 3/12/2021 12:00 PM     Author: Chandrakant Argueta PT Service: -- Author Type: Physical Therapist    Filed: 3/12/2021 12:35 PM Encounter Date: 3/12/2021 Status: Signed    : Chandrakant Argueta PT (Physical Therapist)       Continue to use ice or heat as needed if these help decrease pain.    Get up and get moving as you're able. Try not to sit for more than 30-45 minutes at a time.     Try to go for a walk each day (weather permitting).    Use as much of your motion in your neck as tolerated so the muscles can get back to working as normal as possible.     CERVICAL FLEXION    Tilt your head downwards, then return back to looking straight ahead. Hold for 5 seconds. Do 10 repetitions at a time. Perform 3-5x/day    CERVICAL EXTENSION    Tilt your head upwards, then return back to looking straight ahead. Hold for 5 seconds. Do 10 repetitions at a time. Perform 3-5x/day    CERVICAL SIDE BEND    Tilt your head towards the side, then return back to looking straight ahead.  (Be sure to keep you eyes and nose pointed straight ahead the entire time). Hold for 5 seconds. Do 10 repetitions at a time. Perform 3-5x/day    CERVICAL ROTATION    Turn your head towards the side, then return back to looking straight ahead. Hold for 5 seconds. Do 10 repetitions at a time. Perform 3-5x/day
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
## 2021-07-21 ENCOUNTER — RECORDS - HEALTHEAST (OUTPATIENT)
Dept: ADMINISTRATIVE | Facility: CLINIC | Age: 51
End: 2021-07-21

## 2021-10-07 ENCOUNTER — HOSPITAL ENCOUNTER (OUTPATIENT)
Dept: MAMMOGRAPHY | Facility: CLINIC | Age: 51
Discharge: HOME | End: 2021-10-07
Attending: FAMILY MEDICINE | Admitting: FAMILY MEDICINE
Payer: COMMERCIAL

## 2021-10-07 DIAGNOSIS — R92.0 BREAST MICROCALCIFICATIONS: ICD-10-CM

## 2021-10-07 PROCEDURE — 77065 DX MAMMO INCL CAD UNI: CPT | Mod: RT

## 2021-10-07 NOTE — LETTER
Kelly Harris  [address]  [place] [address]        October 7, 2021  Date of Exam: 10/7/21    Dear Kelly:    Thank you for your recent visit.    Breast Imaging Result: Your breast imaging examination showed an area that we believe is benign (not cancer). We recommend that you come back again prior to your next yearly breast imaging for short term follow-up, or in certain instances, biopsy. If you have not already scheduled this follow-up exam, please call [phone].     Breast Density: Your breast imaging shows that you have dense breast tissue. This means you have a slightly higher risk of getting breast cancer. It also means your breast imaging will be harder to read, but it doesn't mean that breast imaging isn't useful. In fact, yearly breast imaging is even more important for individuals at higher risk. Additional testing may be warranted depending on your overall risk.    As you know, early detection of cancer is very important. Although not all cancers are found through breast imaging it is the most accurate method for early detection. A thorough examination includes a combination of breast imaging and a physical examination by your health care professional. Therefore, if you have not had a recent physical exam of your breasts see your health care team.    A report of your breast imaging results was sent to: Misty Bernstein    Your breast imaging will become part of your medical file here at [facility] for at least 10 years. You are responsible for informing any new health care team or breast imaging facility of the date and location of this examination.    We appreciate the opportunity to participate in your health care.    Sincerely,  Dr. Jasmin ANDERSEN [facility]

## 2021-10-07 NOTE — LETTER
October 11, 2021      Kelly Steven  [address]  [place] [address]        Dear ,    We are writing to inform you of your test results.    Your mammogram was reassuring and likely benign. They recommend bilateral mammogram for screening.       Resulted Orders   MA Diagnostic Digital Right    Narrative    EXAM: MA DIAGNOSTIC DIGITAL RIGHT  LOCATION: [facility]  DATE/TIME: 10/7/2021 2:49 PM    INDICATION: Short-term follow-up of right breast microcalcifications which are most likely benign  COMPARISON: 04/06/2021, 04/05/2021, 01/23/2020, priors to 11/17/2008    MAMMOGRAPHIC FINDINGS: Right full-field digital diagnostic mammogram performed. The breasts are heterogeneously dense, which may obscure small masses. Images evaluated with the assistance of CAD. Grouping of microcalcifications right 10:00 posterior   depth is slightly smaller overall measuring 3 x 2 x 2 mm, previously 4 x 3 x 3 mm and remain amorphous on both views, previously showing layering on the lateral view. Again, these are considered most likely benign however as they do not show layering as   before, recommend an additional six-month mammogram follow-up with magnification views, at which time patient is due for bilateral mammogram. No new findings.      Impression    IMPRESSION:  Right 10:00 posterior depth calcifications again thought to be most likely benign and recommend 6 month mammogram follow-up.    ACR BI-RADS Category 3: Probably Benign.    Results given to the patient.       If you have any questions or concerns, please call the clinic at the number listed above.       Sincerely,      Misty Bernstein MD

## 2021-10-18 ENCOUNTER — ALLIED HEALTH/NURSE VISIT (OUTPATIENT)
Dept: FAMILY MEDICINE | Facility: CLINIC | Age: 51
End: 2021-10-18
Payer: COMMERCIAL

## 2021-10-18 VITALS — TEMPERATURE: 98.3 F

## 2021-10-18 DIAGNOSIS — Z23 NEED FOR PROPHYLACTIC VACCINATION AND INOCULATION AGAINST INFLUENZA: Primary | ICD-10-CM

## 2021-10-18 PROCEDURE — 90471 IMMUNIZATION ADMIN: CPT

## 2021-10-18 PROCEDURE — 90686 IIV4 VACC NO PRSV 0.5 ML IM: CPT

## 2021-12-07 ENCOUNTER — TELEPHONE (OUTPATIENT)
Dept: FAMILY MEDICINE | Facility: CLINIC | Age: 51
End: 2021-12-07
Payer: COMMERCIAL

## 2021-12-07 DIAGNOSIS — J98.01 ACUTE BRONCHOSPASM: ICD-10-CM

## 2021-12-07 DIAGNOSIS — J30.2 CHRONIC SEASONAL ALLERGIC RHINITIS: ICD-10-CM

## 2021-12-07 DIAGNOSIS — J45.40 MODERATE PERSISTENT ASTHMA WITHOUT COMPLICATION: ICD-10-CM

## 2021-12-08 RX ORDER — ALBUTEROL SULFATE 90 UG/1
AEROSOL, METERED RESPIRATORY (INHALATION)
Qty: 18 G | Refills: 0 | Status: SHIPPED | OUTPATIENT
Start: 2021-12-08 | End: 2022-07-05 | Stop reason: ALTCHOICE

## 2021-12-08 RX ORDER — EPINASTINE HCL 0.05 %
DROPS OPHTHALMIC (EYE)
Qty: 15 ML | Refills: 3 | Status: SHIPPED | OUTPATIENT
Start: 2021-12-08 | End: 2021-12-20 | Stop reason: ALTCHOICE

## 2021-12-08 RX ORDER — TIOTROPIUM BROMIDE INHALATION SPRAY 3.12 UG/1
SPRAY, METERED RESPIRATORY (INHALATION)
Qty: 4 G | Refills: 0 | Status: SHIPPED | OUTPATIENT
Start: 2021-12-08 | End: 2022-01-13

## 2021-12-09 NOTE — TELEPHONE ENCOUNTER
Called and left a message for patient to give us a call back to schedule an appointment with you.  Thank you.  LOLITA Yeager
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
## 2021-12-20 ENCOUNTER — OFFICE VISIT (OUTPATIENT)
Dept: FAMILY MEDICINE | Facility: CLINIC | Age: 51
End: 2021-12-20
Payer: COMMERCIAL

## 2021-12-20 ENCOUNTER — TELEPHONE (OUTPATIENT)
Dept: FAMILY MEDICINE | Facility: CLINIC | Age: 51
End: 2021-12-20

## 2021-12-20 VITALS
TEMPERATURE: 97.9 F | SYSTOLIC BLOOD PRESSURE: 152 MMHG | BODY MASS INDEX: 25.4 KG/M2 | DIASTOLIC BLOOD PRESSURE: 109 MMHG | HEART RATE: 80 BPM | RESPIRATION RATE: 18 BRPM | WEIGHT: 144.4 LBS | OXYGEN SATURATION: 98 %

## 2021-12-20 DIAGNOSIS — R03.0 ELEVATED BLOOD PRESSURE READING WITHOUT DIAGNOSIS OF HYPERTENSION: Primary | ICD-10-CM

## 2021-12-20 DIAGNOSIS — J30.2 SEASONAL ALLERGIES: ICD-10-CM

## 2021-12-20 DIAGNOSIS — J45.40 MODERATE PERSISTENT ASTHMA WITHOUT COMPLICATION: ICD-10-CM

## 2021-12-20 PROCEDURE — 99215 OFFICE O/P EST HI 40 MIN: CPT | Mod: GC | Performed by: STUDENT IN AN ORGANIZED HEALTH CARE EDUCATION/TRAINING PROGRAM

## 2021-12-20 RX ORDER — BUDESONIDE AND FORMOTEROL FUMARATE DIHYDRATE 160; 4.5 UG/1; UG/1
2 AEROSOL RESPIRATORY (INHALATION) 2 TIMES DAILY
Qty: 10.2 G | Refills: 0 | Status: SHIPPED | OUTPATIENT
Start: 2021-12-20 | End: 2022-01-13

## 2021-12-20 RX ORDER — TETRAHYDROZOLINE HCL 0.05 %
1 DROPS OPHTHALMIC (EYE) 3 TIMES DAILY
Qty: 30 ML | Refills: 0 | Status: SHIPPED | OUTPATIENT
Start: 2021-12-20 | End: 2022-07-05

## 2021-12-20 NOTE — PROGRESS NOTES
Assessment & Plan     Elevated blood pressure reading without diagnosis of hypertension  Discussed her blood pressure today, it could be related to her morning anxiety with her employer.  I recommended that she come back in 2 weeks for follow-up with this for reassessment.    Moderate persistent asthma without complication  Although patient notes that she has asthma, the diagnosis is unclear.  It appears that she was seen by ENT for similar symptoms in the past.  ACT is 15 today's indicating that she has poor control of her breathing.  Discussed at length that she has different options available to her regarding her breathing.  We opted to follow guideline changes regarding asthma with using Symbicort as controller and as rescue.  Counseled her to trial this for 2 weeks to see if this will help improve her breathing and to hold off on taking her 3 other medications.  I counseled her that if symptoms do not improve, we would probably need to consider referral to pulmonology for further clarification of her breathing needs.  Patient was agreeable to this plan.  - budesonide-formoterol (SYMBICORT) 160-4.5 MCG/ACT Inhaler  Dispense: 10.2 g; Refill: 0    Seasonal allergies  Patient has bilateral injection of the conjunctivae today.  - tetrahydrozoline (VISINE) 0.05 % ophthalmic solution  Dispense: 30 mL; Refill: 0      Review of the result(s) of each unique test - CXR  60 minutes spent on the date of the encounter doing chart review, history and exam, documentation and further activities per the note         Return in about 2 weeks (around 1/3/2022) for Follow up.    Eun Chacon MD  [facility]  Precepted with Dr. Amarjit Morales   Kelly is a 50 year old who presents for the following health issues     HPI     Presents for follow up of asthma. She is on Spiriva, Advair, albuterol as needed.  She reports that she was put on Spiriva as her asthma is not well controlled.  Patient says that  she was formally diagnosed with asthma.  She says that her triggers are cold weather, dust, pollen, and she has to wear a mask to do her laundry, and she has to sleep with a fan on to help with air circulation.  Last hospitalization was approximately 2 years ago for asthma exacerbation.  She has not been hospitalized in the ED since.  She does not have any nighttime awakenings; however, she states she will wake up at night she forgets to turn the fan on.  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  She notes that people around her smoke, but she does not smoke or self, and smoking does trigger her shortness of breath.  Over the week, she uses her albuterol 3 days to help with rescue.  She is also using Zyrtec for allergy control, and she notes some itchy eyes for which her current eye drops are not helping with.    We also discussed her blood pressure today because it was elevated 2 times.  Patient notes that she was waking out of sleep by her employer due to scheduling, and this was a source of anxiety for her.  She says that she has not been formally diagnosed with high blood pressure, and she is not taking any medications for this.  She says her blood pressures are fine at previous office visits.    Finally, she notes having ongoing itchy eyes, and her eye drops have not helped.    Review of Systems   Complete ROS negative aside noted in HPI      Objective    BP (!) 152/109 (BP Location: Left arm, Patient Position: Sitting, Cuff Size: Adult Regular)   Pulse 80   Temp 97.9  F (36.6  C) (Oral)   Resp 18   Wt 65.5 kg (144 lb 6.4 oz)   LMP 04/12/2017   SpO2 98%   BMI 25.40 kg/m    Body mass index is 25.4 kg/m .  Physical Exam   GENERAL: healthy, alert and no distress  EYES: bilateral conjunctivae.   RESP: lungs clear to auscultation - no rales, rhonchi or wheezes  CV: regular rate and rhythm, normal S1 S2, no S3 or S4, no murmur, click or rub, no peripheral edema  and peripheral pulses strong  MS: no gross musculoskeletal defects noted, no edema      ----- Service Performed and Documented by Resident or Fellow ------

## 2021-12-20 NOTE — TELEPHONE ENCOUNTER
Central Prior Authorization Team   Phone: [phone]    PA Initiation    Medication: budesonide-formoterol (SYMBICORT) 160-4.5 MCG/ACT Inhaler  Insurance Company:    Pharmacy Filling the Rx: [id number] - [place], [place] - [address]  Filling Pharmacy Phone: [phone]  Filling Pharmacy Fax: [phone]  Start Date: 12/20/2021

## 2021-12-20 NOTE — TELEPHONE ENCOUNTER
Prior Authorization Retail Medication Request    Medication/Dose: budesonide-formoterol (SYMBICORT) 160-4.5 MCG/ACT Inhaler    ICD code (if different than what is on RX):  Moderate persistent asthma without complication [J45.40]     Previously Tried and Failed: albuterol (PROAIR HFA/PROVENTIL HFA/VENTOLIN HFA) 108 (90 Base) MCG/ACT inhaler, SPIRIVA RESPIMAT 2.5 MCG/ACT inhaler AND fluticasone-salmeterol (ADVAIR-HFA) 230-21 MCG/ACT inhaler    Rationale:      Insurance Name:  [employer] OUT OF STATE [[id number]]  Insurance ID:  [id number]       Pharmacy Information (if different than what is on RX)  [facility]   Phone:  [phone]

## 2021-12-20 NOTE — PROGRESS NOTES
Preceptor Attestation:    I discussed the patient with the resident and evaluated the patient in person.  I have verified the content of the note, which accurately reflects my assessment of the patient and the plan of care.   Supervising Physician:  Lopez Ocasio MD.

## 2021-12-20 NOTE — PATIENT INSTRUCTIONS
Your new inhaler is called Symbicort, and is either 160/4.5 mcg or 80/4.5 mcg strength. It contains 2 medicines: a steroid (budesonide) to help prevent asthma attacks, and a bronchodilator (formoterol) to help relieve asthma symptoms when they happen. The bronchodilator works fast to relieve your symptoms right away, and lasts a long time to help you feel better for a longer time than albuterol. This is what it looks like. If your insurance only covers the generic inhaler, it may look different.     This inhaler leads to less asthma attacks, and better control of asthma overall.   Use 2 puff 2 daily as your maintenance dose. You can also use this inhaler as a rescue inhaler for shortness of breath or wheezing. Do not use more than 12 puffs in 1 day.    You can keep track of how many puffs are left in the inhaler by using the dose counter on the top of the inhaler. Your prescription is for 2 inhalers -- This should last you at least 30 days.

## 2021-12-21 ASSESSMENT — ASTHMA QUESTIONNAIRES: ACT_TOTALSCORE: 15

## 2022-01-06 ENCOUNTER — OFFICE VISIT (OUTPATIENT)
Dept: FAMILY MEDICINE | Facility: CLINIC | Age: 52
End: 2022-01-06
Payer: COMMERCIAL

## 2022-01-06 VITALS
TEMPERATURE: 97.8 F | RESPIRATION RATE: 20 BRPM | HEART RATE: 75 BPM | OXYGEN SATURATION: 100 % | BODY MASS INDEX: 25.47 KG/M2 | DIASTOLIC BLOOD PRESSURE: 89 MMHG | WEIGHT: 144.8 LBS | SYSTOLIC BLOOD PRESSURE: 135 MMHG

## 2022-01-06 DIAGNOSIS — J45.40 MODERATE PERSISTENT ASTHMA WITHOUT COMPLICATION: Primary | ICD-10-CM

## 2022-01-06 DIAGNOSIS — J38.3 VOCAL CORD DYSFUNCTION: ICD-10-CM

## 2022-01-06 DIAGNOSIS — R03.0 ELEVATED BLOOD PRESSURE READING WITHOUT DIAGNOSIS OF HYPERTENSION: ICD-10-CM

## 2022-01-06 PROCEDURE — 99214 OFFICE O/P EST MOD 30 MIN: CPT | Mod: GC | Performed by: STUDENT IN AN ORGANIZED HEALTH CARE EDUCATION/TRAINING PROGRAM

## 2022-01-06 NOTE — PROGRESS NOTES
Preceptor attestation:  Vital signs reviewed: /89 (BP Location: Right arm, Patient Position: Sitting, Cuff Size: Adult Regular)   Pulse 75   Temp 97.8  F (36.6  C) (Oral)   Resp 20   Wt 65.7 kg (144 lb 12.8 oz)   LMP 04/12/2017   SpO2 100%   BMI 25.47 kg/m      Patient seen, evaluated, and discussed with the resident.  I have verified the content of the note, which accurately reflects my assessment of the patient and the plan of care.    Supervising physician: Maci Fritz MD  [facility]

## 2022-01-06 NOTE — PATIENT INSTRUCTIONS
Please continue either the symbicort or your other medications to help with your breathing. Continue to monitor you blood pressure at home.

## 2022-01-06 NOTE — PROGRESS NOTES
Assessment & Plan     Moderate persistent asthma without complication  Hx Vocal cord dysfunction  Worsening ACT may be exacerbated by weather, and more importantly, that the Symbicort alone is not helping her symptoms. She notes wanting to continue to try the Symbicort alone for an additional 2 weeks to see if it will improve. I did discuss with her that at this point, the Symbicort should have been working, and that she could try to go back to her other regimen. Patient preferred to continue the Symbicort for now, and will follow up as needed. Reviewed her PFT, which was read as normal, and as such, findings are inconsistent with her inhaler regimen. Discussed with her to consider discussion further with pulm, roger as there may be another component to her breathing needs. I also did note that she has been seeing an allergist and at one point ENT for vocal cord dysfunction. As such, it may be possible that her breathing needs may not be completely related to asthma. She preferred to continue to monitor symptoms for now.  - continue to monitor symptoms  - return PRN to discuss referral options    Elevated blood pressure reading without diagnosis of hypertension  BP elevated x2 this visit. There may be a component of white coat HTN, roger as it seems her pressures are elevated when she is in the office vs at home. Reinforced with her to continue to monitor pressures at home and continue with lifestyle modifications.      Review of the result(s) of each unique test - PFT  40 minutes spent on the date of the encounter doing chart review, history and exam, documentation and further activities per the note         Eun Chacon MD  [facility]  Precepted with Dr. Fritz      Subjective   Kelly Harris is a 51 year old who presents for the following health issues     HPI     Presents for follow up of her breathing. At last visit, changed her regimen to symbicort for daily and rescue. Her ACT at  that time was 15, it is 12 now. Patient notes that cold weather really exacerbates her symptoms, and in the summer, her breathing is much improved. She says that the Symbicort has somewhat helped her chest tightness, but not as well as Spiriva.     We also followed up on her blood pressure. During her last visit, she had elevated blood pressure. At that time, she notes it was due to scheduling and with work. Patient says that she checks her pressure at home and they are usually under 130s systolic, and she has occasional readings in the 150s when she stressed out or coming home from work. Patient notes that she is trying to cut back on salt intake and improve her lifestyle to prevent elevated pressures. She also notes that office settings do increase her pressures as well.     Review of Systems   Complete ROS normal aside noted in HPI      Objective    /89 (BP Location: Right arm, Patient Position: Sitting, Cuff Size: Adult Regular)   Pulse 75   Temp 97.8  F (36.6  C) (Oral)   Resp 20   Wt 65.7 kg (144 lb 12.8 oz)   LMP 04/12/2017   SpO2 100%   BMI 25.47 kg/m    Body mass index is 25.47 kg/m .    Physical Exam   GENERAL: healthy, alert and no distress  RESP: lungs clear to auscultation - no rales, rhonchi or wheezes  CV: regular rate and rhythm, normal S1 S2, no S3 or S4, no murmur, click or rub, no peripheral edema and peripheral pulses strong  MS: no gross musculoskeletal defects noted, no edema      ----- Service Performed and Documented by Resident or Fellow ------           Patient was pre-oxygenated. An endotracheal tube (ETT) was placed through the vocal cords into the trachea.  ETT position was confirmed by auscultation of bilateral breath sounds to all lung fields. ETCO2 level was appropriate./Patient connected to ventilator with settings as ordered.

## 2022-01-07 ASSESSMENT — ASTHMA QUESTIONNAIRES: ACT_TOTALSCORE: 12

## 2022-01-13 DIAGNOSIS — J45.40 MODERATE PERSISTENT ASTHMA WITHOUT COMPLICATION: ICD-10-CM

## 2022-01-13 RX ORDER — BUDESONIDE AND FORMOTEROL FUMARATE DIHYDRATE 160; 4.5 UG/1; UG/1
2 AEROSOL RESPIRATORY (INHALATION) 2 TIMES DAILY
Qty: 10.2 G | Refills: 0 | Status: SHIPPED | OUTPATIENT
Start: 2022-01-13 | End: 2022-02-07

## 2022-01-13 RX ORDER — TIOTROPIUM BROMIDE INHALATION SPRAY 3.12 UG/1
SPRAY, METERED RESPIRATORY (INHALATION)
Qty: 4 G | Refills: 0 | Status: SHIPPED | OUTPATIENT
Start: 2022-01-13 | End: 2022-02-07

## 2022-02-07 DIAGNOSIS — J45.40 MODERATE PERSISTENT ASTHMA WITHOUT COMPLICATION: ICD-10-CM

## 2022-02-07 RX ORDER — BUDESONIDE AND FORMOTEROL FUMARATE DIHYDRATE 160; 4.5 UG/1; UG/1
AEROSOL RESPIRATORY (INHALATION)
Qty: 10.2 G | Refills: 3 | Status: SHIPPED | OUTPATIENT
Start: 2022-02-07 | End: 2022-07-05 | Stop reason: ALTCHOICE

## 2022-02-07 RX ORDER — TIOTROPIUM BROMIDE INHALATION SPRAY 3.12 UG/1
SPRAY, METERED RESPIRATORY (INHALATION)
Qty: 4 G | Refills: 3 | Status: SHIPPED | OUTPATIENT
Start: 2022-02-07 | End: 2023-11-14

## 2022-02-07 NOTE — TELEPHONE ENCOUNTER
Chief Complaint   Patient presents with   • Congestion     stuffy and runny x 2 weeks    • Cough     x 3 days with mucus        HISTORY OF PRESENT ILLNESS:   The patient is a 3 year old male who presents with clear rhinorrhea and congestion mom states for up to 2 weeks.  In the last 2 or 3 days he has cough and has had some clear mucus.  There has not been any fever but she said he “felt hot”.  There has not been signs of respiratory distress, headache, sore throat, GI symptoms.  I did see him in early October for URI symptoms as well as some mild rash and she states he was better in a few days.    PAST MEDICAL HISTORY, PAST SURGICAL HISTORY, SOCIAL HISTORY, MEDICATIONS, AND ALLERGIES:  Reviewed per electronic chart.    REVIEW OF SYSTEMS:   Constitutional:  Denies fever or chills   Eyes:  Denies discharge  Respiratory:  Denies persistent or productive cough or shortness of breath   Cardiovascular:  Denies chest pain   Gastrointestinal:  Denies abdominal pain, nausea, vomiting, or diarrhea     Integument:  Denies rash   Neurologic:  Denies headache,  Endocrine:  Denies polyuria or polydipsia   Lymphatic:  Denies swollen glands       PHYSICAL EXAM:  Vitals:   Vitals:    10/26/21 1931   BP: 112/66   BP Location: LU - Left upper extremity   Patient Position: Sitting   Cuff Size: Regular   Pulse: 108   Temp: 97.3 °F (36.3 °C)   TempSrc: Temporal   SpO2: 95%   Weight: 21.3 kg (47 lb)      Constitutional:  No acute distress, nontoxic appearance.  No respiratory distress  HENT: Normocephalic, atraumatic. Bilateral external ears normal. Oropharynx moist. No oral exudates.  He has got some clear rhinorrhea without purulence or foreign body  Eyes: , EOMI (extraocular movements are intact). Conjunctivae normal. No discharge.  Neck:  Normal range of motion. No tenderness. Supple. No lymphadenopathy. No stridor.  Cardiovascular:  Normal heart rate. Normal rhythm. No murmurs. No rubs. No gallops.  Pulmonary/Chest:  Normal breath  Refilled meds, patient needs ACT   sounds. No respiratory distress. No wheezing.      Skin:  Warm, dry. No erythema. No rash.  Neuro:  Patient awake, alert, mentation normal, nor neuro deficits.  Psychiatric:  Affect normal. Judgment normal. Mood normal.    Labs/Radiology:  Orders Placed This Encounter   • cetirizine (ZyrTEC Childrens Allergy) 5 MG/5ML solution       ASSESSMENT:   1. Viral URI with cough        PLAN:  Mom mostly was looking for reassurance that the child's respiratory status did not suggest pneumonia.  He has not had a fever, sputum production or any abnormal findings on auscultation.  His room air sats are fine.  I think he has got a viral URI.  No specific testing is recommended.  Detailed instructions in care and follow-up were provided.  She states in the past when he has been congested that Zyrtec is help so we provided a prescription for that for a week.    Followup with primary if no improvement of symptoms or worsening. Patient acknowledged understanding of the instructions.

## 2022-02-09 ENCOUNTER — OFFICE VISIT (OUTPATIENT)
Dept: FAMILY MEDICINE | Facility: CLINIC | Age: 52
End: 2022-02-09
Payer: COMMERCIAL

## 2022-02-09 VITALS
HEART RATE: 92 BPM | DIASTOLIC BLOOD PRESSURE: 84 MMHG | HEIGHT: 64 IN | SYSTOLIC BLOOD PRESSURE: 154 MMHG | OXYGEN SATURATION: 98 % | WEIGHT: 147.6 LBS | RESPIRATION RATE: 20 BRPM | TEMPERATURE: 98.1 F | BODY MASS INDEX: 25.2 KG/M2

## 2022-02-09 DIAGNOSIS — S91.002A: Primary | ICD-10-CM

## 2022-02-09 DIAGNOSIS — I82.890 THROMBOSIS OF OVARIAN VEIN: ICD-10-CM

## 2022-02-09 PROCEDURE — 12001 RPR S/N/AX/GEN/TRNK 2.5CM/<: CPT | Mod: GC | Performed by: STUDENT IN AN ORGANIZED HEALTH CARE EDUCATION/TRAINING PROGRAM

## 2022-02-09 PROCEDURE — 90471 IMMUNIZATION ADMIN: CPT | Performed by: STUDENT IN AN ORGANIZED HEALTH CARE EDUCATION/TRAINING PROGRAM

## 2022-02-09 PROCEDURE — 90715 TDAP VACCINE 7 YRS/> IM: CPT | Performed by: STUDENT IN AN ORGANIZED HEALTH CARE EDUCATION/TRAINING PROGRAM

## 2022-02-09 ASSESSMENT — MIFFLIN-ST. JEOR: SCORE: 1269.27

## 2022-02-09 NOTE — PROGRESS NOTES
"[facility] - Office Visit - laceration    SUBJECTIVE: 51 year old female sustained laceration of left ankle 5 hours ago.   Nature of injury: Tripped while getting out of bed, then noticed ws bleeding when turned lights on.  Light bulb is broken so likey was what cuased the cut.  Takes aspirin 325 mg daily due to h/o blood clot in abdomen.   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    Doesn't usually hurt herself so doesn't usally bleed like this.    Tetanus vaccination status reviewed: last tetanus booster 13 years ago, Td vaccination indicated and given today.     OBJECTIVE:  BP (!) 154/84   Pulse 92   Temp 98.1  F (36.7  C) (Oral)   Resp 20   Ht 1.633 m (5' 4.3\")   Wt 67 kg (147 lb 9.6 oz)   LMP 04/12/2017   SpO2 98%   BMI 25.10 kg/m     Patient appears well, vitals are normal. Laceration 2 cm noted, with oozing blood when hemostatic clot was removed. Good hemostasis after sutures placed.  Description: clean wound edges, no foreign bodies. Neurovascular and tendon structures are intact.    ASSESSMENT:     Laceration as described.    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  Has not followed-up with Heme Onc or had any further discussion of anticoagulation plan.    PLAN:     Anesthesia with Lidocaine 1% with epinephrine. Wound cleansed, debrided of visible foreign material and necrotic tissue, and sutured. Dressing applied.  Wound care instructions provided.      Tdap booster given today    Observe for any signs of infection or other problems.      Return for suture removal in 10 days and discuss anticoagulation with PCP    Consider referral to hematology    Patient dicussed with attending physician, Dr.Edward AMINA Demarco, who agrees with the plan and was present for entire suture procedure.  -----  Candice Vegas MD  PGY-3  Family " Medicine Resident

## 2022-02-09 NOTE — PROGRESS NOTES
Preceptor Attestation:    I discussed the patient with the resident and evaluated the patient in person. I was present for and supervised the entire procedure. I have verified the content of the note, which accurately reflects my assessment of the patient and the plan of care.   Supervising Physician:  Smooth Demarco DO.

## 2022-02-09 NOTE — Clinical Note
Geovany Haq,  This pt will follow-up with you on 2/17 for suture removal and to discuss anticoagulation plan.  Likely needs heme referral, but we decided not  to do that while she was acutely bleeding.

## 2022-02-17 ENCOUNTER — OFFICE VISIT (OUTPATIENT)
Dept: FAMILY MEDICINE | Facility: CLINIC | Age: 52
End: 2022-02-17
Payer: COMMERCIAL

## 2022-02-17 VITALS
SYSTOLIC BLOOD PRESSURE: 149 MMHG | HEART RATE: 103 BPM | BODY MASS INDEX: 25.47 KG/M2 | WEIGHT: 149.8 LBS | OXYGEN SATURATION: 97 % | RESPIRATION RATE: 18 BRPM | DIASTOLIC BLOOD PRESSURE: 92 MMHG | TEMPERATURE: 98.2 F

## 2022-02-17 DIAGNOSIS — S91.012D LACERATION OF LEFT ANKLE, SUBSEQUENT ENCOUNTER: Primary | ICD-10-CM

## 2022-02-17 DIAGNOSIS — Z79.01 ANTICOAGULATED: ICD-10-CM

## 2022-02-17 PROBLEM — S91.012A LACERATION OF LEFT ANKLE: Status: ACTIVE | Noted: 2022-02-17

## 2022-02-17 PROCEDURE — 99213 OFFICE O/P EST LOW 20 MIN: CPT | Mod: GC | Performed by: STUDENT IN AN ORGANIZED HEALTH CARE EDUCATION/TRAINING PROGRAM

## 2022-02-17 ASSESSMENT — ASTHMA QUESTIONNAIRES: ACT_TOTALSCORE: 16

## 2022-02-17 NOTE — PROGRESS NOTES
Preceptor Attestation:    I discussed the patient with the resident and evaluated the patient in person. I have verified the content of the note, which accurately reflects my assessment of the patient and the plan of care.   Supervising Physician:  Kurt Richmond MD.

## 2022-02-17 NOTE — PATIENT INSTRUCTIONS
We will schedule you to have the sutures out on 2/21, and we will look more into the aspirin question

## 2022-02-17 NOTE — PROGRESS NOTES
[facility] - Office Visit    ASSESSMENT & PLAN     Ankle laceration:  Recovering from a medial L ankle laceration on 02/09/22 that required 2 stitches to be placed in clinic, she says she has had no complications. On exam the wound is healing appropriately and there is no bleeding, oozing, or hematoma present. Though healing appropriately the scab still hasn't reached full strength and she is not quite ready for sutures to be removed, should be ready in the next 3-4 days.   -   Return to clinic on Monday 02/21/22 for suture removal (scheduled before leaving clinic)    Bleeding concern:   No signs of continued bleeding on examination of her ankle, no history of excessive bleeding or bruising. Discussed with her that she could schedule an appointment with her heme/onc doctor about the necessity of ongoing anticoagulation, and if this is warranted, whether 325mg asa is appropriate. Patient preferred to continue the asa for now and will revisit this topic at next visit. Could consider re-referral to heme/onc   -   Continue taking aspirin as prescribed   -   Reach out to original aspirin prescriber to discuss changing or stopping medication      Patient Instructions   We will schedule you to have the sutures out on 2/21, and we will look more into the aspirin question      There are no discontinued medications.    Follow-up: Return in 4 days (on 2/21/2022) for Suture removal.    -----  Klaus Wells MS3  [employer]    Patient seen in conjunction with medical student Klaus Wells MS3  Personally performed physical exam and medical decision making.  Reviewed labs and discussed with medical student and patient.    Eun Chacon MD PGY2  Precepted with Dr. Basilio GUNTER       Kelly Harris is a 51 year old  female who presents to clinic today for the following:  Chief Complaint   Patient presents with     Suture Removal     suture removal on the left foot         Ankle laceration:   She was seen in clinic 9 days ago for laceration of her medial L ankle. Received 2 stitches under local anesthetic. She has tolerated the stitches well and has been very careful to keep the site clean and covered since. She had some small spotting on the bandages for 1-2 days after, but no consistent bleeding, no hematoma. No pain at the site today.    Bleeding concerns:   Concerned about increased bleeding after recent ankle laceration, felt that she bled more than expected and had trouble getting it to stop without medical intervention at the clinic. Her primary concern is that this might be due to her daily 325mg aspirin that she has been taking for over a decade due to ovarian vein thrombosis in 2008. She noted that she has not followed up with the specialists on this topic and is unsure how long she needs to be on this regimen.       Review of Systems   Constitutional: Negative for chills and fever.   Respiratory: Negative for cough and shortness of breath.    Cardiovascular: Negative for chest pain, palpitations and peripheral edema.   Musculoskeletal: Negative for arthralgias, joint swelling and myalgias.   Skin: Negative for rash  Hematologic: Negative for uncontrollable bleeding or frequent unexplained bruises  Neurological: Negative for dizziness, weakness, headaches and paresthesias.   Psychiatric/Behavioral: Negative for mood changes. The patient is not nervous/anxious.          OBJECTIVE   BP (!) 149/92 (BP Location: Left arm, Patient Position: Sitting, Cuff Size: Adult Regular)   Pulse 103   Temp 98.2  F (36.8  C) (Oral)   Resp 18   Wt 67.9 kg (149 lb 12.8 oz)   LMP 04/12/2017   SpO2 97%   BMI 25.47 kg/m       General:  Normal appearance, no apparent distress  Cardiovascular: Normal rate and regular rhythm, normal heart sounds  Lungs: Pulmonary effort is normal, normal breath sounds  Extremities: Laceration on L medial ankle healing well, 2 stitches in place, no  bleeding, leakage, or hematoma noted. no lower extremity edema  Pulses:  2+ radial, dorsalis pedis  Skin: Warm and dry. No concerning rashes or lesions.  Neurologic: No focal deficit, alert and oriented x3  Psychiatric: normal mood and affect, cooperative

## 2022-02-21 ENCOUNTER — OFFICE VISIT (OUTPATIENT)
Dept: FAMILY MEDICINE | Facility: CLINIC | Age: 52
End: 2022-02-21
Payer: COMMERCIAL

## 2022-02-21 VITALS
TEMPERATURE: 98.1 F | HEART RATE: 100 BPM | RESPIRATION RATE: 20 BRPM | DIASTOLIC BLOOD PRESSURE: 86 MMHG | OXYGEN SATURATION: 97 % | WEIGHT: 150 LBS | BODY MASS INDEX: 25.51 KG/M2 | SYSTOLIC BLOOD PRESSURE: 138 MMHG

## 2022-02-21 DIAGNOSIS — D57.1 SICKLE CELL DISEASE WITHOUT CRISIS (H): ICD-10-CM

## 2022-02-21 DIAGNOSIS — Z48.02 VISIT FOR SUTURE REMOVAL: Primary | ICD-10-CM

## 2022-02-21 DIAGNOSIS — Z86.718 HISTORY OF THROMBOSIS: ICD-10-CM

## 2022-02-21 PROCEDURE — 99214 OFFICE O/P EST MOD 30 MIN: CPT | Mod: GC | Performed by: STUDENT IN AN ORGANIZED HEALTH CARE EDUCATION/TRAINING PROGRAM

## 2022-02-21 NOTE — PROGRESS NOTES
Assessment & Plan     Visit for suture removal  The scar today appears to be well-healed, no concerns for infection, wound dehiscence, or that the wound still requires sutures.  As such, removed two sutures with minimal bleeding afterwards.  Recommended patient continue to monitor to ensure that her wound continues to heal well.  Educated her to return to clinic for further evaluation if she notices profuse/ongoing bleeding.    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  It is unclear whether this was provoked versus unprovoked.  Based on this discussion today, it seems as though she was started on 325 mg of aspirin as perhaps her hematologist may have thought that she could not be on warfarin.  Reviewed care with pharmacy today, and discussed a couple options with the patient.  We do believe that based on reviewing current literature, patient may not need to be on 325 mg of aspirin at this time, and it is unclear whether or not she needs to be on anticoagulation indefinitely as it is unclear whether this clot was provoked or unprovoked.  Given that she also has a history of sickle cell disease, and she has not been seen for quite some time regarding this diagnosis, she was open to seeing hematology formally, and accepts hematology referral today.  For now, recommended that she continue taking the 325 mg of aspirin until she can be seen and evaluated for this question.  - Adult Oncology/Hematology Referral; Future    Review of prior external note(s) from - office visit for lac repair and 2008 office visit regarding anticoagulation   Discussed care with pharmacist regarding anticoagulation today  30 minutes spent on the date of the encounter doing chart review, history and exam, documentation and further activities per the note       BMI:   Estimated body mass index is 25.51 kg/m  as calculated from the following:    Height as of 2/9/22: 1.633 m (5'  "4.3\").    Weight as of this encounter: 68 kg (150 lb).   Will discuss at later visit    Recommended follow-up at a later visit for reassessment of laceration site, preventative care visit, following up from hematology after she is able to visit with them.  Eun Chacon MD PGY2  [facility]  Precepted with Dr. Gifford    Subjective   Kelly Harris is a 51 year old who presents for the following health issues    HPI     Presents for suture removal.  Patient was seen most recently on 2/18/2022 for this; however, she did still have some more open skin.  Since this last visit, she has had no issues with bleeding, wound, drainage.  We also discussed her anticoagulation regimen at length, again, in 2008 she was diagnosed with an ovarian vein thrombosis, and was started on 325 mg of aspirin.  Patient says that she was started on this regimen as her doctor at that time believed her young age was a potential contraindication to other forms of anticoagulation.  Patient also mentions that she does have a personal history of sickle cell disease, and her children currently see hematology for their management of sickle cell disease.    Review of Systems   Complete ROS normal aside noted in HPI      Objective    /86   Pulse 100   Temp 98.1  F (36.7  C) (Oral)   Resp 20   Wt 68 kg (150 lb)   LMP 04/12/2017   SpO2 97%   BMI 25.51 kg/m    Body mass index is 25.51 kg/m .    Physical Exam   GENERAL: healthy, alert and no distress  MS: no gross musculoskeletal defects noted, no edema  SKIN: Left medial malleolus with well-healing scar, minimal serosanguineous fluid draining.  2 sutures noted.  Skin is nonerythematous, nontender to palpation, no purulent drainage noted.      ----- Service Performed and Documented by Resident or Fellow ------            "

## 2022-02-21 NOTE — PROGRESS NOTES
Preceptor Attestation:    I discussed the patient with the resident and evaluated the patient in person. I was present for and supervised the entire procedure. I have verified the content of the note, which accurately reflects my assessment of the patient and the plan of care.   Supervising Physician:  Ky Gifford MD.

## 2022-02-27 DIAGNOSIS — J30.2 SEASONAL ALLERGIES: ICD-10-CM

## 2022-02-28 RX ORDER — CETIRIZINE HYDROCHLORIDE 10 MG/1
TABLET ORAL
Qty: 90 TABLET | Refills: 3 | Status: SHIPPED | OUTPATIENT
Start: 2022-02-28 | End: 2023-03-04

## 2022-03-01 ENCOUNTER — PATIENT OUTREACH (OUTPATIENT)
Dept: ONCOLOGY | Facility: CLINIC | Age: 52
End: 2022-03-01
Payer: COMMERCIAL

## 2022-03-01 NOTE — PROGRESS NOTES
Writer received referral for SCD. Reviewed for urgency based on labs and symptomology. Appropriate scheduling instructions added and referral sent to New Patient Scheduling for completion.

## 2022-03-10 ENCOUNTER — TRANSFERRED RECORDS (OUTPATIENT)
Dept: HEALTH INFORMATION MANAGEMENT | Facility: CLINIC | Age: 52
End: 2022-03-10
Payer: COMMERCIAL

## 2022-04-07 ENCOUNTER — ONCOLOGY VISIT (OUTPATIENT)
Dept: ONCOLOGY | Facility: HOSPITAL | Age: 52
End: 2022-04-07
Attending: STUDENT IN AN ORGANIZED HEALTH CARE EDUCATION/TRAINING PROGRAM
Payer: COMMERCIAL

## 2022-04-07 ENCOUNTER — PRE VISIT (OUTPATIENT)
Dept: ONCOLOGY | Facility: HOSPITAL | Age: 52
End: 2022-04-07

## 2022-04-07 VITALS
WEIGHT: 150.1 LBS | HEART RATE: 86 BPM | RESPIRATION RATE: 16 BRPM | TEMPERATURE: 99.3 F | DIASTOLIC BLOOD PRESSURE: 89 MMHG | BODY MASS INDEX: 26.59 KG/M2 | HEIGHT: 63 IN | SYSTOLIC BLOOD PRESSURE: 149 MMHG | OXYGEN SATURATION: 98 %

## 2022-04-07 DIAGNOSIS — Z86.718 HISTORY OF THROMBOSIS: ICD-10-CM

## 2022-04-07 DIAGNOSIS — I82.890 THROMBOSIS OF OVARIAN VEIN: Primary | ICD-10-CM

## 2022-04-07 DIAGNOSIS — D57.1 SICKLE CELL DISEASE WITHOUT CRISIS (H): ICD-10-CM

## 2022-04-07 PROCEDURE — G0463 HOSPITAL OUTPT CLINIC VISIT: HCPCS

## 2022-04-07 PROCEDURE — 99204 OFFICE O/P NEW MOD 45 MIN: CPT | Performed by: INTERNAL MEDICINE

## 2022-04-07 ASSESSMENT — PAIN SCALES - GENERAL: PAINLEVEL: EXTREME PAIN (8)

## 2022-04-07 NOTE — PROGRESS NOTES
[facility] Hematology and Oncology Consult Note    Patient: Kelly Harris  MRN: [id number]  Date of Service: Apr 7, 2022       Reason for Visit    I was consulted by Dr.a Chacon for history of sickle cell disease and question of continuing aspirin therapy    Assessment/Plan    Recent prolonged bleeding episode from ankle laceration while on aspirin  History of ovarian vein thrombosis, possibly 2008  Hemoglobin SC disease    Patient with prolonged bleeding after recent ankle ulceration which required suturing.  No other significant bleeding issues to suggest a bleeding problem.  Patient has been on aspirin since 2008 when she possibly had ovarian vein thrombosis but I am not able to access records at this time.    She was not on any anticoagulation that she can recall but only on aspirin.  No other personal or family history of anticoagulation.  Will attempt to review previous records.  For now would recommend stopping aspirin as she has also had abdominal discomfort and the risks with continued aspirin therapy appear to outweigh any benefits.    Patient has diagnosis of hemoglobin SC disease.  Overall has milder crises related to her hemoglobin SC disease which is fairly typical.  No indication for hydroxyurea.  She does follow with ophthalmology as she has had vision issues.    Apart from that I would recommend that she get appropriate vaccinations through her primary care including those for Streptococcus pneumonia, Neisseria species, Haemophilus influenza B, hepatitis B and annual flu vaccine.    Also recommend folic acid supplementation, multivitamin without iron, vitamin D and calcium supplementation.    At this time no other indication for follow-up in hematology.    Questions answered.    Plan: Recommendations as above      Addendum:  Did obtain and review previous Hem/Onc consultation by KYLE Parks.  Patient had ruptured ovarian cyst on CT with subsequent MRI showing ovarian vein thrombosis.   Normal Pap and pelvic exam.  Thrombophilia work up including AT3, Protein C/S, NERISSA, FV Leiden and Prothrombin gene mutation were all negative.    There was no recommendation for anticoagulation    Ok to stop Aspririn.          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      History    Ms. Kelly Harris is a 51 year old with past history of hemoglobin SC disease, retinal hemorrhages, asthma, ovarian vein thrombosis, recent left ankle laceration with prolonged bleeding who was referred for further evaluation and question about continuing aspirin therapy and also review of her sickle cell disease.    Back in 2018 she had left lower abdominal pain and felt ill and was ultimately diagnosed with some type of blood clot.  I cannot review previous records but on her problem list is diagnosis of ovarian vein thrombosis.  Patient does not recall being on anticoagulation but just on aspirin.    Recently she suffered a laceration to the left ankle and had prolonged bleeding and this required stitches.  Seen by primary and now referred to us.  Occasional nosebleeds especially in the winter when it is dry.  No blood in the stool or urine.  No menses for the last 3 to 4 years.    No previous problems with root canal, 2 jaw tumor surgeries.    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  Has joint pains and crises every 2 to 3 months but manages with Tylenol and hydration.  No history of stroke.  Has had vision issues and does follow with ophthalmology.    No abdominal symptoms.  No change with  bowels or urine.  No infectious problems.    She is originally from [place].  She is single with 2 kids who also have hemoglobin SC disease.  She works as a nutritionist.    Other personal or family history of thrombosis.                Review of systems.  No fever or night sweats.  No loss of weight.  No lumps or bumps anywhere.  No unusual headaches or eyesight issues.  No dizziness.  No bleeding from the nose.  No sores in the mouth. No problems with swallowing.  No chest pain. No shortness of breath. No cough.  No abdominal pain. No nausea or vomiting.  No diarrhea or constipation.  No blood in stool or black colored stools.  No problems passing urine.  No numbness or tingling in hands or feet.  No skin rashes.  A 14 point review of systems is otherwise negative.        Past History    History reviewed. No pertinent past medical history.  Past Surgical History:   Procedure Laterality Date     MOUTH SURGERY  09/07/2019    Removed benign mouth tumor     Family History   Problem Relation Age of Onset     Diabetes Father      Coronary Artery Disease Father      Hypertension Father      Asthma Daughter      Sickle Cell Trait Daughter         S/C     Asthma Daughter      Sickle Cell Trait Daughter         S/C     Heart Disease Daughter         Heart valve regurgitation, abnormal vein with shunt     Hypertension Mother      Lung Cancer Paternal Cousin      Cancer No family hx of      Social History     Socioeconomic History     Marital status:      Spouse name: None     Number of children: None     Years of education: None     Highest education level: None   Occupational History     None   Tobacco Use     Smoking status: Never Smoker     Smokeless tobacco: Never Used   Substance and Sexual Activity     Alcohol use: No     Drug use: No     Sexual activity: Yes     Partners: Male   Other Topics Concern     None   Social History Narrative     None     Social Determinants of Health     Financial Resource Strain: Not  "on file   Food Insecurity: Not on file   Transportation Needs: Not on file   Physical Activity: Not on file   Stress: Not on file   Social Connections: Not on file   Intimate Partner Violence: Not on file   Housing Stability: Not on file         Allergies    Allergies   Allergen Reactions     Iodine Rash           Physical Exam    BP (!) 149/89 (BP Location: Left arm, Patient Position: Sitting, Cuff Size: Adult Regular)   Pulse 86   Temp 99.3  F (37.4  C) (Oral)   Resp 16   Ht 1.607 m (5' 3.25\")   Wt 68.1 kg (150 lb 1.6 oz)   LMP 04/12/2017   SpO2 98%   BMI 26.38 kg/m        GENERAL: Alert and oriented to time place and person. Seated comfortably. In no distress.    HEAD: Atraumatic and normocephalic.    EYES: SETH, EOMI.  No pallor.  No icterus.    Oral cavity: no mucosal lesion or tonsillar enlargement.    NECK: supple. JVP normal.  No thyroid enlargement.    LYMPH NODES: No palpable, cervical, axillary or inguinal lymphadenopathy.    CHEST: clear to auscultation bilaterally.  Resonant to percussion throughout bilaterally.  Symmetrical breath movements bilaterally.    CVS: S1 and S2 are heard. Regular rate and rhythm.  No murmur or gallop or rub heard.  No peripheral edema.    ABDOMEN: Soft. Not tender. Not distended.  No palpable hepatomegaly or splenomegaly.  No other mass palpable.  Bowel sounds heard.    EXTREMITIES: Warm.    SKIN: no rash, or bruising or purpura.  Has a full head of hair.    Lab Results    No results found for this or any previous visit (from the past 168 hour(s)).    Imaging Results    No results found.      Signed by: Shari Bartlett MD  "

## 2022-04-07 NOTE — LETTER
"    4/7/2022         RE: Kelly Harris  [address] [facility] [address]        Dear Colleague,    Thank you for referring your patient, Kelly Harris, to the [facility] [place]. Please see a copy of my visit note below.    Oncology Rooming Note    April 7, 2022 9:10 AM   Kelly Harris is a 51 year old female who presents for:    Chief Complaint   Patient presents with     Oncology Clinic Visit     New Patient - Sickle cell disease without crisis, History of thrombosis     Initial Vitals: BP (!) 149/89 (BP Location: Left arm, Patient Position: Sitting, Cuff Size: Adult Regular)   Pulse 86   Temp 99.3  F (37.4  C) (Oral)   Resp 16   Ht 1.607 m (5' 3.25\")   Wt 68.1 kg (150 lb 1.6 oz)   LMP 04/12/2017   SpO2 98%   BMI 26.38 kg/m   Estimated body mass index is 26.38 kg/m  as calculated from the following:    Height as of this encounter: 1.607 m (5' 3.25\").    Weight as of this encounter: 68.1 kg (150 lb 1.6 oz). Body surface area is 1.74 meters squared.  Extreme Pain (8) Comment: Data Unavailable   Patient's last menstrual period was 04/12/2017.  Allergies reviewed: Yes  Medications reviewed: Yes    Medications: Medication refills not needed today.  Pharmacy name entered into Billdesk:      [id number] [place], [place] - [address]    Clinical concerns: New Patient - Sickle cell disease without crisis, History of thrombosis  Marjan Vernon, [facility] Hematology and Oncology Consult Note    Patient: Kelly Harris  MRN: [id number]  Date of Service: Apr 7, 2022       Reason for Visit    I was consulted by Dr.a Chacon for history of sickle cell disease and question of continuing aspirin therapy    Assessment/Plan    Recent prolonged bleeding episode from ankle laceration while on aspirin  History of ovarian vein thrombosis, 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  No other significant bleeding issues to suggest a bleeding problem.  Patient has been on aspirin since 2008 when she possibly had ovarian vein thrombosis but I am not able to access records at this time.    She was not on any anticoagulation that she can recall but only on aspirin.  No other personal or family history of anticoagulation.  Will attempt to review previous records.  For now would recommend stopping aspirin as she has also had abdominal discomfort and the risks with continued aspirin therapy appear to outweigh any benefits.    Patient has diagnosis of hemoglobin SC disease.  Overall has milder crises related to her hemoglobin SC disease which is fairly typical.  No indication for hydroxyurea.  She does follow with ophthalmology as she has had vision issues.    Apart from that I would recommend that she get appropriate vaccinations through her primary care including those for Streptococcus pneumonia, Neisseria species, Haemophilus influenza B, hepatitis B and annual flu vaccine.    Also recommend folic acid supplementation, multivitamin without iron, vitamin D and calcium supplementation.    At this time no other indication for follow-up in hematology.    Questions answered.    Plan: Recommendations as above      Addendum:  Did obtain and review previous Hem/Onc consultation by KYLE Parks.  Patient had ruptured ovarian cyst on CT with subsequent MRI showing ovarian vein thrombosis.  Normal Pap and pelvic exam.  Thrombophilia work up including AT3, Protein C/S, NERISSA, FV Leiden and Prothrombin gene mutation were all negative.    There was no recommendation for anticoagulation    Ok to stop Aspririn.          ECOG Performance    0 - Independent    Encounter Diagnoses:    Problem List Items Addressed This Visit        Circulatory    Thrombosis of ovarian vein - Primary      Other Visit Diagnoses     History of thrombosis        Sickle cell disease without crisis (H)                   ______________________________________________________________________________    Staging History  Cancer Staging  No matching staging information was found for the patient.      History    Ms. Kelly Harris is a 51 year old with past history of hemoglobin SC disease, retinal hemorrhages, asthma, ovarian vein thrombosis, recent left ankle laceration with prolonged bleeding who was referred for further evaluation and question about continuing aspirin therapy and also review of her sickle cell disease.    Back in 2018 she had left lower abdominal pain and felt ill and was ultimately diagnosed with some type of blood clot.  I cannot review previous records but on her problem list is diagnosis of ovarian vein thrombosis.  Patient does not recall being on anticoagulation but just on aspirin.    Recently she suffered a laceration to the left ankle and had prolonged bleeding and this required stitches.  Seen by primary and now referred to us.  Occasional nosebleeds especially in the winter when it is dry.  No blood in the stool or urine.  No menses for the last 3 to 4 years.    No previous problems with root canal, 2 jaw tumor surgeries.    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  Has joint pains and crises every 2 to 3 months but manages with Tylenol and hydration.  No history of stroke.  Has had vision issues and does follow with ophthalmology.    No abdominal symptoms.  No change with bowels or urine.  No infectious problems.    She is originally from [place].  She is single with 2 kids who also have hemoglobin SC disease.  She works as a nutritionist.    Other personal or family history of thrombosis.                Review of systems.  No fever or night sweats.  No loss of weight.  No lumps or bumps anywhere.  No unusual headaches or eyesight issues.  No dizziness.  No bleeding from the nose.  No sores in the mouth. No problems with swallowing.  No chest pain. No shortness of  breath. No cough.  No abdominal pain. No nausea or vomiting.  No diarrhea or constipation.  No blood in stool or black colored stools.  No problems passing urine.  No numbness or tingling in hands or feet.  No skin rashes.  A 14 point review of systems is otherwise negative.        Past History    History reviewed. No pertinent past medical history.  Past Surgical History:   Procedure Laterality Date     MOUTH SURGERY  09/07/2019    Removed benign mouth tumor     Family History   Problem Relation Age of Onset     Diabetes Father      Coronary Artery Disease Father      Hypertension Father      Asthma Daughter      Sickle Cell Trait Daughter         S/C     Asthma Daughter      Sickle Cell Trait Daughter         S/C     Heart Disease Daughter         Heart valve regurgitation, abnormal vein with shunt     Hypertension Mother      Lung Cancer Paternal Cousin      Cancer No family hx of      Social History     Socioeconomic History     Marital status:      Spouse name: None     Number of children: None     Years of education: None     Highest education level: None   Occupational History     None   Tobacco Use     Smoking status: Never Smoker     Smokeless tobacco: Never Used   Substance and Sexual Activity     Alcohol use: No     Drug use: No     Sexual activity: Yes     Partners: Male   Other Topics Concern     None   Social History Narrative     None     Social Determinants of Health     Financial Resource Strain: Not on file   Food Insecurity: Not on file   Transportation Needs: Not on file   Physical Activity: Not on file   Stress: Not on file   Social Connections: Not on file   Intimate Partner Violence: Not on file   Housing Stability: Not on file         Allergies    Allergies   Allergen Reactions     Iodine Rash           Physical Exam    BP (!) 149/89 (BP Location: Left arm, Patient Position: Sitting, Cuff Size: Adult Regular)   Pulse 86   Temp 99.3  F (37.4  C) (Oral)   Resp 16   Ht 1.607 m (5'  "3.25\")   Wt 68.1 kg (150 lb 1.6 oz)   LMP 04/12/2017   SpO2 98%   BMI 26.38 kg/m        GENERAL: Alert and oriented to time place and person. Seated comfortably. In no distress.    HEAD: Atraumatic and normocephalic.    EYES: SETH, EOMI.  No pallor.  No icterus.    Oral cavity: no mucosal lesion or tonsillar enlargement.    NECK: supple. JVP normal.  No thyroid enlargement.    LYMPH NODES: No palpable, cervical, axillary or inguinal lymphadenopathy.    CHEST: clear to auscultation bilaterally.  Resonant to percussion throughout bilaterally.  Symmetrical breath movements bilaterally.    CVS: S1 and S2 are heard. Regular rate and rhythm.  No murmur or gallop or rub heard.  No peripheral edema.    ABDOMEN: Soft. Not tender. Not distended.  No palpable hepatomegaly or splenomegaly.  No other mass palpable.  Bowel sounds heard.    EXTREMITIES: Warm.    SKIN: no rash, or bruising or purpura.  Has a full head of hair.    Lab Results    No results found for this or any previous visit (from the past 168 hour(s)).    Imaging Results    No results found.      Signed by: Shari Bartlett MD      Again, thank you for allowing me to participate in the care of your patient.        Sincerely,        Shari Bartlett MD    "

## 2022-04-07 NOTE — PROGRESS NOTES
"Oncology Rooming Note    April 7, 2022 9:10 AM   Kelly Harris is a 51 year old female who presents for:    Chief Complaint   Patient presents with     Oncology Clinic Visit     New Patient - Sickle cell disease without crisis, History of thrombosis     Initial Vitals: BP (!) 149/89 (BP Location: Left arm, Patient Position: Sitting, Cuff Size: Adult Regular)   Pulse 86   Temp 99.3  F (37.4  C) (Oral)   Resp 16   Ht 1.607 m (5' 3.25\")   Wt 68.1 kg (150 lb 1.6 oz)   LMP 04/12/2017   SpO2 98%   BMI 26.38 kg/m   Estimated body mass index is 26.38 kg/m  as calculated from the following:    Height as of this encounter: 1.607 m (5' 3.25\").    Weight as of this encounter: 68.1 kg (150 lb 1.6 oz). Body surface area is 1.74 meters squared.  Extreme Pain (8) Comment: Data Unavailable   Patient's last menstrual period was 04/12/2017.  Allergies reviewed: Yes  Medications reviewed: Yes    Medications: Medication refills not needed today.  Pharmacy name entered into Pocket Social:      [id number] [place] [place] - [address]    Clinical concerns: New Patient - Sickle cell disease without crisis, History of thrombosis  Marjan Vernon CMA            "

## 2022-04-07 NOTE — PATIENT INSTRUCTIONS
Consider Folic acid 1mg daily; MVT without iron, Calcium/Vit D replacement; vaccinations;     STOP ASPIRIN

## 2022-04-08 ENCOUNTER — ANCILLARY PROCEDURE (OUTPATIENT)
Dept: MAMMOGRAPHY | Facility: CLINIC | Age: 52
End: 2022-04-08
Attending: STUDENT IN AN ORGANIZED HEALTH CARE EDUCATION/TRAINING PROGRAM
Payer: COMMERCIAL

## 2022-04-08 DIAGNOSIS — Z12.31 VISIT FOR SCREENING MAMMOGRAM: ICD-10-CM

## 2022-04-08 PROCEDURE — 77067 SCR MAMMO BI INCL CAD: CPT | Mod: TC | Performed by: RADIOLOGY

## 2022-04-08 PROCEDURE — 77063 BREAST TOMOSYNTHESIS BI: CPT | Mod: TC | Performed by: RADIOLOGY

## 2022-05-24 DIAGNOSIS — J30.2 CHRONIC SEASONAL ALLERGIC RHINITIS: ICD-10-CM

## 2022-05-24 DIAGNOSIS — J30.89 SEASONAL ALLERGIC RHINITIS DUE TO OTHER ALLERGIC TRIGGER: ICD-10-CM

## 2022-05-25 RX ORDER — FLUTICASONE PROPIONATE 50 MCG
SPRAY, SUSPENSION (ML) NASAL
Qty: 48 ML | Refills: 1 | Status: SHIPPED | OUTPATIENT
Start: 2022-05-25 | End: 2023-03-04

## 2022-06-28 NOTE — NURSING NOTE
Kelly Harris      1.  Has the patient received the information for the influenza vaccine? YES    2.  Does the patient have any of the following contraindications?     Allergy to eggs? No     Allergic reaction to previous influenza vaccines? No     Any other problems to previous influenza vaccines? No     Paralyzed by Guillain-Guin syndrome? No     Currently pregnant? NO     Current moderate or severe illness? No     Allergy to contact lens solution? No    3.  The vaccine has been administered in the usual fashion and the patient was instructed to wait 20 minutes before leaving the building in the event of an allergic reaction: YES    Vaccination given by Patt Lozano CMA.  Recorded by Patt Lozano        at bedside with pt's friend.

## 2022-07-05 ENCOUNTER — OFFICE VISIT (OUTPATIENT)
Dept: FAMILY MEDICINE | Facility: CLINIC | Age: 52
End: 2022-07-05
Payer: COMMERCIAL

## 2022-07-05 ENCOUNTER — OFFICE VISIT (OUTPATIENT)
Dept: PHARMACY | Facility: CLINIC | Age: 52
End: 2022-07-05
Payer: COMMERCIAL

## 2022-07-05 VITALS
TEMPERATURE: 98.3 F | SYSTOLIC BLOOD PRESSURE: 144 MMHG | HEIGHT: 64 IN | WEIGHT: 149 LBS | HEART RATE: 67 BPM | RESPIRATION RATE: 16 BRPM | BODY MASS INDEX: 25.44 KG/M2 | DIASTOLIC BLOOD PRESSURE: 90 MMHG | OXYGEN SATURATION: 99 %

## 2022-07-05 DIAGNOSIS — Z23 HIGH PRIORITY FOR 2019-NCOV VACCINE: Primary | ICD-10-CM

## 2022-07-05 DIAGNOSIS — Z00.00 ROUTINE GENERAL MEDICAL EXAMINATION AT A HEALTH CARE FACILITY: ICD-10-CM

## 2022-07-05 DIAGNOSIS — J45.40 MODERATE PERSISTENT ASTHMA WITHOUT COMPLICATION: Primary | ICD-10-CM

## 2022-07-05 DIAGNOSIS — J45.40 MODERATE PERSISTENT ASTHMA WITHOUT COMPLICATION: ICD-10-CM

## 2022-07-05 PROCEDURE — 91306 COVID-19,PF,MODERNA (18+ YRS BOOSTER .25ML): CPT

## 2022-07-05 PROCEDURE — 99207 PR NO CHARGE LOS: CPT | Performed by: PHARMACIST

## 2022-07-05 PROCEDURE — 99396 PREV VISIT EST AGE 40-64: CPT | Mod: 25

## 2022-07-05 PROCEDURE — 99213 OFFICE O/P EST LOW 20 MIN: CPT | Mod: 25

## 2022-07-05 PROCEDURE — 0064A COVID-19,PF,MODERNA (18+ YRS BOOSTER .25ML): CPT

## 2022-07-05 RX ORDER — BUDESONIDE AND FORMOTEROL FUMARATE DIHYDRATE 160; 4.5 UG/1; UG/1
AEROSOL RESPIRATORY (INHALATION)
Qty: 20.4 G | Refills: 11 | Status: SHIPPED | OUTPATIENT
Start: 2022-07-05 | End: 2023-07-17

## 2022-07-05 ASSESSMENT — ENCOUNTER SYMPTOMS
ARTHRALGIAS: 1
DIZZINESS: 1
BREAST MASS: 0

## 2022-07-05 ASSESSMENT — ASTHMA QUESTIONNAIRES
ACT_TOTALSCORE: 18
QUESTION_2 LAST FOUR WEEKS HOW OFTEN HAVE YOU HAD SHORTNESS OF BREATH: ONCE OR TWICE A WEEK
ACT_TOTALSCORE: 18
QUESTION_5 LAST FOUR WEEKS HOW WOULD YOU RATE YOUR ASTHMA CONTROL: SOMEWHAT CONTROLLED
QUESTION_4 LAST FOUR WEEKS HOW OFTEN HAVE YOU USED YOUR RESCUE INHALER OR NEBULIZER MEDICATION (SUCH AS ALBUTEROL): TWO OR THREE TIMES PER WEEK
QUESTION_3 LAST FOUR WEEKS HOW OFTEN DID YOUR ASTHMA SYMPTOMS (WHEEZING, COUGHING, SHORTNESS OF BREATH, CHEST TIGHTNESS OR PAIN) WAKE YOU UP AT NIGHT OR EARLIER THAN USUAL IN THE MORNING: ONCE OR TWICE
QUESTION_1 LAST FOUR WEEKS HOW MUCH OF THE TIME DID YOUR ASTHMA KEEP YOU FROM GETTING AS MUCH DONE AT WORK, SCHOOL OR AT HOME: A LITTLE OF THE TIME
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
## 2022-07-05 NOTE — PATIENT INSTRUCTIONS
Your new inhaler regimen is called SMART therapy (single maintenance and reliever therapy) with Symbicort, 160/4.5 mcg. It contains 2 medicines: a steroid (budesonide) to help prevent asthma attacks, and a bronchodilator (formoterol) to help relieve asthma symptoms when they happen. The bronchodilator works fast to relieve your symptoms right away, and lasts a long time to help you feel better for a longer time than albuterol. This is what it looks like. If your insurance only covers the generic inhaler, it may look different.     This inhaler, when used as both a controller and rescue, leads to less asthma attacks, and better control of asthma overall.   Use 2 puffs twice daily as your maintenance dose. You can also use this inhaler as a rescue inhaler for shortness of breath or wheezing. Do not use more than 12 puffs in 1 day. STOP ALBUTEROL.    You can keep track of how many puffs are left in the inhaler by using the dose counter on the top of the inhaler. Your prescription is for 2 inhalers -- This should last you at least 30 days.                07/07/22   EYE REFERRAL  [facility]  [address]  [place] [address]    Phone: [phone]  Fax: [phone]    Faxed demographics, referral, and office note. They will contact pt to schedule.     Ann-Marie Sheridan
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
## 2022-07-14 ENCOUNTER — TRANSFERRED RECORDS (OUTPATIENT)
Dept: HEALTH INFORMATION MANAGEMENT | Facility: CLINIC | Age: 52
End: 2022-07-14

## 2022-08-08 ENCOUNTER — OFFICE VISIT (OUTPATIENT)
Dept: FAMILY MEDICINE | Facility: CLINIC | Age: 52
End: 2022-08-08
Payer: COMMERCIAL

## 2022-08-08 VITALS
HEART RATE: 89 BPM | DIASTOLIC BLOOD PRESSURE: 99 MMHG | BODY MASS INDEX: 25.95 KG/M2 | RESPIRATION RATE: 20 BRPM | OXYGEN SATURATION: 100 % | TEMPERATURE: 97.8 F | WEIGHT: 148.8 LBS | SYSTOLIC BLOOD PRESSURE: 165 MMHG

## 2022-08-08 DIAGNOSIS — G47.30 SLEEP APNEA, UNSPECIFIED TYPE: Primary | ICD-10-CM

## 2022-08-08 PROCEDURE — 99213 OFFICE O/P EST LOW 20 MIN: CPT | Mod: GC

## 2022-08-08 ASSESSMENT — ASTHMA QUESTIONNAIRES: ACT_TOTALSCORE: 16

## 2022-08-08 NOTE — PROGRESS NOTES
Preceptor attestation:  Vital signs reviewed: BP (!) 165/99   Pulse 89   Temp 97.8  F (36.6  C) (Oral)   Resp 20   Wt 67.5 kg (148 lb 12.8 oz)   LMP 04/12/2017   SpO2 100%   BMI 25.95 kg/m      Patient seen, evaluated, and discussed with the resident.  I have verified the content of the note, which accurately reflects my assessment of the patient and the plan of care.    Supervising physician: Maci Fritz MD  [facility]

## 2022-08-08 NOTE — PROGRESS NOTES
[facility] Medicine Clinic Visit    Assessment & Plan   Follow-up HTN  Blood pressure at home has been systolic 111-137 and diastolic 69-84. Highest level usually in the morning. She had elevated BP at the eye doctor too to 150s/90s. Not having any related symptoms including headache or vision changes.  Given the normal blood pressures at home and elevated in the clinic setting I believe this to be whitecoat hypertension.  I will have her bring her home blood pressure monitor clinic with her next time and will make sure it's well calibrated to ensure the diagnosis.  -Follow-up in 1 month    Asthma follow-up  Started Symbicort last visit.  Symptoms appear to be fairly well controlled.  ACT score was 16 which is less than last time mostly due to symptoms of difficulty sleeping at night due to trouble breathing.  She says it is worse on her back and resolves when she rolls over to her side which sounds more consistent with sleep apnea versus asthma.  We discussed getting a sleep study to decide which is causing this nighttime breathing disturbance and she was agreeable to trying this.  -Sleep study   -Continue Symbicort      RTC in 1 month for follow up of blood pressure and sleep apnea or sooner if develops new or worsening symptoms.    Options for treatment and follow-up care were reviewed with the patient who was engaged and actively involved in the decision making process, verbalized understanding of the options discussed, and satisfied with the final plan.    Patient was staffed with supervising physician, Dr. Fritz.     Arcenio Alcantara MD, PGY2  [facility] Medicine    Subjective   Kelly Harris is a 51 year old female with a history including asthma, seasonal allergies, sickle cell disease who presents for follow-up of blood pressure and asthma.    Asthma Follow Up   Concerns Mostly having symptoms at night and when spending time around grassy areas.     Description:  Cough: no  Wheezing:  Yes  Details: sometimes  Shortness of breath:  Yes Details: sometimes  Fevers:  no  Fatigue: Yes Details: mostly by the end of the day.     Decreased appetite:no  Chest pain or discomfort: no  Leg (swelling) edema: no  Nasal congestion: Yes Details: uses flonase  Sore throat: no    How often are current asthma medications being used:                  Controller medicine daily:  Yes          Rescue nebulizer or Inhaler during an average week: 2-3 times.            Night time symptoms during an average week: 2-3 times           Triggers: pollens        History of Urgent care/ER visits:  no  History of hospitalizations:  no                                                   Asthma Control Test score for today:   ACT Total Scores 8/8/2022   ACT TOTAL SCORE (Goal Greater than or Equal to 20) 16   In the past 12 months, how many times did you visit the emergency room for your asthma without being admitted to the hospital? 0   In the past 12 months, how many times were you hospitalized overnight because of your asthma? 0     Asthma Action Plan done this year?: YES     ,   Hypertension Follow-up  <130/80    Outpatient blood pressures are being checked at home.  Results are systolic 111-137 and diastolic 69-84.    Chest Pain? :No     Low Salt Diet: no added salt    Daily NSAID Use?No     Did patient take their HTN pills today/last night as usual?  Yes    Last Basic Metabolic Panel:  Lab Results   Component Value Date     11/09/2015      Lab Results   Component Value Date    POTASSIUM 4.5 11/09/2015     Lab Results   Component Value Date    CHLORIDE 108 11/09/2015     Lab Results   Component Value Date    KULWANT 10.2 11/09/2015     No results found for: CO2  Lab Results   Component Value Date    BUN 14 11/09/2015     Lab Results   Component Value Date    CR 0.89 11/09/2015     Lab Results   Component Value Date    GLC 80 11/09/2015       Adherence and Exercise  Medication side effects: no  How often is a medication missed?  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  Saw hematology who said daily aspirin.       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 She has never used smokeless tobacco. She reports that she does not drink alcohol and does not use drugs.    There are no exam notes on file for this visit.    Objective     Vitals:    08/08/22 0809 08/08/22 0813   BP: (!) 158/86 (!) 165/99   Pulse: 74 89   Resp: 20    Temp: 97.8  F (36.6  C)    TempSrc: Oral    SpO2: 100%    Weight: 67.5 kg (148 lb 12.8 oz)      Body mass index is 25.95 kg/m .    GEN: NAD, healthy, alert  Constitutional: Awake, alert, cooperative, no acute distress, and appears stated age.  Eyes: Cataract present bilaterally.  Sclera and conjunctive are clear.  Lungs: No increased WOB. CTABL, no crackles or wheezing appreciated.  Cardiovascular: Appears well perfused. RRR, normal S1 and S2, no S3 or S4, and no murmur appreciated.  Neurologic: A&Ox3.  Cranial nerves II-XII are grossly intact.    Neuropsychiatric: Normal affect, mood, orientation, memory and insight.  Skin: No visible rashes, erythema, pallor, petechia or purpura.    Labs:  No visits with results within 1 Month(s) from this visit.   Latest known visit with results is:   Office Visit on 05/17/2021   Component  Date Value Ref Range Status     Hepatitis C Antibody Screen 05/17/2021 Negative  Negative Final     WBC 05/17/2021 9.0  4.0 - 11.0 10e9/L Final     RBC 05/17/2021 4.7  3.8 - 5.2 10e12/L Final     Hemoglobin 05/17/2021 12.2  11.7 - 15.7 g/dL Final     Hematocrit 05/17/2021 34.0 (A) 35.0 - 47.0 % Final     MCV 05/17/2021 73.1 (A) 78.0 - 100.0 fL Final     MCH 05/17/2021 26.2 (A) 26.5 - 35.0 pg Final     MCHC 05/17/2021 35.9  32.0 - 36.0 g/dL Final     RDW 05/17/2021 14  10 - 15 % Final     Platelets 05/17/2021 334.0  150.0 - 450.0 10e9/L Final     Cholesterol 05/17/2021 168  <=199 mg/dL Final     Triglycerides 05/17/2021 83  <=149 mg/dL Final     HDL Cholesterol 05/17/2021 67  >=50 mg/dL Final     LDL Cholesterol Calculated 05/17/2021 84  <=129 mg/dL Final     Fasting? 05/17/2021 Unknown   Final

## 2022-09-15 ENCOUNTER — OFFICE VISIT (OUTPATIENT)
Dept: FAMILY MEDICINE | Facility: CLINIC | Age: 52
End: 2022-09-15
Payer: COMMERCIAL

## 2022-09-15 VITALS
OXYGEN SATURATION: 99 % | BODY MASS INDEX: 25.16 KG/M2 | SYSTOLIC BLOOD PRESSURE: 149 MMHG | WEIGHT: 147.4 LBS | TEMPERATURE: 98.2 F | RESPIRATION RATE: 20 BRPM | HEIGHT: 64 IN | DIASTOLIC BLOOD PRESSURE: 91 MMHG | HEART RATE: 100 BPM

## 2022-09-15 DIAGNOSIS — Z12.4 CERVICAL CANCER SCREENING: ICD-10-CM

## 2022-09-15 DIAGNOSIS — Z12.31 VISIT FOR SCREENING MAMMOGRAM: ICD-10-CM

## 2022-09-15 DIAGNOSIS — R07.9 CHEST PAIN, UNSPECIFIED TYPE: Primary | ICD-10-CM

## 2022-09-15 LAB — D DIMER PPP FEU-MCNC: 3.07 UG/ML FEU (ref 0–0.5)

## 2022-09-15 PROCEDURE — 85379 FIBRIN DEGRADATION QUANT: CPT

## 2022-09-15 PROCEDURE — 36415 COLL VENOUS BLD VENIPUNCTURE: CPT

## 2022-09-15 PROCEDURE — 99212 OFFICE O/P EST SF 10 MIN: CPT | Mod: 25

## 2022-09-15 NOTE — PROGRESS NOTES
[facility] Medicine Clinic Visit    Assessment & Plan   Follow up BP  Patient presents for follow-up of elevated blood pressure.  She saw me 1 month ago after having an elevated blood pressure at the eye clinic.  Her blood pressure at the last clinic visit was 165/99.  Today it was 159/89.  Her blood pressures at home range from 111-132 systolic and 68-84 diastolic.  Not currently on any medications.  She is currently avoiding adding salt to her diet.  No exercise regimen at this time.  We discussed whitecoat hypertension versus essential hypertension and follow up treatment options at this point given no clear diagnosis. She prefers lifestyle modification at this time including diet changes and increased activity.   - Information on diet changes for lowering BP provided  - Instructed patient to bring in their BP cuff to next appointment    Chest pain  Patient started having chest pain two days ago that resolved today after taking 325 mg aspirin. She reports that symptoms started after n overseas airplane ride from [place]. Pain prevented her from taking a full breath which caused some SOB. She had a nose bleed this morning which may have been from the aspirin. No DVT symptoms. Not on hormonal contraception. No cardiac history. Vitals WNL. We will check a d-dimer today and follow up accordingly with CT PE and anticoagulation with a DOAC  -D-dimer        RTC in 3 months for follow up of blood pressure or sooner if develops new or worsening symptoms.    Options for treatment and follow-up care were reviewed with the patient who was engaged and actively involved in the decision making process, verbalized understanding of the options discussed, and satisfied with the final plan.    Patient was staffed with supervising physician, Dr. Ruiz.     Arcenio Alcantara MD, PGY2  [facility] Medicine    Subjective   Kelly Harris is a 51 year old female with a history including low back pain and seasonal allergies who  presents with chest pain and follow up for blood pressure.     Chest Pain     Onset: 9/13/2022 following overseas plane travel from [facility]    Description:   Location:  right side  Character: tight  Radiation: none  Duration: constant     Intensity: 9/10    Progression of Symptoms:  improving    Accompanying Signs & Symptoms:  Shortness of breath:  YES related to pain with inspiration  Sweating: No   Nausea/vomiting: No   Lightheadedness: No   Palpitations: No   Fever/Chills: No   Cough: No   Heartburn: No     History:   Family history of heart disease No   Tobacco use: No     Precipitating factors:   Worse with exercise/exertion:No   Worse with deep breaths :   YES       Alleviating factors:Patient took 325 mg aspirin which helped      Hypertension Follow-up  <130/80    Outpatient blood pressures are being checked at home.  Results are 111-132 systolic and 68-84 diastolic.    Chest Pain? : YES but only on the airplane     Low Salt Diet: no added salt    Daily NSAID Use?No     Did patient take their HTN pills today/last night as usual?  Yes Details: not on BP meds    Last Basic Metabolic Panel:  Lab Results   Component Value Date     11/09/2015      Lab Results   Component Value Date    POTASSIUM 4.5 11/09/2015     Lab Results   Component Value Date    CHLORIDE 108 11/09/2015     Lab Results   Component Value Date    KULWANT 10.2 11/09/2015     No results found for: CO2  Lab Results   Component Value Date    BUN 14 11/09/2015     Lab Results   Component Value Date    CR 0.89 11/09/2015     Lab Results   Component Value Date    GLC 80 11/09/2015       Adherence and Exercise  Exercise: No exercise      Patient Active Problem List    Diagnosis Date Noted     Laceration of left ankle 02/17/2022     Priority: Medium     Retinal hemorrhage of both eyes 05/17/2021     Priority: Medium     Seasonal allergic rhinitis due to other allergic trigger 12/04/2020     Priority: Medium     Vocal cord dysfunction 02/13/2019      "Priority: Medium     Moderate persistent asthma without complication 01/11/2019     Priority: Medium     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  Saw hematology who said daily aspirin.       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 She has never used smokeless tobacco. She reports that she does not drink alcohol and does not use drugs.    There are no exam notes on file for this visit.    Objective     Vitals:    09/15/22 1313 09/15/22 1315   BP: (!) 159/89 (!) 149/91   BP Location: Right arm Right arm   Patient Position: Sitting Sitting   Cuff Size: Adult Regular Adult Regular   Pulse: 100    Resp: 20    Temp: 98.2  F (36.8  C)    TempSrc: Oral    SpO2: 99%    Weight: 66.9 kg (147 lb 6.4 oz)    Height: 1.613 m (5' 3.5\")      Body mass index is 25.7 kg/m .    GEN: NAD, healthy, alert  Constitutional: Awake, alert, cooperative, no acute distress, and appears stated age.  Eyes: EOMI, sclera clear, conjunctiva normal.  Lungs: No increased WOB. CTABL, no crackles or wheezing appreciated.  Cardiovascular: Appears well perfused. RRR, normal S1 and S2, no S3 or S4, and no murmur appreciated.  Musculoskeletal: No redness, warmth, or swelling of the lower extremities. Negative Hillary sign.  Neurologic: Cranial nerves II-XII are grossly intact.  Neuropsychiatric: Normal affect, mood, orientation, memory and insight.  Skin: No visible rashes, erythema, pallor, petechia or purpura.    Labs:  No visits with results within 1 Month(s) from this visit.   Latest known visit with results is:   Office Visit on 05/17/2021   Component Date Value Ref Range Status     Hepatitis C Antibody Screen 05/17/2021 Negative  Negative Final     WBC 05/17/2021 9.0  4.0 - 11.0 10e9/L Final     RBC 05/17/2021 4.7  3.8 - 5.2 10e12/L Final "     Hemoglobin 05/17/2021 12.2  11.7 - 15.7 g/dL Final     Hematocrit 05/17/2021 34.0 (A) 35.0 - 47.0 % Final     MCV 05/17/2021 73.1 (A) 78.0 - 100.0 fL Final     MCH 05/17/2021 26.2 (A) 26.5 - 35.0 pg Final     MCHC 05/17/2021 35.9  32.0 - 36.0 g/dL Final     RDW 05/17/2021 14  10 - 15 % Final     Platelets 05/17/2021 334.0  150.0 - 450.0 10e9/L Final     Cholesterol 05/17/2021 168  <=199 mg/dL Final     Triglycerides 05/17/2021 83  <=149 mg/dL Final     HDL Cholesterol 05/17/2021 67  >=50 mg/dL Final     LDL Cholesterol Calculated 05/17/2021 84  <=129 mg/dL Final     Fasting? 05/17/2021 Unknown   Final

## 2022-09-19 ENCOUNTER — APPOINTMENT (OUTPATIENT)
Dept: CT IMAGING | Facility: CLINIC | Age: 52
End: 2022-09-19
Attending: EMERGENCY MEDICINE
Payer: COMMERCIAL

## 2022-09-19 ENCOUNTER — HOSPITAL ENCOUNTER (EMERGENCY)
Facility: CLINIC | Age: 52
Discharge: HOME | End: 2022-09-20
Attending: EMERGENCY MEDICINE | Admitting: EMERGENCY MEDICINE
Payer: COMMERCIAL

## 2022-09-19 ENCOUNTER — TELEPHONE (OUTPATIENT)
Dept: FAMILY MEDICINE | Facility: CLINIC | Age: 52
End: 2022-09-19

## 2022-09-19 DIAGNOSIS — R07.89 ATYPICAL CHEST PAIN: ICD-10-CM

## 2022-09-19 LAB
ANION GAP SERPL CALCULATED.3IONS-SCNC: 11 MMOL/L (ref 5–18)
BASOPHILS # BLD MANUAL: 0 10E3/UL (ref 0–0.2)
BASOPHILS NFR BLD MANUAL: 0 %
BNP SERPL-MCNC: <10 PG/ML (ref 0–76)
BUN SERPL-MCNC: 11 MG/DL (ref 8–22)
CALCIUM SERPL-MCNC: 9.5 MG/DL (ref 8.5–10.5)
CHLORIDE BLD-SCNC: 103 MMOL/L (ref 98–107)
CO2 SERPL-SCNC: 25 MMOL/L (ref 22–31)
CREAT SERPL-MCNC: 1.18 MG/DL (ref 0.6–1.1)
EOSINOPHIL # BLD MANUAL: 0.1 10E3/UL (ref 0–0.7)
EOSINOPHIL NFR BLD MANUAL: 1 %
ERYTHROCYTE [DISTWIDTH] IN BLOOD BY AUTOMATED COUNT: 14.3 % (ref 10–15)
GFR SERPL CREATININE-BSD FRML MDRD: 55 ML/MIN/1.73M2
GLUCOSE BLD-MCNC: 99 MG/DL (ref 70–125)
HCT VFR BLD AUTO: 31.5 % (ref 35–47)
HGB BLD-MCNC: 11.2 G/DL (ref 11.7–15.7)
HOLD SPECIMEN: NORMAL
LYMPHOCYTES # BLD MANUAL: 6.6 10E3/UL (ref 0.8–5.3)
LYMPHOCYTES NFR BLD MANUAL: 54 %
MCH RBC QN AUTO: 26 PG (ref 26.5–33)
MCHC RBC AUTO-ENTMCNC: 35.6 G/DL (ref 31.5–36.5)
MCV RBC AUTO: 73 FL (ref 78–100)
MONOCYTES # BLD MANUAL: 0.6 10E3/UL (ref 0–1.3)
MONOCYTES NFR BLD MANUAL: 5 %
NEUTROPHILS # BLD MANUAL: 4.9 10E3/UL (ref 1.6–8.3)
NEUTROPHILS NFR BLD MANUAL: 40 %
NRBC # BLD AUTO: 0.5 10E3/UL
NRBC BLD MANUAL-RTO: 4 %
PLAT MORPH BLD: ABNORMAL
PLATELET # BLD AUTO: 394 10E3/UL (ref 150–450)
POTASSIUM BLD-SCNC: 3.7 MMOL/L (ref 3.5–5)
RBC # BLD AUTO: 4.31 10E6/UL (ref 3.8–5.2)
RBC MORPH BLD: ABNORMAL
ROULEAUX BLD QL SMEAR: ABNORMAL
SODIUM SERPL-SCNC: 139 MMOL/L (ref 136–145)
TARGETS BLD QL SMEAR: ABNORMAL
TROPONIN I SERPL-MCNC: <0.01 NG/ML (ref 0–0.29)
VARIANT LYMPHS BLD QL SMEAR: PRESENT
WBC # BLD AUTO: 12.2 10E3/UL (ref 4–11)

## 2022-09-19 PROCEDURE — 250N000011 HC RX IP 250 OP 636: Performed by: EMERGENCY MEDICINE

## 2022-09-19 PROCEDURE — 85007 BL SMEAR W/DIFF WBC COUNT: CPT | Performed by: EMERGENCY MEDICINE

## 2022-09-19 PROCEDURE — 93005 ELECTROCARDIOGRAM TRACING: CPT | Performed by: EMERGENCY MEDICINE

## 2022-09-19 PROCEDURE — 99285 EMERGENCY DEPT VISIT HI MDM: CPT | Mod: 25

## 2022-09-19 PROCEDURE — 96374 THER/PROPH/DIAG INJ IV PUSH: CPT

## 2022-09-19 PROCEDURE — 85027 COMPLETE CBC AUTOMATED: CPT | Performed by: EMERGENCY MEDICINE

## 2022-09-19 PROCEDURE — 80048 BASIC METABOLIC PNL TOTAL CA: CPT | Performed by: EMERGENCY MEDICINE

## 2022-09-19 PROCEDURE — 83880 ASSAY OF NATRIURETIC PEPTIDE: CPT | Performed by: EMERGENCY MEDICINE

## 2022-09-19 PROCEDURE — 84484 ASSAY OF TROPONIN QUANT: CPT | Performed by: EMERGENCY MEDICINE

## 2022-09-19 PROCEDURE — 71275 CT ANGIOGRAPHY CHEST: CPT

## 2022-09-19 PROCEDURE — 36415 COLL VENOUS BLD VENIPUNCTURE: CPT | Performed by: EMERGENCY MEDICINE

## 2022-09-19 RX ORDER — IOPAMIDOL 755 MG/ML
100 INJECTION, SOLUTION INTRAVASCULAR ONCE
Status: COMPLETED | OUTPATIENT
Start: 2022-09-19 | End: 2022-09-19

## 2022-09-19 RX ORDER — ONDANSETRON 2 MG/ML
4 INJECTION INTRAMUSCULAR; INTRAVENOUS ONCE
Status: COMPLETED | OUTPATIENT
Start: 2022-09-19 | End: 2022-09-19

## 2022-09-19 RX ADMIN — ONDANSETRON 4 MG: 2 INJECTION INTRAMUSCULAR; INTRAVENOUS at 22:57

## 2022-09-19 RX ADMIN — IOPAMIDOL 100 ML: 755 INJECTION, SOLUTION INTRAVENOUS at 22:47

## 2022-09-19 ASSESSMENT — ACTIVITIES OF DAILY LIVING (ADL): ADLS_ACUITY_SCORE: 35

## 2022-09-19 NOTE — TELEPHONE ENCOUNTER
Noted that patient had elevated d-dimer, and was told by clinic provider to go to the ED earlier in the day. Reviewed chart, noted that she did not go to the ED. I called the patient, who reported that she continues to have shortness of breath, though feels somewhat better. She still cannot do normal level of activity, however. I counseled her on the importance of going to the ED, especially for concern of PE. She expressed understanding and said that she will go to [facility].    Eun Chacon MD PGY3

## 2022-09-19 NOTE — ED NOTES
Expected Patient Referral to ED  3:02 PM    Referring Clinic/Provider:  Dr. Alcantara, [place] residency [facility]    Reason for referral/Clinical facts:  Patient seen last Thursday after a long plane ride with shortness of breath  D-dimer was 3.07  Persistent symptoms    Recommendations provided:  Send to ED for further evaluation    Caller was informed that this institution does possess the capabilities and/or resources to provide for patient and should be transferred to our facility.    Discussed that if direct admit is sought and any hurdles are encountered, this ED would be happy to see the patient and evaluate.    Informed caller that recommendations provided are recommendations based only on the facts provided and that they responsible to accept or reject the advice, or to seek a formal in person consultation as needed and that this ED will see/treat patient should they arrive.      Nimco Lunsford MD  [facility] EMERGENCY ROOM  [address] [place] [id number]  [phone]       Nimco Lunsford MD  09/19/22 [id number]

## 2022-09-19 NOTE — RESULT ENCOUNTER NOTE
Patient was called and updated the positive D-dimer level.  She was advised to go to the [facility] emergency department for work-up of pulmonary embolism.

## 2022-09-20 VITALS
OXYGEN SATURATION: 100 % | WEIGHT: 147.27 LBS | TEMPERATURE: 97.8 F | RESPIRATION RATE: 14 BRPM | SYSTOLIC BLOOD PRESSURE: 139 MMHG | HEART RATE: 66 BPM | BODY MASS INDEX: 25.68 KG/M2 | DIASTOLIC BLOOD PRESSURE: 78 MMHG

## 2022-09-20 LAB
ATRIAL RATE - MUSE: 76 BPM
DIASTOLIC BLOOD PRESSURE - MUSE: NORMAL MMHG
INTERPRETATION ECG - MUSE: NORMAL
P AXIS - MUSE: 75 DEGREES
PR INTERVAL - MUSE: 148 MS
QRS DURATION - MUSE: 74 MS
QT - MUSE: 402 MS
QTC - MUSE: 452 MS
R AXIS - MUSE: 62 DEGREES
SYSTOLIC BLOOD PRESSURE - MUSE: NORMAL MMHG
T AXIS - MUSE: 60 DEGREES
VENTRICULAR RATE- MUSE: 76 BPM

## 2022-09-20 NOTE — ED PROVIDER NOTES
EMERGENCY DEPARTMENT ENCOUNTER      NAME: Kelly Harris  AGE: 51 year old female  YOB: 1970  MRN: [id number]  EVALUATION DATE & TIME: 9/19/2022 10:00 PM    PCP: Eun Chacon    ED PROVIDER: Jc Kwan M.D.      Chief Complaint   Patient presents with     Breathing Problem         FINAL IMPRESSION:  1. Atypical chest pain          ED COURSE & MEDICAL DECISION MAKING:    10:20 PM I met with the patient, obtained history, performed an initial exam, and discussed options and plan for diagnostics and treatment here in the ED. PPE worn: n95 mask, eye protection, gloves   11:50 PM Repeat exam is benign. Discussed findings and discharge. Recommended close follow-up.    Pertinent Labs & Imaging studies reviewed. (See chart for details)  51 year old female presents to the Emergency Department for evaluation of chest pain. Patient appears non toxic with stable vitals signs, patient is afebrile with no tachycardia or hypoxia, no increased work of breathing.  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  Considered PE though she is had symptoms for quite some time, her story certainly atypical for ACS, nothing to suggest dissection or esophageal rupture.  No fevers or cough to suggest COVID, pneumonia, no wheezing to suggest COPD, asthma or other restrictive lung process.  Troponin was negative and ECG nonischemic.  Rest the labs showed no acute concerning findings.  CT imaging reported no acute concerning findings, specifically no evidence for pulmonary embolism, no pericardial effusion, no aortic dissection or acute abnormality, no other acute concerning findings on CT.  Repeat exam is benign.  Again with duration of symptoms certainly nothing to suggest ACS and feel at this time she can safely be discharged and follow-up with primary care and then outpatient cardiology only as  "indicated.  Discussed these findings recommendations with the patient felt reassured and comfortable discharge.  Return precautions provided.        At the conclusion of the encounter I discussed the results of all of the tests and the disposition. The questions were answered and return precautions provided. The patient or family acknowledged understanding and was agreeable with the care plan.         MEDICATIONS GIVEN IN THE EMERGENCY:  Medications   iopamidol (ISOVUE-370) solution 100 mL (100 mLs Intravenous Given 9/19/22 2247)   ondansetron (ZOFRAN) injection 4 mg (4 mg Intravenous Given 9/19/22 2257)       NEW PRESCRIPTIONS STARTED AT TODAY'S ER VISIT  Discharge Medication List as of 9/19/2022 11:57 PM               =================================================================    HPI    Patient information was obtained from: Patient     Use of Intrepreter: N/A        Kelly Harris is a 51 year old female who presents chest pain and shortness of breath.     Since the beginning of September (~3 weeks ago), the patient reports of shortness of breath and localized right chest pain, described as \"tight.\" The patient states that she noticed that her symptoms became worse when she was on the plane flying back from [facility] on 9/12/22 (7 days ago). The patient was able to see a provider for her symptoms and they told her to come to the ED for worsening symptoms. She also notes having a history of blood clots and adds that she took an Aspirin for her symptoms. The patient states that she felt minimally better after taking Aspirin, but whenever she breathes, her pain worsens. Currently in the ED, she states that she \"feels better than before.\" She also endorses nausea, but denies abdominal pain, vomiting, bowel/bladder changes. The patient otherwise denies any other symptoms or complaints at this time.     REVIEW OF SYSTEMS   Constitutional:  Denies fever, chills  Respiratory: Positive for shortness of breath. Denies " productive cough  Cardiovascular: Positive for chest pain. Denies palpitations  GI: Positive for nausea. Denies abdominal pain, vomiting, or change in bowel or bladder habits   Musculoskeletal:  Denies any new muscle/joint swelling  Skin:  Denies rash   Neurologic:  Denies focal weakness  All systems negative except as marked.     PAST MEDICAL HISTORY:  History reviewed. No pertinent past medical history.    PAST SURGICAL HISTORY:  Past Surgical History:   Procedure Laterality Date     MOUTH SURGERY  09/07/2019    Removed benign mouth tumor         CURRENT MEDICATIONS:    Prior to Admission medications    Medication Sig Start Date End Date Taking? Authorizing Provider   budesonide-formoterol (SYMBICORT) 160-4.5 MCG/ACT Inhaler Inhale 2 puffs twice daily plus 1-2 puffs as needed. May use up to 12 puffs per day. 7/5/22   Lopez Ocasio MD   cetirizine (ZYRTEC) 10 MG tablet TAKE 1 TABLET BY MOUTH EVERY DAY 2/28/22   Maci Fritz MD   fluticasone (FLONASE) 50 MCG/ACT nasal spray INSTILL 1 SPRAY INTO BOTH NOSTRILS DAILY 5/25/22   Maci Fritz MD   SPIRIVA RESPIMAT 2.5 MCG/ACT inhaler INHALE 2 PUFFS BY MOUTH INTO THE LUNGS DAILY 2/7/22   Maci Fritz MD        ALLERGIES:  Allergies   Allergen Reactions     Iodine Rash       FAMILY HISTORY:  Family History   Problem Relation Age of Onset     Diabetes Father      Coronary Artery Disease Father      Hypertension Father      Asthma Daughter      Sickle Cell Trait Daughter         S/C     Asthma Daughter      Sickle Cell Trait Daughter         S/C     Heart Disease Daughter         Heart valve regurgitation, abnormal vein with shunt     Hypertension Mother      Lung Cancer Paternal Cousin      Cancer No family hx of        SOCIAL HISTORY:   Social History     Socioeconomic History     Marital status:    Tobacco Use     Smoking status: Never Smoker     Smokeless tobacco: Never Used   Substance and Sexual Activity     Alcohol use: No     Drug use: No      Sexual activity: Yes     Partners: Male       VITALS:  Patient Vitals for the past 24 hrs:   BP Temp Temp src Pulse Resp SpO2 Weight   09/20/22 0005 139/78 -- -- 66 -- 100 % --   09/19/22 2256 (!) 147/78 -- -- 69 -- 99 % --   09/19/22 2000 (!) 176/97 97.8  F (36.6  C) Oral 77 14 99 % 66.8 kg (147 lb 4.3 oz)        PHYSICAL EXAM    Constitutional:  Awake, alert, in no apparent distress  HENT:  Normocephalic, Atraumatic. Bilateral external ears normal. Oropharynx moist. Nose normal. Neck- Normal range of motion with no guarding, No midline cervical tenderness, Supple, No stridor.   Eyes:  PERRL, EOMI with no signs of entrapment, Conjunctiva normal, No discharge.   Respiratory:  No respiratory distress, No wheezing. Minimal breath sounds to right lobe.    Cardiovascular:  Normal heart rate, Normal rhythm, No appreciable rubs or gallops.   GI:  Soft, No tenderness, No distension, No palpable masses  Musculoskeletal:  Intact distal pulses, No edema. Good range of motion in all major joints. No tenderness to palpation or major deformities noted.  Integument:  Warm, Dry, No erythema, No rash.   Neurologic:  Alert & oriented, Normal motor function, Normal sensory function, No focal deficits noted.   Psychiatric:  Affect normal, Judgment normal, Mood normal.     LAB:  All pertinent labs reviewed and interpreted.  Results for orders placed or performed during the hospital encounter of 09/19/22   CT Chest Pulmonary Embolism w Contrast    Impression    IMPRESSION:  1.  No evidence for pulmonary embolism.   Basic metabolic panel   Result Value Ref Range    Sodium 139 136 - 145 mmol/L    Potassium 3.7 3.5 - 5.0 mmol/L    Chloride 103 98 - 107 mmol/L    Carbon Dioxide (CO2) 25 22 - 31 mmol/L    Anion Gap 11 5 - 18 mmol/L    Urea Nitrogen 11 8 - 22 mg/dL    Creatinine 1.18 (H) 0.60 - 1.10 mg/dL    Calcium 9.5 8.5 - 10.5 mg/dL    Glucose 99 70 - 125 mg/dL    GFR Estimate 55 (L) >60 mL/min/1.73m2   Result Value Ref Range     Troponin I <0.01 0.00 - 0.29 ng/mL   B-Type Natriuretic Peptide ([facility] Only)   Result Value Ref Range    BNP <10 0 - 76 pg/mL   CBC with platelets and differential   Result Value Ref Range    WBC Count 12.2 (H) 4.0 - 11.0 10e3/uL    RBC Count 4.31 3.80 - 5.20 10e6/uL    Hemoglobin 11.2 (L) 11.7 - 15.7 g/dL    Hematocrit 31.5 (L) 35.0 - 47.0 %    MCV 73 (L) 78 - 100 fL    MCH 26.0 (L) 26.5 - 33.0 pg    MCHC 35.6 31.5 - 36.5 g/dL    RDW 14.3 10.0 - 15.0 %    Platelet Count 394 150 - 450 10e3/uL   Extra Blue Top Tube   Result Value Ref Range    Hold Specimen JIC    Manual Differential   Result Value Ref Range    % Neutrophils 40 %    % Lymphocytes 54 %    % Monocytes 5 %    % Eosinophils 1 %    % Basophils 0 %    NRBCs per 100 WBC 4 (H) <=0 %    Absolute Neutrophils 4.9 1.6 - 8.3 10e3/uL    Absolute Lymphocytes 6.6 (H) 0.8 - 5.3 10e3/uL    Absolute Monocytes 0.6 0.0 - 1.3 10e3/uL    Absolute Eosinophils 0.1 0.0 - 0.7 10e3/uL    Absolute Basophils 0.0 0.0 - 0.2 10e3/uL    Absolute NRBCs 0.5 (H) <=0.0 10e3/uL    RBC Morphology Confirmed RBC Indices     Platelet Assessment  Automated Count Confirmed. Platelet morphology is normal.     Automated Count Confirmed. Platelet morphology is normal.    Reactive Lymphocytes Present (A) None Seen    R[place]      Target Cells Moderate (A) None Seen       RADIOLOGY:  CT Chest Pulmonary Embolism w Contrast   Final Result   IMPRESSION:   1.  No evidence for pulmonary embolism.             EKG:    Sinus rhythm, no specific ST acute ischemic changes, no concerning dysrhythmias or interval prolongation, no priors for comparison  I have independently reviewed and interpreted the EKG(s) documented above.    PROCEDURES:        I, Maykel Boo, am serving as a scribe to document services personally performed by Jc Kwan MD, based on my observation and the provider's statements to me. I, Jc Kwan MD attest that Maykel Boo is acting in a scribe capacity, has observed my  performance of the services and has documented them in accordance with my direction.    Jc Kwan M.D.  Emergency Medicine  [facility] EMERGENCY ROOM  [id number] [id number] [phone]  Dept: [phone]     Jc Kwan MD  09/20/22 0051

## 2022-09-20 NOTE — ED TRIAGE NOTES
pt flew back from [facility] on the 13th this month. Has had chest pain, right sided and back pain since arriving. States is sob as well. Denies light headedness nor dizziness. Hx of blood clots.

## 2022-09-20 NOTE — DISCHARGE INSTRUCTIONS
You can take 650 mg of tylenol every 6-8 hours (no more than 3000 mg in 24 hours).  You can take 400 mg of ibuprofen with food every 6-8 hours (no more than 3200 mg in 24 hours).   If needed you can alternate between the two (take tylenol, then 3 hours later take a dose of ibuprofen, then 3 hours later take a dose of tylenol)

## 2022-10-04 ENCOUNTER — ALLIED HEALTH/NURSE VISIT (OUTPATIENT)
Dept: FAMILY MEDICINE | Facility: CLINIC | Age: 52
End: 2022-10-04
Payer: COMMERCIAL

## 2022-10-04 DIAGNOSIS — Z23 NEED FOR PROPHYLACTIC VACCINATION AND INOCULATION AGAINST INFLUENZA: Primary | ICD-10-CM

## 2022-10-04 PROCEDURE — 90686 IIV4 VACC NO PRSV 0.5 ML IM: CPT

## 2022-10-04 PROCEDURE — 90471 IMMUNIZATION ADMIN: CPT

## 2022-11-27 NOTE — PROGRESS NOTES
Kelly is a 51 year old who is being evaluated via a billable video visit.      How would you like to obtain your AVS? Mail a copy  If the video visit is dropped, the invitation should be resent by: Text to cell phone: [phone]  Will anyone else be joining your video visit? No    Sudha Roberts, Kerry Facilitator/LPN      Video-Visit Details    Video Start Time: 8:32 AM    Type of service:  Video Visit    Video End Time:9:18 AM    Originating Location (pt. Location): Home        Distant Location (provider location):  Off-site    Platform used for Video Visit: [facility]    Outpatient Sleep Medicine Consultation:      Name: Kelly Harris MRN# [id number]   Age: 51 year old YOB: 1970     Date of Consultation: November 27, 2022  Consultation is requested by: Maci Fritz MD  [address] [place] [address] Maci Fritz  Primary care provider: Eun Chacon       Reason for Sleep Consult:     Kelly Harris is sent by Maci Fritz for a sleep consultation regarding possible sleep apnea.    Patient s Reason for visit  Kelly Harris main reason for visit:  Difficulty sleeping  Patient states problem(s) started:  Couple of years  Kelly Harris's goals for this visit:  Evaluate sleep           Assessment and Plan:     Summary Sleep Diagnoses and Recommendations:  (G47.30) Observed sleep apnea  (primary encounter diagnosis), (J45.40) Moderate persistent asthma without complication, (J38.3) Vocal cord dysfunction  Comment: Kelly presents with a chief concern of only being able to get a few hours of good sleep. She wakes with a choking or coughing, particularly if she tries to sleep on her back. She does not think she snores but is not often observed while sleeping. Her daughters have told her that she seems to stop breathing in her sleep though. She does have a history of asthma and vocal cord dysfunction complicating her picture. Her STOP BANG score is low at 2 (observed  apnea, age 51), but may be underestimated given she is not observed while sleeping for the most part. She denies daytime sleepiness (ESS 1/24) but will nap for 1-2 hours on days off. She is not treated for HTN. Her BMI is 24.7. We do not have a neck measurement.  Plan: HST-Home Sleep Apnea Test - Noxturnal Returnable            (F51.04) Chronic insomnia  Comment: For the last couple of years, Kelly has had difficulty falling asleep and staying asleep. It may take an hour or more to fall asleep. She also wakes after a few hours of sleep and then struggles to get back to sleep. Sometimes she feels partially asleep but partially aware of what is going on around her. She wakes with choking/coughing, particularly if she tries to sleep on her back. Her sleep schedule is a bit inconsistent, particularly her bedtime. She mentions sometimes going to bed shortly after getting home from work and then being awake by 10 PM. She usually goes to bed at 9 PM and reads for an hour. Her schedule is about the same on weekends but she may nap on days off.    Plan: She was advised to reduce her time in bed to no more than 8 hours. Keep a consistent bedtime and wake time on all days and avoid sleep outside of that schedule. We will work on this further after her sleep study.      Comorbid Diagnoses:  Vocal cord dysfunction, moderate asthma, sickle cell disease    Summary Counseling:    Sleep Testing Reviewed  Obstructive Sleep Apnea Reviewed  Complications of Untreated Sleep Apnea Reviewed      Patient will follow up 2 weeks after sleep study.  Bennett Goltz, PA-C      Total time spent reviewing medical records, history and physical examination, review of previous testing and interpretation as well as documentation on this date:58 min    CC: Maci Fritz          History of Present Illness:     Kelly can only sleep about 3 hours and then wakes to change position. She can't sleep more than that. When she rolls to her back for  comfort, she feels she can't breathe. She feels like her airway is closing. If she rolls back to her side, it takes a long time to fall back to sleep. This has been going on for a couple of years. Sometimes she can feel like she is partially asleep but also partially aware that her daughter's left the lights on.  She has a history of vocal cord dysfunction and occasionally has shortness of breath. Those two issues do not always co-occur. She has tried treating for silent reflux.      Past Sleep Evaluations: none    SLEEP-WAKE SCHEDULE:     Work/School Days: Patient goes to school/work:     Usually gets into bed at  9 PM, sometimes she may go to bed much earlier and then wake by 10 PM   Takes patient about 60+ min  to fall asleep. She will read in bed until she gets tired.  Has trouble falling asleep 5  nights per week  Wakes up in the middle of the night 3  times.  Wakes up due to choking/coughing.  She gets up for the restroom only if she drinks a lot of water.  She has trouble falling back asleep 7  times a week.   It usually takes over an hour if at all  to get back to sleep  Patient is usually up at 6:30 AM, sometimes as early as 4-5 AM if she gets called in to work early.   Uses alarm:  yes    Weekends/Non-work Days/All Other Days:  Usually gets into bed at  7 PM but go to sleep around 9 PM   Takes patient about 2 horus  to fall asleep  Patient is usually up at 6:30 AM   Uses alarm:  no    Sleep Need  Patient gets 3 hours of good  sleep on average   Patient thinks she needs about 6 hours sleep    Kelly Harris prefers to sleep in this position(s):  Sides/stomach, can't really sleep supine.   Patient states they do the following activities in bed:  Reads (usually book, sometimes phone). Rare TV in bed. She denies having a racing mind most of the time.    Naps  Patient takes a purposeful nap 1-2  times a week (if she has a day off) and naps are usually 1-2 hours  in duration  She feels better after a nap:   yes  She dozes off unintentionally 0-1  days per week, only if she wakes early and takes her allergy pill. Usually, she is pretty busy.  Patient has had a driving accident or near-miss due to sleepiness/drowsiness:  no      SLEEP DISRUPTIONS:    Breathing/Snoring  Patient snores: she does not think she snores but sometimes she wakes hearing herself wheezing. She is not generally observed while sleeping though  Other people complain about her snoring:  no  Patient has been told she stops breathing in her sleep:  Yes, daughters have observed this  She has issues with the following:  Sometimes wakes with headaches. No morning dry mouth. She sometimes has difficulty breathing through her nose. No nocturnal heartburn or reflux.    Movement:  Patient gets pain, discomfort, with an urge to move:   no  It happens when she is resting:     It happens more at night:     Patient has been told she kicks her legs at night:   no     Behaviors in Sleep:  Kelly Harris has experienced the following behaviors while sleeping:    Pt denies bruxism (but dentist may have mentioned that due to a crack in her tooth), sleep talking, sleep walking, and dream enactment behavior. Pt denies sleep paralysis, hypnagogue and cataplexy.     Is there anything else you would like your sleep provider to know:  no      CAFFEINE AND OTHER SUBSTANCES:    Patient consumes caffeinated beverages per day:   Infrequent soda  Last caffeine use is usually:    List of any prescribed or over the counter stimulants that patient takes:  none  List of any prescribed or over the counter sleep medication patient takes:  none  List of previous sleep medications that patient has tried:  none  Patient drinks alcohol to help them sleep:  no  Patient drinks alcohol near bedtime:  no    Family History:  Patient has a family member been diagnosed with a sleep disorder:  None known      Social History:  She lives with her 2 daughters (16 and 21).  She works with [employer] in [facility]. She works day shifts.    SCALES:    EPWORTH SLEEPINESS SCALE      Denniston Sleepiness Scale ( RAMONAMERRILLJohnson Morgan  1990-1997[facility] [place]/English - Final version - 21 Nov 07 - [facility] [place].) 11/28/2022   Sitting and reading Would never doze   Watching TV Would never doze   Sitting, inactive in a public place (e.g. a theatre or a meeting) Would never doze   As a passenger in a car for an hour without a break Slight chance of dozing   Lying down to rest in the afternoon when circumstances permit Would never doze   Sitting and talking to someone Would never doze   Sitting quietly after a lunch without alcohol Would never doze   In a car, while stopped for a few minutes in traffic Would never doze   Denniston Score (MC) 1   Denniston Score (Sleep) 1         INSOMNIA SEVERITY INDEX (DAVID)      Insomnia Severity Index (DAVID) 11/28/2022   Difficulty falling asleep 2   Difficulty staying asleep 4   Problems waking up too early 0   How SATISFIED/DISSATISFIED are you with your CURRENT sleep pattern? 3   How NOTICEABLE to others do you think your sleep problem is in terms of impairing the quality of your life? 4   How WORRIED/DISTRESSED are you about your current sleep problem? 2   To what extent do you consider your sleep problem to INTERFERE with your daily functioning (e.g. daytime fatigue, mood, ability to function at work/daily chores, concentration, memory, mood, etc.) CURRENTLY? 1   DAVID Total Score 16       Guidelines for Scoring/Interpretation:  Total score categories:  0-7 = No clinically significant insomnia   8-14 = Subthreshold insomnia   15-21 = Clinical insomnia (moderate severity)  22-28 = Clinical insomnia (severe)  Used via courtesy of www.myhealth.va.gov with permission from Brian Alanis PhD., [facility]      STOP BANG     STOP BANG Questionnaire (  2008, the American Society of Anesthesiologists, Inc. [employer] 9/20/2022   B/P Clinic: 139/78   BMI  "Clinic: -         GAD7    ANASTASIA-7  3/31/2021   1. Feeling nervous, anxious, or on edge 1   2. Not being able to stop or control worrying 0   3. Worrying too much about different things 1   4. Trouble relaxing 1   5. Being so restless that it is hard to sit still 0   6. Becoming easily annoyed or irritable 0   7. Feeling afraid, as if something awful might happen 2   ANASTASIA-7 Total Score 5   If you checked any problems, how difficult have they made it for you to do your work, take care of things at home, or get along with other people? Somewhat difficult         CAGE-AID    CAGE-AID Flowsheet 3/31/2021   Have you ever felt you should Cut down on your drinking or drug use? 0   Have people Annoyed you by criticizing your drinking or drug use? 0   Have you ever felt bad or Guilty about your drinking or drug use? 0   Have you ever had a drink or used drugs first thing in the morning to steady your nerves or to get rid of a hangover? (Eye opener) 0   CAGE-AID SCORE 0       CAGE-AID reprinted with permission from the [place] Medical Journal, HAYDEE Joe. and ASHLEE Tejeda, \"Conjoint screening questionnaires for alcohol and drug abuse\" [place] Medical Journal 94: 135-140, 1995.      PATIENT HEALTH QUESTIONNAIRE-9 (PHQ - 9)    PHQ-9 ([employer]) 3/31/2021   1.  Little interest or pleasure in doing things 1   2.  Feeling down, depressed, or hopeless 1   3.  Trouble falling or staying asleep, or sleeping too much 1   4.  Feeling tired or having little energy 1   5.  Poor appetite or overeating 1   6.  Feeling bad about yourself 0   7.  Trouble concentrating 0   8.  Moving slowly or restless 0   9.  Suicidal or self-harm thoughts 0   PHQ-9 Total Score 5   Difficulty at work, home, or with people Somewhat difficult       Developed by Valeria Hung, Kareen Riggs, Isai Wray and colleagues, with an educational coral from [employer] No permission required to reproduce, translate, display or distribute.        Allergies:  "   Allergies   Allergen Reactions     Iodine Rash       Medications:    Current Outpatient Medications   Medication Sig Dispense Refill     budesonide-formoterol (SYMBICORT) 160-4.5 MCG/ACT Inhaler Inhale 2 puffs twice daily plus 1-2 puffs as needed. May use up to 12 puffs per day. 20.4 g 11     cetirizine (ZYRTEC) 10 MG tablet TAKE 1 TABLET BY MOUTH EVERY DAY 90 tablet 3     fluticasone (FLONASE) 50 MCG/ACT nasal spray INSTILL 1 SPRAY INTO BOTH NOSTRILS DAILY 48 mL 1     SPIRIVA RESPIMAT 2.5 MCG/ACT inhaler INHALE 2 PUFFS BY MOUTH INTO THE LUNGS DAILY 4 g 3       Problem List:  Patient Active Problem List    Diagnosis Date Noted     Laceration of left ankle 02/17/2022     Priority: Medium     Retinal hemorrhage of both eyes 05/17/2021     Priority: Medium     Seasonal allergic rhinitis due to other allergic trigger 12/04/2020     Priority: Medium     Vocal cord dysfunction 02/13/2019     Priority: Medium     Moderate persistent asthma without complication 01/11/2019     Priority: Medium     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  Saw hematology who said daily aspirin.       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 No pertinent past medical history.  Past Surgical History:   Procedure Laterality Date     MOUTH SURGERY  09/07/2019    Removed benign mouth tumor       Social History:  Social History     Socioeconomic History     Marital status:      Spouse name: Not on file     Number of children: Not on file     Years of education: Not on file     Highest education level: Not on file   Occupational History     Not on file   Tobacco Use     Smoking status: Never     Smokeless tobacco: Never   Substance and Sexual Activity     Alcohol use: No     Drug use: No     Sexual activity: Yes     Partners:  "Male   Other Topics Concern     Not on file   Social History Narrative     Not on file     Social Determinants of Health     Financial Resource Strain: Not on file   Food Insecurity: Not on file   Transportation Needs: Not on file   Physical Activity: Not on file   Stress: Not on file   Social Connections: Not on file   Intimate Partner Violence: Not on file   Housing Stability: Not on file       Family History:  Family History   Problem Relation Age of Onset     Diabetes Father      Coronary Artery Disease Father      Hypertension Father      Asthma Daughter      Sickle Cell Trait Daughter         S/C     Asthma Daughter      Sickle Cell Trait Daughter         S/C     Heart Disease Daughter         Heart valve regurgitation, abnormal vein with shunt     Hypertension Mother      Lung Cancer Paternal Cousin      Cancer No family hx of        Review of Systems:  A complete review of systems reviewed by me is negative with the exeption of what has been mentioned in the history of present illness.  General: Negative for fevers, chills, night sweats   Eyes: Negative for change in vision lately but has hx of retinal hemorrhage  Ears/Nose/Throat: positive for occ nose bleed, negative for, purulent rhinorrhea, postnasal drainage, persistent sore throat  Respiratory: Positive for shortness of breath (chemical triggers) or using stairs, wheezing coughing.  Cardiovascular: negative for, tachycardia, irregular heart beat and chest pain, lower extremity swelling      Physical Examination:  Vitals: Ht 1.613 m (5' 3.5\")   Wt 64.4 kg (142 lb)   LMP 04/12/2017   BMI 24.76 kg/m             GENERAL APPEARANCE: healthy, alert, no distress and cooperative  EYES: Eyes grossly normal to inspection and wearing glasses  HENT: lighting was too poor to visualize  NECK: no asymmetry, masses, or scars  RESP: No respiratory distress, cough or wheeze           Data: All pertinent previous laboratory data reviewed     Recent Labs   Lab Test " 09/19/22  2224 11/09/15  0945    143   POTASSIUM 3.7 4.5   CHLORIDE 103 108*   CO2 25  --    ANIONGAP 11  --    GLC 99 80   BUN 11 14   CR 1.18* 0.89   KULWANT 9.5 10.2       Recent Labs   Lab Test 09/19/22  2224   WBC 12.2*   RBC 4.31   HGB 11.2*   HCT 31.5*   MCV 73*   MCH 26.0*   MCHC 35.6   RDW 14.3          No results for input(s): PROTTOTAL, ALBUMIN, BILITOTAL, ALKPHOS, AST, ALT, BILIDIRECT in the last 30402 hours.    No results found for: TSH    No results found for: UAMP, UBARB, BENZODIAZEUR, UCANN, UCOC, OPIT, UPCP    No results found for: IRONSAT, [id number], JANEL    No results found for: PH, PHARTERIAL, PO2, [id number], SAT, PCO2, HCO3, BASEEXCESS, ANTHONY, BEB    @LABRCNTIPR(phv:4,pco2v:4,po2v:4,hco3v:4,buffy:4,o2per:4)@    Echocardiology: No results found for this or any previous visit (from the past 4320 hour(s)).    Chest x-ray: No results found for this or any previous visit from the past 365 days.      Chest CT: CT Chest Pulmonary Embolism w Contrast 09/19/2022    Narrative  EXAM: CT CHEST PULMONARY EMBOLISM W CONTRAST  LOCATION: [facility]  DATE/TIME: 9/19/2022 10:49 PM    INDICATION: sob, cp  COMPARISON: None.  TECHNIQUE: CT chest pulmonary angiogram during arterial phase injection of IV contrast. Multiplanar reformats and MIP reconstructions were performed. Dose reduction techniques were used.  CONTRAST: 100ml Isovue 370    FINDINGS:  ANGIOGRAM CHEST: No evidence for pulmonary embolism. Pulmonary arteries normal in caliber. Thoracic aorta normal in caliber. No aortic dissection or other acute abnormality.    HEART: Cardiac chambers within normal limits. No pericardial effusion. No coronary artery calcification.    MEDIASTINUM: No adenopathy or mass.    LUNGS AND PLEURA: No pulmonary mass, consolidation, or suspicious pulmonary nodule. No pleural effusion or pneumothorax.    LIMITED UPPER ABDOMEN: Negative.    MUSCULOSKELETAL: Negative.    Impression  IMPRESSION:  1.  No  evidence for pulmonary embolism.      PFT: Most Recent [facility] Pulmonary Function Testing    No results found for: 20001      Bennett Ezra Goltz, PA-C, MY 11/27/2022

## 2022-11-28 ENCOUNTER — VIRTUAL VISIT (OUTPATIENT)
Dept: SLEEP MEDICINE | Facility: CLINIC | Age: 52
End: 2022-11-28
Attending: FAMILY MEDICINE
Payer: COMMERCIAL

## 2022-11-28 VITALS — HEIGHT: 64 IN | WEIGHT: 142 LBS | BODY MASS INDEX: 24.24 KG/M2

## 2022-11-28 DIAGNOSIS — G47.30 OBSERVED SLEEP APNEA: Primary | ICD-10-CM

## 2022-11-28 DIAGNOSIS — J45.40 MODERATE PERSISTENT ASTHMA WITHOUT COMPLICATION: ICD-10-CM

## 2022-11-28 DIAGNOSIS — J38.3 VOCAL CORD DYSFUNCTION: ICD-10-CM

## 2022-11-28 DIAGNOSIS — F51.04 CHRONIC INSOMNIA: ICD-10-CM

## 2022-11-28 PROCEDURE — 99244 OFF/OP CNSLTJ NEW/EST MOD 40: CPT | Mod: 95 | Performed by: PHYSICIAN ASSISTANT

## 2022-11-28 ASSESSMENT — SLEEP AND FATIGUE QUESTIONNAIRES
HOW LIKELY ARE YOU TO NOD OFF OR FALL ASLEEP IN A CAR, WHILE STOPPED FOR A FEW MINUTES IN TRAFFIC: WOULD NEVER DOZE
HOW LIKELY ARE YOU TO NOD OFF OR FALL ASLEEP WHEN YOU ARE A PASSENGER IN A CAR FOR AN HOUR WITHOUT A BREAK: SLIGHT CHANCE OF DOZING
HOW LIKELY ARE YOU TO NOD OFF OR FALL ASLEEP WHILE LYING DOWN TO REST IN THE AFTERNOON WHEN CIRCUMSTANCES PERMIT: WOULD NEVER DOZE
HOW LIKELY ARE YOU TO NOD OFF OR FALL ASLEEP WHILE WATCHING TV: WOULD NEVER DOZE
HOW LIKELY ARE YOU TO NOD OFF OR FALL ASLEEP WHILE SITTING AND READING: WOULD NEVER DOZE
HOW LIKELY ARE YOU TO NOD OFF OR FALL ASLEEP WHILE SITTING INACTIVE IN A PUBLIC PLACE: WOULD NEVER DOZE
HOW LIKELY ARE YOU TO NOD OFF OR FALL ASLEEP WHILE SITTING AND TALKING TO SOMEONE: WOULD NEVER DOZE
HOW LIKELY ARE YOU TO NOD OFF OR FALL ASLEEP WHILE SITTING QUIETLY AFTER LUNCH WITHOUT ALCOHOL: WOULD NEVER DOZE

## 2022-11-28 NOTE — NURSING NOTE
Chief Complaint   Patient presents with     Video Visit     Sleep Apnea       Patient confirms medications and allergies are accurate v and or denies any changes since last reviewed/verified.     Flu shot given 10/4/2022    Sudha Roberts, Virtual Facilitator/LPN

## 2022-11-28 NOTE — PATIENT INSTRUCTIONS
"      MY TREATMENT INFORMATION FOR SLEEP APNEA-  Kelly Harris    DOCTOR : Bennett Goltz, PA-C    Am I having a sleep study at a sleep center?  --->Due to normal delays, you will be contacted within 2-4 weeks to schedule    Am I having a home sleep study?  --->Watch the video for the device you are using:    -/drop off device-   https://www.Cashkaro.com/watch?v=yGGFBdELGhk      Frequently asked questions:  1. What is Obstructive Sleep Apnea (TOMEKA)? TOMEKA is the most common type of sleep apnea. Apnea means, \"without breath.\"  Apnea is most often caused by narrowing or collapse of the upper airway as muscles relax during sleep.   Almost everyone has occasional apneas. Most people with sleep apnea have had brief interruptions at night frequently for many years.  The severity of sleep apnea is related to how frequent and severe the events are.   2. What are the consequences of TOMEKA? Symptoms include: feeling sleepy during the day, snoring loudly, gasping or stopping of breathing, trouble sleeping, and occasionally morning headaches or heartburn at night.  Sleepiness can be serious and even increase the risk of falling asleep while driving. Other health consequences may include development of high blood pressure and other cardiovascular disease in persons who are susceptible. Untreated TOMEKA can contribute to heart disease, stroke and diabetes.   3. What are the treatment options? In most situations, sleep apnea is a lifelong disease that must be managed with daily therapy. Medications are not effective for sleep apnea and surgery is generally not considered until other therapies have been tried. Your treatment is your choice. Continuous Positive Airway (CPAP) works right away and is the therapy that is effective in nearly everyone. An oral device to hold your jaw forward is usually the next most reliable option. Other options include postioning devices (to keep you off your back), weight loss, and surgery including a " tongue pacing device. There is more detail about some of these options below.  4. Are my sleep studies covered by insurance? Although we will request verification of coverage, we advise you also check in advance of the study to ensure there is coverage.    Important tips for those choosing CPAP and similar devices   Know your equipment:  CPAP is continuous positive airway pressure that prevents obstructive sleep apnea by keeping the throat from collapsing while you are sleeping. In most cases, the device is  smart  and can slowly self-adjusts if your throat collapses and keeps a record every day of how well you are treated-this information is available to you and your care team.  BPAP is bilevel positive airway pressure that keeps your throat open and also assists each breath with a pressure boost to maintain adequate breathing.  Special kinds of BPAP are used in patients who have inadequate breathing from lung or heart disease. In most cases, the device is  smart  and can slowly self-adjusts to assist breathing. Like CPAP, the device keeps a record of how well you are treated.  Your mask is your connection to the device. You get to choose what feels most comfortable and the staff will help to make sure if fits. Here: are some examples of the different masks that are available:       Key points to remember on your journey with sleep apnea:  Sleep study.  PAP devices often need to be adjusted during a sleep study to show that they are effective and adjusted right.  Good tips to remember: Try wearing just the mask during a quiet time during the day so your body adapts to wearing it. A humidifier is recommended for comfort in most cases to prevent drying of your nose and throat. Allergy medication from your provider may help you if you are having nasal congestion.  Getting settled-in. It takes more than one night for most of us to get used to wearing a mask. Try wearing just the mask during a quiet time during the day  so your body adapts to wearing it. A humidifier is recommended for comfort in most cases. Our team will work with you carefully on the first day and will be in contact within 4 days and again at 2 and 4 weeks for advice and remote device adjustments. Your therapy is evaluated by the device each day.   Use it every night. The more you are able to sleep naturally for 7-8 hours, the more likely you will have good sleep and to prevent health risks or symptoms from sleep apnea. Even if you use it 4 hours it helps. Occasionally all of us are unable to use a medical therapy, in sleep apnea, it is not dangerous to miss one night.   Communicate. Call our skilled team on the number provided on the first day if your visit for problems that make it difficult to wear the device. Over 2 out of 3 patients can learn to wear the device long-term with help from our team. Remember to call our team or your sleep providers if you are unable to wear the device as we may have other solutions for those who cannot adapt to mask CPAP therapy. It is recommended that you sleep your sleep provider within the first 3 months and yearly after that if you are not having problems.   Use it for your health. We encourage use of CPAP masks during daytime quiet periods to allow your face and brain to adapt to the sensation of CPAP so that it will be a more natural sensation to awaken to at night or during naps. This can be very useful during the first few weeks or months of adapting to CPAP though it does not help medically to wear CPAP during wakefulness and  should not be used as a strategy just to meet guidelines.  Take care of your equipment. Make sure you clean your mask and tubing using directions every day and that your filter and mask are replaced as recommended or if they are not working.     BESIDES CPAP, WHAT OTHER THERAPIES ARE THERE?    Positioning Device  Positioning devices are generally used when sleep apnea is mild and only occurs on  your back.This example shows a pillow that straps around the waist. It may be appropriate for those whose sleep study shows milder sleep apnea that occurs primarily when lying flat on one's back. Preliminary studies have shown benefit but effectiveness at home may need to be verified by a home sleep test. These devices are generally not covered by medical insurance.  Examples of devices that maintain sleeping on the back to prevent snoring and mild sleep apnea.    Belt type body positioner  http://TrepUp.Zeus/    Electronic reminder  http://nightshifttherapy.com/            Oral Appliance  What is oral appliance therapy?  An oral appliance device fits on your teeth at night like a retainer used after having braces. The device is made by a specialized dentist and requires several visits over 1-2 months before a manufactured device is made to fit your teeth and is adjusted to prevent your sleep apnea. Once an oral device is working properly, snoring should be improved. A home sleep test may be recommended at that time if to determine whether the sleep apnea is adequately treated.       Some things to remember:  -Oral devices are often, but not always, covered by your medical insurance. Be sure to check with your insurance provider.   -If you are referred for oral therapy, you will be given a list of specialized dentists to consider or you may choose to visit the Web site of the American Academy of Dental Sleep Medicine  -Oral devices are less likely to work if you have severe sleep apnea or are extremely overweight.     More detailed information  An oral appliance is a small acrylic device that fits over the upper and lower teeth  (similar to a retainer or a mouth guard). This device slightly moves jaw forward, which moves the base of the tongue forward, opens the airway, improves breathing for effective treat snoring and obstructive sleep apnea in perhaps 7 out of 10 people .  The best working devices are  custom-made by a dental device  after a mold is made of the teeth 1, 2, 3.  When is an oral appliance indicated?  Oral appliance therapy is recommended as a first-line treatment for patients with primary snoring, mild sleep apnea, and for patients with moderate sleep apnea who prefer appliance therapy to use of CPAP4, 5. Severity of sleep apnea is determined by sleep testing and is based on the number of respiratory events per hour of sleep.   How successful is oral appliance therapy?  The success rate of oral appliance therapy in patients with mild sleep apnea is 75-80% while in patients with moderate sleep apnea it is 50-70%. The chance of success in patients with severe sleep apnea is 40-50%. The research also shows that oral appliances have a beneficial effect on the cardiovascular health of TOMEKA patients at the same magnitude as CPAP therapy7.  Oral appliances should be a second-line treatment in cases of severe sleep apnea, but if not completely successful then a combination therapy utilizing CPAP plus oral appliance therapy may be effective. Oral appliances tend to be effective in a broad range of patients although studies show that the patients who have the highest success are females, younger patients, those with milder disease, and less severe obesity. 3, 6.   Finding a dentist that practices dental sleep medicine  Specific training is available through the American Academy of Dental Sleep Medicine for dentists interested in working in the field of sleep. To find a dentist who is educated in the field of sleep and the use of oral appliances, near you, visit the Web site of the American Academy of Dental Sleep Medicine.    References  1. Zay et al. Objectively measured vs self-reported compliance during oral appliance therapy for sleep-disordered breathing. Chest 2013; 144(5): 8731-6950.  2. Mary et al. Objective measurement of compliance during oral appliance therapy for  sleep-disordered breathing. Thorax 2013; 68(1): 91-96.  3. Willa et al. Mandibular advancement devices in 620 men and women with TOMEKA and snoring: tolerability and predictors of treatment success. Chest 2004; 125: 6770-6288.  4. Prasad et al. Oral appliances for snoring and TOMEKA: a review. Sleep 2006; 29: 244-262.  5. Tracy et al. Oral appliance treatment for TOMEKA: an update. J Clin Sleep Med 2014; 10(2): 215-227.  6. Vickie et al. Predictors of OSAH treatment outcome. J Dent Res 2007; 86: 2965-6014.      Weight Loss:    Weight loss is a long-term strategy that may improve sleep apnea in some patients.    Weight management is a personal decision and the decision should be based on your interest and the potential benefits.  If you are interested in exploring weight loss strategies, the following discussion covers the impact on weight loss on sleep apnea and the approaches that may be successful.    Being overweight does not necessarily mean you will have health consequences.  Those who have BMI over 35 or over 27 with existing medical conditions carries greater risk.   Weight loss decreases severity of sleep apnea in most people with obesity. For those with mild obesity who have developed snoring with weight gain, even 15-30 pound weight loss can improve and occasionally eliminate sleep apnea.  Structured and life-long dietary and health habits are necessary to lose weight and keep healthier weight levels.     Though there may be significant health benefits from weight loss, long-term weight loss is very difficult to achieve- studies show success with dietary management in less than 10% of people. In addition, substantial weight loss may require years of dietary control and may be difficult if patients have severe obesity. In these cases, surgical management may be considered.  Finally, older individuals who have tolerated obesity without health complications may be less likely to benefit from weight loss  strategies.      BMI 24    Surgery:    Surgery for obstructive sleep apnea is considered generally only when other therapies fail to work. Surgery may be discussed with you if you are having a difficult time tolerating CPAP and or when there is an abnormal structure that requires surgical correction.  Nose and throat surgeries often enlarge the airway to prevent collapse.  Most of these surgeries create pain for 1-2 weeks and up to half of the most common surgeries are not effective throughout life.  You should carefully discuss the benefits and drawbacks to surgery with your sleep provider and surgeon to determine if it is the best solution for you.   More information  Surgery for TOMEKA is directed at areas that are responsible for narrowing or complete obstruction of the airway during sleep.  There are a wide range of procedures available to enlarge and/or stabilize the airway to prevent blockage of breathing in the three major areas where it can occur: the palate, tongue, and nasal regions.  Successful surgical treatment depends on the accurate identification of the factors responsible for obstructive sleep apnea in each person.  A personalized approach is required because there is no single treatment that works well for everyone.  Because of anatomic variation, consultation with an examination by a sleep surgeon is a critical first step in determining what surgical options are best for each patient.  In some cases, examination during sedation may be recommended in order to guide the selection of procedures.  Patients will be counseled about risks and benefits as well as the typical recovery course after surgery. Surgery is typically not a cure for a person s TOMEKA.  However, surgery will often significantly improve one s TOMEKA severity (termed  success rate ).  Even in the absence of a cure, surgery will decrease the cardiovascular risk associated with OSA7; improve overall quality of life8 (sleepiness, functionality,  sleep quality, etc).  Palate Procedures:  Patients with TOMEKA often have narrowing of their airway in the region of their tonsils and uvula.  The goals of palate procedures are to widen the airway in this region as well as to help the tissues resist collapse.  Modern palate procedure techniques focus on tissue conservation and soft tissue rearrangement, rather than tissue removal.  Often the uvula is preserved in this procedure. Residual sleep apnea is common in patient after pharyngoplasty with an average reduction in sleep apnea events of 33%2.    Tongue Procedures:  ExamWhile patients are awake, the muscles that surround the throat are active and keep this region open for breathing. These muscles relax during sleep, allowing the tongue and other structures to collapse and block breathing.  There are several different tongue procedures available.  Selection of a tongue base procedure depends on characteristics seen on physical exam.  Generally, procedures are aimed at removing bulky tissues in this area or preventing the back of the tongue from falling back during sleep.  Success rates for tongue surgery range from 50-62%3.  Hypoglossal Nerve Stimulation:  Hypoglossal nerve stimulation has recently received approval from the [place] Food and Drug Administration for the treatment of obstructive sleep apnea.  This is based on research showing that the system was safe and effective in treating sleep apnea6.  Results showed that the median AHI score decreased 68%, from 29.3 to 9.0. This therapy uses an implant system that senses breathing patterns and delivers mild stimulation to airway muscles, which keeps the airway open during sleep.  The system consists of three fully implanted components: a small generator (similar in size to a pacemaker), a breathing sensor, and a stimulation lead.  Using a small handheld remote, a patient turns the therapy on before bed and off upon awakening.    Candidates for this device  must be greater than 18 years of age, have moderate to severe TOMEKA (AHI between 15-65), BMI less than 35, have tried CPAP/oral appliance for at least 8 weeks without success, and have appropriate upper airway anatomy (determined by a sleep endoscopy performed by Dr. Dwayne Nicole).  Hypoglossal Nerve Stimulation Pathway:    The sleep surgeon s office will work with the patient through the insurance prior-authorization process (including communications and appeals).    Nasal Procedures:  Nasal obstruction can interfere with nasal breathing during the day and night.  Studies have shown that relief of nasal obstruction can improve the ability of some patients to tolerate positive airway pressure therapy for obstructive sleep apnea1.  Treatment options include medications such as nasal saline, topical corticosteroid and antihistamine sprays, and oral medications such as antihistamines or decongestants. Non-surgical treatments can include external nasal dilators for selected patients. If these are not successful by themselves, surgery can improve the nasal airway either alone or in combination with these other options.      Combination Procedures:  Combination of surgical procedures and other treatments may be recommended, particularly if patients have more than one area of narrowing or persistent positional disease.  The success rate of combination surgery ranges from 66-80%2,3.    References  Kaleigh HOLLIDAY. The Role of the Nose in Snoring and Obstructive Sleep Apnoea: An Update.  Eur Arch Otorhinolaryngol. 2011; 268: 1365-73.   Silas SM; Adalberto JA; Liseth JR; Pallanch JF; Dinah MB; Salud SG; Aziza BALTAZARD. Surgical modifications of the upper airway for obstructive sleep apnea in adults: a systematic review and meta-analysis. SLEEP 2010;33(10):1253-0831. Brodie REDD. Hypopharyngeal surgery in obstructive sleep apnea: an evidence-based medicine review.  Arch Otolaryngol Head Neck Surg. 2006 Feb;132(2):206-13.  Doron Malone  Y, Chirag JOAQUIN. The efficacy of anatomically based multilevel surgery for obstructive sleep apnea. Otolaryngol Head Neck Surg. 2003 Oct;129(4):327-35.  Brodie E, Goldberg A. Hypopharyngeal Surgery in Obstructive Sleep Apnea: An Evidence-Based Medicine Review. Arch Otolaryngol Head Neck Surg. 2006 Feb;132(2):206-13.  Pia PJ et al. Upper-Airway Stimulation for Obstructive Sleep Apnea.  N Engl J Med. 2014 Jan 9;370(2):139-49.  Mendoza Y et al. Increased Incidence of Cardiovascular Disease in Middle-aged Men with Obstructive Sleep Apnea. Am J Respir Crit Care Med; 2002 166: 159-165  Suarez EM et al. Studying Life Effects and Effectiveness of Palatopharyngoplasty (SLEEP) study: Subjective Outcomes of Isolated Uvulopalatopharyngoplasty. Otolaryngol Head Neck Surg. 2011; 144: 623-631.        WHAT IF I ONLY HAVE SNORING?    Mandibular advancement devices, lateral sleep positioning, long-term weight loss and treatment of nasal allergies have been shown to improve snoring.  Exercising tongue muscles with a game (Virtual Power Systemsttps://apps.Cloud Practice/us/lidia/soundly-reduce-snoring/re8718347310) or stimulating the tongue during the day with a device (https://doi.org/10.3390/wco22838162) have improved snoring in some individuals.    Remember to Drive Safe... Drive Alive     Sleep health profoundly affects your health, mood, and your safety.  Thirty three percent of the population (one in three of us) is not getting enough sleep and many have a sleep disorder. Not getting enough sleep or having an untreated / undertreated sleep condition may make us sleepy without even knowing it. In fact, our driving could be dramatically impaired due to our sleep health. As your provider, here are some things I would like you to know about driving:     Here are some warning signs for impairment and dangerous drowsy driving:              -Having been awake more than 16 hours               -Looking tired               -Eyelid drooping              -Head nodding  (it could be too late at this point)              -Driving for more than 30 minutes     Some things you could do to make the driving safer if you are experiencing some drowsiness:              -Stop driving and rest              -Call for transportation              -Make sure your sleep disorder is adequately treated     Some things that have been shown NOT to work when experiencing drowsiness while driving:              -Turning on the radio              -Opening windows              -Eating any  distracting  /  entertaining  foods (e.g., sunflower seeds, candy, or any other)              -Talking on the phone      Your decision may not only impact your life, but also the life of others. Please, remember to drive safe for yourself and all of us.      General recommendations for sleep problems (Insomnia)  Allow 2-4 weeks to see results     Establish a regular sleep schedule    Most people only need 7-8 hours of sleep.  Don't be in bed longer than you need     to sleep or you will end up spending more time awake in bed. This trains your    brain to think of the bed as a place to not sleep.  Go to bed at same time each night   Get up at same time each day - Set an alarm everyday (even weekends). This is one of    the most important tips. It prevents you from relying on your insomnia to get you    up on time for your day. That actually reinforces insomnia. It also will help your    body get into a pattern where you start feeling tired at a consistent time each    night.  The body functions best when you keep a consistent routine.  Avoid sleeping-in and napping. Anytime you sleep during the day, you will be less tired at    night. You may be tired enough to fall asleep, but you will wake more in the    middle of the night because you will have met your sleep need before the night is    done.   Cut down time in bed (if not asleep, get up)- Use your bed only for sleep and sex    Anytime you spend time in bed doing  activities other than sleep (reading,    watching TV, working, playing on the computer or phone, or even just laying in    bed trying to sleep), you are training  your brain to think of the bed as a place to    do activities other than sleep. If you are not falling asleep within 20-30 minutes,    get out of bed. While out of bed, avoid bright lights. Avoid work or chores. Being    productive in the middle of the night reinforces waking up at night. Find relaxing,    not particularly entertaining activities like reading, listening to music, or relaxation    exercises. Go back to bed if you start feeling groggy, or after about 30 minutes,    even if not feeling very tired. Sometimes, just getting out of bed stops the pattern    of getting frustrated about laying in bed not sleeping, and that can help you fall    asleep.   Avoid trying to force yourself to sleep- sleep is not like everything else. The harder you    work at most things, the more you can accomplish. The harder you work at    sleep, the less you will sleep.     Make the bedroom comfortable - quiet, dark and cool are better. Consider ear plugs    (silicon). Use dark blinds or wear an eye mask if needed     Make a relaxing routine prior to bedtime  Relaxation exercises:   Progressive muscle relaxation: Relax each muscle group individually    Begin with your feet, flex, then relax. Try to imagine your feet feeling heavy and sinking into the bed. Move to your calves, do the same thing. Work through each muscle group toward your head.    Relaxing Mental Imagery: Try to imagine a trip that you took and found relaxing, or imagine a day at the beach. Try to walk yourself through the day in your mind as if you were dreaming it. Try to imagine sensing the different experiences, such as feeling sand between your toes, the heat of the sun on your skin, seeing the waves crashing the shore, the smell of the salt water, etc.     Deal with your worries before bedtime     Set aside a worry time around dinner time for 10-15 minutes. Write down the    things that are on your mind. Plan time in the coming days to address those    issues. Brainstorm ideas on how you will deal with them. Try to identify issues    that are out of your control, and try to let those issues go.  Listen to relaxation tapes   Classical Music or Nature sounds   Back Massage   Get regular exercise each day (at least 1-2 hours before bedtime)   Take medications only as directed   Eat a light bedtime snack or warm drink   Warm milk   Warm herbal tea (non-caffeinated)       Things to avoid   No overstimulating activities just before bed   No competitive games before bedtime   No exciting television programs before bedtime   Avoid caffeine after lunchtime   Avoid chocolate   Do not use alcohol to induce sleep (worsens Insomnia)   Do not take someone else's sleeping pills   Do not look at the clock when awakening   Do not turn on light when getting up to use bathroom, use a nightlight     Online Programs   www.[employer] (pronounced shut eye). There is a fee for this program. Enter the code  [place]  if you decide to enroll in this program.    www.sleepIO.com (pronounced sleep ee oh). There is a fee for this program. Enter the code  [place]  if you decide to enroll in this program.     Suggested Resources  Insomnia Treatment Books   Overcoming Insomnia by Nicholas Waterman and Natalie Sanchez (2008)  No More Sleepless Nights by Greg Nichols and Trini Zepeda (1996)  Say Phani to Insomnia by Handy Jones (2009)  The Insomnia Workbook by Lyubov Sanchez and Brian Alanis (2009)  The Insomnia Answer by Leonid Navarrete and Matthias Gutierres (2006)      Stress Management and Relaxation Books  The Relaxation and Stress Reduction Workbook by Chelsi Turcios, Mae Asif and Jesus Irving (2008)  Stress Management Workbook: Techniques and Self-Assessment Procedures by Kandy Tinoco and Moses GREENE  Ba (1997)  A Mindfulness-Based Stress Reduction Workbook by Juan F Pollock and Alba Saldivar (2010)  The Complete Stress Management Workbook by Thanh Yang, Shahram Tiwari and Ja Canchola (1996)  Assert Yourself by Trinidad Mejia and Castro Mejia (1977)    Relaxation Resources for Computer Download   These websites offer resources to help you relax. This list is for information only. [facility] is not responsible for the quality of services or the actions of any person or organization.  Progressive Muscle Relaxation (PMR):   http://www.[employer]/progressive-muscle-relaxation-exercise.html   http://studentsupport.[facility]/counseling/resources/self-help/relaxation-and-stress-management/   Deep Breathing Exercises:  http://www.[employer]/breathing-awareness.html     Meditation:   www.[employer]  www.[facility] Systemsmeditation-site.com You may have to pay for some of these resources.    Guided Imagery:  http://www.[employer]/guided-imagery-scripts.html   http://[employer]/library/gastxncagm-wftfyh-dyllghd/   Consider phone apps such as: Calm, Headspace or Insight Timer.    Counseling / Behavioral Health  [facility]  Visit www.[place].org or call [phone] to find a clinic close to you.  Or call [phone] for [facility].

## 2022-12-21 NOTE — PROGRESS NOTES
MTM ENCOUNTER  SUBJECTIVE/OBJECTIVE:                           Kelly Harris is a 49 year old female coming in for an initial visit. She was referred to me from Dr. Bernstein. Today's visit is a co-visit with Dr. Bernstein. .     Reason for visit: initial MTM and asthma thechnique.    Allergies/ADRs: Reviewed in chart  Tobacco: She reports that she has never smoked. She has never used smokeless tobacco.  Alcohol: not assessed  Caffeine: not assessed  Activity: not assessed  Past Medical History: Reviewed in chart    Medication Adherence/Access: no issues reported. She states that she used to forget her medications (about once or twice a week) but have been noticing that when she takes her medications regularly, it helps control her symptoms better.     Asthma: Patient uses Advair HFA 2 puffs twice daily and has ProAir HFA 2 puffs every 4 hours as needed. She has been using Albuterol (her rescue inhaler) about 4 times per day. She was able to inform us on how she uses her Advair inhaler and understands which inhaler is her controller and which is her rescue inhaler. She recently picked up her Spiriva Respimat 2.5mcg/actuation inhaler and wants to learn how to use it. She has stopped using montelukast due to not being able to tolerate it (insomnia). Upon presentation, her voice sounded wheezy. She describes that she wakes up from shortness of breath about 3-4 times per week. With concerns for her wheezing, we asked Dr. Bernstein to come in, to listen to her lungs--which may be due to her vocal cord dysfunction. She states that when she's doing active work, she notices her symptoms get worse and she needs to use her rescue inhaler more.     Seasonal allergies: Patient uses Flonase nasal spray--1 spray into each nostril daily, Elestat 0.05% --1 drop twice daily, and takes Zyrtec 10 mg daily. She states that her allergies get worse when the season changes around May and is worse in the summer time.     Sickle Cell Without  "Crisis/Thrombosis of ovarian vein: Patient takes aspirin 325 mg daily.     BP Readings from Last 1 Encounters:   10/12/20 132/83     Pulse Readings from Last 1 Encounters:   10/12/20 68     Wt Readings from Last 1 Encounters:   10/12/20 64.9 kg (143 lb)     Ht Readings from Last 1 Encounters:   01/02/20 1.6 m (5' 3\")     Estimated body mass index is 25.33 kg/m  as calculated from the following:    Height as of 1/2/20: 1.6 m (5' 3\").    Weight as of 10/12/20: 64.9 kg (143 lb).    Temp Readings from Last 1 Encounters:   10/12/20 98.2  F (36.8  C) (Oral)     ASSESSMENT:                          Medication Adherence: No issues identified    Asthma: Uncontrolled. Patient uses her rescue inhaler very often. Patient would benefit from re-education with inhaler techniques, including Respimat.    Seasonal allergies: Controlled    Sickle Cell Without Crisis/Thrombosis of ovarian vein: Controlled.       PLAN:                              Use Spiriva Respimat daily at night to control asthma.    Use Albuterol inhaler for when experiencing shortness or breath or wheezing.    I spent 30 minutes with this patient today. All changes were made via collaborative practice agreement with Dr. Bernstein. A copy of the visit note was provided to the patient's primary care provider.    Will follow up in at next visit.    The patient was given a summary of these recommendations.     Medications to follow-up with at next visit:    Reassess inhaler technique.    Reassess frequency of Albuterol use.      Medication Therapy Recommendations Needing Review  Moderate persistent asthma without complication    Current Medication: tiotropium (SPIRIVA RESPIMAT) 2.5 MCG/ACT inhaler   Rationale: Does not understand instructions - Adherence - Adherence   Recommendation: Provide Education   Status: Patient Agreed - Adherence/Education           Liliya Sheridan, PharmD student. I saw this patient with Lachelle Grossman, PharmD    I was present with the pharmacy " student who participated in the service and in the documentation of this note. I have verified the history, personally performed the medical decision making, and have verified the content of the note, which accurately reflects my assessment of the patient and the plan of care.   Lachelle Grossman, [facility], PharmD        Labs/EKG/Imaging Studies/Medications

## 2023-01-17 ENCOUNTER — OFFICE VISIT (OUTPATIENT)
Dept: SLEEP MEDICINE | Facility: CLINIC | Age: 53
End: 2023-01-17
Attending: PHYSICIAN ASSISTANT
Payer: COMMERCIAL

## 2023-01-17 DIAGNOSIS — J38.3 VOCAL CORD DYSFUNCTION: ICD-10-CM

## 2023-01-17 DIAGNOSIS — J45.40 MODERATE PERSISTENT ASTHMA WITHOUT COMPLICATION: ICD-10-CM

## 2023-01-17 DIAGNOSIS — G47.30 OBSERVED SLEEP APNEA: ICD-10-CM

## 2023-03-29 ENCOUNTER — OFFICE VISIT (OUTPATIENT)
Dept: SLEEP MEDICINE | Facility: CLINIC | Age: 53
End: 2023-03-29
Payer: COMMERCIAL

## 2023-03-29 DIAGNOSIS — G47.30 OBSERVED SLEEP APNEA: Primary | ICD-10-CM

## 2023-03-29 ASSESSMENT — SLEEP AND FATIGUE QUESTIONNAIRES
HOW LIKELY ARE YOU TO NOD OFF OR FALL ASLEEP WHILE WATCHING TV: MODERATE CHANCE OF DOZING
HOW LIKELY ARE YOU TO NOD OFF OR FALL ASLEEP WHILE SITTING INACTIVE IN A PUBLIC PLACE: WOULD NEVER DOZE
HOW LIKELY ARE YOU TO NOD OFF OR FALL ASLEEP WHILE SITTING AND READING: SLIGHT CHANCE OF DOZING
HOW LIKELY ARE YOU TO NOD OFF OR FALL ASLEEP WHILE SITTING AND TALKING TO SOMEONE: WOULD NEVER DOZE
HOW LIKELY ARE YOU TO NOD OFF OR FALL ASLEEP IN A CAR, WHILE STOPPED FOR A FEW MINUTES IN TRAFFIC: WOULD NEVER DOZE
HOW LIKELY ARE YOU TO NOD OFF OR FALL ASLEEP WHILE SITTING QUIETLY AFTER LUNCH WITHOUT ALCOHOL: WOULD NEVER DOZE
HOW LIKELY ARE YOU TO NOD OFF OR FALL ASLEEP WHILE LYING DOWN TO REST IN THE AFTERNOON WHEN CIRCUMSTANCES PERMIT: HIGH CHANCE OF DOZING
HOW LIKELY ARE YOU TO NOD OFF OR FALL ASLEEP WHEN YOU ARE A PASSENGER IN A CAR FOR AN HOUR WITHOUT A BREAK: WOULD NEVER DOZE

## 2023-03-29 NOTE — PROGRESS NOTES
Pt is completing a home sleep test. Pt was instructed on how to put on the Noxturnal T3 device and associated equipment before going to bed and given the opportunity to practice putting it on before leaving the sleep center. Pt was reminded to bring the home sleep test kit back to the center tomorrow, at agreed upon time for download and reporting.   Neck circumference: 35.5 CM / 14 inches.

## 2023-03-30 ENCOUNTER — DOCUMENTATION ONLY (OUTPATIENT)
Dept: SLEEP MEDICINE | Facility: CLINIC | Age: 53
End: 2023-03-30
Payer: COMMERCIAL

## 2023-03-30 NOTE — PROGRESS NOTES
HST POST-STUDY QUESTIONNAIRE    1. What time did you go to bed?  8:53pm  2. How long do you think it took to fall asleep?  30 mins  3. What time did you wake up to start the day?  6:30am  4. Did you get up during the night at all?  Yes  5. If you woke up, do you remember approximately what time(s)? 3:45 and 6:00am  6. Did you have any difficulty with the equipment?  No  7. Did you us any type of treatment with this study?  None  8. Was the head of the bed elevated? No  9. Did you sleep in a recliner?  No  10. Did you stop using CPAP at least 3 days before this test?  NA  11. Any other information you'd like us to know?

## 2023-03-30 NOTE — PROCEDURES
"HOME SLEEP STUDY INTERPRETATION        Patient: Kelly Harris  MRN: [id number]  YOB: 1970  Study Date: 3/29/2023  PCP/Referring Provider: Eun Chacon;   Ordering Provider: Bennett Goltz, PA-C         Indications for Home Study: Kelly Harris is a 52 year old female who presents with symptoms suggestive of obstructive sleep apnea.    Estimated body mass index is 24.76 kg/m  as calculated from the following:    Height as of 22: 1.613 m (5' 3.5\").    Weight as of 22: 64.4 kg (142 lb).  Total score - [place]: 6 (3/29/2023  9:06 AM)  STOP-BAN/8        Data: A full night home sleep study was performed recording the standard physiologic parameters including body position, movement, sound, nasal pressure, thermal oral airflow, chest and abdominal movements with respiratory inductance plethysmography, and oxygen saturation by pulse oximetry. Pulse rate was estimated by oximetry recording. This study was considered adequate based on > 4 hours of quality oximetry and respiratory recording. As specified by the AASM Manual for the Scoring of Sleep and Associated events, version 2.3, Rule VIII.D 1B, 4% oxygen desaturation scoring for hypopneas is used as a standard of care on all home sleep apnea testing.        Analysis Time:  298.4 minutes        Respiration:   Sleep Associated Hypoxemia: sustained hypoxemia was not present. Baseline oxygen saturation was 98%.  Time with saturation less than or equal to 88% was 0 minutes. The lowest oxygen saturation was 93%.   Snoring: Snoring was present.  Respiratory events: The home study revealed a presence of 0 obstructive apneas and 0 mixed and central apneas. There were 0 hypopneas resulting in a combined apnea/hypopnea index [AHI] of 0 events per hour.  AHI was 0 per hour supine, 0 per hour prone, 0 per hour on left side, and 0 per hour on right side.   Pattern: Excluding events noted above, respiratory rate and pattern was " Normal.      Position: Percent of time spent: supine - 9.9%, prone - 17.3%, on left - 30.1%, on right - 42.7%.      Heart Rate: By pulse oximetry normal rate was noted.       Assessment:     Negative for sleep apnea.    Sleep associated hypoxemia was not present.      Diagnosis Code(s): Snoring R06.83    Norris Erazo MD, March 30, 2023   Diplomate, American Board of Psychiatry and Neurology, Sleep Medicine

## 2023-03-30 NOTE — PROGRESS NOTES
This HSAT was performed using a Noxturnal T3 device which recorded snore, sound, movement activity, body position, nasal pressure, oronasal thermal airflow, pulse, oximetry and both chest and abdominal respiratory effort. HSAT data was restricted to the time patient states they were in bed.     HSAT was scored using 1B 4% hypopnea rule.     HST AHI (Non-PAT): 0  Snoring was reported as mild.  Time with SpO2 below 89% was 0 minutes.   Overall signal quality was fair.     Pt will follow up with sleep provider to determine appropriate therapy.

## 2023-04-03 ENCOUNTER — OFFICE VISIT (OUTPATIENT)
Dept: FAMILY MEDICINE | Facility: CLINIC | Age: 53
End: 2023-04-03
Payer: COMMERCIAL

## 2023-04-03 VITALS
RESPIRATION RATE: 22 BRPM | HEART RATE: 101 BPM | BODY MASS INDEX: 25.95 KG/M2 | WEIGHT: 148.8 LBS | SYSTOLIC BLOOD PRESSURE: 151 MMHG | OXYGEN SATURATION: 97 % | DIASTOLIC BLOOD PRESSURE: 94 MMHG

## 2023-04-03 DIAGNOSIS — J45.51 SEVERE PERSISTENT ASTHMA WITH ACUTE EXACERBATION (H): ICD-10-CM

## 2023-04-03 DIAGNOSIS — J45.40 MODERATE PERSISTENT ASTHMA WITHOUT COMPLICATION: Primary | ICD-10-CM

## 2023-04-03 PROCEDURE — 99214 OFFICE O/P EST MOD 30 MIN: CPT | Mod: GC

## 2023-04-03 RX ORDER — PREDNISONE 20 MG/1
40 TABLET ORAL DAILY
Qty: 10 TABLET | Refills: 0 | Status: SHIPPED | OUTPATIENT
Start: 2023-04-03 | End: 2023-04-08

## 2023-04-03 RX ORDER — MONTELUKAST SODIUM 10 MG/1
10 TABLET ORAL AT BEDTIME
Qty: 30 TABLET | Refills: 1 | Status: SHIPPED | OUTPATIENT
Start: 2023-04-03 | End: 2023-05-04

## 2023-04-03 ASSESSMENT — ASTHMA QUESTIONNAIRES: ACT_TOTALSCORE: 7

## 2023-04-03 NOTE — PROGRESS NOTES
"  Assessment & Plan     Acute asthma exacerbation  Moderate persistent asthma without complication  Patient is acutely distressed in clinic today with worsening breathing.  She states that her breathing has been getting worse since the beginning of March and has not responded well to daily allergy medicine plus daily Symbicort use with as needed doses.  Also having some voice changes.  She has known seasonal allergies but states that recently her symptoms were getting worse in crowded areas with carpeting such as at her workplace.  On exam there is decreased air movement throughout with no audible wheezes.  This could indicate severe asthma exacerbation.  Patient states that she had previously been well controlled but we will bolus with to Smart therapy in the last year.  It has been a while since she had PFTs or allergy testing done.  Based on her history and physical exam she appears to be in acute asthma exacerbation and will start a prednisone burst as well as montelukast and tiotropium.  We will follow-up in 4 weeks to see if there is improvement.  If no improvement she may need to repeat PFTs and possibly allergy testing.  -Continue Smart therapy with high-dose\"  - predniSONE (DELTASONE) 20 MG tablet  Dispense: 10 tablet; Refill: 0  - montelukast (SINGULAIR) 10 MG tablet  Dispense: 30 tablet; Refill: 1  - tiotropium (SPIRIVA RESPIMAT) 2.5 MCG/ACT inhaler  Dispense: 4 g; Refill: 1          Return in about 4 weeks (around 5/1/2023).    Arcenio Alcantara MD  [facility] ADAN Mendoza is a 52 year old, presenting for the following health issues:  Recheck Medication (Her new inhalers are not working - you want to go back to her old meds ) and Asthma        4/3/2023     9:25 AM   Additional Questions   Roomed by ngf   Accompanied by self         4/3/2023     9:25 AM   Patient Reported Additional Medications   Patient reports taking the following new medications none     HPI     Asthma " Follow-Up    Was ACT completed today?  Yes        4/3/2023     9:37 AM   ACT Total Scores   ACT TOTAL SCORE (Goal Greater than or Equal to 20) 7   In the past 12 months, how many times did you visit the emergency room for your asthma without being admitted to the hospital? 0   In the past 12 months, how many times were you hospitalized overnight because of your asthma? 0       How many days per week do you miss taking your asthma controller medication?  0    Please describe any recent triggers for your asthma: smoke and pollens    Have you had any Emergency Room Visits, Urgent Care Visits, or Hospital Admissions since your last office visit?  No        Review of Systems   Constitutional, HEENT, cardiovascular, pulmonary, gi and gu systems are negative, except as otherwise noted.      Objective    BP (!) 151/94 (BP Location: Right arm, Patient Position: Sitting, Cuff Size: Adult Large)   Pulse 101   Resp 22   Wt 67.5 kg (148 lb 12.8 oz)   LMP 04/12/2017   SpO2 97%   Breastfeeding No   BMI 25.95 kg/m    Body mass index is 25.95 kg/m .  Physical Exam   GENERAL: alert and moderate distress  NECK: no adenopathy, no asymmetry, masses, or scars and thyroid normal to palpation  RESP: Decreased air movement, no wheezes.   CV: regular rate and rhythm, normal S1 S2, no S3 or S4, no murmur, click or rub.      ----- Service Performed and Documented by Resident or Fellow ------

## 2023-04-03 NOTE — PROGRESS NOTES
Preceptor Attestation:    I discussed the patient with the resident and evaluated the patient in person. I have verified the content of the note, which accurately reflects my assessment of the patient and the plan of care.   Supervising Physician:  Rosaura Kemp MD.

## 2023-04-16 ENCOUNTER — HEALTH MAINTENANCE LETTER (OUTPATIENT)
Age: 53
End: 2023-04-16

## 2023-04-20 ENCOUNTER — ANCILLARY PROCEDURE (OUTPATIENT)
Dept: MAMMOGRAPHY | Facility: CLINIC | Age: 53
End: 2023-04-20
Attending: STUDENT IN AN ORGANIZED HEALTH CARE EDUCATION/TRAINING PROGRAM
Payer: COMMERCIAL

## 2023-04-20 DIAGNOSIS — Z12.31 VISIT FOR SCREENING MAMMOGRAM: ICD-10-CM

## 2023-04-20 PROCEDURE — 77067 SCR MAMMO BI INCL CAD: CPT | Mod: TC | Performed by: RADIOLOGY

## 2023-05-04 ENCOUNTER — OFFICE VISIT (OUTPATIENT)
Dept: FAMILY MEDICINE | Facility: CLINIC | Age: 53
End: 2023-05-04
Payer: COMMERCIAL

## 2023-05-04 VITALS
SYSTOLIC BLOOD PRESSURE: 137 MMHG | BODY MASS INDEX: 25.77 KG/M2 | DIASTOLIC BLOOD PRESSURE: 86 MMHG | RESPIRATION RATE: 20 BRPM | WEIGHT: 147.8 LBS | TEMPERATURE: 99.3 F | HEART RATE: 106 BPM | OXYGEN SATURATION: 98 %

## 2023-05-04 DIAGNOSIS — J45.40 MODERATE PERSISTENT ASTHMA WITHOUT COMPLICATION: Primary | ICD-10-CM

## 2023-05-04 PROCEDURE — 99214 OFFICE O/P EST MOD 30 MIN: CPT | Mod: GC

## 2023-05-04 RX ORDER — MONTELUKAST SODIUM 10 MG/1
10 TABLET ORAL AT BEDTIME
Qty: 30 TABLET | Refills: 1 | Status: SHIPPED | OUTPATIENT
Start: 2023-05-04 | End: 2023-07-07

## 2023-05-04 NOTE — PROGRESS NOTES
Preceptor Attestation:   Patient seen, evaluated and discussed with the resident. I have verified the content of the note, which accurately reflects my assessment of the patient and the plan of care.   Supervising Physician:  Ky Zavala MD

## 2023-05-04 NOTE — PROGRESS NOTES
[facility] Medicine Clinic Visit    Assessment & Plan   ASSESSMENT / PLAN:  (J45.40) Moderate persistent asthma without complication  (primary encounter diagnosis)  Comment: Patient continues to have persistent symptoms of cough, of hoarseness, shortness of breath and has wheezing on exam.  Improved from 1 month ago when she was started on Singulair, Respimat, and 5-day prednisone burst in addition to her Symbicort Smart therapy.  Using 2-3 extra doses of Symbicort daily in addition to the 2 puffs twice daily.  The hoarseness as well as the persistent wheezing despite maximal asthma therapy raises concern for vocal cord dysfunction and other etiologies.  Looks like she has had vocal cord dysfunction in the past and has seen speech pathology for this but I do not see any ENT notes in the chart.  PFTs from 2021 did not give clear picture of asthma or COPD.  CT from September 2022 showed clear lungs.  Will consult pulmonology for further work-up.  Consider ENT referral if pulmonology work-up is normal.  -Adult Pulmonary Medicine Referral,  - Montelukast (SINGULAIR) 10 MG tablet  -Continue Symbicort high-dose Smart therapy  - Continue Respimat inhaler  - Continue cetirizine and Flonase      RTC for Pap smear.    Options for treatment and follow-up care were reviewed with the patient who was engaged and actively involved in the decision making process, verbalized understanding of the options discussed, and satisfied with the final plan.    Patient was staffed with supervising physician, Dr. Zavala.     Arcenio Alcantara MD, PGY2  [facility] Medicine    Subjective   Kelly Harris is a 52 year old female with a history including asthma, sickle cell disease, low back pain with left-sided sciatica who presents for asthma follow-up    Asthma Follow Up   Concerns Asthma still not completely controlled.     Description:  Cough: Yes   Wheezing:  Yes   Shortness of breath:  Yes Details: if climbing stairs.  Fevers:   no  Chest pain or discomfort: no  Nasal congestion: Yes  Sore throat: no, but has hoarse voice  Palpitations:no       How often are current asthma medications being used:                  Controller medicine daily:  Yes          Rescue nebulizer or Inhaler during an average week: using Symbicort 2-3 times per day on top of 2 puff BID.       Able to do usual responsibilities:  No, has avoided work meetings due to breathing concerns       Night time symptoms during an average week: 2 times           Triggers: pollens        History of Urgent care/ER visits:  Yes Details: It has been a long time  History of hospitalizations:  no      If hospitalized-History of intubation: no                                                        Asthma Control Test score for today:     4/3/2023     9:37 AM   ACT Total Scores   ACT TOTAL SCORE (Goal Greater than or Equal to 20) 7   In the past 12 months, how many times did you visit the emergency room for your asthma without being admitted to the hospital? 0   In the past 12 months, how many times were you hospitalized overnight because of your asthma? 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  Saw hematology who said daily aspirin.       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 She has  never used smokeless tobacco. She reports that she does not drink alcohol and does not use drugs.    There are no exam notes on file for this visit.    Objective     Vitals:    05/04/23 1427   BP: 137/86   Pulse: 106   Resp: 20   Temp: 99.3  F (37.4  C)   TempSrc: Oral   SpO2: 98%   Weight: 67 kg (147 lb 12.8 oz)     Body mass index is 25.77 kg/m .    GEN: NAD, healthy, alert  Constitutional: Awake, alert, cooperative, no acute distress, and appears stated age.  HEENT: NC/AT, EOMI, normal conjunctivae/sclerae.  Lungs: Diffuse inspiratory expiratory wheezes.  Cardiovascular: Appears well perfused. RRR, normal S1 and S2, no S3 or S4, and no murmur appreciated.    Labs:  No visits with results within 1 Month(s) from this visit.   Latest known visit with results is:   Admission on 09/19/2022, Discharged on 09/20/2022   Component Date Value Ref Range Status     Ventricular Rate 09/19/2022 76  BPM Final     Atrial Rate 09/19/2022 76  BPM Final     ND Interval 09/19/2022 148  ms Final     QRS Duration 09/19/2022 74  ms Final     QT 09/19/2022 402  ms Final     QTc 09/19/2022 452  ms Final     P Axis 09/19/2022 75  degrees Final     R AXIS 09/19/2022 62  degrees Final     T Axis 09/19/2022 60  degrees Final     Interpretation ECG 09/19/2022    Final                    Value:Sinus rhythm  Possible Left atrial enlargement  Borderline ECG  No previous ECGs available  Confirmed by SEE ED PROVIDER NOTE FOR, ECG INTERPRETATION ([id number]),  JAISON WILD ([id number]) on 9/20/2022 9:32:37 AM       Sodium 09/19/2022 139  136 - 145 mmol/L Final     Potassium 09/19/2022 3.7  3.5 - 5.0 mmol/L Final     Chloride 09/19/2022 103  98 - 107 mmol/L Final     Carbon Dioxide (CO2) 09/19/2022 25  22 - 31 mmol/L Final     Anion Gap 09/19/2022 11  5 - 18 mmol/L Final     Urea Nitrogen 09/19/2022 11  8 - 22 mg/dL Final     Creatinine 09/19/2022 1.18 (H)  0.60 - 1.10 mg/dL Final     Calcium 09/19/2022 9.5  8.5 - 10.5 mg/dL Final     Glucose  09/19/2022 99  70 - 125 mg/dL Final     GFR Estimate 09/19/2022 55 (L)  >60 mL/min/1.73m2 Final    Effective December 21, 2021 eGFRcr in adults is calculated using the 2021 CKD-EPI creatinine equation which includes age and gender (Ag et al., [facility], DOI: 10.1056/JOFIpi0012997)     Troponin I 09/19/2022 <0.01  0.00 - 0.29 ng/mL Final     BNP 09/19/2022 <10  0 - 76 pg/mL Final     WBC Count 09/19/2022 12.2 (H)  4.0 - 11.0 10e3/uL Final     RBC Count 09/19/2022 4.31  3.80 - 5.20 10e6/uL Final     Hemoglobin 09/19/2022 11.2 (L)  11.7 - 15.7 g/dL Final     Hematocrit 09/19/2022 31.5 (L)  35.0 - 47.0 % Final     MCV 09/19/2022 73 (L)  78 - 100 fL Final     MCH 09/19/2022 26.0 (L)  26.5 - 33.0 pg Final     MCHC 09/19/2022 35.6  31.5 - 36.5 g/dL Final     RDW 09/19/2022 14.3  10.0 - 15.0 % Final     Platelet Count 09/19/2022 394  150 - 450 10e3/uL Final     Hold Specimen 09/19/2022 JIC   Final     % Neutrophils 09/19/2022 40  % Final     % Lymphocytes 09/19/2022 54  % Final     % Monocytes 09/19/2022 5  % Final     % Eosinophils 09/19/2022 1  % Final     % Basophils 09/19/2022 0  % Final     NRBCs per 100 WBC 09/19/2022 4 (H)  <=0 % Final     Absolute Neutrophils 09/19/2022 4.9  1.6 - 8.3 10e3/uL Final     Absolute Lymphocytes 09/19/2022 6.6 (H)  0.8 - 5.3 10e3/uL Final     Absolute Monocytes 09/19/2022 0.6  0.0 - 1.3 10e3/uL Final     Absolute Eosinophils 09/19/2022 0.1  0.0 - 0.7 10e3/uL Final     Absolute Basophils 09/19/2022 0.0  0.0 - 0.2 10e3/uL Final     Absolute NRBCs 09/19/2022 0.5 (H)  <=0.0 10e3/uL Final     RBC Morphology 09/19/2022 Confirmed RBC Indices   Final     Platelet Assessment 09/19/2022 Automated Count Confirmed. Platelet morphology is normal.  Automated Count Confirmed. Platelet morphology is normal. Final     Reactive Lymphocytes 09/19/2022 Present (A)  None Seen Final    This is an appended report.  These results have been appended to a previously final verified report.     Brown 09/19/2022     Corrected    This is a corrected result. Previous result was Present on 9/19/2022 at 10:59 PM CDT     Target Cells 09/19/2022 Moderate (A)  None Seen Final

## 2023-05-05 DIAGNOSIS — J45.909 ASTHMA: Primary | ICD-10-CM

## 2023-05-15 NOTE — PROGRESS NOTES
Sleep Follow-Up Visit:    Date on this visit: 5/16/2023    Kelly Harris comes in today for follow-up of her sleep study done on 3/29/23. Kelly Harris was initially seen for being able to get a few hours of good sleep. She wakes with a choking or coughing, particularly if she tries to sleep on her back..     HST Results:  Weight: 142 pounds  Analysis Time:  298.4 minutes      Respiration:   Sleep Associated Hypoxemia: sustained hypoxemia was not present. Baseline oxygen saturation was 98%.  Time with saturation less than or equal to 88% was 0 minutes. The lowest oxygen saturation was 93%.   Snoring: Snoring was present.  Respiratory events: The home study revealed a presence of 0 obstructive apneas and 0 mixed and central apneas. There were 0 hypopneas resulting in a combined apnea/hypopnea index [AHI] of 0 events per hour.  AHI was 0 per hour supine, 0 per hour prone, 0 per hour on left side, and 0 per hour on right side.   Pattern: Excluding events noted above, respiratory rate and pattern was Normal.      Position: Percent of time spent: supine - 9.9%, prone - 17.3%, on left - 30.1%, on right - 42.7%.     Heart Rate: By pulse oximetry normal rate was noted.     She has been getting into bed 10 PM. She may get up for a while and does chores. It may be around midnight before she falls asleep. She is up at 6 AM now. She has a little difficulty getting up to the alarm. On weekends, she may get up about 7:30 AM. Sometimes she is up at 4 AM for work on weekends. She denies having much trouble shutting her mind off at the beginning of the night but may get a racing mind if she wakes in the middle of the night. She wakes to any sound outside.   She is not napping. She denies feeling like she is fighting naps, but she does sleep better in the day. She last napped on a weekend 2 weeks ago. She sometimes gets called in to work in the middle of the night. Once one of her daughters goes to college, she will be quitting  this job.   She may wake 3 times per night.     She had been having bad asthma and her medications have been changed recently. She feels very tired in the morning since switching to Spiriva. She does sometimes feel short of breath and has coughing at night, interfering with sleep.     Past medical/surgical history, family history, social history, medications and allergies were reviewed.      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  Saw hematology who said daily aspirin.       Bilateral low back pain with left-sided sciatica 12/11/2015     Priority: Medium     Sickle-cell/Hb-C disease without crisis (H) 01/11/2013     Priority: Medium        Impression/Plan:    (F51.04) Chronic insomnia  (primary encounter diagnosis)  Comment: Kelly continues to struggle with falling asleep and staying asleep.  She can get called in to work in the middle of the night at any time, so can't use a sleep aid. She also seems to sleep lightly as a result, listening for the phone to ring. She may be also trying to sleep at a time that is not her circadian preference. She has a hard time falling asleep at 10 PM, she is not very tired. She sleeps in an extra 90 minutes on weekends when she does not have to work. She plans to change jobs once her youngest goes to college. She denies racing mind for the most part and has had normal ANASTASIA-7 scores, but she strikes me as slightly anxious based on her demeanor. Pursuing treatment for anxiety could help her sleep. Asthma also  "seems to affect her sleep, as could some of her inhalers.  Plan: Ultimately, changing jobs would be the most effective treatment. We reviewed concepts of circadian and homeostatic sleep drives. We also discussed stimulus control and sleep compression. She was advised to start with a sleep schedule of 11 PM to 6 AM. She was advised to avoid sleeping in or napping. We discussed dealing with stress by setting aside a designated \"worry time\" in the early evening and she was given resources for guided imagery/relaxation. She was encouraged to try 1-3 mg melatonin about 9:30 PM as long as it is not causing difficulty getting up in the night if needed. If continuing to struggle with sleep, I offered a referral to Dr. Cabral for further CBT-I management. She will let me know if she would like a referral.     (R06.83) Snoring  Comment: This test did not show any apnea at all.  Her AHI was 0/h.  However, it is unclear how much she actually slept.  The position tracing showed she change positions about every 30-60 minutes.  Plan: Use the [employer] lidia to get more evaluation of her snoring and breathing at night.  We discussed the option of repeating a sleep study with a sleep aid, but she was not interested at this time.     She will follow up with me in the future as needed.     39 minutes were spent on the date of the encounter doing chart review, history and exam, documentation and further activities as noted above.     Bennett Goltz, PA-C    CC: Maci Fritz MD     "

## 2023-05-16 ENCOUNTER — OFFICE VISIT (OUTPATIENT)
Dept: SLEEP MEDICINE | Facility: CLINIC | Age: 53
End: 2023-05-16
Payer: COMMERCIAL

## 2023-05-16 VITALS
OXYGEN SATURATION: 100 % | BODY MASS INDEX: 25.44 KG/M2 | DIASTOLIC BLOOD PRESSURE: 88 MMHG | HEART RATE: 80 BPM | HEIGHT: 64 IN | SYSTOLIC BLOOD PRESSURE: 134 MMHG | WEIGHT: 149 LBS

## 2023-05-16 DIAGNOSIS — F51.04 CHRONIC INSOMNIA: Primary | ICD-10-CM

## 2023-05-16 DIAGNOSIS — R06.83 SNORING: ICD-10-CM

## 2023-05-16 PROCEDURE — 99214 OFFICE O/P EST MOD 30 MIN: CPT | Performed by: PHYSICIAN ASSISTANT

## 2023-05-16 NOTE — PATIENT INSTRUCTIONS
Use the [employer] lidia to record your snoring and breathing. If you observe gasps, snorts or pauses in breathing, let me know and we will repeat a sleep study.     General recommendations for sleep problems (Insomnia)  Allow 2-4 weeks to see results    You might consider trying 1-3 mg melatonin about 9:30 PM.     Establish a regular sleep schedule    Most people only need 7-8 hours of sleep.  Don't be in bed longer than you need     to sleep or you will end up spending more time awake in bed. This trains your    brain to think of the bed as a place to not sleep.  Go to bed at same time each night   Get up at same time each day - Set an alarm everyday (even weekends). This is one of    the most important tips. It prevents you from relying on your insomnia to get you    up on time for your day. That actually reinforces insomnia. It also will help your    body get into a pattern where you start feeling tired at a consistent time each    night.  The body functions best when you keep a consistent routine.  Avoid sleeping-in and napping. Anytime you sleep during the day, you will be less tired at    night. You may be tired enough to fall asleep, but you will wake more in the    middle of the night because you will have met your sleep need before the night is    done.   Cut down time in bed (if not asleep, get up)- Use your bed only for sleep and sex    Anytime you spend time in bed doing activities other than sleep (reading,    watching TV, working, playing on the computer or phone, or even just laying in    bed trying to sleep), you are training  your brain to think of the bed as a place to    do activities other than sleep. If you are not falling asleep within 20-30 minutes,    get out of bed. While out of bed, avoid bright lights. Avoid work or chores. Being    productive in the middle of the night reinforces waking up at night. Find relaxing,    not particularly entertaining activities like reading, listening to music,  or relaxation    exercises. Go back to bed if you start feeling groggy, or after about 30 minutes,    even if not feeling very tired. Sometimes, just getting out of bed stops the pattern    of getting frustrated about laying in bed not sleeping, and that can help you fall    asleep.   Avoid trying to force yourself to sleep- sleep is not like everything else. The harder you    work at most things, the more you can accomplish. The harder you work at    sleep, the less you will sleep.     Make the bedroom comfortable - quiet, dark and cool are better. Consider ear plugs    (silicon). Use dark blinds or wear an eye mask if needed     Make a relaxing routine prior to bedtime  Relaxation exercises:   Progressive muscle relaxation: Relax each muscle group individually    Begin with your feet, flex, then relax. Try to imagine your feet feeling heavy and sinking into the bed. Move to your calves, do the same thing. Work through each muscle group toward your head.    Relaxing Mental Imagery: Try to imagine a trip that you took and found relaxing, or imagine a day at the beach. Try to walk yourself through the day in your mind as if you were dreaming it. Try to imagine sensing the different experiences, such as feeling sand between your toes, the heat of the sun on your skin, seeing the waves crashing the shore, the smell of the salt water, etc.     Deal with your worries before bedtime    Set aside a worry time around dinner time for 10-15 minutes. Write down the    things that are on your mind. Plan time in the coming days to address those    issues. Brainstorm ideas on how you will deal with them. Try to identify issues    that are out of your control, and try to let those issues go.  Listen to relaxation tapes   Classical Music or Nature sounds   Back Massage   Get regular exercise each day (at least 1-2 hours before bedtime)   Take medications only as directed   Eat a light bedtime snack or warm drink   Warm milk   Warm  herbal tea (non-caffeinated)       Things to avoid   No overstimulating activities just before bed   No competitive games before bedtime   No exciting television programs before bedtime   Avoid caffeine after lunchtime   Avoid chocolate   Do not use alcohol to induce sleep (worsens Insomnia)   Do not take someone else's sleeping pills   Do not look at the clock when awakening   Do not turn on light when getting up to use bathroom, use a nightlight     Online Programs   www.SHUTi.me (pronounced shut eye). There is a fee for this program. Enter the code  [facility]  if you decide to enroll in this program.    www.sleepIO.com (pronounced sleep ee oh). There is a fee for this program. Enter the code  [facility]  if you decide to enroll in this program.     Suggested Resources  Insomnia Treatment Books   Overcoming Insomnia by Nicholas Waterman and Natalie Sanchez (2008)  No More Sleepless Nights by Greg Nichols and Trini Zepeda (1996)  Say Phani to Insomnia by Handy Jones (2009)  The Insomnia Workbook by Lyubov Sanchez and Brian Alanis (2009)  The Insomnia Answer by Leonid Navarrete and Matthias Gutierres (2006)      Stress Management and Relaxation Books  The Relaxation and Stress Reduction Workbook by Chelsi Turcios, Mae Asif and Jesus Irving (2008)  Stress Management Workbook: Techniques and Self-Assessment Procedures by Kandy Tinoco and Moses Cosme (1997)  A Mindfulness-Based Stress Reduction Workbook by Juan F Pollock and Alba Saldivar (2010)  The Complete Stress Management Workbook by Thanh Yang, Shahram Tiwari and Ja Canchola (1996)  Assert Yourself by Trinidad Mejia and Castro Mejia (1977)    Relaxation Resources for Computer Download   These websites offer resources to help you relax. This list is for information only. [facility] is not responsible for the quality of services or the actions of any person or organization.  Progressive Muscle Relaxation (PMR):    http://www.[employer]/progressive-muscle-relaxation-exercise.html   http://studentsupport.[facility]counseling/resources/self-help/relaxation-and-stress-management/   Deep Breathing Exercises:  http://www.[employer]/breathing-awareness.html     Meditation:   wwwThe [employer] Store  www.[employer] Networkmeditation-site.[employer] You may have to pay for some of these resources.    Guided Imagery:  http://www.[employer]/guided-imagery-scripts.html   http://[employer]/library/kqaiitonby-reujqn-ikwicbn/   Consider phone apps such as: Calm, Headspace or Insight Timer.    Counseling / Behavioral Health  [facility]  Visit [email or web address] or call [phone] to find a clinic close to you.  Or call [phone] for [facility].

## 2023-05-16 NOTE — NURSING NOTE
"Chief Complaint   Patient presents with     Study Results     HST f/u       Initial /88   Pulse 80   Ht 1.613 m (5' 3.5\")   Wt 67.6 kg (149 lb)   LMP 04/12/2017   SpO2 100%   BMI 25.98 kg/m   Estimated body mass index is 25.98 kg/m  as calculated from the following:    Height as of this encounter: 1.613 m (5' 3.5\").    Weight as of this encounter: 67.6 kg (149 lb).    Medication Reconciliation: complete  ESS 6  Heidy Puckett MA  "

## 2023-05-25 ENCOUNTER — OFFICE VISIT (OUTPATIENT)
Dept: FAMILY MEDICINE | Facility: CLINIC | Age: 53
End: 2023-05-25
Payer: COMMERCIAL

## 2023-05-25 VITALS
DIASTOLIC BLOOD PRESSURE: 83 MMHG | HEART RATE: 80 BPM | RESPIRATION RATE: 18 BRPM | SYSTOLIC BLOOD PRESSURE: 131 MMHG | HEIGHT: 64 IN | BODY MASS INDEX: 25.71 KG/M2 | OXYGEN SATURATION: 100 % | WEIGHT: 150.6 LBS | TEMPERATURE: 98.5 F

## 2023-05-25 DIAGNOSIS — Z12.4 CERVICAL CANCER SCREENING: Primary | ICD-10-CM

## 2023-05-25 DIAGNOSIS — D57.1 SICKLE CELL DISEASE WITHOUT CRISIS (H): ICD-10-CM

## 2023-05-25 PROCEDURE — G0145 SCR C/V CYTO,THINLAYER,RESCR: HCPCS

## 2023-05-25 PROCEDURE — G0124 SCREEN C/V THIN LAYER BY MD: HCPCS | Performed by: PATHOLOGY

## 2023-05-25 PROCEDURE — 87624 HPV HI-RISK TYP POOLED RSLT: CPT

## 2023-05-25 PROCEDURE — 99212 OFFICE O/P EST SF 10 MIN: CPT | Mod: GC

## 2023-05-25 NOTE — PROGRESS NOTES
"  Assessment & Plan     Cervical cancer screening  Here for cervical cancer screening.  Last Pap/HPV was about 5 and half years ago.  No history of abnormal results.  Patient has history of sickle cell disease and is complaining of some  - Pap Screen with HPV - recommended age 30 - 65 years    Sickle cell disease without crisis (H)  History of sickle cell disease.  Having joint aches and pains.  Notes that joint pain improves with adequate hydration.  She is a very active individual with lots of walking as a part of her job.  We discussed adding in some stretching or yoga type exercises.  No acute concerns or signs of crisis today.    FUTURE APPOINTMENTS:       - Follow-up visit in about 6 weeks for CPE    Return in about 6 weeks (around 7/5/2023) for Routine preventive.    Arcenio Alcantara MD  [facility] ADAN Mendoza is a 52 year old, presenting for the following health issues:  OTHER (Pap smear )        5/25/2023     8:14 AM   Additional Questions   Roomed by quinton   Accompanied by self     HPI     Here for routine cervical cancer screening.  Notes no history of abnormal results in the past.  Has 2 adult children.  No concerns for acute infections or STDs.    Also mentions some joint pain that is chronic and which she relates to her sickle cell disease.  Notes that pain improves with better hydration.  Does not have any swelling or erythema of the joints.  Elevates her legs at night to help with some mild swelling.      Review of Systems   Constitutional, HEENT, cardiovascular, pulmonary, gi and gu systems are negative, except as otherwise noted.      Objective    /83   Pulse 80   Temp 98.5  F (36.9  C) (Oral)   Resp 18   Ht 1.62 m (5' 3.78\")   Wt 68.3 kg (150 lb 9.6 oz)   LMP 04/12/2017   SpO2 100%   BMI 26.03 kg/m    Body mass index is 26.03 kg/m .  Physical Exam   GENERAL: healthy, alert and no distress   (female): normal female external genitalia, normal urethral " meatus, vaginal mucosa, normal cervix  MS: no gross musculoskeletal defects noted, no edema    No results found for this or any previous visit (from the past 24 hour(s)).    ----- Service Performed and Documented by Resident or Fellow ------

## 2023-05-30 LAB
BKR LAB AP GYN ADEQUACY: ABNORMAL
BKR LAB AP GYN INTERPRETATION: ABNORMAL
BKR LAB AP HPV REFLEX: ABNORMAL
BKR LAB AP PREVIOUS ABNORMAL: ABNORMAL
PATH REPORT.COMMENTS IMP SPEC: ABNORMAL
PATH REPORT.COMMENTS IMP SPEC: ABNORMAL
PATH REPORT.RELEVANT HX SPEC: ABNORMAL

## 2023-06-01 LAB
HUMAN PAPILLOMA VIRUS 16 DNA: NEGATIVE
HUMAN PAPILLOMA VIRUS 18 DNA: NEGATIVE
HUMAN PAPILLOMA VIRUS FINAL DIAGNOSIS: ABNORMAL
HUMAN PAPILLOMA VIRUS OTHER HR: POSITIVE

## 2023-06-05 PROBLEM — R87.619 ABNORMAL PAP SMEAR OF CERVIX: Status: ACTIVE | Noted: 2023-05-25

## 2023-07-07 ENCOUNTER — OFFICE VISIT (OUTPATIENT)
Dept: FAMILY MEDICINE | Facility: CLINIC | Age: 53
End: 2023-07-07
Payer: COMMERCIAL

## 2023-07-07 VITALS
TEMPERATURE: 97.7 F | RESPIRATION RATE: 18 BRPM | BODY MASS INDEX: 25.78 KG/M2 | SYSTOLIC BLOOD PRESSURE: 151 MMHG | HEART RATE: 87 BPM | OXYGEN SATURATION: 100 % | WEIGHT: 151 LBS | DIASTOLIC BLOOD PRESSURE: 82 MMHG | HEIGHT: 64 IN

## 2023-07-07 DIAGNOSIS — Z13.1 SCREENING FOR DIABETES MELLITUS: Primary | ICD-10-CM

## 2023-07-07 DIAGNOSIS — J45.40 MODERATE PERSISTENT ASTHMA WITHOUT COMPLICATION: ICD-10-CM

## 2023-07-07 DIAGNOSIS — Z00.00 ROUTINE GENERAL MEDICAL EXAMINATION AT A HEALTH CARE FACILITY: ICD-10-CM

## 2023-07-07 LAB — HBA1C MFR BLD: 4.5 % (ref 0–5.6)

## 2023-07-07 PROCEDURE — 90619 MENACWY-TT VACCINE IM: CPT

## 2023-07-07 PROCEDURE — 90746 HEPB VACCINE 3 DOSE ADULT IM: CPT

## 2023-07-07 PROCEDURE — 83036 HEMOGLOBIN GLYCOSYLATED A1C: CPT

## 2023-07-07 PROCEDURE — 90471 IMMUNIZATION ADMIN: CPT

## 2023-07-07 PROCEDURE — 99396 PREV VISIT EST AGE 40-64: CPT | Mod: 25

## 2023-07-07 PROCEDURE — 36415 COLL VENOUS BLD VENIPUNCTURE: CPT

## 2023-07-07 PROCEDURE — 90472 IMMUNIZATION ADMIN EACH ADD: CPT

## 2023-07-07 PROCEDURE — 90648 HIB PRP-T VACCINE 4 DOSE IM: CPT

## 2023-07-07 PROCEDURE — 99214 OFFICE O/P EST MOD 30 MIN: CPT | Mod: 25

## 2023-07-07 RX ORDER — PREDNISONE 20 MG/1
40 TABLET ORAL DAILY
Qty: 10 TABLET | Refills: 0 | Status: SHIPPED | OUTPATIENT
Start: 2023-07-07 | End: 2023-07-12

## 2023-07-07 RX ORDER — MONTELUKAST SODIUM 10 MG/1
10 TABLET ORAL AT BEDTIME
Qty: 30 TABLET | Refills: 1 | Status: ON HOLD | OUTPATIENT
Start: 2023-07-07 | End: 2023-07-31

## 2023-07-07 ASSESSMENT — ENCOUNTER SYMPTOMS
CONSTIPATION: 0
MYALGIAS: 1
BREAST MASS: 0
PARESTHESIAS: 0
JOINT SWELLING: 0
SHORTNESS OF BREATH: 0
CHILLS: 0
ARTHRALGIAS: 1
DIARRHEA: 0
SORE THROAT: 0
WEAKNESS: 0
FEVER: 0
DYSURIA: 0
HEADACHES: 0
ABDOMINAL PAIN: 0
NAUSEA: 0
DIZZINESS: 0
NERVOUS/ANXIOUS: 0
HEARTBURN: 0
FREQUENCY: 0
HEMATURIA: 0
HEMATOCHEZIA: 0
EYE PAIN: 0
COUGH: 0
PALPITATIONS: 0

## 2023-07-07 NOTE — NURSING NOTE
Prior to immunization administration, verified patients identity using patient s name and date of birth. Please see Immunization Activity for additional information.     Screening Questionnaire for Adult Immunization    Are you sick today?   No   Do you have allergies to medications, food, a vaccine component or latex?   No   Have you ever had a serious reaction after receiving a vaccination?   No   Do you have a long-term health problem with heart, lung, kidney, or metabolic disease (e.g., diabetes), asthma, a blood disorder, no spleen, complement component deficiency, a cochlear implant, or a spinal fluid leak?  Are you on long-term aspirin therapy?   Yes   Do you have cancer, leukemia, HIV/AIDS, or any other immune system problem?   No   Do you have a parent, brother, or sister with an immune system problem?   No   In the past 3 months, have you taken medications that affect  your immune system, such as prednisone, other steroids, or anticancer drugs; drugs for the treatment of rheumatoid arthritis, Crohn s disease, or psoriasis; or have you had radiation treatments?   Yes   Have you had a seizure, or a brain or other nervous system problem?   No   During the past year, have you received a transfusion of blood or blood    products, or been given immune (gamma) globulin or antiviral drug?   No   For women: Are you pregnant or is there a chance you could become       pregnant during the next month?   No   Have you received any vaccinations in the past 4 weeks?   No     Immunization questionnaire was positive for at least one answer.  Notified Dr Alcantara.      Patient instructed to remain in clinic for 15 minutes afterwards, and to report any adverse reactions.     Screening performed by Zoila Raines MA on 7/7/2023 at 9:44 AM.

## 2023-07-07 NOTE — PROGRESS NOTES
Preceptor Attestation:    I discussed the patient with the resident and evaluated the patient in person. I have verified the content of the note, which accurately reflects my assessment of the patient and the plan of care.   Supervising Physician:  Morteza Marie MD.
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
## 2023-07-15 ENCOUNTER — HOSPITAL ENCOUNTER (OUTPATIENT)
Facility: CLINIC | Age: 53
Setting detail: OBSERVATION
Discharge: HOME | End: 2023-07-16
Attending: EMERGENCY MEDICINE | Admitting: INTERNAL MEDICINE
Payer: COMMERCIAL

## 2023-07-15 DIAGNOSIS — D57.00 SICKLE CELL PAIN CRISIS (H): ICD-10-CM

## 2023-07-15 LAB
ABO/RH(D): NORMAL
ANTIBODY SCREEN: NEGATIVE
ATRIAL RATE - MUSE: 96 BPM
DIASTOLIC BLOOD PRESSURE - MUSE: NORMAL MMHG
INTERPRETATION ECG - MUSE: NORMAL
P AXIS - MUSE: 8 DEGREES
PR INTERVAL - MUSE: 120 MS
QRS DURATION - MUSE: 80 MS
QT - MUSE: 328 MS
QTC - MUSE: 414 MS
R AXIS - MUSE: 77 DEGREES
SPECIMEN EXPIRATION DATE: NORMAL
SYSTOLIC BLOOD PRESSURE - MUSE: NORMAL MMHG
T AXIS - MUSE: 37 DEGREES
VENTRICULAR RATE- MUSE: 96 BPM

## 2023-07-15 PROCEDURE — 36415 COLL VENOUS BLD VENIPUNCTURE: CPT | Performed by: EMERGENCY MEDICINE

## 2023-07-15 PROCEDURE — 96375 TX/PRO/DX INJ NEW DRUG ADDON: CPT

## 2023-07-15 PROCEDURE — 96376 TX/PRO/DX INJ SAME DRUG ADON: CPT

## 2023-07-15 PROCEDURE — 86850 RBC ANTIBODY SCREEN: CPT | Performed by: EMERGENCY MEDICINE

## 2023-07-15 PROCEDURE — 80053 COMPREHEN METABOLIC PANEL: CPT | Performed by: EMERGENCY MEDICINE

## 2023-07-15 PROCEDURE — 258N000003 HC RX IP 258 OP 636: Performed by: EMERGENCY MEDICINE

## 2023-07-15 PROCEDURE — 250N000011 HC RX IP 250 OP 636: Performed by: EMERGENCY MEDICINE

## 2023-07-15 PROCEDURE — 99285 EMERGENCY DEPT VISIT HI MDM: CPT | Mod: 25

## 2023-07-15 PROCEDURE — 93005 ELECTROCARDIOGRAM TRACING: CPT | Performed by: EMERGENCY MEDICINE

## 2023-07-15 PROCEDURE — 85045 AUTOMATED RETICULOCYTE COUNT: CPT | Performed by: EMERGENCY MEDICINE

## 2023-07-15 PROCEDURE — 85007 BL SMEAR W/DIFF WBC COUNT: CPT | Performed by: EMERGENCY MEDICINE

## 2023-07-15 PROCEDURE — 85048 AUTOMATED LEUKOCYTE COUNT: CPT | Performed by: EMERGENCY MEDICINE

## 2023-07-15 PROCEDURE — 96374 THER/PROPH/DIAG INJ IV PUSH: CPT

## 2023-07-15 PROCEDURE — 83735 ASSAY OF MAGNESIUM: CPT | Performed by: EMERGENCY MEDICINE

## 2023-07-15 PROCEDURE — 86901 BLOOD TYPING SEROLOGIC RH(D): CPT | Performed by: EMERGENCY MEDICINE

## 2023-07-15 PROCEDURE — 86905 BLOOD TYPING RBC ANTIGENS: CPT | Performed by: EMERGENCY MEDICINE

## 2023-07-15 PROCEDURE — 96361 HYDRATE IV INFUSION ADD-ON: CPT

## 2023-07-15 RX ORDER — HYDROMORPHONE HYDROCHLORIDE 1 MG/ML
0.5 INJECTION, SOLUTION INTRAMUSCULAR; INTRAVENOUS; SUBCUTANEOUS ONCE
Status: COMPLETED | OUTPATIENT
Start: 2023-07-15 | End: 2023-07-15

## 2023-07-15 RX ORDER — ONDANSETRON 2 MG/ML
4 INJECTION INTRAMUSCULAR; INTRAVENOUS ONCE
Status: COMPLETED | OUTPATIENT
Start: 2023-07-15 | End: 2023-07-15

## 2023-07-15 RX ADMIN — ONDANSETRON 4 MG: 2 INJECTION INTRAMUSCULAR; INTRAVENOUS at 20:26

## 2023-07-15 RX ADMIN — HYDROMORPHONE HYDROCHLORIDE 0.5 MG: 1 INJECTION, SOLUTION INTRAMUSCULAR; INTRAVENOUS; SUBCUTANEOUS at 22:10

## 2023-07-15 RX ADMIN — HYDROMORPHONE HYDROCHLORIDE 0.5 MG: 1 INJECTION, SOLUTION INTRAMUSCULAR; INTRAVENOUS; SUBCUTANEOUS at 20:26

## 2023-07-15 RX ADMIN — SODIUM CHLORIDE 1000 ML: 9 INJECTION, SOLUTION INTRAVENOUS at 20:26

## 2023-07-15 ASSESSMENT — ENCOUNTER SYMPTOMS
VOMITING: 0
FEVER: 0
SHORTNESS OF BREATH: 0
NAUSEA: 1
DIARRHEA: 0

## 2023-07-15 ASSESSMENT — ACTIVITIES OF DAILY LIVING (ADL)
ADLS_ACUITY_SCORE: 35
ADLS_ACUITY_SCORE: 35

## 2023-07-15 NOTE — LETTER
[address]  [address] [facility] [address]  Phone: [phone]  Fax: [phone]    July 16, 2023        Kelly Harris  [address] [facility] [place] [address]          To whom it may concern:    RE: Kelly Harris    Patient was seen and treated at our hospital from July 15th until July 16th, 2023. Please excuse her from work at least until she is able to follow up in clinic on Thursday July 20th and have her condition re-evaluated.     Please contact me for questions or concerns.      Sincerely,        Eun Perez, DO

## 2023-07-16 VITALS
OXYGEN SATURATION: 93 % | WEIGHT: 150 LBS | RESPIRATION RATE: 18 BRPM | HEIGHT: 64 IN | DIASTOLIC BLOOD PRESSURE: 58 MMHG | TEMPERATURE: 97.9 F | SYSTOLIC BLOOD PRESSURE: 119 MMHG | BODY MASS INDEX: 25.61 KG/M2 | HEART RATE: 63 BPM

## 2023-07-16 DIAGNOSIS — J45.40 MODERATE PERSISTENT ASTHMA WITHOUT COMPLICATION: ICD-10-CM

## 2023-07-16 LAB
ALBUMIN SERPL-MCNC: 3.5 G/DL (ref 3.5–5)
ALBUMIN SERPL-MCNC: 3.6 G/DL (ref 3.5–5)
ALP SERPL-CCNC: 78 U/L (ref 45–120)
ALP SERPL-CCNC: 85 U/L (ref 45–120)
ALT SERPL W P-5'-P-CCNC: 12 U/L (ref 0–45)
ALT SERPL W P-5'-P-CCNC: 14 U/L (ref 0–45)
ANION GAP SERPL CALCULATED.3IONS-SCNC: 6 MMOL/L (ref 5–18)
ANION GAP SERPL CALCULATED.3IONS-SCNC: 7 MMOL/L (ref 5–18)
AST SERPL W P-5'-P-CCNC: 12 U/L (ref 0–40)
AST SERPL W P-5'-P-CCNC: 14 U/L (ref 0–40)
ATRIAL RATE - MUSE: 79 BPM
BASE EXCESS BLDV CALC-SCNC: 3.6 MMOL/L
BASOPHILS # BLD MANUAL: 0 10E3/UL (ref 0–0.2)
BASOPHILS # BLD MANUAL: 0.2 10E3/UL (ref 0–0.2)
BASOPHILS NFR BLD MANUAL: 0 %
BASOPHILS NFR BLD MANUAL: 1 %
BILIRUB SERPL-MCNC: 0.8 MG/DL (ref 0–1)
BILIRUB SERPL-MCNC: 0.9 MG/DL (ref 0–1)
BUN SERPL-MCNC: 7 MG/DL (ref 8–22)
BUN SERPL-MCNC: 7 MG/DL (ref 8–22)
C AG RBC QL: NEGATIVE
CALCIUM SERPL-MCNC: 8.2 MG/DL (ref 8.5–10.5)
CALCIUM SERPL-MCNC: 8.6 MG/DL (ref 8.5–10.5)
CHLORIDE BLD-SCNC: 104 MMOL/L (ref 98–107)
CHLORIDE BLD-SCNC: 104 MMOL/L (ref 98–107)
CO2 SERPL-SCNC: 27 MMOL/L (ref 22–31)
CO2 SERPL-SCNC: 27 MMOL/L (ref 22–31)
CREAT SERPL-MCNC: 0.84 MG/DL (ref 0.6–1.1)
CREAT SERPL-MCNC: 0.86 MG/DL (ref 0.6–1.1)
D DIMER PPP FEU-MCNC: 1.9 UG/ML FEU (ref 0–0.5)
DIASTOLIC BLOOD PRESSURE - MUSE: 62 MMHG
E AG RBC QL: NEGATIVE
EOSINOPHIL # BLD MANUAL: 0 10E3/UL (ref 0–0.7)
EOSINOPHIL # BLD MANUAL: 0.2 10E3/UL (ref 0–0.7)
EOSINOPHIL NFR BLD MANUAL: 0 %
EOSINOPHIL NFR BLD MANUAL: 1 %
ERYTHROCYTE [DISTWIDTH] IN BLOOD BY AUTOMATED COUNT: 14.3 % (ref 10–15)
ERYTHROCYTE [DISTWIDTH] IN BLOOD BY AUTOMATED COUNT: 14.5 % (ref 10–15)
GFR SERPL CREATININE-BSD FRML MDRD: 80 ML/MIN/1.73M2
GFR SERPL CREATININE-BSD FRML MDRD: 83 ML/MIN/1.73M2
GLUCOSE BLD-MCNC: 113 MG/DL (ref 70–125)
GLUCOSE BLD-MCNC: 119 MG/DL (ref 70–125)
HCO3 BLDV-SCNC: 29 MMOL/L (ref 24–30)
HCT VFR BLD AUTO: 31.3 % (ref 35–47)
HCT VFR BLD AUTO: 32.2 % (ref 35–47)
HGB BLD-MCNC: 11 G/DL (ref 11.7–15.7)
HGB BLD-MCNC: 11.5 G/DL (ref 11.7–15.7)
INTERPRETATION ECG - MUSE: NORMAL
K AG RBC QL: NEGATIVE
LITTLE C AG RBC QL: POSITIVE
LYMPHOCYTES # BLD MANUAL: 5.8 10E3/UL (ref 0.8–5.3)
LYMPHOCYTES # BLD MANUAL: 6.6 10E3/UL (ref 0.8–5.3)
LYMPHOCYTES NFR BLD MANUAL: 36 %
LYMPHOCYTES NFR BLD MANUAL: 40 %
MAGNESIUM SERPL-MCNC: 2.1 MG/DL (ref 1.8–2.6)
MCH RBC QN AUTO: 26.3 PG (ref 26.5–33)
MCH RBC QN AUTO: 26.7 PG (ref 26.5–33)
MCHC RBC AUTO-ENTMCNC: 35.1 G/DL (ref 31.5–36.5)
MCHC RBC AUTO-ENTMCNC: 35.7 G/DL (ref 31.5–36.5)
MCV RBC AUTO: 75 FL (ref 78–100)
MCV RBC AUTO: 75 FL (ref 78–100)
MONOCYTES # BLD MANUAL: 1.3 10E3/UL (ref 0–1.3)
MONOCYTES # BLD MANUAL: 1.5 10E3/UL (ref 0–1.3)
MONOCYTES NFR BLD MANUAL: 8 %
MONOCYTES NFR BLD MANUAL: 9 %
NEUTROPHILS # BLD MANUAL: 8.3 10E3/UL (ref 1.6–8.3)
NEUTROPHILS # BLD MANUAL: 8.9 10E3/UL (ref 1.6–8.3)
NEUTROPHILS NFR BLD MANUAL: 50 %
NEUTROPHILS NFR BLD MANUAL: 55 %
NRBC # BLD AUTO: 1 10E3/UL
NRBC # BLD AUTO: 1.3 10E3/UL
NRBC BLD MANUAL-RTO: 6 %
NRBC BLD MANUAL-RTO: 8 %
OXYHGB MFR BLDV: 14.4 % (ref 70–75)
P AXIS - MUSE: 78 DEGREES
PCO2 BLDV: 55 MM HG (ref 35–50)
PH BLDV: 7.34 [PH] (ref 7.35–7.45)
PLAT MORPH BLD: ABNORMAL
PLAT MORPH BLD: ABNORMAL
PLATELET # BLD AUTO: 263 10E3/UL (ref 150–450)
PLATELET # BLD AUTO: 291 10E3/UL (ref 150–450)
PO2 BLDV: 15 MM HG (ref 25–47)
POLYCHROMASIA BLD QL SMEAR: SLIGHT
POLYCHROMASIA BLD QL SMEAR: SLIGHT
POTASSIUM BLD-SCNC: 4 MMOL/L (ref 3.5–5)
POTASSIUM BLD-SCNC: 4.1 MMOL/L (ref 3.5–5)
PR INTERVAL - MUSE: 122 MS
PROT SERPL-MCNC: 6.3 G/DL (ref 6–8)
PROT SERPL-MCNC: 6.6 G/DL (ref 6–8)
QRS DURATION - MUSE: 74 MS
QT - MUSE: 336 MS
QTC - MUSE: 385 MS
R AXIS - MUSE: 85 DEGREES
RBC # BLD AUTO: 4.18 10E6/UL (ref 3.8–5.2)
RBC # BLD AUTO: 4.31 10E6/UL (ref 3.8–5.2)
RBC MORPH BLD: ABNORMAL
RBC MORPH BLD: ABNORMAL
RETICS # AUTO: 0.13 10E6/UL (ref 0.01–0.11)
RETICS/RBC NFR AUTO: 2.9 % (ref 0.8–2.7)
SAO2 % BLDV: 14.7 % (ref 70–75)
SICKLE CELLS BLD QL SMEAR: SLIGHT
SICKLE CELLS BLD QL SMEAR: SLIGHT
SODIUM SERPL-SCNC: 137 MMOL/L (ref 136–145)
SODIUM SERPL-SCNC: 138 MMOL/L (ref 136–145)
SPECIMEN EXPIRATION DATE: NORMAL
SYSTOLIC BLOOD PRESSURE - MUSE: 111 MMHG
T AXIS - MUSE: -10 DEGREES
TARGETS BLD QL SMEAR: SLIGHT
TARGETS BLD QL SMEAR: SLIGHT
TROPONIN I SERPL-MCNC: <0.01 NG/ML (ref 0–0.29)
VARIANT LYMPHS BLD QL SMEAR: PRESENT
VARIANT LYMPHS BLD QL SMEAR: PRESENT
VENTRICULAR RATE- MUSE: 79 BPM
WBC # BLD AUTO: 16.2 10E3/UL (ref 4–11)
WBC # BLD AUTO: 16.6 10E3/UL (ref 4–11)

## 2023-07-16 PROCEDURE — 85027 COMPLETE CBC AUTOMATED: CPT | Performed by: INTERNAL MEDICINE

## 2023-07-16 PROCEDURE — 258N000003 HC RX IP 258 OP 636: Performed by: INTERNAL MEDICINE

## 2023-07-16 PROCEDURE — G0378 HOSPITAL OBSERVATION PER HR: HCPCS

## 2023-07-16 PROCEDURE — 84484 ASSAY OF TROPONIN QUANT: CPT | Performed by: INTERNAL MEDICINE

## 2023-07-16 PROCEDURE — 85379 FIBRIN DEGRADATION QUANT: CPT | Performed by: INTERNAL MEDICINE

## 2023-07-16 PROCEDURE — 96376 TX/PRO/DX INJ SAME DRUG ADON: CPT

## 2023-07-16 PROCEDURE — 82805 BLOOD GASES W/O2 SATURATION: CPT | Performed by: INTERNAL MEDICINE

## 2023-07-16 PROCEDURE — 96361 HYDRATE IV INFUSION ADD-ON: CPT

## 2023-07-16 PROCEDURE — 36415 COLL VENOUS BLD VENIPUNCTURE: CPT | Performed by: INTERNAL MEDICINE

## 2023-07-16 PROCEDURE — 80053 COMPREHEN METABOLIC PANEL: CPT | Performed by: INTERNAL MEDICINE

## 2023-07-16 PROCEDURE — 85007 BL SMEAR W/DIFF WBC COUNT: CPT | Performed by: INTERNAL MEDICINE

## 2023-07-16 PROCEDURE — 250N000011 HC RX IP 250 OP 636: Performed by: EMERGENCY MEDICINE

## 2023-07-16 PROCEDURE — 93005 ELECTROCARDIOGRAM TRACING: CPT | Performed by: INTERNAL MEDICINE

## 2023-07-16 PROCEDURE — 250N000013 HC RX MED GY IP 250 OP 250 PS 637: Performed by: INTERNAL MEDICINE

## 2023-07-16 PROCEDURE — 99235 HOSP IP/OBS SAME DATE MOD 70: CPT | Mod: GC

## 2023-07-16 PROCEDURE — 258N000003 HC RX IP 258 OP 636: Performed by: EMERGENCY MEDICINE

## 2023-07-16 PROCEDURE — 250N000013 HC RX MED GY IP 250 OP 250 PS 637

## 2023-07-16 RX ORDER — NALOXONE HYDROCHLORIDE 0.4 MG/ML
0.2 INJECTION, SOLUTION INTRAMUSCULAR; INTRAVENOUS; SUBCUTANEOUS
Status: DISCONTINUED | OUTPATIENT
Start: 2023-07-16 | End: 2023-07-16 | Stop reason: HOSPADM

## 2023-07-16 RX ORDER — NALOXONE HYDROCHLORIDE 0.4 MG/ML
0.4 INJECTION, SOLUTION INTRAMUSCULAR; INTRAVENOUS; SUBCUTANEOUS
Status: DISCONTINUED | OUTPATIENT
Start: 2023-07-16 | End: 2023-07-16 | Stop reason: HOSPADM

## 2023-07-16 RX ORDER — IBUPROFEN 600 MG/1
600 TABLET, FILM COATED ORAL EVERY 6 HOURS
Status: DISCONTINUED | OUTPATIENT
Start: 2023-07-16 | End: 2023-07-16 | Stop reason: HOSPADM

## 2023-07-16 RX ORDER — ONDANSETRON 4 MG/1
4 TABLET, ORALLY DISINTEGRATING ORAL EVERY 6 HOURS PRN
Status: DISCONTINUED | OUTPATIENT
Start: 2023-07-16 | End: 2023-07-16 | Stop reason: HOSPADM

## 2023-07-16 RX ORDER — OXYCODONE HYDROCHLORIDE 5 MG/1
5 TABLET ORAL EVERY 4 HOURS PRN
Status: DISCONTINUED | OUTPATIENT
Start: 2023-07-16 | End: 2023-07-16 | Stop reason: HOSPADM

## 2023-07-16 RX ORDER — OXYCODONE HYDROCHLORIDE 5 MG/1
5 TABLET ORAL EVERY 4 HOURS PRN
Qty: 10 TABLET | Refills: 0 | Status: SHIPPED | OUTPATIENT
Start: 2023-07-16 | End: 2023-07-16

## 2023-07-16 RX ORDER — BUDESONIDE AND FORMOTEROL FUMARATE DIHYDRATE 160; 4.5 UG/1; UG/1
2 AEROSOL RESPIRATORY (INHALATION)
Status: DISCONTINUED | OUTPATIENT
Start: 2023-07-16 | End: 2023-07-16 | Stop reason: HOSPADM

## 2023-07-16 RX ORDER — ONDANSETRON 2 MG/ML
4 INJECTION INTRAMUSCULAR; INTRAVENOUS EVERY 6 HOURS PRN
Status: DISCONTINUED | OUTPATIENT
Start: 2023-07-16 | End: 2023-07-16 | Stop reason: HOSPADM

## 2023-07-16 RX ORDER — MONTELUKAST SODIUM 10 MG/1
10 TABLET ORAL AT BEDTIME
Status: DISCONTINUED | OUTPATIENT
Start: 2023-07-16 | End: 2023-07-16 | Stop reason: HOSPADM

## 2023-07-16 RX ORDER — ACETAMINOPHEN 325 MG/1
650 TABLET ORAL EVERY 6 HOURS
Status: DISCONTINUED | OUTPATIENT
Start: 2023-07-16 | End: 2023-07-16 | Stop reason: HOSPADM

## 2023-07-16 RX ORDER — CETIRIZINE HYDROCHLORIDE 10 MG/1
10 TABLET ORAL DAILY
Status: DISCONTINUED | OUTPATIENT
Start: 2023-07-16 | End: 2023-07-16 | Stop reason: HOSPADM

## 2023-07-16 RX ORDER — SODIUM CHLORIDE 9 MG/ML
INJECTION, SOLUTION INTRAVENOUS CONTINUOUS
Status: DISCONTINUED | OUTPATIENT
Start: 2023-07-16 | End: 2023-07-16 | Stop reason: HOSPADM

## 2023-07-16 RX ORDER — OXYCODONE HYDROCHLORIDE 5 MG/1
5 TABLET ORAL EVERY 4 HOURS PRN
Qty: 10 TABLET | Refills: 0 | Status: ON HOLD | OUTPATIENT
Start: 2023-07-16 | End: 2023-08-02

## 2023-07-16 RX ORDER — ACETAMINOPHEN 650 MG/1
650 SUPPOSITORY RECTAL EVERY 6 HOURS PRN
Status: DISCONTINUED | OUTPATIENT
Start: 2023-07-16 | End: 2023-07-16

## 2023-07-16 RX ORDER — ACETAMINOPHEN 325 MG/1
650 TABLET ORAL EVERY 4 HOURS PRN
Status: DISCONTINUED | OUTPATIENT
Start: 2023-07-16 | End: 2023-07-16

## 2023-07-16 RX ORDER — FLUTICASONE PROPIONATE 50 MCG
1 SPRAY, SUSPENSION (ML) NASAL DAILY
Status: DISCONTINUED | OUTPATIENT
Start: 2023-07-16 | End: 2023-07-16 | Stop reason: HOSPADM

## 2023-07-16 RX ORDER — HYDROMORPHONE HYDROCHLORIDE 1 MG/ML
0.5 INJECTION, SOLUTION INTRAMUSCULAR; INTRAVENOUS; SUBCUTANEOUS
Status: DISCONTINUED | OUTPATIENT
Start: 2023-07-16 | End: 2023-07-16 | Stop reason: HOSPADM

## 2023-07-16 RX ADMIN — MONTELUKAST SODIUM 10 MG: 10 TABLET, FILM COATED ORAL at 02:19

## 2023-07-16 RX ADMIN — UMECLIDINIUM 1 PUFF: 62.5 AEROSOL, POWDER ORAL at 09:18

## 2023-07-16 RX ADMIN — FLUTICASONE PROPIONATE 1 SPRAY: 50 SPRAY, METERED NASAL at 09:18

## 2023-07-16 RX ADMIN — OXYCODONE HYDROCHLORIDE 5 MG: 5 TABLET ORAL at 11:41

## 2023-07-16 RX ADMIN — SODIUM CHLORIDE 1000 ML: 9 INJECTION, SOLUTION INTRAVENOUS at 00:59

## 2023-07-16 RX ADMIN — SODIUM CHLORIDE: 9 INJECTION, SOLUTION INTRAVENOUS at 02:19

## 2023-07-16 RX ADMIN — CETIRIZINE HYDROCHLORIDE 10 MG: 10 TABLET, FILM COATED ORAL at 09:18

## 2023-07-16 RX ADMIN — OXYCODONE HYDROCHLORIDE 5 MG: 5 TABLET ORAL at 03:00

## 2023-07-16 RX ADMIN — IBUPROFEN 600 MG: 600 TABLET ORAL at 03:42

## 2023-07-16 RX ADMIN — SODIUM CHLORIDE: 9 INJECTION, SOLUTION INTRAVENOUS at 11:39

## 2023-07-16 RX ADMIN — HYDROMORPHONE HYDROCHLORIDE 0.5 MG: 1 INJECTION, SOLUTION INTRAMUSCULAR; INTRAVENOUS; SUBCUTANEOUS at 01:01

## 2023-07-16 RX ADMIN — ACETAMINOPHEN 650 MG: 325 TABLET ORAL at 09:18

## 2023-07-16 RX ADMIN — IBUPROFEN 600 MG: 600 TABLET ORAL at 09:18

## 2023-07-16 RX ADMIN — BUDESONIDE AND FORMOTEROL FUMARATE DIHYDRATE 2 PUFF: 160; 4.5 AEROSOL RESPIRATORY (INHALATION) at 09:18

## 2023-07-16 RX ADMIN — ACETAMINOPHEN 650 MG: 325 TABLET ORAL at 03:42

## 2023-07-16 ASSESSMENT — ACTIVITIES OF DAILY LIVING (ADL)
ADLS_ACUITY_SCORE: 37
ADLS_ACUITY_SCORE: 35
ADLS_ACUITY_SCORE: 35
ADLS_ACUITY_SCORE: 37

## 2023-07-16 NOTE — ED PROVIDER NOTES
EMERGENCY DEPARTMENT ENCOUNTER      NAME: Kelly Harris  AGE: 52 year old female  YOB: 1970  MRN: [id number]  EVALUATION DATE & TIME: 7/15/2023  7:36 PM    PCP: Arcenio Alcantara    ED PROVIDER: Meliza Licea PA-C      Chief Complaint   Patient presents with     Sickle Cell Pain Crisis         FINAL IMPRESSION:  1. Sickle cell pain crisis (H)          ED COURSE & MEDICAL DECISION MAKING:    Pertinent Labs & Imaging studies reviewed. (See chart for details)    52 year old female presents to the Emergency Department for evaluation of pain.    Physical exam is remarkable for an uncomfortable appearing female.  Heart and lung sounds are clear diffusely throughout.  Abdomen is soft and nontender.  Vital signs initially remarkable for tachycardia which I suspect was secondary to the level of pain, remainder are stable and she is afebrile.    CBC is remarkable for mild anemia with hemoglobin of 11.5, no leukocytosis;  Of note, lab value initially reported to me was a hemoglobin of 4.8 so we did perform a type and screen however this ultimately was unnecessary.  CMP unremarkable with no significant electrolyte derangements, normal liver and kidney function.  Magnesium within normal limits.  Reticulocyte count is elevated.     The patient was given IV fluids, Zofran, and Dilaudid for treatment of her symptoms with improvement.  I do not think any further emergent labs or imaging are indicated at this time.  She denies any chest pain or shortness of breath and has a relatively benign exam other than appearing very uncomfortable.  Given elevated reticulocyte count with infrequent sickle cell crises requiring IV narcotic pain medications, plan to admit the patient primarily for pain control.  She is agreeable with this treatment plan and verbalized her understanding.  Patient's care was discussed with staff physician Dr. Torres who is in agreement with the treatment plan.    Medical Decision  Making    History:    Supplemental history from: Documented in chart, if applicable    External Record(s) reviewed: Documented in chart, if applicable.    Work Up:    Chart documentation includes differential considered and any EKGs or imaging independently interpreted by provider, where specified.    In additional to work up documented, I considered the following work up: Documented in chart, if applicable.    External consultation:    Discussion of management with another provider: Documented in chart, if applicable    Complicating factors:    Care impacted by chronic illness: Other: Sickle Cell    Care affected by social determinants of health: N/A    Disposition considerations: Admit.    ED Course   7:46 PM Performed my initial history and physical exam. Discussed workup in the emergency department, management of symptoms, and likely disposition.   8:11 PM I staffed the patient with Dr. Torres.   10:36 PM I obtained informed consent from patient for blood transfusion.   12:17 AM Updated patient with new CBC results.   12:41 AM Discussed with Dr. Vazquez who agrees to admit the patient.    At the conclusion of the encounter I discussed the results of all of the tests and the disposition. The questions were answered. The patient or family acknowledged understanding and was agreeable with the care plan.     Voice recognition software was used in the creation of this note. Any grammatical or nonsensical errors are due to inherent errors with the software and are not the intention of the writer.     MEDICATIONS GIVEN IN THE EMERGENCY:  Medications   HYDROmorphone (PF) (DILAUDID) injection 0.5 mg (has no administration in time range)   0.9% sodium chloride BOLUS (has no administration in time range)   0.9% sodium chloride BOLUS (0 mLs Intravenous Stopped 7/15/23 2238)   HYDROmorphone (PF) (DILAUDID) injection 0.5 mg (0.5 mg Intravenous $Given 7/15/23 2026)   ondansetron (ZOFRAN) injection 4 mg (4 mg Intravenous $Given  "7/15/23 2026)   HYDROmorphone (PF) (DILAUDID) injection 0.5 mg (0.5 mg Intravenous $Given 7/15/23 2210)       NEW PRESCRIPTIONS STARTED AT TODAY'S ER VISIT  New Prescriptions    No medications on file            =================================================================    HPI    Patient information was obtained from: Patient    Use of : N/A         Kelly Harris is a 52 year old female with a history of sickle cell who presents for sickle cell crisis.     Patient reports that she is concerned she is having a sickle cell crisis. She is having \"piercing pain\" all over since yesterday, but in the ED the pain is mostly on her right side. Patient has had nausea with her pain. Denies any shortness of breath but has trouble breathing due to her pain. She took an aspirin yesterday but nothing for pain or nausea today. Patient reports her last sickle cell crisis was many years ago.     Patient denies any chest pain, vomiting, diarrhea, fever, or any other complaints at this time.     REVIEW OF SYSTEMS   Review of Systems   Constitutional:  Negative for fever.   Respiratory:  Negative for shortness of breath.    Cardiovascular:  Negative for chest pain.   Gastrointestinal:  Positive for nausea. Negative for diarrhea and vomiting.   Hematological:         Right sided pain - sickle cell crisis   All other systems reviewed and are negative.      All other systems reviewed and are negative unless noted in HPI.      PAST MEDICAL HISTORY:  Past Medical History:   Diagnosis Date     Sickle cell pain crisis (H) 7/16/2023       PAST SURGICAL HISTORY:  Past Surgical History:   Procedure Laterality Date     MOUTH SURGERY  09/07/2019    Removed benign mouth tumor       CURRENT MEDICATIONS:    budesonide-formoterol (SYMBICORT) 160-4.5 MCG/ACT Inhaler  cetirizine (ZYRTEC) 10 MG tablet  fluticasone (FLONASE) 50 MCG/ACT nasal spray  montelukast (SINGULAIR) 10 MG tablet  SPIRIVA RESPIMAT 2.5 MCG/ACT " "inhaler        ALLERGIES:  Allergies   Allergen Reactions     Iodine Rash       FAMILY HISTORY:  Family History   Problem Relation Age of Onset     Diabetes Father      Coronary Artery Disease Father      Hypertension Father      Asthma Daughter      Sickle Cell Trait Daughter         S/C     Asthma Daughter      Sickle Cell Trait Daughter         S/C     Heart Disease Daughter         Heart valve regurgitation, abnormal vein with shunt     Hypertension Mother      Lung Cancer Paternal Cousin      Cancer No family hx of        SOCIAL HISTORY:   Social History     Socioeconomic History     Marital status: Single   Tobacco Use     Smoking status: Never     Passive exposure: Never     Smokeless tobacco: Never   Vaping Use     Vaping Use: Never used   Substance and Sexual Activity     Alcohol use: No     Drug use: No     Sexual activity: Yes     Partners: Male       VITALS:  Patient Vitals for the past 24 hrs:   BP Temp Pulse Resp SpO2 Height Weight   07/15/23 2350 -- -- 73 29 100 % -- --   07/15/23 2335 -- -- 67 18 98 % -- --   07/15/23 2250 -- -- 75 23 99 % -- --   07/15/23 2235 -- -- 70 18 99 % -- --   07/15/23 2220 -- -- 69 17 100 % -- --   07/15/23 2202 (!) 149/76 -- 80 28 100 % -- --   07/15/23 2147 139/77 -- 87 30 99 % -- --   07/15/23 2132 127/72 -- 69 30 94 % -- --   07/15/23 2117 (!) 141/70 -- 73 21 94 % -- --   07/15/23 2102 116/60 -- 72 17 92 % -- --   07/15/23 2047 116/63 -- 73 25 95 % -- --   07/15/23 2030 112/64 -- 88 24 94 % -- --   07/15/23 1933 (!) 164/86 -- 118 22 97 % 1.626 m (5' 4\") 68 kg (150 lb)   07/15/23 1932 -- 98.8  F (37.1  C) -- -- -- -- --       PHYSICAL EXAM    VITAL SIGNS: BP (!) 149/76   Pulse 73   Temp 98.8  F (37.1  C)   Resp 29   Ht 1.626 m (5' 4\")   Wt 68 kg (150 lb)   LMP 04/12/2017   SpO2 100%   BMI 25.75 kg/m    General Appearance: Uncomfortable appearing; Alert, cooperative, normal speech and facial symmetry, appears stated age  Head:  Normocephalic, without obvious " abnormality, atraumatic  Cardio:  Regular rate and rhythm, S1 and S2 normal, no murmur, rub    or gallop, 2+ pulses symmetric in all extremities  Pulm:  Clear to auscultation bilaterally, respirations unlabored with no accessory muscle use  Abdomen:  Abdomen is soft, non-distended with no tenderness to palpation, rebound tenderness, or guarding.   Extremities: Moves all extremities  Neuro: Patient is awake, alert, and responsive to voice. No gross motor weaknesses or sensory loss; moves all extremities.    LAB:  All pertinent labs reviewed and interpreted.  Labs Ordered and Resulted from Time of ED Arrival to Time of ED Departure   COMPREHENSIVE METABOLIC PANEL - Abnormal       Result Value    Sodium 138      Potassium 4.0      Chloride 104      Carbon Dioxide (CO2) 27      Anion Gap 7      Urea Nitrogen 7 (*)     Creatinine 0.86      Calcium 8.6      Glucose 113      Alkaline Phosphatase 85      AST 12      ALT 14      Protein Total 6.6      Albumin 3.6      Bilirubin Total 0.8      GFR Estimate 80     RETICULOCYTE COUNT - Abnormal    % Reticulocyte 2.9 (*)     Absolute Reticulocyte 0.126 (*)    CBC WITH PLATELETS AND DIFFERENTIAL - Abnormal    WBC Count        RBC Count 4.31      Hemoglobin 11.5 (*)     Hematocrit 32.2 (*)     MCV 75 (*)     MCH 26.7      MCHC 35.7      RDW 14.3      Platelet Count 291     MAGNESIUM - Normal    Magnesium 2.1     TYPE AND SCREEN, ADULT    ABO/RH(D) O POS      Antibody Screen Negative      SPECIMEN EXPIRATION DATE [id number]     ABO/RH TYPE AND SCREEN       RADIOLOGY:  Reviewed all pertinent imaging. Please see official radiology report.  No orders to display     Jose Manuel JONES, am serving as a scribe to document services personally performed by Meliza Licea PA-C based on my observation and the provider's statements to me. IMeliza PA-C attest that Jose Manuel Lr is acting in a scribe capacity, has observed my performance of the services and has documented them in  accordance with my direction.     Meliza Licea PA-C  Emergency Medicine  [facility] EMERGENCY ROOM  [address] [facility] [address]  [phone]  Dept: [phone]       Meliza Licea PA-C  07/16/23 0059

## 2023-07-16 NOTE — PROGRESS NOTES
"PRIMARY DIAGNOSIS: \"GENERIC\" NURSING  OUTPATIENT/OBSERVATION GOALS TO BE MET BEFORE DISCHARGE:  1. ADLs back to baseline: No    2. Activity and level of assistance: Up with standby assistance.    3. Pain status: Improved-controlled with oral pain medications.    4. Return to near baseline physical activity: No     Discharge Planner Nurse   Safe discharge environment identified: Yes  Barriers to discharge: No       Entered by: Glenis Gonzáles RN 07/16/2023 12:19 PM     Please review provider order for any additional goals.   Nurse to notify provider when observation goals have been met and patient is ready for discharge.  "

## 2023-07-16 NOTE — ED PROVIDER NOTES
"Emergency Department Staff Physician Note     I had a face to face encounter with this patient seen by the Advanced Practice Provider (SAÚL).  I have seen, examined, and discussed the patient with the SAÚL and agree with their assessment and plan of management.    Relevant HPI:     Kelly Harris is a 52 year old female who presents to the Emergency Department for evaluation of sickle cell pain crisis    Patient reports that she is having a sickle cell crisis and is having piercing pain all over since yesterday, but in the ED is mostly on her right side. Patient has had nausea with her pain. Denies any shortness of breath but has trouble breathing due to her pain. She took an aspirin yesterday but nothing for pain or nausea today. Patient reports her last crisis was years ago. Patient denies any chest pain, vomiting, diarrhea, fever, or any other complaints at this time.     I, Tim Coronado, am serving as a scribe to document services personally performed by Ky Torres DO, based on my observations and the provider's statements to me.   I, Ky Torres DO, attest that Tim Coronado was acting in a scribe capacity, has observed my performance of the services and has documented them in accordance with my direction.    ED Course:  8:03 PM I received the patient report from Meliza Licea PA-C. I agree with their assessment and plan of management, and I will see the patient.  8:06 PM I met with the patient to introduce myself, gather additional history, perform my initial exam, and discuss the plan.     Brief Physical Exam:  VITAL SIGNS: /58 (BP Location: Right arm)   Pulse 63   Temp 97.9  F (36.6  C) (Oral)   Resp 18   Ht 1.626 m (5' 4\")   Wt 68 kg (150 lb)   LMP 04/12/2017   SpO2 93%   BMI 25.75 kg/m      General Appearance: Uncomfortable-appearing, well-nourished, no acute distress   Head:  Normocephalic, without obvious abnormality, atraumatic  Eyes:  PERRL, conjunctiva/corneas clear, EOM's " intact,  ENT:  Lips, mucosa, and tongue normal, membranes are moist without pallor  Neck:  Normal ROM, symmetrical, trachea midline    Cardio:  Regular rate and rhythm, no murmur, rub or gallop, 2+ pulses symmetric in all extremities  Pulm:  Clear to auscultation bilaterally, respirations unlabored,   Abdomen:  Soft, non-tender, no rebound or guarding.  Musculoskeletal: Full ROM, no edema, no cyanosis, good ROM of major joints  Integument:  Warm, Dry, No erythema, No rash.    Neurologic:  Alert & oriented.  No focal deficits appreciated.  Ambulatory.  Psychiatric:  Affect normal, Judgment normal, Mood normal.         Impression / ED Plan:  I had a face to face encounter with this patient seen by the Advanced Practice Provider (SAÚL).  I have seen, examined, and discussed the patient with the SAÚL and agree with their assessment and plan of management. I personally saw the patient and performed a substantive portion of the visit including all aspects of the medical decision making.    Kelly Harris is a 52 year old female presents to the ED for evaluation of pain consistent with previous sickle cell episodes.  Describes it in her chest and rating down into her legs.  No evidence of acute chest tonight.  Initially we did receive a call that informed us that the patient was severely anemic, however repeat testing proved this to be an error.  At this time there is no evidence of infection.  Plan is for admission for pain control and further management.    1. Sickle cell pain crisis (H)        Ky Torres DO  Staff Physician  [facility] Emergency Department        Ky Torres DO  07/16/23 [id number]

## 2023-07-16 NOTE — PROGRESS NOTES
"PRIMARY DIAGNOSIS: \"GENERIC\" NURSING  OUTPATIENT/OBSERVATION GOALS TO BE MET BEFORE DISCHARGE:  1. ADLs back to baseline: No    2. Activity and level of assistance: Up with standby assistance.    3. Pain status: Improved-controlled with oral pain medications.    4. Return to near baseline physical activity: Yes     Discharge Planner Nurse   Safe discharge environment identified: Yes  Barriers to discharge: No       Entered by: Glenis Gonzáles RN 07/16/2023 12:18 PM     Please review provider order for any additional goals.   Nurse to notify provider when observation goals have been met and patient is ready for discharge.  "

## 2023-07-16 NOTE — DISCHARGE SUMMARY
"[facility]  Discharge Summary - Medicine & Pediatrics       Date of Admission:  7/15/2023  Date of Discharge:  7/16/2023  2:44 PM  Discharging Provider: Eun Perez DO   Discharge Service: Hospitalist Service    Discharge Diagnoses   Sickle cell disease   Sickle cell pain crisis, improving     Clinically Significant Risk Factors     # Overweight: Estimated body mass index is 25.75 kg/m  as calculated from the following:    Height as of this encounter: 1.626 m (5' 4\").    Weight as of this encounter: 68 kg (150 lb).       Follow-ups Needed After Discharge   Follow-up Appointments     Follow-up and recommended labs and tests       Follow up with primary care provider, Arcenio Alcantara, within 7 days for   hospital follow- up.  The following labs/tests are recommended: WBC.          Discharge Disposition   Discharged to home  Condition at discharge: Stable    Hospital Course   Kelly Harris was admitted on 7/15/2023 for sickle cell pain crisis.  The following problems were addressed during her hospitalization:       Sickle cell disease  Sickle cell pain crisis, improving   Leukocytosis, stable   Presented with 1 day of pain all over consistent with sickle cell pain crisis.  Reports recent intermittent full body pains with unknown triggers.  Does not believe she is dehydrated.  Patient presents afebrile, VSS.  CMP unremarkable, CBC with WBC 16.6, hemoglobin 11.5, MCV 75.  Slight sickle cells seen on peripheral blood smear.  EKG showing normal sinus rhythm and nonspecific T wave abnormality, no significant change from previous. Troponin unremarkable. Very uncomfortable on physical exam though otherwise unremarkable exam.  Acute chest syndrome unlikely.  Hemoglobin about stable from previous.  For leukocytosis no clear signs of infection, no fever, no focal findings. Pain improved with IV fluid resuscitation. Prescribed 10 tablets of PRN oxycodone 5 mg upon discharge for ongoing pain " management. Will follow up at [facility] on Thursday 7/20 with PCP.       Consultations This Hospital Stay   None    Code Status   Full Code       The patient was discussed with Dr. Hamilton.     Eun PerezDO gilles  [address] [address]  Phone: [phone]  Fax: [phone]  ______________________________________________________________________    Physical Exam   Vital Signs: Temp: 97.9  F (36.6  C) Temp src: Oral BP: 119/58 Pulse: 63   Resp: 18 SpO2: 93 % O2 Device: None (Room air)    Weight: 150 lbs 0 oz  Constitutional:       Appearance: She is not diaphoretic. Not in acute distress.   HENT:      Nose: Nose normal.      Mouth/Throat:      Mouth: Mucous membranes are moist.      Pharynx: Oropharynx is clear.   Eyes:      Extraocular Movements: Extraocular movements intact.      Conjunctiva/sclera: Conjunctivae normal.   Cardiovascular:      Rate and Rhythm: Normal rate and regular rhythm.      Pulses: Normal pulses.      Heart sounds: Normal heart sounds.   Pulmonary:      Effort: Pulmonary effort is normal. No respiratory distress.      Breath sounds: Normal breath sounds.   Abdominal:      General: Abdomen is flat.      Palpations: Abdomen is soft.      Tenderness: There is no abdominal tenderness.   Musculoskeletal:         General: Normal range of motion.      Cervical back: Normal range of motion.      Right lower leg: No edema.      Left lower leg: No edema.   Skin:     General: Skin is warm and dry.   Neurological:      General: No focal deficit present.      Mental Status: She is alert and oriented to person, place, and time.   Psychiatric:         Mood and Affect: Mood normal.         Behavior: Behavior normal.       Primary Care Physician   Arcenio Alcantara    Discharge Orders      Reason for your hospital stay    Sickle cell pain crisis     Follow-up and recommended labs and tests     Follow up with primary care provider, Arcenio Alcantara, within 7 days for  hospital follow- up.  The following labs/tests are recommended: WBC.     Activity    Your activity upon discharge: activity as tolerated     Diet    Follow this diet upon discharge: Orders Placed This Encounter      Combination Diet Regular Diet Adult; No Caffeine Diet       Significant Results and Procedures   Most Recent 3 CBC's:Recent Labs   Lab Test 07/16/23  0240 07/15/23  2350 09/19/22  2224   WBC 16.2* 16.6* 12.2*   HGB 11.0* 11.5* 11.2*   MCV 75* 75* 73*    291 394     Most Recent 3 BMP's:Recent Labs   Lab Test 07/16/23  0240 07/15/23  2350 09/19/22  2224    138 139   POTASSIUM 4.1 4.0 3.7   CHLORIDE 104 104 103   CO2 27 27 25   BUN 7* 7* 11   CR 0.84 0.86 1.18*   ANIONGAP 6 7 11   KULWANT 8.2* 8.6 9.5    113 99   ,   Results for orders placed or performed in visit on 04/20/23   MA Screening Digital Bilateral    Narrative    BILATERAL FULL FIELD DIGITAL SCREENING MAMMOGRAM    Performed on: 4/20/23    Compared to: 04/08/2022, 04/05/2021, 01/23/2020, and 12/11/2017    Technique: This study was evaluated with the assistance of Computer-Aided   Detection.    Findings: The breasts are heterogeneously dense, which may obscure small   masses.  There is no radiographic evidence of malignancy.     Impression    IMPRESSION: ACR BI-RADS Category 1: Negative    RECOMMENDED FOLLOW-UP: Annual routine screening mammogram    The results and recommendations of this examination will be communicated   to the patient.        Risa Stewart MD               Discharge Medications   Discharge Medication List as of 7/16/2023  2:22 PM      CONTINUE these medications which have CHANGED    Details   oxyCODONE (ROXICODONE) 5 MG tablet Take 1 tablet (5 mg) by mouth every 4 hours as needed for severe pain, Disp-10 tablet, R-0, Local Print         CONTINUE these medications which have NOT CHANGED    Details   budesonide-formoterol (SYMBICORT) 160-4.5 MCG/ACT Inhaler Inhale 2 puffs twice daily plus 1-2 puffs as  needed. May use up to 12 puffs per day., Disp-20.4 g, R-11, E-PrescribePlease dispense #2 10.2g inhalers for a 30-day supply per RAZA 2021 guidelines. If reject arises, enter DUR codes: HD / M0 / 1G. Note: MA  prefers brand Symbicort.      cetirizine (ZYRTEC) 10 MG tablet TAKE 1 TABLET BY MOUTH EVERY DAY, Disp-90 tablet, R-3, E-Prescribe      fluticasone (FLONASE) 50 MCG/ACT nasal spray INSTILL 1 SPRAY INTO BOTH NOSTRILS DAILY, Disp-32 mL, R-4, E-Prescribe      montelukast (SINGULAIR) 10 MG tablet Take 1 tablet (10 mg) by mouth At Bedtime, Disp-30 tablet, R-1, E-Prescribe      SPIRIVA RESPIMAT 2.5 MCG/ACT inhaler INHALE 2 PUFFS BY MOUTH INTO THE LUNGS DAILY, Disp-4 g, R-3, E-Prescribe           Allergies   Allergies   Allergen Reactions     Iodine Rash

## 2023-07-16 NOTE — H&P
[facility]    History and Physical - Hospitalist Service       Date of Admission:  7/15/2023    Assessment & Plan      Kelly Harris is a 52 year old female with a pmh of asthma and sickle cell disease who presents with sickle cell pain crisis admitted on 7/15/2023 for pain control.    Sickle cell disease  Sickle cell pain crisis  Leukocytosis  Patient presents with 1 day of pain all over consistent with sickle cell pain crisis.  Reports recent intermittent full body pains with unknown triggers.  Does not believe she is dehydrated.  Patient presents afebrile, VSS.  CMP unremarkable, CBC with WBC 16.6, hemoglobin 11.5, MCV 75.  Slight sickle cells seen on peripheral blood smear.  EKG showing normal sinus rhythm and nonspecific T wave abnormality, no significant change from previous.  Very uncomfortable on physical exam though otherwise unremarkable exam.  Acute chest syndrome unlikely.  Hemoglobin about stable from previous.  For leukocytosis no clear signs of infection, no fever, no focal findings.  -Trend troponins  -Pain control: Scheduled Tylenol, scheduled ibuprofen p.o. as needed oxycodone 5 mg, IV Dilaudid 0.5 as needed for breakthrough pain  -IVF 125ml/hr  -A.m. CMP, CBC  -Monitor closely for signs of infection such as fever    Asthma  -Continue PTA Symbicort, Singulair, Spiriva, Flonase, Zyrtec     Diet: Combination Diet Regular Diet Adult; No Caffeine Diet    DVT Prophylaxis: Low Risk/Ambulatory with no VTE prophylaxis indicated  Reyes Catheter: Not present  Fluids: IVF 125ml/hr  Lines: None     Cardiac Monitoring: None  Code Status: Full Code         Disposition Plan      Expected Discharge Date: 07/17/2023                The patient's care was discussed with the Attending Physician, Dr. Abel Hamilton.      Steffi Gallegos MD  Hospitalist Service  [facility]  Securely message with [email or web address] (more info)  Text page via [employer] Paging/Directory    ______________________________________________________________________    Chief Complaint   Sickle cell crisis    History is obtained from the patient    History of Present Illness   Kelly Harris is a 52 year old female with a pmh of asthma and sickle cell disease who presents with sickle cell pain crisis    Patient reports intermittent pains all over, now with 1 day of more severe pain not responding to Tylenol.  Reports she does not think that she is dehydrated, is not sure what is aggravating her pains, she drinks lots of water.  She reports pain is all over especially in her chest, shoulders, bilateral abdomen, knees.  No shortness of breath, no fevers, no nausea or vomiting, no diarrhea.  Reports she is not sure what pain medicines worked better for her.    ED course   Patient presents afebrile, VSS, satting well on RA.  CMP unremarkable, glucose normal, CBC with WBC 16.6, hemoglobin 11.5, MCV 75.  Slight sickle cells seen on peripheral blood smear.  EKG showing normal sinus rhythm and nonspecific T wave abnormality, no significant change from previous.    In the ED patient received 1 L IV NS bolus, hydromorphone 0.5, ondansetron      Past Medical History    Past Medical History:   Diagnosis Date     Sickle cell pain crisis (H) 7/16/2023       Past Surgical History   Past Surgical History:   Procedure Laterality Date     MOUTH SURGERY  09/07/2019    Removed benign mouth tumor       Prior to Admission Medications   Prior to Admission Medications   Prescriptions Last Dose Informant Patient Reported? Taking?   SPIRIVA RESPIMAT 2.5 MCG/ACT inhaler 7/15/2023  No Yes   Sig: INHALE 2 PUFFS BY MOUTH INTO THE LUNGS DAILY   budesonide-formoterol (SYMBICORT) 160-4.5 MCG/ACT Inhaler 7/15/2023 at x 1  No Yes   Sig: Inhale 2 puffs twice daily plus 1-2 puffs as needed. May use up to 12 puffs per day.   cetirizine (ZYRTEC) 10 MG tablet 7/14/2023  No Yes   Sig: TAKE 1 TABLET BY MOUTH EVERY DAY   fluticasone (FLONASE) 50  MCG/ACT nasal spray 7/14/2023  No Yes   Sig: INSTILL 1 SPRAY INTO BOTH NOSTRILS DAILY   montelukast (SINGULAIR) 10 MG tablet 7/14/2023  No Yes   Sig: Take 1 tablet (10 mg) by mouth At Bedtime      Facility-Administered Medications: None           Physical Exam   Vital Signs: Temp: 98.8  F (37.1  C)   BP: 114/62 Pulse: 65   Resp: 27 SpO2: 90 %      Weight: 150 lbs 0 oz    Physical Exam  Constitutional:       Appearance: She is not diaphoretic.      Comments: Uncomfortable on exam.   HENT:      Nose: Nose normal.      Mouth/Throat:      Mouth: Mucous membranes are moist.      Pharynx: Oropharynx is clear.   Eyes:      Extraocular Movements: Extraocular movements intact.      Conjunctiva/sclera: Conjunctivae normal.   Cardiovascular:      Rate and Rhythm: Normal rate and regular rhythm.      Pulses: Normal pulses.      Heart sounds: Normal heart sounds.   Pulmonary:      Effort: Pulmonary effort is normal. No respiratory distress.      Breath sounds: Normal breath sounds.   Abdominal:      General: Abdomen is flat.      Palpations: Abdomen is soft.      Tenderness: There is no abdominal tenderness.   Musculoskeletal:         General: Normal range of motion.      Cervical back: Normal range of motion.      Right lower leg: No edema.      Left lower leg: No edema.   Skin:     General: Skin is warm and dry.   Neurological:      General: No focal deficit present.      Mental Status: She is alert and oriented to person, place, and time.   Psychiatric:         Mood and Affect: Mood normal.         Behavior: Behavior normal.           Data     I have personally reviewed the following data over the past 24 hrs:    16.6 (H)  \   11.0 (L)   / 263     138 104 7 (L) /  113   4.0 27 0.86 \       ALT: 14 AST: 12 AP: 85 TBILI: 0.8   ALB: 3.6 TOT PROTEIN: 6.6 LIPASE: N/A       Ferritin:  N/A % Retic:  2.9 (H) LDH:  N/A       Imaging results reviewed over the past 24 hrs:   No results found for this or any previous visit (from the past  24 hour(s)).

## 2023-07-16 NOTE — PHARMACY-ADMISSION MEDICATION HISTORY
Pharmacist Admission Medication History    Admission medication history is complete. The information provided in this note is only as accurate as the sources available at the time of the update.    Medication reconciliation/reorder completed by provider prior to medication history? No    Information Source(s): Patient, Caregiver and Patient's pharmacy via in-person    Pertinent Information: None    Changes made to PTA medication list:    Added: None    Deleted: None    Changed: None    Medication Affordability:       Allergies reviewed with patient and updates made in EHR: yes    Medication History Completed By: Todd Sullivan RPH 7/15/2023 8:50 PM    Prior to Admission medications    Medication Sig Last Dose Taking? Auth Provider Long Term End Date   budesonide-formoterol (SYMBICORT) 160-4.5 MCG/ACT Inhaler Inhale 2 puffs twice daily plus 1-2 puffs as needed. May use up to 12 puffs per day. 7/15/2023 at x 1 Yes Lopez Ocasio MD Yes    cetirizine (ZYRTEC) 10 MG tablet TAKE 1 TABLET BY MOUTH EVERY DAY 7/14/2023 Yes Abel Hamilton MD     fluticasone (FLONASE) 50 MCG/ACT nasal spray INSTILL 1 SPRAY INTO BOTH NOSTRILS DAILY 7/14/2023 Yes Abel Hamilton MD     montelukast (SINGULAIR) 10 MG tablet Take 1 tablet (10 mg) by mouth At Bedtime 7/14/2023 Yes Morteza Marie MD Yes    SPIRIVA RESPIMAT 2.5 MCG/ACT inhaler INHALE 2 PUFFS BY MOUTH INTO THE LUNGS DAILY 7/15/2023 Yes Maci Fritz MD Yes

## 2023-07-16 NOTE — PLAN OF CARE
Problem: Plan of Care - These are the overarching goals to be used throughout the patient stay.    Goal: Absence of Hospital-Acquired Illness or Injury  Outcome: Progressing  Intervention: Identify and Manage Fall Risk  Recent Flowsheet Documentation  Taken 7/16/2023 0900 by Glenis Gonzáles RN  Safety Promotion/Fall Prevention:    activity supervised    assistive device/personal items within reach    clutter free environment maintained    lighting adjusted    nonskid shoes/slippers when out of bed    patient and family education    room near nurse's station    room organization consistent    safety round/check completed  Goal: Optimal Comfort and Wellbeing  Outcome: Progressing  Intervention: Monitor Pain and Promote Comfort  Recent Flowsheet Documentation  Taken 7/16/2023 0918 by Glenis Gonzáles RN  Pain Management Interventions:    medication (see MAR)    food    rest     Problem: Pain Acute  Goal: Optimal Pain Control and Function  Outcome: Progressing  Intervention: Develop Pain Management Plan  Recent Flowsheet Documentation  Taken 7/16/2023 0918 by Glenis Gonzáles RN  Pain Management Interventions:    medication (see MAR)    food    rest  Intervention: Prevent or Manage Pain  Recent Flowsheet Documentation  Taken 7/16/2023 0900 by Glenis Gonzáles RN  Medication Review/Management: medications reviewed   Goal Outcome Evaluation: vitally stable on room air. C/o generalized pain and weakness. Pain being managed with oxycodone 5 mg. Right PIV infusing 125 ml/hr normal saline. Tolerating a regular diet, denies nausea and vomiting. Patient moved to .

## 2023-07-16 NOTE — ED TRIAGE NOTES
PT is coming tonight with a Sickle cell crisis that started yesterday. She took no pain medication PTA today but yesterday took tylenol that did not improve the pain. Last flair up was a few months ago and was managed with OTC medications. She does not remember having a flair up this bad in quite some time.      Triage Assessment     Row Name 07/15/23 1933       Triage Assessment (Adult)    Airway WDL WDL       Respiratory WDL    Respiratory WDL WDL       Skin Circulation/Temperature WDL    Skin Circulation/Temperature WDL WDL       Cardiac WDL    Cardiac WDL WDL       Peripheral/Neurovascular WDL    Peripheral Neurovascular WDL WDL       Cognitive/Neuro/Behavioral WDL    Cognitive/Neuro/Behavioral WDL WDL

## 2023-07-16 NOTE — PLAN OF CARE
"Pt newly admitted settled, questions/concerns answered. Pt has NS running at 125ml/hr. Pt states her pain is much better after ED nurse gave PRN oxy at 11:41. Pt has scheduled Tylenol and Ibuprofen. Pt resting comfortably, may discharge later this afternoon.   Maria Isabel Chinchilla RN on 7/16/2023 at 2:02 PM   Problem: Plan of Care - These are the overarching goals to be used throughout the patient stay.    Goal: Plan of Care Review  Description: The Plan of Care Review/Shift note should be completed every shift.  The Outcome Evaluation is a brief statement about your assessment that the patient is improving, declining, or no change.  This information will be displayed automatically on your shift note.  Outcome: Progressing  Goal: Patient-Specific Goal (Individualized)  Description: You can add care plan individualizations to a care plan. Examples of Individualization might be:  \"Parent requests to be called daily at 9am for status\", \"I have a hard time hearing out of my right ear\", or \"Do not touch me to wake me up as it startles me\".  Outcome: Progressing  Goal: Absence of Hospital-Acquired Illness or Injury  Outcome: Progressing  Goal: Optimal Comfort and Wellbeing  Outcome: Progressing  Goal: Readiness for Transition of Care  Outcome: Progressing     Problem: Pain Acute  Goal: Optimal Pain Control and Function  Outcome: Progressing   Goal Outcome Evaluation:                        "

## 2023-07-16 NOTE — PROGRESS NOTES
I did inform the patient that her care will be provided by Resident service while in the hospital. I did inform the resident on call of about the patient.

## 2023-07-16 NOTE — PROGRESS NOTES
PRIMARY DIAGNOSIS:  Sickle cell pain crisis   OUTPATIENT/OBSERVATION GOALS TO BE MET BEFORE DISCHARGE:  1. ADLs back to baseline: No    2. Activity and level of assistance: Up with standby assistance.    3. Pain status: Improved-controlled with oral pain medications.    4. Return to near baseline physical activity: Yes     Discharge Planner Nurse   Safe discharge environment identified: Yes  Barriers to discharge: No       Entered by: Marina Renteria RN 07/16/2023 6:36 AM   Patient reports persistent generalized pain, controlled with Oxycodone prn as well as scheduled Tylenol and Ibuprofen.  Patient up with SBA to bathroom.  VSS.  Please review provider order for any additional goals.   Nurse to notify provider when observation goals have been met and patient is ready for discharge.

## 2023-07-16 NOTE — PROGRESS NOTES
PRIMARY DIAGNOSIS: sickle cell pain crisis  OUTPATIENT/OBSERVATION GOALS TO BE MET BEFORE DISCHARGE:  1. ADLs back to baseline: No    2. Activity and level of assistance: Up with standby assistance.    3. Pain status: Improved-controlled with oral pain medications.    4. Return to near baseline physical activity: No     Discharge Planner Nurse   Safe discharge environment identified: Yes  Barriers to discharge: Yes       Entered by: Marina Renteria RN 07/16/2023 6:35 AM     Please review provider order for any additional goals.   Nurse to notify provider when observation goals have been met and patient is ready for discharge.

## 2023-07-17 ENCOUNTER — PATIENT OUTREACH (OUTPATIENT)
Dept: CARE COORDINATION | Facility: CLINIC | Age: 53
End: 2023-07-17
Payer: COMMERCIAL

## 2023-07-17 RX ORDER — BUDESONIDE AND FORMOTEROL FUMARATE DIHYDRATE 160; 4.5 UG/1; UG/1
AEROSOL RESPIRATORY (INHALATION)
Qty: 10.2 G | Refills: 23 | Status: ON HOLD | OUTPATIENT
Start: 2023-07-17 | End: 2023-08-02

## 2023-07-17 NOTE — PROGRESS NOTES
"Clinic Care Coordination Contact  [facility]: Post-Discharge Note  SITUATION                                                      Admission:    Admission Date: 07/16/23   Reason for Admission: Sickle cell pain crisis  Discharge:   Discharge Date: 07/16/23  Discharge Diagnosis: Sickle cell pain crisis    BACKGROUND                                                      Per hospital discharge summary and inpatient provider notes:    Kelly Harris is a 52 year old female with a pmh of asthma and sickle cell disease who presents with sickle cell pain crisis     Patient reports intermittent pains all over, now with 1 day of more severe pain not responding to Tylenol.  Reports she does not think that she is dehydrated, is not sure what is aggravating her pains, she drinks lots of water.  She reports pain is all over especially in her chest, shoulders, bilateral abdomen, knees.  No shortness of breath, no fevers, no nausea or vomiting, no diarrhea.  Reports she is not sure what pain medicines worked better for her.     ED course   Patient presents afebrile, VSS, satting well on RA.  CMP unremarkable, glucose normal, CBC with WBC 16.6, hemoglobin 11.5, MCV 75.  Slight sickle cells seen on peripheral blood smear.  EKG showing normal sinus rhythm and nonspecific T wave abnormality, no significant change from previous.     In the ED patient received 1 L IV NS bolus, hydromorphone 0.5, ondansetron    ASSESSMENT           Discharge Assessment  How are you doing now that you are home?: \"She's doing good\"  How are your symptoms? (Red Flag symptoms escalate to triage hotline per guidelines): Improved  Do you feel your condition is stable enough to be safe at home until your provider visit?: Yes  Does the patient have their discharge instructions? : Yes  Does the patient have questions regarding their discharge instructions? : No  Were you started on any new medications or were there changes to any of your previous " medications? : Yes  Does the patient have all of their medications?: Yes  Do you have questions regarding any of your medications? : No  Do you have all of your needed medical supplies or equipment (DME)?  (i.e. oxygen tank, CPAP, cane, etc.): Yes  Discharge follow-up appointment scheduled within 14 calendar days? : Yes  Discharge Follow Up Appointment Date: 07/20/23  Discharge Follow Up Appointment Scheduled with?: Primary Care Provider                  PLAN                                                      Outpatient Plan:   Follow up with primary care provider, Arcenio Alcantara, within 7 days for hospital follow- up.  The following labs/tests are recommended: WBC.         Future Appointments   Date Time Provider Department [place]   7/20/2023  9:20 AM [facility]   7/25/2023 10:00 AM [facility]FT RM 1 [facility]   7/25/2023 11:00 AM Najma Phillips MD [facility]         For any urgent concerns, please contact our 24 hour nurse triage line: [phone] ([address] [place] [address])         Fara Mai  Community Health Worker  Connected Care [facility]  [phone]

## 2023-07-20 ENCOUNTER — OFFICE VISIT (OUTPATIENT)
Dept: FAMILY MEDICINE | Facility: CLINIC | Age: 53
End: 2023-07-20
Payer: COMMERCIAL

## 2023-07-20 VITALS
SYSTOLIC BLOOD PRESSURE: 132 MMHG | DIASTOLIC BLOOD PRESSURE: 85 MMHG | WEIGHT: 150 LBS | TEMPERATURE: 98.2 F | BODY MASS INDEX: 25.75 KG/M2 | HEART RATE: 92 BPM | RESPIRATION RATE: 20 BRPM | OXYGEN SATURATION: 100 %

## 2023-07-20 DIAGNOSIS — J45.40 MODERATE PERSISTENT ASTHMA WITHOUT COMPLICATION: ICD-10-CM

## 2023-07-20 DIAGNOSIS — D57.00 SICKLE CELL PAIN CRISIS (H): ICD-10-CM

## 2023-07-20 DIAGNOSIS — Z09 HOSPITAL DISCHARGE FOLLOW-UP: Primary | ICD-10-CM

## 2023-07-20 LAB
ERYTHROCYTE [DISTWIDTH] IN BLOOD BY AUTOMATED COUNT: 14.3 % (ref 10–15)
HCT VFR BLD AUTO: 32.4 % (ref 35–47)
HGB BLD-MCNC: 11.8 G/DL (ref 11.7–15.7)
MCH RBC QN AUTO: 26.2 PG (ref 26.5–33)
MCHC RBC AUTO-ENTMCNC: 36.4 G/DL (ref 31.5–36.5)
MCV RBC AUTO: 72 FL (ref 78–100)
PLATELET # BLD AUTO: 351 10E3/UL (ref 150–450)
RBC # BLD AUTO: 4.5 10E6/UL (ref 3.8–5.2)
WBC # BLD AUTO: 12.8 10E3/UL (ref 4–11)

## 2023-07-20 PROCEDURE — 85027 COMPLETE CBC AUTOMATED: CPT

## 2023-07-20 PROCEDURE — 36415 COLL VENOUS BLD VENIPUNCTURE: CPT

## 2023-07-20 PROCEDURE — 99496 TRANSJ CARE MGMT HIGH F2F 7D: CPT | Mod: GC

## 2023-07-20 RX ORDER — PREDNISONE 20 MG/1
40 TABLET ORAL DAILY
Qty: 10 TABLET | Refills: 0 | Status: ON HOLD | OUTPATIENT
Start: 2023-07-20 | End: 2023-08-02

## 2023-07-20 RX ORDER — OXYCODONE HYDROCHLORIDE 5 MG/1
5-10 TABLET ORAL EVERY 4 HOURS PRN
Qty: 30 TABLET | Refills: 0 | Status: ON HOLD | OUTPATIENT
Start: 2023-07-20 | End: 2023-08-02

## 2023-07-20 NOTE — PROGRESS NOTES
Preceptor Attestation:    I discussed the patient with the resident and evaluated the patient in person. I have verified the content of the note, which accurately reflects my assessment of the patient and the plan of care.   Supervising Physician:  Onur Abebe MD.

## 2023-07-20 NOTE — LETTER
[facility] [address] [place] [address]  Phone: [phone]  Fax: [phone]    July 20, 2023        Kelly Harris  [address] [facility] [address] [address]          To whom it may concern:    RE: Kelly Harris    Patient was seen and treated today at our clinic and missed work. She will be able to return to work on 7/25 following her other appointments.    Please contact me for questions or concerns.      Sincerely,        Jermaine Leung, DO

## 2023-07-20 NOTE — PROGRESS NOTES
Assessment & Plan     Hospital discharge follow-up  Sickle cell pain crisis (H)  Patient admitted to [facility] for sickle cell pain crisis from 7/15-7/16 where she was given IV fluid rehydration as well as pain management with oxycodone.  She was discharged with 10 tablets of oxycodone 5 mg to take as needed.  Has not been improving since and in some cases worsening, believe this is a continuation of her pain crisis that she does not feel it is fully resolved.  We will focus on conservative measures with increasing fluid intake, increasing activity, and provided with 30 tablets of oxycodone 5 to 10 mg every 4 hours as needed.  Additionally, she has not followed with hematology in some time, would like to do so again.  She may be a candidate for hydroxyurea therapy given that her crises in general have been worsening.  No evidence of acute chest syndrome, vitally stable.  Follow-up in 1 to 2 weeks with PCP, and present to ED if she begins having worsening shortness of breath or chest pain.  Work note provided that she is finding it very difficult to work with this pain and has a follow-up appointment with pulmonology on 7/25, and she feels she can return to work after this appointment.  - oxyCODONE (ROXICODONE) 5 MG tablet; Take 1-2 tablets (5-10 mg) by mouth every 4 hours as needed for moderate to severe pain  - Adult Oncology/Hematology  Referral; Future  - CBC with platelets to evaluate WBC as recommended in discharge summary    Moderate persistent asthma without complication  Concerned that patient is having worsening asthma given that she is tachypneic and wheezy on examination.  Additionally, she has had decreased activity since being discharged from the hospital so atelectasis may be playing a component as well.  Saturating well on room air.  Provided with prednisone burst to take as directed should her asthma begin to worsen, but right now I do not believe she is in an acute asthma  "exacerbation.  - predniSONE (DELTASONE) 20 MG tablet; Take 2 tablets (40 mg) by mouth daily for 5 days      Assessment requiring an independent historian(s) - family - daughter  Ordering of each unique test  Prescription drug management  30 minutes spent by me on the date of the encounter doing chart review, history and exam, documentation and further activities per the note     MED REC REQUIRED  Post Medication Reconciliation Status:  Discharge medications reconciled, continue medications without change    Follow Up  Follow up in 1-2wks with primary care provider.    Jermaine Leung, [facility] ADAN Mendoza is a 52 year old, presenting for the following health issues:  Hospital F/U (Hospital follow up and don't feel good )        7/20/2023     9:20 AM   Additional Questions   Roomed by msmackenzie   Accompanied by daughter     HPI         7/17/2023    11:47 AM   Post Discharge Outreach   Admission Date 7/16/2023   Reason for Admission Sickle cell pain crisis   Discharge Date 7/16/2023   Discharge Diagnosis Sickle cell pain crisis   How are you doing now that you are home? \"She's doing good\"   How are your symptoms? (Red Flag symptoms escalate to triage hotline per guidelines) Improved   Do you feel your condition is stable enough to be safe at home until your provider visit? Yes   Does the patient have their discharge instructions?  Yes   Does the patient have questions regarding their discharge instructions?  No   Were you started on any new medications or were there changes to any of your previous medications?  Yes   Does the patient have all of their medications? Yes   Do you have questions regarding any of your medications?  No   Do you have all of your needed medical supplies or equipment (DME)?  (i.e. oxygen tank, CPAP, cane, etc.) Yes   Discharge follow-up appointment scheduled within 14 calendar days?  Yes   Discharge Follow Up Appointment Date 7/20/2023   Discharge Follow Up " Appointment Scheduled with? Primary Care Provider     Hospital Follow-up Visit:    Hospital/Nursing Home/IP Rehab Facility: [facility]  Date of Admission: 7/15  Date of Discharge: 7/16  Reason(s) for Admission: Sickle Cell Pain Crises    Was your hospitalization related to COVID-19? No   Problems taking medications regularly:  None  Medication changes since discharge: Given 10 tablets of oxycodone 5mg PO q6hrs which she has been taking, provides pain relief for approximately 3.5 hours and she has 3 tablets remaining.  Problems adhering to non-medication therapy:  None    Summary of hospitalization:  [facility] discharge summary reviewed  Diagnostic Tests/Treatments reviewed.  Follow up needed: Follow-up with PCP in 1 to 2 weeks  Other Healthcare Providers Involved in Patient s Care:         None  Update since discharge: fluctuating course, sometimes the pain is worse and then an hour later it can be better however there is concern for worsening sickle cell crisis as she feels particularly worse today.  Denies shortness of breath or chest pain.  She does feel better than when she was initially admitted however does not feel that her pain crisis has completely resolved.  Her activity has decreased and she is feeling generalized pain in all joints.  No peripheral edema.  Staying with her daughter who is helping take care of her.    Plan of care communicated with patient and family      Review of Systems   Constitutional, HEENT, cardiovascular, pulmonary, gi and gu systems are negative, except as otherwise noted.      Objective    /85   Pulse 92   Temp 98.2  F (36.8  C) (Tympanic)   Resp 20   Wt 68 kg (150 lb)   LMP 04/12/2017   SpO2 100%   BMI 25.75 kg/m    Body mass index is 25.75 kg/m .  Physical Exam   Constitutional: Awake, alert, cooperative, mild distress.  Eyes: Lids and lashes normal, extra ocular muscles intact, sclera clear, conjunctiva normal.  ENT:  Normocephalic, atraumatic. Moist mucous membranes.  Respiratory: Tachypneic, no accessory muscle use.  Inspiratory wheeze bilaterally.  Cardiovascular: Regular rate and rhythm, normal S1 and S2, no S3 or S4, and no murmur noted  GI: Abdomen soft, non-tender, non-distended, no masses palpated. Bowel sounds present.  Skin: No bruising or bleeding and normal skin color, texture, turgor.  Musculoskeletal: There is no redness, warmth, or swelling of the joints.  All upper extremity and lower extremity joints mildly tender to palpation.

## 2023-07-25 ENCOUNTER — ONCOLOGY VISIT (OUTPATIENT)
Dept: ONCOLOGY | Facility: HOSPITAL | Age: 53
End: 2023-07-25
Attending: INTERNAL MEDICINE
Payer: COMMERCIAL

## 2023-07-25 ENCOUNTER — ALLIED HEALTH/NURSE VISIT (OUTPATIENT)
Dept: PULMONOLOGY | Facility: CLINIC | Age: 53
End: 2023-07-25
Payer: COMMERCIAL

## 2023-07-25 ENCOUNTER — INFUSION THERAPY VISIT (OUTPATIENT)
Dept: INFUSION THERAPY | Facility: HOSPITAL | Age: 53
End: 2023-07-25
Attending: INTERNAL MEDICINE
Payer: COMMERCIAL

## 2023-07-25 ENCOUNTER — OFFICE VISIT (OUTPATIENT)
Dept: PULMONOLOGY | Facility: CLINIC | Age: 53
End: 2023-07-25
Payer: COMMERCIAL

## 2023-07-25 ENCOUNTER — HOSPITAL ENCOUNTER (INPATIENT)
Facility: HOSPITAL | Age: 53
LOS: 8 days | Discharge: HOME | DRG: 811 | End: 2023-08-02
Attending: EMERGENCY MEDICINE | Admitting: FAMILY MEDICINE
Payer: COMMERCIAL

## 2023-07-25 ENCOUNTER — APPOINTMENT (OUTPATIENT)
Dept: RADIOLOGY | Facility: HOSPITAL | Age: 53
DRG: 811 | End: 2023-07-25
Attending: EMERGENCY MEDICINE
Payer: COMMERCIAL

## 2023-07-25 VITALS
HEIGHT: 63 IN | WEIGHT: 149.4 LBS | DIASTOLIC BLOOD PRESSURE: 80 MMHG | BODY MASS INDEX: 26.47 KG/M2 | SYSTOLIC BLOOD PRESSURE: 144 MMHG | RESPIRATION RATE: 16 BRPM | HEART RATE: 114 BPM | TEMPERATURE: 99.6 F | OXYGEN SATURATION: 98 %

## 2023-07-25 VITALS
WEIGHT: 148.8 LBS | SYSTOLIC BLOOD PRESSURE: 128 MMHG | BODY MASS INDEX: 26.36 KG/M2 | OXYGEN SATURATION: 98 % | HEART RATE: 104 BPM | DIASTOLIC BLOOD PRESSURE: 80 MMHG | HEIGHT: 63 IN

## 2023-07-25 DIAGNOSIS — D57.00 SICKLE CELL PAIN CRISIS (H): ICD-10-CM

## 2023-07-25 DIAGNOSIS — D57.00 SICKLE CELL PAIN CRISIS (H): Primary | ICD-10-CM

## 2023-07-25 DIAGNOSIS — R52 PAIN: Primary | ICD-10-CM

## 2023-07-25 DIAGNOSIS — J45.40 MODERATE PERSISTENT ASTHMA WITHOUT COMPLICATION: ICD-10-CM

## 2023-07-25 DIAGNOSIS — J45.909 ASTHMA: ICD-10-CM

## 2023-07-25 DIAGNOSIS — D57.00 SICKLE CELL DISEASE WITH CRISIS (H): Primary | ICD-10-CM

## 2023-07-25 DIAGNOSIS — D57.20 SICKLE-CELL/HB-C DISEASE WITHOUT CRISIS (H): ICD-10-CM

## 2023-07-25 DIAGNOSIS — I26.99 ACUTE PULMONARY EMBOLISM WITHOUT ACUTE COR PULMONALE, UNSPECIFIED PULMONARY EMBOLISM TYPE (H): ICD-10-CM

## 2023-07-25 LAB
ANION GAP SERPL CALCULATED.3IONS-SCNC: 7 MMOL/L (ref 7–15)
BASOPHILS # BLD MANUAL: 0 10E3/UL (ref 0–0.2)
BASOPHILS NFR BLD MANUAL: 0 %
BUN SERPL-MCNC: 11 MG/DL (ref 6–20)
CALCIUM SERPL-MCNC: 9.2 MG/DL (ref 8.6–10)
CHLORIDE SERPL-SCNC: 105 MMOL/L (ref 98–107)
CREAT SERPL-MCNC: 0.98 MG/DL (ref 0.51–0.95)
DEPRECATED HCO3 PLAS-SCNC: 26 MMOL/L (ref 22–29)
EOSINOPHIL # BLD MANUAL: 0 10E3/UL (ref 0–0.7)
EOSINOPHIL NFR BLD MANUAL: 0 %
ERYTHROCYTE [DISTWIDTH] IN BLOOD BY AUTOMATED COUNT: 14.2 % (ref 10–15)
FLUAV RNA SPEC QL NAA+PROBE: NEGATIVE
FLUBV RNA RESP QL NAA+PROBE: NEGATIVE
GFR SERPL CREATININE-BSD FRML MDRD: 69 ML/MIN/1.73M2
GLUCOSE SERPL-MCNC: 109 MG/DL (ref 70–99)
HCT VFR BLD AUTO: 30 % (ref 35–47)
HGB BLD-MCNC: 10.6 G/DL (ref 11.7–15.7)
LYMPHOCYTES # BLD MANUAL: 2.4 10E3/UL (ref 0.8–5.3)
LYMPHOCYTES NFR BLD MANUAL: 16 %
MCH RBC QN AUTO: 26.3 PG (ref 26.5–33)
MCHC RBC AUTO-ENTMCNC: 35.3 G/DL (ref 31.5–36.5)
MCV RBC AUTO: 74 FL (ref 78–100)
MONOCYTES # BLD MANUAL: 0.7 10E3/UL (ref 0–1.3)
MONOCYTES NFR BLD MANUAL: 5 %
NEUTROPHILS # BLD MANUAL: 11.7 10E3/UL (ref 1.6–8.3)
NEUTROPHILS NFR BLD MANUAL: 79 %
NRBC # BLD AUTO: 1 10E3/UL
NRBC BLD MANUAL-RTO: 7 %
PLAT MORPH BLD: ABNORMAL
PLATELET # BLD AUTO: 302 10E3/UL (ref 150–450)
POTASSIUM SERPL-SCNC: 4 MMOL/L (ref 3.4–5.3)
RBC # BLD AUTO: 4.03 10E6/UL (ref 3.8–5.2)
RBC MORPH BLD: ABNORMAL
RETICS # AUTO: 0.08 10E6/UL (ref 0.01–0.11)
RETICS/RBC NFR AUTO: 2.1 % (ref 0.8–2.7)
RSV RNA SPEC NAA+PROBE: NEGATIVE
SARS-COV-2 RNA RESP QL NAA+PROBE: NEGATIVE
SICKLE CELLS BLD QL SMEAR: SLIGHT
SODIUM SERPL-SCNC: 138 MMOL/L (ref 136–145)
TARGETS BLD QL SMEAR: ABNORMAL
WBC # BLD AUTO: 14.8 10E3/UL (ref 4–11)

## 2023-07-25 PROCEDURE — 96374 THER/PROPH/DIAG INJ IV PUSH: CPT

## 2023-07-25 PROCEDURE — G0463 HOSPITAL OUTPT CLINIC VISIT: HCPCS | Performed by: INTERNAL MEDICINE

## 2023-07-25 PROCEDURE — 258N000003 HC RX IP 258 OP 636

## 2023-07-25 PROCEDURE — 120N000001 HC R&B MED SURG/OB

## 2023-07-25 PROCEDURE — 250N000009 HC RX 250

## 2023-07-25 PROCEDURE — 93005 ELECTROCARDIOGRAM TRACING: CPT | Performed by: EMERGENCY MEDICINE

## 2023-07-25 PROCEDURE — 94375 RESPIRATORY FLOW VOLUME LOOP: CPT | Performed by: INTERNAL MEDICINE

## 2023-07-25 PROCEDURE — 71046 X-RAY EXAM CHEST 2 VIEWS: CPT

## 2023-07-25 PROCEDURE — 258N000003 HC RX IP 258 OP 636: Performed by: NURSE PRACTITIONER

## 2023-07-25 PROCEDURE — 36415 COLL VENOUS BLD VENIPUNCTURE: CPT | Performed by: EMERGENCY MEDICINE

## 2023-07-25 PROCEDURE — 87637 SARSCOV2&INF A&B&RSV AMP PRB: CPT

## 2023-07-25 PROCEDURE — 96376 TX/PRO/DX INJ SAME DRUG ADON: CPT

## 2023-07-25 PROCEDURE — 99285 EMERGENCY DEPT VISIT HI MDM: CPT | Mod: 25

## 2023-07-25 PROCEDURE — 85045 AUTOMATED RETICULOCYTE COUNT: CPT | Performed by: EMERGENCY MEDICINE

## 2023-07-25 PROCEDURE — 94640 AIRWAY INHALATION TREATMENT: CPT

## 2023-07-25 PROCEDURE — 85027 COMPLETE CBC AUTOMATED: CPT | Performed by: EMERGENCY MEDICINE

## 2023-07-25 PROCEDURE — 258N000003 HC RX IP 258 OP 636: Performed by: EMERGENCY MEDICINE

## 2023-07-25 PROCEDURE — 250N000011 HC RX IP 250 OP 636: Mod: JZ

## 2023-07-25 PROCEDURE — 250N000011 HC RX IP 250 OP 636: Performed by: EMERGENCY MEDICINE

## 2023-07-25 PROCEDURE — 96361 HYDRATE IV INFUSION ADD-ON: CPT

## 2023-07-25 PROCEDURE — 85007 BL SMEAR W/DIFF WBC COUNT: CPT | Performed by: EMERGENCY MEDICINE

## 2023-07-25 PROCEDURE — 80048 BASIC METABOLIC PNL TOTAL CA: CPT | Performed by: EMERGENCY MEDICINE

## 2023-07-25 PROCEDURE — 87040 BLOOD CULTURE FOR BACTERIA: CPT | Performed by: EMERGENCY MEDICINE

## 2023-07-25 PROCEDURE — 250N000011 HC RX IP 250 OP 636: Performed by: INTERNAL MEDICINE

## 2023-07-25 PROCEDURE — 250N000013 HC RX MED GY IP 250 OP 250 PS 637

## 2023-07-25 PROCEDURE — 99215 OFFICE O/P EST HI 40 MIN: CPT | Mod: 25 | Performed by: INTERNAL MEDICINE

## 2023-07-25 PROCEDURE — 99213 OFFICE O/P EST LOW 20 MIN: CPT | Mod: 25 | Performed by: INTERNAL MEDICINE

## 2023-07-25 RX ORDER — ONDANSETRON 2 MG/ML
4 INJECTION INTRAMUSCULAR; INTRAVENOUS EVERY 6 HOURS PRN
Status: DISCONTINUED | OUTPATIENT
Start: 2023-07-25 | End: 2023-08-02 | Stop reason: HOSPADM

## 2023-07-25 RX ORDER — MORPHINE SULFATE 4 MG/ML
4 INJECTION, SOLUTION INTRAMUSCULAR; INTRAVENOUS ONCE
Status: DISCONTINUED | OUTPATIENT
Start: 2023-07-25 | End: 2023-07-25

## 2023-07-25 RX ORDER — SODIUM CHLORIDE 9 MG/ML
INJECTION, SOLUTION INTRAVENOUS CONTINUOUS
Status: DISCONTINUED | OUTPATIENT
Start: 2023-07-25 | End: 2023-07-25

## 2023-07-25 RX ORDER — AMOXICILLIN 250 MG
2 CAPSULE ORAL 2 TIMES DAILY PRN
Status: DISCONTINUED | OUTPATIENT
Start: 2023-07-25 | End: 2023-08-02 | Stop reason: HOSPADM

## 2023-07-25 RX ORDER — HYDROMORPHONE HCL IN WATER/PF 6 MG/30 ML
0.2 PATIENT CONTROLLED ANALGESIA SYRINGE INTRAVENOUS
Status: DISCONTINUED | OUTPATIENT
Start: 2023-07-25 | End: 2023-08-02 | Stop reason: HOSPADM

## 2023-07-25 RX ORDER — PROCHLORPERAZINE 25 MG
25 SUPPOSITORY, RECTAL RECTAL EVERY 12 HOURS PRN
Status: DISCONTINUED | OUTPATIENT
Start: 2023-07-25 | End: 2023-08-02 | Stop reason: HOSPADM

## 2023-07-25 RX ORDER — AMOXICILLIN 250 MG
1 CAPSULE ORAL 2 TIMES DAILY PRN
Status: DISCONTINUED | OUTPATIENT
Start: 2023-07-25 | End: 2023-08-02 | Stop reason: HOSPADM

## 2023-07-25 RX ORDER — MOMETASONE FUROATE AND FORMOTEROL FUMARATE DIHYDRATE 200; 5 UG/1; UG/1
2 AEROSOL RESPIRATORY (INHALATION) 2 TIMES DAILY
Qty: 13 G | Refills: 4 | Status: SHIPPED | OUTPATIENT
Start: 2023-07-25 | End: 2024-06-03

## 2023-07-25 RX ORDER — LIDOCAINE 40 MG/G
CREAM TOPICAL
Status: DISCONTINUED | OUTPATIENT
Start: 2023-07-25 | End: 2023-08-02 | Stop reason: HOSPADM

## 2023-07-25 RX ORDER — SODIUM CHLORIDE, SODIUM LACTATE, POTASSIUM CHLORIDE, CALCIUM CHLORIDE 600; 310; 30; 20 MG/100ML; MG/100ML; MG/100ML; MG/100ML
INJECTION, SOLUTION INTRAVENOUS CONTINUOUS
Status: DISCONTINUED | OUTPATIENT
Start: 2023-07-25 | End: 2023-07-30

## 2023-07-25 RX ORDER — ACETAMINOPHEN 325 MG/1
975 TABLET ORAL EVERY 8 HOURS
Status: DISCONTINUED | OUTPATIENT
Start: 2023-07-25 | End: 2023-08-02 | Stop reason: HOSPADM

## 2023-07-25 RX ORDER — MONTELUKAST SODIUM 10 MG/1
10 TABLET ORAL AT BEDTIME
Status: DISCONTINUED | OUTPATIENT
Start: 2023-07-25 | End: 2023-08-02 | Stop reason: HOSPADM

## 2023-07-25 RX ORDER — PROCHLORPERAZINE MALEATE 10 MG
10 TABLET ORAL EVERY 6 HOURS PRN
Status: DISCONTINUED | OUTPATIENT
Start: 2023-07-25 | End: 2023-08-02 | Stop reason: HOSPADM

## 2023-07-25 RX ORDER — OXYCODONE HYDROCHLORIDE 5 MG/1
5 TABLET ORAL EVERY 4 HOURS PRN
Status: DISCONTINUED | OUTPATIENT
Start: 2023-07-25 | End: 2023-07-26

## 2023-07-25 RX ORDER — IPRATROPIUM BROMIDE AND ALBUTEROL SULFATE 2.5; .5 MG/3ML; MG/3ML
3 SOLUTION RESPIRATORY (INHALATION)
Status: DISCONTINUED | OUTPATIENT
Start: 2023-07-25 | End: 2023-08-02 | Stop reason: HOSPADM

## 2023-07-25 RX ORDER — ONDANSETRON 4 MG/1
4 TABLET, ORALLY DISINTEGRATING ORAL EVERY 6 HOURS PRN
Status: DISCONTINUED | OUTPATIENT
Start: 2023-07-25 | End: 2023-08-02 | Stop reason: HOSPADM

## 2023-07-25 RX ORDER — MORPHINE SULFATE 4 MG/ML
5 INJECTION, SOLUTION INTRAMUSCULAR; INTRAVENOUS ONCE
Status: DISCONTINUED | OUTPATIENT
Start: 2023-07-25 | End: 2023-07-25 | Stop reason: DRUGHIGH

## 2023-07-25 RX ORDER — PREDNISONE 20 MG/1
20 TABLET ORAL DAILY
Qty: 5 TABLET | Refills: 0 | Status: ON HOLD | OUTPATIENT
Start: 2023-07-25 | End: 2023-08-02

## 2023-07-25 RX ORDER — POLYETHYLENE GLYCOL 3350 17 G/17G
17 POWDER, FOR SOLUTION ORAL DAILY
Status: DISCONTINUED | OUTPATIENT
Start: 2023-07-25 | End: 2023-08-02 | Stop reason: HOSPADM

## 2023-07-25 RX ORDER — ALBUTEROL SULFATE 90 UG/1
2 AEROSOL, METERED RESPIRATORY (INHALATION) EVERY 6 HOURS PRN
Qty: 18 G | Refills: 4 | Status: SHIPPED | OUTPATIENT
Start: 2023-07-25 | End: 2024-07-02

## 2023-07-25 RX ORDER — ENOXAPARIN SODIUM 100 MG/ML
40 INJECTION SUBCUTANEOUS EVERY 24 HOURS
Status: DISCONTINUED | OUTPATIENT
Start: 2023-07-25 | End: 2023-07-26 | Stop reason: ALTCHOICE

## 2023-07-25 RX ADMIN — ENOXAPARIN SODIUM 40 MG: 40 INJECTION SUBCUTANEOUS at 19:49

## 2023-07-25 RX ADMIN — SODIUM CHLORIDE 1000 ML: 9 INJECTION, SOLUTION INTRAVENOUS at 15:04

## 2023-07-25 RX ADMIN — IPRATROPIUM BROMIDE AND ALBUTEROL SULFATE 3 ML: .5; 3 SOLUTION RESPIRATORY (INHALATION) at 22:19

## 2023-07-25 RX ADMIN — ACETAMINOPHEN 975 MG: 325 TABLET ORAL at 19:47

## 2023-07-25 RX ADMIN — HYDROMORPHONE HYDROCHLORIDE 1 MG: 1 INJECTION, SOLUTION INTRAMUSCULAR; INTRAVENOUS; SUBCUTANEOUS at 17:45

## 2023-07-25 RX ADMIN — MONTELUKAST 10 MG: 10 TABLET, FILM COATED ORAL at 23:38

## 2023-07-25 RX ADMIN — OXYCODONE HYDROCHLORIDE 5 MG: 5 TABLET ORAL at 19:47

## 2023-07-25 RX ADMIN — MORPHINE SULFATE 4 MG: 4 INJECTION, SOLUTION INTRAMUSCULAR; INTRAVENOUS at 15:07

## 2023-07-25 RX ADMIN — MORPHINE SULFATE 4 MG: 4 INJECTION, SOLUTION INTRAMUSCULAR; INTRAVENOUS at 15:34

## 2023-07-25 RX ADMIN — SODIUM CHLORIDE: 9 INJECTION, SOLUTION INTRAVENOUS at 19:33

## 2023-07-25 RX ADMIN — SODIUM CHLORIDE 1000 ML: 9 INJECTION, SOLUTION INTRAVENOUS at 16:30

## 2023-07-25 RX ADMIN — HYDROMORPHONE HYDROCHLORIDE 0.2 MG: 0.2 INJECTION, SOLUTION INTRAMUSCULAR; INTRAVENOUS; SUBCUTANEOUS at 22:07

## 2023-07-25 RX ADMIN — SODIUM CHLORIDE, POTASSIUM CHLORIDE, SODIUM LACTATE AND CALCIUM CHLORIDE: 600; 310; 30; 20 INJECTION, SOLUTION INTRAVENOUS at 20:07

## 2023-07-25 ASSESSMENT — ASTHMA QUESTIONNAIRES
QUESTION_5 LAST FOUR WEEKS HOW WOULD YOU RATE YOUR ASTHMA CONTROL: POORLY CONTROLLED
ACT_TOTALSCORE: 16
QUESTION_4 LAST FOUR WEEKS HOW OFTEN HAVE YOU USED YOUR RESCUE INHALER OR NEBULIZER MEDICATION (SUCH AS ALBUTEROL): NOT AT ALL
ACT_TOTALSCORE: 16
QUESTION_1 LAST FOUR WEEKS HOW MUCH OF THE TIME DID YOUR ASTHMA KEEP YOU FROM GETTING AS MUCH DONE AT WORK, SCHOOL OR AT HOME: A LITTLE OF THE TIME
QUESTION_2 LAST FOUR WEEKS HOW OFTEN HAVE YOU HAD SHORTNESS OF BREATH: THREE TO SIX TIMES A WEEK
QUESTION_3 LAST FOUR WEEKS HOW OFTEN DID YOUR ASTHMA SYMPTOMS (WHEEZING, COUGHING, SHORTNESS OF BREATH, CHEST TIGHTNESS OR PAIN) WAKE YOU UP AT NIGHT OR EARLIER THAN USUAL IN THE MORNING: TWO OR THREE NIGHTS A WEEK

## 2023-07-25 ASSESSMENT — PAIN SCALES - GENERAL: PAINLEVEL: EXTREME PAIN (8)

## 2023-07-25 ASSESSMENT — ACTIVITIES OF DAILY LIVING (ADL)
ADLS_ACUITY_SCORE: 35
ADLS_ACUITY_SCORE: 35
ADLS_ACUITY_SCORE: 37

## 2023-07-25 NOTE — PROGRESS NOTES
Pt had appointment with Dr. Bartlett.  Pt was not feeling well and endorsed a 9/10 pain.  Pt was then transferred to us and ordered 1000 mls fluid and IV morphine 4 mg.  Pt had 8/10 pain 20 minutes after 1st dose and another dose of IV morphine 4 mg was given.  Pt was then wheeled down to emergency department.

## 2023-07-25 NOTE — H&P
[facility]    History and Physical - Hospitalist Service       Date of Admission:  7/25/2023    Assessment & Plan      Kelly Harris is a 52 year old female admitted on 7/25/2023. She has a history of Hemoglobin SC disease and asthma with recent hospitalization with sickle cell pain crisis and is admitted today for sickle cell disease with pain crisis.     Sickle Cell Disease  Pain Crisis  Patient with recent admission to [facility] 7/15/23 for sickle cell crisis, admitted for pain control. She received IV fluid rehydration as well as pain management with oxycodone. She states her pain improved during the hospital stay, but never fully resolved. She was discharged with Oxycodone for pain to take as needed. She had a hospital follow up appointment 7/17/23 with Dr. Leung at which time she had continuation of pain. She was given additional Oxycodone for pain as needed, and was schedule for an appointment with hematology. She was seen today in the hematology clinic, remained in significant pain. She was given IV hydration with 500mL NS in the office as well as IV morphine 5 mg x 2 doses. Her pain was still not significantly improved, so she was brought to the ER for hospitalization and further pain management. She has noted prior episodes of pain crises with need for IV hydration and pain medication, but states it has been 5+ years since her last hospitalization. In the ER, CXR ordered and negative. Patient was given 1L bolus NS and 1mg IV Dilaudid. Still unclear etiology of pain crisis - dehydration vs. Infection vs. Asthma exacerbation vs. Stress   - IV hydration with maintenance fluids - LR @125 mls/hr  - Scheduled Tylenol 975 mg Q8hrs  - Oxycodone 5 mg Q4hrs PRN  - Dilaudid 0.2 mg IV Q2hrs PRN for breakthrough pain  - CBC and CMP in AM      Asthma  Wheezing  Shortness of Breath  Patient was diagnosed with asthma during childhood. On hospital follow up visit (7/20/23), pt was  noted to have worsening asthma. Was started on Prednisone 20 mg for 5 days and scheduled for pulmonology clinic visit. Patient saw pulmonology earlier today who recommended changes to current treatment regimen. Recommended changing Symbicort to Dulera, prescribed albuterol inhaler, and recommended continuing singulair and spiriva respimat. Also recommended checking IgE and repeat PFTs. Currently, patient with scattered wheezes throughout, but good air movement. Patient took last dose of Prednisone yesterday.   - Influenza, RSV, Covid negative   - Duonebs Q2 hr PRN  - continue PTA Singulair      Constipation  Patient has been on opioids for 2+ weeks. Patient denies any prior issues with constipation. Denies any blood in stool, nausea, vomiting. Likely constipation secondary to opioid use.  - Scheduled Miralax  - Senna PRN         Diet: Combination Diet Regular Diet Adult  DVT Prophylaxis: Enoxaparin (Lovenox) SQ  Reyes Catheter: Not present  Fluids: maintenance LR @125mls/hr  Lines: None     Cardiac Monitoring: None  Code Status: Full Code    Clinically Significant Risk Factors Present on Admission              Disposition Plan      Expected Discharge Date: 07/27/2023                The patient's care was discussed with the  on call attending physician, Dr. Dai .      Heidy Duron, DO  Hospitalist Service  [facility]  Securely message with [employer] (more info)  Text page via [facility] Paging/Directory   ______________________________________________________________________    Chief Complaint   Sickle cell pain disease, pain crisis     History is obtained from the patient    History of Present Illness   Kelly Harris is a 52 year old female who has a history of sickle cell disease and asthma and is admitted for sickle cell disease with pain crisis.     Patient with recent admission to [facility][1] 7/25 for sickle cell crisis, admitted for pain control. She received IV fluid rehydration  as well as pain management with oxycodone. She states her pain improved during the hospital stay, but never fully resolved. She was discharged with Oxycodone for pain to take as needed. She had a hospital follow up appointment 7/17/23 with Dr. Leung at which time she had continuation of pain. She also had worsening asthma and was tachypnea and wheezy on examination. She was given additional Oxycodone for pain as needed, and was schedule for an appointment with hematology. Additionally, she was started on Prednisone and referred to pulmonology to evaluated her worsening asthma.     Today, she had an appointment with both pulmonology and hematology. Pulmonology recommended changing Symbicort to Dulera, prescribed albuterol inhaler, and recommended continuing singulair and spiriva respimat. She was then seen in the hematology clinic, remained in significant pain. She was given IV hydration with 500mL NS in the office as well as IV morphine 5 mg x 2 doses. Her pain was still not significantly improved, so she was brought to the ER for hospitalization and further pain management. She has noted prior episodes of pain crises with need for IV hydration and pain medication, but states it has been 5+ years since her last hospitalization.    Currently, she endorses diffuse pain across her whole body but worse on her right side. She describes the pain as sharp, stabbing pain. She currently rates her pain at a 8/10. She also endorses headaches, nausea, and constipation. She denies fevers, shortness of breath, vomiting, palpitations.       Past Medical History    Past Medical History:   Diagnosis Date    Sickle cell pain crisis (H) 7/16/2023       Past Surgical History   Past Surgical History:   Procedure Laterality Date    MOUTH SURGERY  09/07/2019    Removed benign mouth tumor       Prior to Admission Medications   Prior to Admission Medications   Prescriptions Last Dose Informant Patient Reported? Taking?   SPIRIVA RESPIMAT  2.5 MCG/ACT inhaler 7/24/2023 at am  No Yes   Sig: INHALE 2 PUFFS BY MOUTH INTO THE LUNGS DAILY   albuterol (PROAIR HFA/PROVENTIL HFA/VENTOLIN HFA) 108 (90 Base) MCG/ACT inhaler new- not started yet  No Yes   Sig: Inhale 2 puffs into the lungs every 6 hours as needed for shortness of breath, wheezing or cough   budesonide-formoterol (SYMBICORT) 160-4.5 MCG/ACT Inhaler 7/24/2023  No Yes   Sig: INHALE 2 PUFFS TWICE DAILY PLUS 1-2 PUFFS AS NEEDED. MAY USE UP TO 12 PUFFS PER DAY.   cetirizine (ZYRTEC) 10 MG tablet 7/25/2023 at am  No Yes   Sig: TAKE 1 TABLET BY MOUTH EVERY DAY   fluticasone (FLONASE) 50 MCG/ACT nasal spray 7/25/2023 at am  No Yes   Sig: INSTILL 1 SPRAY INTO BOTH NOSTRILS DAILY   mometasone-formoterol (DULERA) 200-5 MCG/ACT inhaler new RX today  No Yes   Sig: Inhale 2 puffs into the lungs 2 times daily   montelukast (SINGULAIR) 10 MG tablet 7/24/2023 at pm  No Yes   Sig: Take 1 tablet (10 mg) by mouth At Bedtime   oxyCODONE (ROXICODONE) 5 MG tablet   No No   Sig: Take 1 tablet (5 mg) by mouth every 4 hours as needed for severe pain   oxyCODONE (ROXICODONE) 5 MG tablet 7/25/2023 at am x1 tab  No Yes   Sig: Take 1-2 tablets (5-10 mg) by mouth every 4 hours as needed for moderate to severe pain   predniSONE (DELTASONE) 20 MG tablet 7/24/2023  No Yes   Sig: Take 2 tablets (40 mg) by mouth daily for 5 days   predniSONE (DELTASONE) 20 MG tablet not started-new rx  No Yes   Sig: Take 1 tablet (20 mg) by mouth daily      Facility-Administered Medications Last Administration Doses Remaining   0.9% sodium chloride BOLUS 7/25/2023  3:04 PM 0   morphine (PF) injection 4 mg 7/25/2023  3:07 PM 0   morphine (PF) injection 4 mg 7/25/2023  3:34 PM 0           Review of Systems    CONSTITUTIONAL:NEGATIVE for fever, chills, change in weight  INTEGUMENTARY/SKIN: NEGATIVE for worrisome rashes, moles or lesions  EYES: NEGATIVE for vision changes or irritation  ENT/MOUTH: NEGATIVE for ear, mouth and throat problems  CV:  POSITIVE for chest pain/chest pressure and NEGATIVE for lower extremity edema and palpitations  RESP: Negative for cough, Positive for shortness of breath, wheezing  GI: POSITIVE for constipation and NEGATIVE for diarrhea, hematemesis, and hematochezia  MUSCULOSKELETAL:POSITIVE  for arthralgias, diffuse muscle pain and NEGATIVE for back pain and edema   NEURO: NEGATIVE for weakness, dizziness or paresthesias  ENDOCRINE: NEGATIVE for temperature intolerance, skin/hair changes  PSYCHIATRIC: NEGATIVE for changes in mood or affect     Social History     Patient currently lives at home with her daughter. She has two daughters each delivered via . She states her daughters also have Hemoglobin SC disease.    She denies any alcohol, tobacco, or drug use.       Allergies   Allergies   Allergen Reactions    Iodine Rash        Physical Exam   Vital Signs: Temp: 99.6  F (37.6  C) Temp src: Oral BP: 138/70 Pulse: 84   Resp: 18 SpO2: 93 % O2 Device: None (Room air)    Weight: 149 lbs 0 oz    EXAM  General: Alert, well-appearing, in mild distress  Head: Nontraumatic   Eyes: PERRL, EOM intact   Oropharynx: moist oral mucus membranes  Pulm: scattered wheezing, good air movement speaking in full sentences  CV: RRR, no murmur  Abd: soft, NTND, no masses  Ext: Warm, well perfused,  no LE edema  Skin: No rash on limited skin exam  Neuro: CN II-XII intact, moves all 4 extremities  Psych: Mood appropriate to visit content, full affect, rational thought content and process      Medical Decision Making         Data     I have personally reviewed the following data over the past 24 hrs:    14.8 (H)  \   10.6 (L)   / 302     138 105 11.0 /  109 (H)   4.0 26 0.98 (H) \     Ferritin:  N/A % Retic:  2.1 LDH:  N/A     Imaging results reviewed over the past 24 hrs:   Recent Results (from the past 24 hour(s))   XR Chest 2 Views    Narrative    EXAM: XR CHEST 2 VIEWS  LOCATION: [facility]  DATE:  7/25/2023    INDICATION: chest pain  COMPARISON: Chest CT 09/19/2022      Impression    IMPRESSION: Negative chest.

## 2023-07-25 NOTE — PROGRESS NOTES
[facility] Hematology and Oncology Progress Note    Patient: Kelly Harris  MRN: [id number]  Date of Service: Jul 25, 2023         Reason for Visit    Chief Complaint   Patient presents with    Oncology Clinic Visit     Follow up appt       Assessment and Plan    Sickle cell pain crisis  Scattered wheezing  History of hemoglobin SC disease    Patient with known history of hemoglobin SC disease.  Recently hospitalized with pain crisis but has continued to have significant pain rated at 9 out of 10 and clearly in discomfort in the clinic today.    She has previously been free of pain crises and the need for opioid pain medications for several years.  No clear etiology for trigger for the current pain issues.  No evidence of infection.    We will give her IV hydration normal saline 500 mL in the office today.  We will give her IV morphine 5 mg x 2 doses 15 minutes apart.  We will reassess after that.  If pain is not significantly improved she will likely require hospitalization for further management.    Based on her history so far she would not likely be a candidate for hydroxyurea therapy.    45 minutes spent.    Plan: Start IV hydration  IV morphine in the clinic  Reassess after that, may require hospitalization    Patient given 2 doses of morphine and IV hydration with only minimal relief of pain.  Will send to the ER for further evaluation and management.  Did speak to ER physician and recommend at least a chest x-ray and basic blood work and then additional fluids and pain management.      ECOG Performance    1 - Can't do physically strenuous work, but fully ambyulatory and can do light sedentary work     Pain  Pain Score: Extreme Pain (8)  Pain Loc: Other - see comment (right side)              Encounter Diagnoses:    Problem List Items Addressed This Visit    None         CC: Arcenio Alcantara MD   ______________________________________________________________________________    History of Present  Illness    Ms. Kelly Harris is a 52-year-old black female with previous known diagnosis of hemoglobin SC disease and asthma and remote history of ovarian vein thrombosis referred for recent hospitalization with sickle cell pain crisis.  She started to have some joint pain back in April and was treated with Tylenol.    She had worsening pain couple weeks ago involving the right chest wall and back and was hospitalized for management for 1 night.  Since discharge she has continued to have significant pain.  Using oxycodone 5 mg about 3 times a day but has not been able to resume work.  She describes being cold but does not have fever.  Has had some headaches dizziness and lightheadedness.  No real shortness of breath or cough.  No urinary symptoms.  Some constipation likely related to her pain medication.    It has been several years since previous hospitalization or requiring narcotic pain medications to manage pain crisis.  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    No previous problems with root canal, 2 jaw tumor surgeries.     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  Has joint pains and crises every 2 to 3 months but manages with Tylenol and hydration.  No history of stroke.  Has had vision issues and does follow with ophthalmology.     No abdominal symptoms.  No change with bowels or urine.  No infectious problems.     She is originally from [place].  She is single with 2 kids who also have hemoglobin SC disease.  She works as a nutritionist.     Other personal or family history of thrombosis.        Review of systems.  No fever or night sweats.  No loss of weight.  No lumps or bumps anywhere.  No unusual headaches or eyesight issues.  No dizziness.  No bleeding from the nose.  No sores in the mouth. No problems with swallowing.  No chest pain. No shortness of breath. No cough.  No abdominal pain. No nausea or vomiting.  No diarrhea or constipation.  No blood in  "stool or black colored stools.  No problems passing urine.  No numbness or tingling in hands or feet.  No skin rashes.  A 14 point review of systems is otherwise negative.        Past History    Past Medical History:   Diagnosis Date    Sickle cell pain crisis (H) 7/16/2023       Past Surgical History:   Procedure Laterality Date    MOUTH SURGERY  09/07/2019    Removed benign mouth tumor         Physical Exam    BP (!) 144/80 (BP Location: Left arm, Patient Position: Sitting, Cuff Size: Adult Regular)   Pulse 114   Temp 99.6  F (37.6  C) (Oral)   Resp 16   Ht 1.6 m (5' 3\")   Wt 67.8 kg (149 lb 6.4 oz)   LMP 04/12/2017   SpO2 98%   BMI 26.47 kg/m        GENERAL: Alert and oriented to time place and person. Seated comfortably. In no distress.    HEAD: Atraumatic and normocephalic.    EYES: SETH, EOMI.  No pallor.  No icterus.    Oral cavity: no mucosal lesion or tonsillar enlargement.    NECK: supple. JVP normal.  No thyroid enlargement.    LYMPH NODES: No palpable, cervical, axillary or inguinal lymphadenopathy.    CHEST: clear to auscultation bilaterally.  Resonant to percussion throughout bilaterally.  Symmetrical breath movements bilaterally.    CVS: S1 and S2 are heard. Regular rate and rhythm.  No murmur or gallop or rub heard.  No peripheral edema.    ABDOMEN: Soft. Not tender. Not distended.  No palpable hepatomegaly or splenomegaly.  No other mass palpable.  Bowel sounds heard.    EXTREMITIES: Warm.    SKIN: no rash, or bruising or purpura.  Has a full head of hair.    Lab Results    Recent Results (from the past 168 hour(s))   CBC with platelets   Result Value Ref Range    WBC Count 12.8 (H) 4.0 - 11.0 10e3/uL    RBC Count 4.50 3.80 - 5.20 10e6/uL    Hemoglobin 11.8 11.7 - 15.7 g/dL    Hematocrit 32.4 (L) 35.0 - 47.0 %    MCV 72 (L) 78 - 100 fL    MCH 26.2 (L) 26.5 - 33.0 pg    MCHC 36.4 31.5 - 36.5 g/dL    RDW 14.3 10.0 - 15.0 %    Platelet Count 351 150 - 450 10e3/uL   General PFT Lab (Please " always keep checked)   Result Value Ref Range    FVC-Pred 2.99 L    FVC-Pre 1.91 L    FVC-%Pred-Pre 63 %    FEV1-Pre 1.62 L    FEV1-%Pred-Pre 66 %    FEV1FVC-Pred 81 %    FEV1FVC-Pre 84 %    FEFMax-Pred 6.47 L/sec    FEFMax-Pre 4.04 L/sec    FEFMax-%Pred-Pre 62 %    FEF2575-Pred 2.40 L/sec    FEF2575-Pre 2.06 L/sec    CSA6700-%Pred-Pre 85 %    ExpTime-Pre 6.43 sec    FIFMax-Pre 4.05 L/sec    VC-Pred 3.51 L    VC-Pre 1.86 L    VC-%Pred-Pre 53 %    IC-Pred 2.46 L    IC-Pre 1.26 L    IC-%Pred-Pre 50 %    ERV-Pred 0.93 L    ERV-Pre 0.61 L    ERV-%Pred-Pre 65 %    FEV1FEV6-Pred 82 %    FEV1FEV6-Pre 84 %    FEV1SVC-Pred 69 %    FEV1SVC-Pre 87 %       Imaging    No results found.      Signed by: Shari Bartlett MD

## 2023-07-25 NOTE — ED NOTES
Expected Patient Referral to ED  3:40 PM    Referring Clinic/Provider:  Primary care    Reason for referral/Clinical facts:  Sickle cell disease, needs pain control chest x-ray and labs and blood culture.  Likely admission    Recommendations provided:  Send to ED for further evaluation    Caller was informed that this institution does possess the capabilities and/or resources to provide for patient and should be transferred to our facility.    Discussed that if direct admit is sought and any hurdles are encountered, this ED would be happy to see the patient and evaluate.    Informed caller that recommendations provided are recommendations based only on the facts provided and that they responsible to accept or reject the advice, or to seek a formal in person consultation as needed and that this ED will see/treat patient should they arrive.      Jax Barlow MD  [facility] EMERGENCY DEPARTMENT  [address] [place] [id number]  [phone]       Jax Barlow MD  07/25/23 1545

## 2023-07-25 NOTE — ED PROVIDER NOTES
EMERGENCY DEPARTMENT ENCOUNTER            IMPRESSION:  Sickle cell disease  Pain crisis        MEDICAL DECISION MAKING:  It was my pleasure to provide care for Kelly Harris who was referred to the emergency department for management of sickle cell crisis.    On my exam patient is pleasant and cooperative.   Vital signs are normal.  Physical exam notable for patient appears uncomfortable.  Cardiopulmonary exam is normal.     IV fluids and pain medication administered for symptom relief.  Patient's symptoms improved.     EKG is not show evidence of ischemia or arrhythmia    Significant laboratory findings include anemia with normal reticulocyte    Chest x-ray imaging been ordered.      ED evaluation is consistent with sickle cell crisis.      Patient was reevaluated and results were discussed.  I recommended patient to the hospital      Prior to making a final disposition on this patient the results of patient's tests and other diagnostic studies were discussed with the patient. All questions were answered. Patient expressed understanding of the plan and was amenable.       =================================================================  CHIEF COMPLAINT:  Chief Complaint   Patient presents with    Sickle Cell Pain Crisis         HPI  Kelly Harris is a 52 year old female with a history of Sickle-cell/Hb-C disease, sickle cell pain crisis, asthma, and thrombosis of ovarian vein who presents to the ED by walk-in for evaluation of chest pain.    Per Chart Review, patient was seen at [facility] earlier today. Patient with known history of hemoglobin SC disease, and was recently hospitalized with sickle cell pain crisis, but has continued to have significant pain rated at 9 out of 10 and was clearly in discomfort in the clinic. She had previously been free of pain crises and the need for opioid pain medications for several years. No clear etiology for trigger for the current pain issues.  No evidence of infection. Patient was provided IV hydration normal saline 500 mL, IV morphine 5 mg x 2 doses 15 minutes apart.  Patient given the 2 doses of morphine and IV hydration with only minimal relief of pain.  Patient was discharged with instructions to immediately head to an ER for further evaluation and management.      Patient endorses the history above.     Patient endorses having right-sided chest pain within the ED, as well as nausea, but she denies any recent abdominal pain, vomiting, or any other complications at this time.        REVIEW OF SYSTEMS  Constitutional: Does not report chills, unintentional weight loss or fatigue   Eyes: Does not report visual changes or discharge    HENT: Does not report sore throat, ear pain or neck pain  Respiratory: Does not report cough or shortness of breath    Cardiovascular: Positive for chest pain. Does not report palpitations or leg swelling  GI: Positive for vomiting. Does not report abdominal pain, vomiting, or dark, bloody stools.    : Does not report hematuria, dysuria, or flank pain  Musculoskeletal: Does not report any new musculoskeletal pain or new muscle/joint pains  Skin: Does not report rash or wound  Neurologic: Does not report current headache, new weakness, focal weakness, or sensory changes      Remainder of systems reviewed, unless noted in HPI all others negative.      PAST MEDICAL HISTORY:  Past Medical History:   Diagnosis Date    Sickle cell pain crisis (H) 7/16/2023       PAST SURGICAL HISTORY:  Past Surgical History:   Procedure Laterality Date    MOUTH SURGERY  09/07/2019    Removed benign mouth tumor         CURRENT MEDICATIONS:    albuterol (PROAIR HFA/PROVENTIL HFA/VENTOLIN HFA) 108 (90 Base) MCG/ACT inhaler  budesonide-formoterol (SYMBICORT) 160-4.5 MCG/ACT Inhaler  cetirizine (ZYRTEC) 10 MG tablet  fluticasone (FLONASE) 50 MCG/ACT nasal spray  mometasone-formoterol (DULERA) 200-5 MCG/ACT inhaler  montelukast (SINGULAIR) 10 MG  "tablet  oxyCODONE (ROXICODONE) 5 MG tablet  oxyCODONE (ROXICODONE) 5 MG tablet  predniSONE (DELTASONE) 20 MG tablet  predniSONE (DELTASONE) 20 MG tablet  SPIRIVA RESPIMAT 2.5 MCG/ACT inhaler        ALLERGIES:  Allergies   Allergen Reactions    Iodine Rash       FAMILY HISTORY:  Family History   Problem Relation Age of Onset    Diabetes Father     Coronary Artery Disease Father     Hypertension Father     Asthma Daughter     Sickle Cell Trait Daughter         S/C    Asthma Daughter     Sickle Cell Trait Daughter         S/C    Heart Disease Daughter         Heart valve regurgitation, abnormal vein with shunt    Hypertension Mother     Lung Cancer Paternal Cousin     Cancer No family hx of        SOCIAL HISTORY:   Social History     Socioeconomic History    Marital status: Single   Tobacco Use    Smoking status: Never     Passive exposure: Never    Smokeless tobacco: Never   Vaping Use    Vaping Use: Never used   Substance and Sexual Activity    Alcohol use: No    Drug use: No    Sexual activity: Yes     Partners: Male     Social Determinants of Health     Intimate Partner Violence: Not At Risk (7/25/2023)    Humiliation, Afraid, Rape, and Kick questionnaire     Fear of Current or Ex-Partner: No     Emotionally Abused: No     Physically Abused: No     Sexually Abused: No       PHYSICAL EXAM:    BP (!) 146/79   Pulse 97   Temp 99.6  F (37.6  C) (Oral)   Resp 18   Ht 1.6 m (5' 3\")   Wt 67.6 kg (149 lb)   LMP 04/12/2017   SpO2 95%   BMI 26.39 kg/m          Constitutional: Awake, alert, she is uncomfortable  Head: Normocephalic, atraumatic.  ENT: Mucous membranes are moist.  No pallor.   Eyes: Pupils are reactive.  No discoloration.  Neck: No lymphadenopathy, no stridor, supple, no soft tissue swelling  Chest: No tenderness   Respiratory: Respirations even, unlabored. Lungs clear to ascultation bilaterally, in no acute respiratory distress.  Cardiovascular: Regular rate and rhythm.  Good overall perfusion.  Upper " and lower extremity pulses are equal.  GI: Abdomen soft, non-tender to palpation.  No guarding or rebound. Bowel sounds present throughout.   Back: No CVA tenderness.    Musculoskeletal: Moves all 4 extremities equally, full function and capacity no peripheral edema.   Integument: Warm, dry. No rash. No bruising or petechiae.  Neurologic: Alert & oriented x 3. Normal speech.   Psychiatric: Normal mood and affect.  Appropriate judgement.    ED COURSE:  4:45 PM I met with the patient to gather history and to perform my initial exam. We discussed plans for the ED course, including diagnostic testing and treatment.   4:55 PM Spoke with the [facility] Resident, who accepts the patient for admission.        Medical Decision Making    History:  Supplemental history from: N/A  External Record(s) reviewed: External medical records including care everywhere reviewed [facility] visit on 07/25/23       Work Up:  EKG, laboratory and imaging studies as ordered were independently interpreted by myself.   Broad differential diagnosis considered for pain crisis  The patient's presentation was of high complexity.     External consultation:  Discussion of management with another provider: [facility] resident and oncologist    Complicating factors:  Patient has a complicated past medical history including sickle-cell/Hb-C disease, sickle cell pain crisis, asthma, and thrombosis of ovarian vein  Care affected by social determinants of health: Access to primary care    Disposition involved shared decision-making with the patient.    LAB:  Laboratory results were independently reviewed and interpreted  Results for orders placed or performed during the hospital encounter of 07/25/23   Reticulocyte count   Result Value Ref Range    % Reticulocyte 2.1 0.8 - 2.7 %    Absolute Reticulocyte 0.084 0.010 - 0.110 10e6/uL   CBC with platelets and differential   Result Value Ref Range    WBC Count      RBC  Count 4.03 3.80 - 5.20 10e6/uL    Hemoglobin 10.6 (L) 11.7 - 15.7 g/dL    Hematocrit 30.0 (L) 35.0 - 47.0 %    MCV 74 (L) 78 - 100 fL    MCH 26.3 (L) 26.5 - 33.0 pg    MCHC 35.3 31.5 - 36.5 g/dL    RDW 14.2 10.0 - 15.0 %    Platelet Count 302 150 - 450 10e3/uL         RADIOLOGY:  Radiology reports were independently reviewed and interpreted  XR Chest 2 Views    (Results Pending)        EKG:    No arrhythmia or ischemia    I have independently reviewed and interpreted the EKG(s) documented above.          MEDICATIONS GIVEN IN THE EMERGENCY:  Medications   0.9% sodium chloride BOLUS (1,000 mLs Intravenous $New Bag 7/25/23 1630)   HYDROmorphone (DILAUDID) injection 1 mg (has no administration in time range)           NEW PRESCRIPTIONS STARTED AT TODAY'S ER VISIT:  New Prescriptions    No medications on file                FINAL DIAGNOSIS:    ICD-10-CM    1. Sickle cell disease with crisis (H)  D57.00                  NAME: Kelly Harris  AGE: 52 year old female  YOB: 1970  MRN: [id number]  EVALUATION DATE & TIME: No admission date for patient encounter.    PCP: Arcenio Alcantara    ED PROVIDER: HOOD Barnhart Matthew Mengistu, am serving as a scribe to document services personally performed by Dr. Jax Barlow based on my observation and the provider's statements to me. IJax MD attest that Jesus Luan is acting in a scribe capacity, has observed my performance of the services and has documented them in accordance with my direction.    Jax Barlow M.D.  Emergency Medicine  [facility] EMERGENCY DEPARTMENT  [address] [facility] [id number] [phone]  Dept: [phone]  7/25/2023         Jax Barlow MD  07/25/23 9345

## 2023-07-25 NOTE — PHARMACY-ADMISSION MEDICATION HISTORY
Pharmacist Admission Medication History    Admission medication history is complete. The information provided in this note is only as accurate as the sources available at the time of the update.    Medication reconciliation/reorder completed by provider prior to medication history? No    Information Source(s): Patient, Clinic records, and CareEverywhere/SureScripts via in-person    Pertinent Information: pulmonary appointment today, some meds changed/updated.    Changes made to PTA medication list:  Added: dulera inhaler, new prednisone dosing,new oxycodone rx  Deleted: None  Changed: prednisone dose to 10mg qday, symbicort inhaler prn to dulera inhaler bid    Medication Affordability:       Allergies reviewed with patient and updates made in EHR: yes    Medication History Completed By: Ananth Sepulveda RPH 7/25/2023 6:18 PM    Prior to Admission medications    Medication Sig Last Dose Taking? Auth Provider Long Term End Date   albuterol (PROAIR HFA/PROVENTIL HFA/VENTOLIN HFA) 108 (90 Base) MCG/ACT inhaler Inhale 2 puffs into the lungs every 6 hours as needed for shortness of breath, wheezing or cough new- not started yet Yes Najma Phillips MD Yes    budesonide-formoterol (SYMBICORT) 160-4.5 MCG/ACT Inhaler INHALE 2 PUFFS TWICE DAILY PLUS 1-2 PUFFS AS NEEDED. MAY USE UP TO 12 PUFFS PER DAY. 7/24/2023 Yes Lopez Ocasio MD Yes    cetirizine (ZYRTEC) 10 MG tablet TAKE 1 TABLET BY MOUTH EVERY DAY 7/25/2023 at am Yes Abel Hamilton MD     fluticasone (FLONASE) 50 MCG/ACT nasal spray INSTILL 1 SPRAY INTO BOTH NOSTRILS DAILY 7/25/2023 at am Yes Abel Hamilton MD     mometasone-formoterol (DULERA) 200-5 MCG/ACT inhaler Inhale 2 puffs into the lungs 2 times daily new RX today Yes Najma Phillips MD Yes    montelukast (SINGULAIR) 10 MG tablet Take 1 tablet (10 mg) by mouth At Bedtime 7/24/2023 at pm Yes Morteza Marie MD Yes    oxyCODONE (ROXICODONE) 5 MG tablet Take 1-2 tablets (5-10 mg) by mouth every 4  hours as needed for moderate to severe pain 7/25/2023 at am x1 tab Yes Onur Abebe MD  7/27/23   predniSONE (DELTASONE) 20 MG tablet Take 1 tablet (20 mg) by mouth daily not started-new rx Yes Najma Phillips MD     predniSONE (DELTASONE) 20 MG tablet Take 2 tablets (40 mg) by mouth daily for 5 days 7/24/2023 Yes Onur Abebe MD  7/25/23   SPIRIVA RESPIMAT 2.5 MCG/ACT inhaler INHALE 2 PUFFS BY MOUTH INTO THE LUNGS DAILY 7/24/2023 at am Yes aMci Fritz MD Yes    oxyCODONE (ROXICODONE) 5 MG tablet Take 1 tablet (5 mg) by mouth every 4 hours as needed for severe pain   Abel Hamilton MD

## 2023-07-25 NOTE — PROGRESS NOTES
"Pulmonary Clinic Consult Note  Date of Service: 07/25/2023    Reason for Consultation: asthma in pt with sickle cell    History:     HPI: Patient is a pleasant 52-year-old female with past medical history significant for sickle cell disease and asthma who was referred here for evaluation of her asthma.    Patient was diagnosed with asthma during childhood.  She notes that she is currently on Symbicort 160-4.52 puffs twice daily, Singulair 10 mg nightly, Spiriva Respimat 2 puffs daily.  Additionally, patient is on Zyrtec 10 mg daily.  She notes that she usually gets 1-2 exacerbations per year where she is given steroids.  Her last course of steroids was bout 1-2 weeks ago and pt was given 5 days of prednisone. She has never been intubated for asthma.  Triggers include: chemicals, dust, smoke, weather changes, pollen, cats. She has an albuterol inhaler but has not used it much. Patient has occasional nocturnal symptoms. When she has asthma exacerbation, patient usually coughs and wheezes.    PMHx/PSHx:  Sickle cell disease  Asthma - childhood.    Social Hx:   Tobacco: lifelong nonsmoker.  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  Travel: no   Pets: no  Lives in a house    Review of Systems - 10 point review of system negative except for what is mentioned in the HPI.    Exam/Data:   /80 (BP Location: Right arm, Patient Position: Chair, Cuff Size: Adult Regular)   Pulse 104   Ht 1.6 m (5' 3\")   Wt 67.5 kg (148 lb 12.8 oz)   LMP 04/12/2017   SpO2 98%   BMI 26.36 kg/m      EXAM:  GEN: In some distress from pain related to sickle cell disease   HEENT: NCAT, EMOI  CVS: S1S2, RRR  Lung: There is scattered wheezing, good air entry  Abd: soft, positive BS  Ext: no c/c/e  Neuro: nonfocal  Skin: no visible rash  Psych: appropriate    DATA    Labs: reviewed in EMR and outside records where pertinent.     CTA done on 9/2022:  IMPRESSION:  1.  No evidence for pulmonary embolism.    Spirometry " done on 7/25/2023:  Decreased FEV1 and FVC to 66 and 63% respectively.  FEV1 FVC 84.  Bronchodilator, pleth, DLCO, were deferred as pt had difficulty performing test.    Assessment/Plan:   Kelly Harris is a 52 year old female, lifelong non-smoker, with h/o asthma since childhood and SSD.  With respect to her asthma, patient gets 1-2 exacerbations per year.  She is on Symbicort 160-4.52 puffs twice daily, Spiriva Respimat, montelukast, Flonase, and Zyrtec.  Patient completed 5 days of prednisone 40 mg, however I will extend for another 5 days of prednisone 20 mg daily given her scattered wheezing on examination.    These were done however patient was unable to complete.  FEV1 and FVC were reduced and FEV1 over FVC ratio was 84.  Suspect her PFT was extremely limited given patient's pain.  She was unable to complete DLCO or Pleth    Recommendations:  Change Symbicort to Dulera   Prescribed albuterol inhaler  Continue singulair and spiriva respimat  Will check IgE - added on to blood work done 5 days ago at the clinic when pt was not on steroids  Would like to repeat PFTs in about 6 weeks when patient is not in pain and her symptoms are not interfering with formic test  May need to send to allergy and immunology given patient's sickle cell disease and asthma    Follow-up in 6 to 8 weeks    Najma Phillips MD  Pulmonary and Critical Care Medicine    Family History   Problem Relation Age of Onset    Diabetes Father     Coronary Artery Disease Father     Hypertension Father     Asthma Daughter     Sickle Cell Trait Daughter         S/C    Asthma Daughter     Sickle Cell Trait Daughter         S/C    Heart Disease Daughter         Heart valve regurgitation, abnormal vein with shunt    Hypertension Mother     Lung Cancer Paternal Cousin     Cancer No family hx of      Allergies   Allergen Reactions    Iodine Rash       Medications:     Current Outpatient Medications   Medication Sig Dispense Refill    budesonide-formoterol  (SYMBICORT) 160-4.5 MCG/ACT Inhaler INHALE 2 PUFFS TWICE DAILY PLUS 1-2 PUFFS AS NEEDED. MAY USE UP TO 12 PUFFS PER DAY. 10.2 g 23    cetirizine (ZYRTEC) 10 MG tablet TAKE 1 TABLET BY MOUTH EVERY DAY 90 tablet 3    fluticasone (FLONASE) 50 MCG/ACT nasal spray INSTILL 1 SPRAY INTO BOTH NOSTRILS DAILY 32 mL 4    montelukast (SINGULAIR) 10 MG tablet Take 1 tablet (10 mg) by mouth At Bedtime 30 tablet 1    oxyCODONE (ROXICODONE) 5 MG tablet Take 1 tablet (5 mg) by mouth every 4 hours as needed for severe pain 10 tablet 0    SPIRIVA RESPIMAT 2.5 MCG/ACT inhaler INHALE 2 PUFFS BY MOUTH INTO THE LUNGS DAILY 4 g 3    oxyCODONE (ROXICODONE) 5 MG tablet Take 1-2 tablets (5-10 mg) by mouth every 4 hours as needed for moderate to severe pain 30 tablet 0    predniSONE (DELTASONE) 20 MG tablet Take 2 tablets (40 mg) by mouth daily for 5 days 10 tablet 0       Much or all of the text in this note was generated through the use of the Dragon Dictate voice-to-text software. Errors in spelling or words which seem out of context are unintentional. Sound alike errors, in particular, may have escaped editing.

## 2023-07-25 NOTE — ED TRIAGE NOTES
Patient was seeing her MD for sickle cell here. Has been having pain over the last 2 weeks and progressively getting worse.  Takes Oxycodone at home.  Last dose today at 11am. While here upstairs, she received 2 doses of Morphine.      Triage Assessment       Row Name 07/25/23 0721       Triage Assessment (Adult)    Airway WDL WDL       Respiratory WDL    Respiratory WDL WDL       Skin Circulation/Temperature WDL    Skin Circulation/Temperature WDL WDL       Cardiac WDL    Cardiac WDL WDL       Peripheral/Neurovascular WDL    Peripheral Neurovascular WDL WDL       Cognitive/Neuro/Behavioral WDL    Cognitive/Neuro/Behavioral WDL WDL

## 2023-07-25 NOTE — LETTER
"    7/25/2023         RE: Kelly Harris  [address] [facility] [address] [address]        Dear Colleague,    Thank you for referring your patient, Kelly Harris, to the [facility] [place]. Please see a copy of my visit note below.    Oncology Rooming Note    July 25, 2023 2:07 PM   Kelly Harris is a 52 year old female who presents for:    Chief Complaint   Patient presents with     Oncology Clinic Visit     Follow up appt     Initial Vitals: BP (!) 144/80 (BP Location: Left arm, Patient Position: Sitting, Cuff Size: Adult Regular)   Pulse 114   Temp 99.6  F (37.6  C) (Oral)   Resp 16   Ht 1.6 m (5' 3\")   Wt 67.8 kg (149 lb 6.4 oz)   LMP 04/12/2017   SpO2 98%   BMI 26.47 kg/m   Estimated body mass index is 26.47 kg/m  as calculated from the following:    Height as of this encounter: 1.6 m (5' 3\").    Weight as of this encounter: 67.8 kg (149 lb 6.4 oz). Body surface area is 1.74 meters squared.  Extreme Pain (8) Comment: Data Unavailable   Patient's last menstrual period was 04/12/2017.  Allergies reviewed: Yes  Medications reviewed: Yes    Medications: Medication refills not needed today.  Pharmacy name entered into [employer]:    [id number] [place], [place] - [address] [place] [address] [place] [address] [address]    Clinical concerns: Pain control and old IV site is painful.  Dr. Bartlett was notified.      Sussy Malin RN               [facility] Hematology and Oncology Progress Note    Patient: Kelly Harris  MRN: [id number]  Date of Service: Jul 25, 2023         Reason for Visit    Chief Complaint   Patient presents with     Oncology Clinic Visit     Follow up appt       Assessment and Plan    Sickle cell pain crisis  Scattered wheezing  History of hemoglobin SC disease    Patient with known history of hemoglobin SC disease.  Recently hospitalized with pain crisis but has continued " to have significant pain rated at 9 out of 10 and clearly in discomfort in the clinic today.    She has previously been free of pain crises and the need for opioid pain medications for several years.  No clear etiology for trigger for the current pain issues.  No evidence of infection.    We will give her IV hydration normal saline 500 mL in the office today.  We will give her IV morphine 5 mg x 2 doses 15 minutes apart.  We will reassess after that.  If pain is not significantly improved she will likely require hospitalization for further management.    Based on her history so far she would not likely be a candidate for hydroxyurea therapy.    45 minutes spent.    Plan: Start IV hydration  IV morphine in the clinic  Reassess after that, may require hospitalization    Patient given 2 doses of morphine and IV hydration with only minimal relief of pain.  Will send to the ER for further evaluation and management.  Did speak to ER physician and recommend at least a chest x-ray and basic blood work and then additional fluids and pain management.      ECOG Performance    1 - Can't do physically strenuous work, but fully ambyulatory and can do light sedentary work     Pain  Pain Score: Extreme Pain (8)  Pain Loc: Other - see comment (right side)              Encounter Diagnoses:    Problem List Items Addressed This Visit    None         CC: Arcenio Alcantara MD   ______________________________________________________________________________    History of Present Illness    Ms. Kelly Harris is a 52-year-old black female with previous known diagnosis of hemoglobin SC disease and asthma and remote history of ovarian vein thrombosis referred for recent hospitalization with sickle cell pain crisis.  She started to have some joint pain back in April and was treated with Tylenol.    She had worsening pain couple weeks ago involving the right chest wall and back and was hospitalized for management for 1 night.  Since discharge she  has continued to have significant pain.  Using oxycodone 5 mg about 3 times a day but has not been able to resume work.  She describes being cold but does not have fever.  Has had some headaches dizziness and lightheadedness.  No real shortness of breath or cough.  No urinary symptoms.  Some constipation likely related to her pain medication.    It has been several years since previous hospitalization or requiring narcotic pain medications to manage pain crisis.  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    No previous problems with root canal, 2 jaw tumor surgeries.     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  Has joint pains and crises every 2 to 3 months but manages with Tylenol and hydration.  No history of stroke.  Has had vision issues and does follow with ophthalmology.     No abdominal symptoms.  No change with bowels or urine.  No infectious problems.     She is originally from [place] [facility] [place] [facility].  She is single with 2 kids who also have hemoglobin SC disease.  She works as a nutritionist.     Other personal or family history of thrombosis.        Review of systems.  No fever or night sweats.  No loss of weight.  No lumps or bumps anywhere.  No unusual headaches or eyesight issues.  No dizziness.  No bleeding from the nose.  No sores in the mouth. No problems with swallowing.  No chest pain. No shortness of breath. No cough.  No abdominal pain. No nausea or vomiting.  No diarrhea or constipation.  No blood in stool or black colored stools.  No problems passing urine.  No numbness or tingling in hands or feet.  No skin rashes.  A 14 point review of systems is otherwise negative.        Past History    Past Medical History:   Diagnosis Date     Sickle cell pain crisis (H) 7/16/2023       Past Surgical History:   Procedure Laterality Date     MOUTH SURGERY  09/07/2019    Removed benign mouth tumor         Physical Exam    BP (!) 144/80 (BP Location: Left arm, Patient Position:  "Sitting, Cuff Size: Adult Regular)   Pulse 114   Temp 99.6  F (37.6  C) (Oral)   Resp 16   Ht 1.6 m (5' 3\")   Wt 67.8 kg (149 lb 6.4 oz)   LMP 04/12/2017   SpO2 98%   BMI 26.47 kg/m        GENERAL: Alert and oriented to time place and person. Seated comfortably. In no distress.    HEAD: Atraumatic and normocephalic.    EYES: SETH, EOMI.  No pallor.  No icterus.    Oral cavity: no mucosal lesion or tonsillar enlargement.    NECK: supple. JVP normal.  No thyroid enlargement.    LYMPH NODES: No palpable, cervical, axillary or inguinal lymphadenopathy.    CHEST: clear to auscultation bilaterally.  Resonant to percussion throughout bilaterally.  Symmetrical breath movements bilaterally.    CVS: S1 and S2 are heard. Regular rate and rhythm.  No murmur or gallop or rub heard.  No peripheral edema.    ABDOMEN: Soft. Not tender. Not distended.  No palpable hepatomegaly or splenomegaly.  No other mass palpable.  Bowel sounds heard.    EXTREMITIES: Warm.    SKIN: no rash, or bruising or purpura.  Has a full head of hair.    Lab Results    Recent Results (from the past 168 hour(s))   CBC with platelets   Result Value Ref Range    WBC Count 12.8 (H) 4.0 - 11.0 10e3/uL    RBC Count 4.50 3.80 - 5.20 10e6/uL    Hemoglobin 11.8 11.7 - 15.7 g/dL    Hematocrit 32.4 (L) 35.0 - 47.0 %    MCV 72 (L) 78 - 100 fL    MCH 26.2 (L) 26.5 - 33.0 pg    MCHC 36.4 31.5 - 36.5 g/dL    RDW 14.3 10.0 - 15.0 %    Platelet Count 351 150 - 450 10e3/uL   General PFT Lab (Please always keep checked)   Result Value Ref Range    FVC-Pred 2.99 L    FVC-Pre 1.91 L    FVC-%Pred-Pre 63 %    FEV1-Pre 1.62 L    FEV1-%Pred-Pre 66 %    FEV1FVC-Pred 81 %    FEV1FVC-Pre 84 %    FEFMax-Pred 6.47 L/sec    FEFMax-Pre 4.04 L/sec    FEFMax-%Pred-Pre 62 %    FEF2575-Pred 2.40 L/sec    FEF2575-Pre 2.06 L/sec    XVI8125-%Pred-Pre 85 %    ExpTime-Pre 6.43 sec    FIFMax-Pre 4.05 L/sec    VC-Pred 3.51 L    VC-Pre 1.86 L    VC-%Pred-Pre 53 %    IC-Pred 2.46 L    IC-Pre " 1.26 L    IC-%Pred-Pre 50 %    ERV-Pred 0.93 L    ERV-Pre 0.61 L    ERV-%Pred-Pre 65 %    FEV1FEV6-Pred 82 %    FEV1FEV6-Pre 84 %    FEV1SVC-Pred 69 %    FEV1SVC-Pre 87 %       Imaging    No results found.      Signed by: Shari Bartlett MD       Again, thank you for allowing me to participate in the care of your patient.        Sincerely,        Shari Bartlett MD

## 2023-07-25 NOTE — LETTER
August 2, 2023    RE:  Kelly Harris                              [address] [facility] [address]            To whom it may concern:    Kelly Harris was hospitalized from 7/25 - 8/2/2023. She may return to work on 8/7/2023.     When the patient returns to work, the following restrictions apply. She should not:  1) Lift, carry, push, and pull no more than:  0-10 lbs.  2) Should not work in areas with sharp objects.   3) Should not climb higher than 5ft.       Sincerely,  Honoree MD Radhames

## 2023-07-25 NOTE — PROGRESS NOTES
"Oncology Rooming Note    July 25, 2023 2:07 PM   Kelly Harris is a 52 year old female who presents for:    Chief Complaint   Patient presents with    Oncology Clinic Visit     Follow up appt     Initial Vitals: BP (!) 144/80 (BP Location: Left arm, Patient Position: Sitting, Cuff Size: Adult Regular)   Pulse 114   Temp 99.6  F (37.6  C) (Oral)   Resp 16   Ht 1.6 m (5' 3\")   Wt 67.8 kg (149 lb 6.4 oz)   LMP 04/12/2017   SpO2 98%   BMI 26.47 kg/m   Estimated body mass index is 26.47 kg/m  as calculated from the following:    Height as of this encounter: 1.6 m (5' 3\").    Weight as of this encounter: 67.8 kg (149 lb 6.4 oz). Body surface area is 1.74 meters squared.  Extreme Pain (8) Comment: Data Unavailable   Patient's last menstrual period was 04/12/2017.  Allergies reviewed: Yes  Medications reviewed: Yes    Medications: Medication refills not needed today.  Pharmacy name entered into [employer]:    [id number] - [place], [place] - [address] [place] [address] [place] [address] [address]    Clinical concerns: Pain control and old IV site is painful.  Dr. Bartlett was notified.      Sussy Malin RN             "

## 2023-07-26 ENCOUNTER — APPOINTMENT (OUTPATIENT)
Dept: CT IMAGING | Facility: HOSPITAL | Age: 53
DRG: 811 | End: 2023-07-26
Payer: COMMERCIAL

## 2023-07-26 LAB
ALBUMIN SERPL BCG-MCNC: 3.3 G/DL (ref 3.5–5.2)
ALP SERPL-CCNC: 90 U/L (ref 35–104)
ALT SERPL W P-5'-P-CCNC: 27 U/L (ref 0–50)
ANION GAP SERPL CALCULATED.3IONS-SCNC: 8 MMOL/L (ref 7–15)
AST SERPL W P-5'-P-CCNC: 29 U/L (ref 0–45)
BASOPHILS # BLD MANUAL: 0 10E3/UL (ref 0–0.2)
BASOPHILS NFR BLD MANUAL: 0 %
BILIRUB SERPL-MCNC: 0.6 MG/DL
BUN SERPL-MCNC: 6.9 MG/DL (ref 6–20)
CALCIUM SERPL-MCNC: 8.6 MG/DL (ref 8.6–10)
CHLORIDE SERPL-SCNC: 103 MMOL/L (ref 98–107)
CREAT SERPL-MCNC: 0.77 MG/DL (ref 0.51–0.95)
DEPRECATED HCO3 PLAS-SCNC: 23 MMOL/L (ref 22–29)
EOSINOPHIL # BLD MANUAL: 0 10E3/UL (ref 0–0.7)
EOSINOPHIL NFR BLD MANUAL: 0 %
ERV-%PRED-PRE: 65 %
ERV-PRE: 0.61 L
ERV-PRED: 0.93 L
ERYTHROCYTE [DISTWIDTH] IN BLOOD BY AUTOMATED COUNT: 13.9 % (ref 10–15)
EXPTIME-PRE: 6.43 SEC
FEF2575-%PRED-PRE: 85 %
FEF2575-PRE: 2.06 L/SEC
FEF2575-PRED: 2.4 L/SEC
FEFMAX-%PRED-PRE: 62 %
FEFMAX-PRE: 4.04 L/SEC
FEFMAX-PRED: 6.47 L/SEC
FEV1-%PRED-PRE: 66 %
FEV1-PRE: 1.62 L
FEV1FEV6-PRE: 84 %
FEV1FEV6-PRED: 82 %
FEV1FVC-PRE: 84 %
FEV1FVC-PRED: 81 %
FEV1SVC-PRE: 87 %
FEV1SVC-PRED: 69 %
FIFMAX-PRE: 4.05 L/SEC
FVC-%PRED-PRE: 63 %
FVC-PRE: 1.91 L
FVC-PRED: 2.99 L
GFR SERPL CREATININE-BSD FRML MDRD: >90 ML/MIN/1.73M2
GLUCOSE SERPL-MCNC: 117 MG/DL (ref 70–99)
HCT VFR BLD AUTO: 26.7 % (ref 35–47)
HGB BLD-MCNC: 9.4 G/DL (ref 11.7–15.7)
IC-%PRED-PRE: 50 %
IC-PRE: 1.26 L
IC-PRED: 2.46 L
LYMPHOCYTES # BLD MANUAL: 2.8 10E3/UL (ref 0.8–5.3)
LYMPHOCYTES NFR BLD MANUAL: 21 %
MCH RBC QN AUTO: 26.2 PG (ref 26.5–33)
MCHC RBC AUTO-ENTMCNC: 35.2 G/DL (ref 31.5–36.5)
MCV RBC AUTO: 74 FL (ref 78–100)
MONOCYTES # BLD MANUAL: 1.1 10E3/UL (ref 0–1.3)
MONOCYTES NFR BLD MANUAL: 8 %
NEUTROPHILS # BLD MANUAL: 9.6 10E3/UL (ref 1.6–8.3)
NEUTROPHILS NFR BLD MANUAL: 71 %
NRBC # BLD AUTO: 2.2 10E3/UL
NRBC BLD MANUAL-RTO: 16 %
PLAT MORPH BLD: ABNORMAL
PLATELET # BLD AUTO: 247 10E3/UL (ref 150–450)
POTASSIUM SERPL-SCNC: 4 MMOL/L (ref 3.4–5.3)
PROT SERPL-MCNC: 5.9 G/DL (ref 6.4–8.3)
RBC # BLD AUTO: 3.59 10E6/UL (ref 3.8–5.2)
RBC MORPH BLD: ABNORMAL
SODIUM SERPL-SCNC: 134 MMOL/L (ref 136–145)
TARGETS BLD QL SMEAR: SLIGHT
VC-%PRED-PRE: 53 %
VC-PRE: 1.86 L
VC-PRED: 3.51 L
WBC # BLD AUTO: 13.5 10E3/UL (ref 4–11)

## 2023-07-26 PROCEDURE — 250N000011 HC RX IP 250 OP 636: Mod: JZ

## 2023-07-26 PROCEDURE — 250N000013 HC RX MED GY IP 250 OP 250 PS 637

## 2023-07-26 PROCEDURE — 36415 COLL VENOUS BLD VENIPUNCTURE: CPT

## 2023-07-26 PROCEDURE — 71275 CT ANGIOGRAPHY CHEST: CPT

## 2023-07-26 PROCEDURE — 250N000011 HC RX IP 250 OP 636: Mod: JZ | Performed by: FAMILY MEDICINE

## 2023-07-26 PROCEDURE — 80053 COMPREHEN METABOLIC PANEL: CPT

## 2023-07-26 PROCEDURE — 258N000003 HC RX IP 258 OP 636

## 2023-07-26 PROCEDURE — 120N000001 HC R&B MED SURG/OB

## 2023-07-26 PROCEDURE — 250N000012 HC RX MED GY IP 250 OP 636 PS 637: Performed by: FAMILY MEDICINE

## 2023-07-26 PROCEDURE — 99223 1ST HOSP IP/OBS HIGH 75: CPT | Mod: AI | Performed by: FAMILY MEDICINE

## 2023-07-26 PROCEDURE — 85007 BL SMEAR W/DIFF WBC COUNT: CPT

## 2023-07-26 PROCEDURE — 85027 COMPLETE CBC AUTOMATED: CPT

## 2023-07-26 RX ORDER — NALOXONE HYDROCHLORIDE 0.4 MG/ML
0.4 INJECTION, SOLUTION INTRAMUSCULAR; INTRAVENOUS; SUBCUTANEOUS
Status: DISCONTINUED | OUTPATIENT
Start: 2023-07-26 | End: 2023-08-02 | Stop reason: HOSPADM

## 2023-07-26 RX ORDER — PREDNISONE 20 MG/1
40 TABLET ORAL DAILY
Status: COMPLETED | OUTPATIENT
Start: 2023-07-26 | End: 2023-07-30

## 2023-07-26 RX ORDER — ENOXAPARIN SODIUM 100 MG/ML
1 INJECTION SUBCUTANEOUS EVERY 12 HOURS
Status: DISCONTINUED | OUTPATIENT
Start: 2023-07-26 | End: 2023-07-29

## 2023-07-26 RX ORDER — NALOXONE HYDROCHLORIDE 0.4 MG/ML
0.2 INJECTION, SOLUTION INTRAMUSCULAR; INTRAVENOUS; SUBCUTANEOUS
Status: DISCONTINUED | OUTPATIENT
Start: 2023-07-26 | End: 2023-08-02 | Stop reason: HOSPADM

## 2023-07-26 RX ORDER — OXYCODONE HYDROCHLORIDE 5 MG/1
10 TABLET ORAL EVERY 4 HOURS PRN
Status: DISCONTINUED | OUTPATIENT
Start: 2023-07-26 | End: 2023-08-02 | Stop reason: HOSPADM

## 2023-07-26 RX ORDER — IOPAMIDOL 755 MG/ML
90 INJECTION, SOLUTION INTRAVASCULAR ONCE
Status: COMPLETED | OUTPATIENT
Start: 2023-07-26 | End: 2023-07-26

## 2023-07-26 RX ORDER — CETIRIZINE HYDROCHLORIDE 10 MG/1
10 TABLET ORAL ONCE
Status: COMPLETED | OUTPATIENT
Start: 2023-07-26 | End: 2023-07-26

## 2023-07-26 RX ADMIN — HYDROMORPHONE HYDROCHLORIDE 0.2 MG: 0.2 INJECTION, SOLUTION INTRAMUSCULAR; INTRAVENOUS; SUBCUTANEOUS at 07:50

## 2023-07-26 RX ADMIN — OXYCODONE HYDROCHLORIDE 10 MG: 5 TABLET ORAL at 17:04

## 2023-07-26 RX ADMIN — OXYCODONE HYDROCHLORIDE 5 MG: 5 TABLET ORAL at 00:58

## 2023-07-26 RX ADMIN — ACETAMINOPHEN 975 MG: 325 TABLET ORAL at 03:59

## 2023-07-26 RX ADMIN — OXYCODONE HYDROCHLORIDE 10 MG: 5 TABLET ORAL at 21:50

## 2023-07-26 RX ADMIN — PREDNISONE 40 MG: 20 TABLET ORAL at 10:33

## 2023-07-26 RX ADMIN — SODIUM CHLORIDE, POTASSIUM CHLORIDE, SODIUM LACTATE AND CALCIUM CHLORIDE: 600; 310; 30; 20 INJECTION, SOLUTION INTRAVENOUS at 04:10

## 2023-07-26 RX ADMIN — MONTELUKAST 10 MG: 10 TABLET, FILM COATED ORAL at 21:47

## 2023-07-26 RX ADMIN — ACETAMINOPHEN 975 MG: 325 TABLET ORAL at 19:00

## 2023-07-26 RX ADMIN — HYDROMORPHONE HYDROCHLORIDE 0.2 MG: 0.2 INJECTION, SOLUTION INTRAMUSCULAR; INTRAVENOUS; SUBCUTANEOUS at 03:59

## 2023-07-26 RX ADMIN — ACETAMINOPHEN 975 MG: 325 TABLET ORAL at 11:19

## 2023-07-26 RX ADMIN — CETIRIZINE HYDROCHLORIDE 10 MG: 10 TABLET, FILM COATED ORAL at 15:14

## 2023-07-26 RX ADMIN — SODIUM CHLORIDE, POTASSIUM CHLORIDE, SODIUM LACTATE AND CALCIUM CHLORIDE 500 ML: 600; 310; 30; 20 INJECTION, SOLUTION INTRAVENOUS at 11:19

## 2023-07-26 RX ADMIN — OXYCODONE HYDROCHLORIDE 10 MG: 5 TABLET ORAL at 13:28

## 2023-07-26 RX ADMIN — HYDROMORPHONE HYDROCHLORIDE 0.2 MG: 0.2 INJECTION, SOLUTION INTRAMUSCULAR; INTRAVENOUS; SUBCUTANEOUS at 11:58

## 2023-07-26 RX ADMIN — OXYCODONE HYDROCHLORIDE 5 MG: 5 TABLET ORAL at 09:35

## 2023-07-26 RX ADMIN — SODIUM CHLORIDE, POTASSIUM CHLORIDE, SODIUM LACTATE AND CALCIUM CHLORIDE: 600; 310; 30; 20 INJECTION, SOLUTION INTRAVENOUS at 18:54

## 2023-07-26 RX ADMIN — IOPAMIDOL 90 ML: 755 INJECTION, SOLUTION INTRAVENOUS at 11:48

## 2023-07-26 RX ADMIN — OXYCODONE HYDROCHLORIDE 5 MG: 5 TABLET ORAL at 05:35

## 2023-07-26 RX ADMIN — ENOXAPARIN SODIUM 70 MG: 80 INJECTION SUBCUTANEOUS at 13:27

## 2023-07-26 ASSESSMENT — ACTIVITIES OF DAILY LIVING (ADL)
ADLS_ACUITY_SCORE: 22
ADLS_ACUITY_SCORE: 22
ADLS_ACUITY_SCORE: 37
CHANGE_IN_FUNCTIONAL_STATUS_SINCE_ONSET_OF_CURRENT_ILLNESS/INJURY: NO
CONCENTRATING,_REMEMBERING_OR_MAKING_DECISIONS_DIFFICULTY: NO
TOILETING_ISSUES: NO
ADLS_ACUITY_SCORE: 22
DRESSING/BATHING_DIFFICULTY: NO
ADLS_ACUITY_SCORE: 25
ADLS_ACUITY_SCORE: 22
DIFFICULTY_EATING/SWALLOWING: NO
ADLS_ACUITY_SCORE: 24
ADLS_ACUITY_SCORE: 23
WALKING_OR_CLIMBING_STAIRS_DIFFICULTY: NO
WEAR_GLASSES_OR_BLIND: YES
ADLS_ACUITY_SCORE: 37
ADLS_ACUITY_SCORE: 25
FALL_HISTORY_WITHIN_LAST_SIX_MONTHS: NO
DOING_ERRANDS_INDEPENDENTLY_DIFFICULTY: NO
ADLS_ACUITY_SCORE: 23
ADLS_ACUITY_SCORE: 24

## 2023-07-26 NOTE — PROGRESS NOTES
Call placed to  as unable to reach Dr Ricci via [employer] msg'ing. Updated re CTA test results per call from radiologist who spoke to this RN. Pt stable on O2 2L at this time. Pain status remains unchanged.

## 2023-07-26 NOTE — PLAN OF CARE
"  Problem: Plan of Care - These are the overarching goals to be used throughout the patient stay.    Goal: Plan of Care Review  Description: The Plan of Care Review/Shift note should be completed every shift.  The Outcome Evaluation is a brief statement about your assessment that the patient is improving, declining, or no change.  This information will be displayed automatically on your shift note.  7/26/2023 1559 by Miya Shirley RN  Outcome: Progressing  7/26/2023 1558 by Miya Shirley RN  Outcome: Progressing  Goal: Patient-Specific Goal (Individualized)  Description: You can add care plan individualizations to a care plan. Examples of Individualization might be:  \"Parent requests to be called daily at 9am for status\", \"I have a hard time hearing out of my right ear\", or \"Do not touch me to wake me up as it startles me\".  7/26/2023 1559 by Miya Shirley RN  Outcome: Progressing  7/26/2023 1558 by Miya Shirley RN  Outcome: Progressing  Goal: Absence of Hospital-Acquired Illness or Injury  7/26/2023 1559 by Miya Shirley RN  Outcome: Progressing  7/26/2023 1558 by Miya Shirley RN  Outcome: Progressing  Intervention: Prevent Skin Injury  Recent Flowsheet Documentation  Taken 7/26/2023 0925 by Miya Shirley RN  Body Position: position changed independently  Goal: Optimal Comfort and Wellbeing  7/26/2023 1559 by Miya Shirley RN  Outcome: Progressing  7/26/2023 1558 by Miya Shirley RN  Outcome: Progressing  Intervention: Monitor Pain and Promote Comfort  Recent Flowsheet Documentation  Taken 7/26/2023 1325 by Miya Shirley RN  Pain Management Interventions: medication (see MAR)  Taken 7/26/2023 0935 by Miya Shirley RN  Pain Management Interventions: medication (see MAR)  Taken 7/26/2023 0819 by Miya Shirley RN  Pain Management Interventions:   medication (see MAR)   repositioned  Goal: Readiness for Transition of Care  7/26/2023 1559 by " Miya Shirley RN  Outcome: Progressing  7/26/2023 1558 by Miya Shirley RN  Outcome: Progressing     Problem: Pain Acute  Goal: Optimal Pain Control and Function  7/26/2023 1559 by Miya Shirley RN  Outcome: Progressing  7/26/2023 1558 by Miya Shirley RN  Outcome: Progressing  Intervention: Develop Pain Management Plan  Recent Flowsheet Documentation  Taken 7/26/2023 1325 by Miya Shirley RN  Pain Management Interventions: medication (see MAR)  Taken 7/26/2023 0935 by Miya hSirley RN  Pain Management Interventions: medication (see MAR)  Taken 7/26/2023 0819 by Miya Shirley RN  Pain Management Interventions:   medication (see MAR)   repositioned     Problem: Gas Exchange Impaired  Goal: Optimal Gas Exchange  Outcome: Progressing  Intervention: Optimize Oxygenation and Ventilation  Recent Flowsheet Documentation  Taken 7/26/2023 0925 by Miya Shirley RN  Head of Bed (HOB) Positioning: HOB at 45 degrees   Goal Outcome Evaluation:  Pt had slight improvement with pain control after increase in oxycodone dosing. Strongest pain site is right rib area deep inside. Continue to monitor. Pt needing O2 2L NC due to SOB with exertion and speaking. Tolerated CTA test late am.

## 2023-07-26 NOTE — PROGRESS NOTES
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     Sickle Cell Disease  Pain Crisis  Bilateral PE on CTA  Patient admitted from hematology clinic for sickle cell pain crisis after minimal improvement with IV hydration with 500mL NS and morphine 5mg IV x 2 in the office. Of note, patient was admitted to [facility] 7/15 for pain control of a similar episode w/o clear trigger and discharged with PO oxycodone. Initial workup has been unremarkable thus far. CXR neg and EKG w/o acute ischemic changes. Infectious etiologies remain on the differential, however, patient has been afebrile, but WBC elevated - could also be related to stress vs steroids. Currently on 2L O2 NC. Patient is on continuous IV hydration and pain control w/scheduled tylenol, oxycodone, and dilaudid for breakthrough pain. This works for a short while, but pain returns to 10/10, especially worse on the R side of her entire body. She also endorsed a sharp sensation in R lower chest and increasing dyspnea raised concerns for acute chest syndrome vs PE, so a CT chest was ordered and it showed bilateral PEs. Acute sickle cell pain crisis was most likely triggered by hypoxia in setting of asthma exacerbation discussed below. PE likely contributing to this as well.   - LMWH 70mg Q12h  - IV hydration with maintenance fluids - LR @125 mls/hr  - LR bolus IV 500mL x1  - Scheduled Tylenol 975 mg Q8hrs  - Increase Oxycodone 10 mg Q4hrs PRN  - Dilaudid 0.4 mg IV Q2hrs PRN for breakthrough pain  - CBC and CMP in AM     Asthma exacerbation  Patient was diagnosed with asthma in childhood  "and this has been mostly managed by her PCP. Per patient, she was changed to symbicort for a rescue inhaler in 2022 and asthma has not been well-controlled since. Initially attributed this to winter as she gets better in the summer, but this season has not been good for her either. She saw a pulmonologist for the first time in many years yesterday, where they changed her asthma regimen and extended her prednisone taper. Continues to have diffuse wheezing on lung auscultation and she endorses hoarse voice, but able to speak in full sentences. Satting in the mid-90s on 2L O2 NC.   - RT consult, recs appreciated   - Influenza, RSV, Covid negative   - Follow bcx  - Supplemental O2 as needed   - Duonebs Q2 hr PRN  - continue PTA Singulair  - Start prednisone 40mg daily      Constipation  Patient has been on opioids for 2+ weeks. Patient denies any prior issues with constipation. Denies any blood in stool, nausea, vomiting. Likely constipation secondary to opioid use.  - Scheduled Miralax  - Senna PRN        Diet: Combination Diet Regular Diet Adult    DVT Prophylaxis: Enoxaparin (Lovenox) SQ  Reyes Catheter: Not present  Lines: None     Cardiac Monitoring: None  Code Status: Full Code      Clinically Significant Risk Factors Present on Admission              # Hypoalbuminemia: Lowest albumin = 3.3 g/dL at 7/26/2023  5:22 AM, will monitor as appropriate          # Overweight: Estimated body mass index is 26.39 kg/m  as calculated from the following:    Height as of this encounter: 1.6 m (5' 3\").    Weight as of this encounter: 67.6 kg (149 lb).            Disposition Plan      Expected Discharge Date: 07/27/2023    Discharge Delays: IV Medication - consider oral or Home Infusion            The patient's care was discussed with the Attending Physician, Dr. Smith .    Eladia Ricci MD  Hospitalist Service  [facility]  Securely message with [employer] (more info)  Text page via [employer] Paging/Directory " "  ______________________________________________________________________    Interval History   R-sided body pain, especially in R lower chest (sharp, \"poking\"). Rates as 7/10 after oxycodone, but goes back to a 10/10 after ~30min.   Hoarse voice - this is not normal for her. Endorses some SOB but attributes to anxiety.   Has not had pain crisis in past 5 years - most of the time, it is manageable with PO fluids and tylenol. Has not had a pain crisis like this before. Received blood transfusion only when she was giving birth to her daughters.   Menopausal for ~4 years. Denies abnormal vaginal bleeding.   No recent sick contacts. Asthma has not been well-controlled since 2022 when her PCP switched her to symbicort. Asthma is worse in winter, but usually gets better in summer. Asthma has been difficult to control this summer, which is why she was referred to a pulmonologist.   Endorses nausea w/o vomiting, neck ache. Denies fevers/chills and abdominal pain. Increased urination frequency related to increase in fluid intake. Nurse said urine looks darker.     Physical Exam   Vital Signs: Temp: 98.7  F (37.1  C) Temp src: Oral BP: 107/52 Pulse: 69   Resp: 18 SpO2: 91 % O2 Device: Nasal cannula Oxygen Delivery: 2 LPM  Weight: 149 lbs 0 oz    Physical Exam  Constitutional:       General: She is not in acute distress.     Comments: Appears uncomfortable, speaking in full sentences   HENT:      Head: Normocephalic and atraumatic.   Cardiovascular:      Rate and Rhythm: Normal rate and regular rhythm.      Heart sounds: No murmur heard.     No friction rub. No gallop.   Pulmonary:      Breath sounds: Wheezing present.   Abdominal:      General: Abdomen is flat. There is no distension.      Palpations: Abdomen is soft. There is no mass.      Tenderness: There is no abdominal tenderness.   Musculoskeletal:         General: Normal range of motion.      Cervical back: No tenderness.      Right lower leg: No edema.      Left lower " leg: No edema.   Skin:     General: Skin is warm and dry.   Neurological:      General: No focal deficit present.      Mental Status: She is oriented to person, place, and time.   Psychiatric:         Thought Content: Thought content normal.         Judgment: Judgment normal.      Comments: Anxious affect

## 2023-07-26 NOTE — PLAN OF CARE
Problem: Plan of Care - These are the overarching goals to be used throughout the patient stay.    Goal: Plan of Care Review  Description: The Plan of Care Review/Shift note should be completed every shift.  The Outcome Evaluation is a brief statement about your assessment that the patient is improving, declining, or no change.  This information will be displayed automatically on your shift note.  Outcome: Progressing     Problem: Pain Acute  Goal: Optimal Pain Control and Function  Outcome: Progressing   Goal Outcome Evaluation:       Pt alert and oriented, able to make needs known. Prn oxycodone 5mg and dilaudid given for pain, generalized all over her body. IVF LR running at 125ml/hr. Ambulated to bathroom, standby assist. On room air, VSS. Will continue to monitor.

## 2023-07-26 NOTE — PLAN OF CARE
Problem: Plan of Care - These are the overarching goals to be used throughout the patient stay.    Goal: Absence of Hospital-Acquired Illness or Injury  Intervention: Prevent Skin Injury  Recent Flowsheet Documentation  Taken 7/26/2023 1550 by Steffi Kuhn RN  Body Position:   position changed independently   side-lying 30 degrees     Problem: Plan of Care - These are the overarching goals to be used throughout the patient stay.    Goal: Optimal Comfort and Wellbeing  Intervention: Monitor Pain and Promote Comfort  Recent Flowsheet Documentation  Taken 7/26/2023 1757 by Steffi Kuhn RN  Pain Management Interventions:   distraction   emotional support   pillow support provided   quiet environment facilitated   rest  Taken 7/26/2023 1704 by Steffi Kuhn RN  Pain Management Interventions: medication (see MAR)  Taken 7/26/2023 1550 by Steffi Kuhn RN  Pain Management Interventions:   distraction   declines     Problem: Pain Acute  Goal: Optimal Pain Control and Function  Intervention: Develop Pain Management Plan  Recent Flowsheet Documentation  Taken 7/26/2023 1757 by Steffi Kuhn RN  Pain Management Interventions:   distraction   emotional support   pillow support provided   quiet environment facilitated   rest  Taken 7/26/2023 1704 by Steffi Kuhn RN  Pain Management Interventions: medication (see MAR)  Taken 7/26/2023 1550 by Steffi Kuhn RN  Pain Management Interventions:   distraction   declines     Problem: Pain Acute  Goal: Optimal Pain Control and Function  Intervention: Prevent or Manage Pain  Recent Flowsheet Documentation  Taken 7/26/2023 1550 by Steffi Kuhn RN  Medication Review/Management: medications reviewed     Problem: Gas Exchange Impaired  Goal: Optimal Gas Exchange  Intervention: Optimize Oxygenation and Ventilation  Recent Flowsheet Documentation  Taken 7/26/2023 1550 by Steffi Kuhn RN  Head of Bed (HOB) Positioning: HOB at  30 degrees   Goal Outcome Evaluation:  Patient alert and oriented x4. Moving with stand by assist. Regular diet with fair intake. Vitals stable on 2 L of oxygen via nasal cannula. LR running at 125 mL/hr. Shortness of breath with activity. Pain in right side extremities and right ribs area managed using PRN 10 mg oxycodone and scheduled tylenol.

## 2023-07-26 NOTE — UTILIZATION REVIEW
Admission Status; Secondary Review Determination   Under the authority of the Utilization Management Committee, the utilization review process indicated a secondary review on Kelly Harris. The review outcome is based on review of the medical records, discussions with staff, and applying clinical experience noted on the date of the review.   (x) Inpatient Status Appropriate - This patient's medical care is consistent with medical management for inpatient care and reasonable inpatient medical practice.     RATIONALE FOR DETERMINATION   52-year-old female with history of hemoglobin SC disease admitted otology clinic with sickle cell pain crisis and asthma exacerbation.  Had suboptimal pain control on outpatient pain medication.  Thought acute sickle cell pain crisis most likely triggered by hypoxia in the setting of asthma exacerbation.  Currently on 2 L of oxygen.  Has received at least 6 doses of IV narcotic in the last 24 hours and at least 4 doses of oral oxycodone thus far during that time period.    At the time of admission with the information available to the attending physician more than 2 nights Hospital complex care was anticipated, based on patient risk of adverse outcome if treated as outpatient and complex care required. Inpatient admission is appropriate based on the Medicare guidelines.   The information on this document is developed by the utilization review team in order for the business office to ensure compliance. This only denotes the appropriateness of proper admission status and does not reflect the quality of care rendered.   The definitions of Inpatient Status and Observation Status used in making the determination above are those provided in the CMS Coverage Manual, Chapter 1 and Chapter 6, section 70.4.   Sincerely,   Petr Munoz MD  Utilization Review  Physician Advisor  [facility]

## 2023-07-27 LAB
ALBUMIN SERPL BCG-MCNC: 3 G/DL (ref 3.5–5.2)
ALP SERPL-CCNC: 100 U/L (ref 35–104)
ALT SERPL W P-5'-P-CCNC: 38 U/L (ref 0–50)
ANION GAP SERPL CALCULATED.3IONS-SCNC: 13 MMOL/L (ref 7–15)
AST SERPL W P-5'-P-CCNC: 40 U/L (ref 0–45)
ATRIAL RATE - MUSE: 89 BPM
BASOPHILS # BLD MANUAL: 0 10E3/UL (ref 0–0.2)
BASOPHILS NFR BLD MANUAL: 0 %
BILIRUB SERPL-MCNC: 0.3 MG/DL
BUN SERPL-MCNC: 7.9 MG/DL (ref 6–20)
CALCIUM SERPL-MCNC: 8.9 MG/DL (ref 8.6–10)
CHLORIDE SERPL-SCNC: 103 MMOL/L (ref 98–107)
CREAT SERPL-MCNC: 0.84 MG/DL (ref 0.51–0.95)
DACRYOCYTES BLD QL SMEAR: SLIGHT
DEPRECATED HCO3 PLAS-SCNC: 22 MMOL/L (ref 22–29)
DIASTOLIC BLOOD PRESSURE - MUSE: NORMAL MMHG
EOSINOPHIL # BLD MANUAL: 0 10E3/UL (ref 0–0.7)
EOSINOPHIL NFR BLD MANUAL: 0 %
ERYTHROCYTE [DISTWIDTH] IN BLOOD BY AUTOMATED COUNT: 14 % (ref 10–15)
GFR SERPL CREATININE-BSD FRML MDRD: 83 ML/MIN/1.73M2
GLUCOSE SERPL-MCNC: 119 MG/DL (ref 70–99)
HCT VFR BLD AUTO: 25.9 % (ref 35–47)
HGB BLD-MCNC: 9.1 G/DL (ref 11.7–15.7)
INTERPRETATION ECG - MUSE: NORMAL
LYMPHOCYTES # BLD MANUAL: 3.3 10E3/UL (ref 0.8–5.3)
LYMPHOCYTES NFR BLD MANUAL: 23 %
MCH RBC QN AUTO: 25.9 PG (ref 26.5–33)
MCHC RBC AUTO-ENTMCNC: 35.1 G/DL (ref 31.5–36.5)
MCV RBC AUTO: 74 FL (ref 78–100)
MONOCYTES # BLD MANUAL: 0.6 10E3/UL (ref 0–1.3)
MONOCYTES NFR BLD MANUAL: 4 %
NEUTROPHILS # BLD MANUAL: 10.5 10E3/UL (ref 1.6–8.3)
NEUTROPHILS NFR BLD MANUAL: 73 %
NRBC # BLD AUTO: 1.3 10E3/UL
NRBC BLD MANUAL-RTO: 9 %
P AXIS - MUSE: 70 DEGREES
PLAT MORPH BLD: ABNORMAL
PLATELET # BLD AUTO: 206 10E3/UL (ref 150–450)
POTASSIUM SERPL-SCNC: 4.1 MMOL/L (ref 3.4–5.3)
PR INTERVAL - MUSE: 140 MS
PROT SERPL-MCNC: 6 G/DL (ref 6.4–8.3)
QRS DURATION - MUSE: 68 MS
QT - MUSE: 338 MS
QTC - MUSE: 411 MS
R AXIS - MUSE: 69 DEGREES
RBC # BLD AUTO: 3.51 10E6/UL (ref 3.8–5.2)
RBC MORPH BLD: ABNORMAL
SODIUM SERPL-SCNC: 138 MMOL/L (ref 136–145)
SYSTOLIC BLOOD PRESSURE - MUSE: NORMAL MMHG
T AXIS - MUSE: 53 DEGREES
TARGETS BLD QL SMEAR: ABNORMAL
VENTRICULAR RATE- MUSE: 89 BPM
WBC # BLD AUTO: 14.4 10E3/UL (ref 4–11)

## 2023-07-27 PROCEDURE — 36415 COLL VENOUS BLD VENIPUNCTURE: CPT

## 2023-07-27 PROCEDURE — 250N000011 HC RX IP 250 OP 636: Mod: JZ

## 2023-07-27 PROCEDURE — 250N000013 HC RX MED GY IP 250 OP 250 PS 637

## 2023-07-27 PROCEDURE — 250N000012 HC RX MED GY IP 250 OP 636 PS 637: Performed by: FAMILY MEDICINE

## 2023-07-27 PROCEDURE — 258N000003 HC RX IP 258 OP 636

## 2023-07-27 PROCEDURE — 85027 COMPLETE CBC AUTOMATED: CPT

## 2023-07-27 PROCEDURE — 99232 SBSQ HOSP IP/OBS MODERATE 35: CPT | Mod: GC | Performed by: FAMILY MEDICINE

## 2023-07-27 PROCEDURE — 120N000001 HC R&B MED SURG/OB

## 2023-07-27 PROCEDURE — 85007 BL SMEAR W/DIFF WBC COUNT: CPT

## 2023-07-27 PROCEDURE — 80053 COMPREHEN METABOLIC PANEL: CPT

## 2023-07-27 RX ADMIN — OXYCODONE HYDROCHLORIDE 10 MG: 5 TABLET ORAL at 13:55

## 2023-07-27 RX ADMIN — ENOXAPARIN SODIUM 70 MG: 80 INJECTION SUBCUTANEOUS at 00:23

## 2023-07-27 RX ADMIN — ACETAMINOPHEN 975 MG: 325 TABLET ORAL at 19:50

## 2023-07-27 RX ADMIN — OXYCODONE HYDROCHLORIDE 10 MG: 5 TABLET ORAL at 08:05

## 2023-07-27 RX ADMIN — OXYCODONE HYDROCHLORIDE 10 MG: 5 TABLET ORAL at 18:46

## 2023-07-27 RX ADMIN — SODIUM CHLORIDE, POTASSIUM CHLORIDE, SODIUM LACTATE AND CALCIUM CHLORIDE: 600; 310; 30; 20 INJECTION, SOLUTION INTRAVENOUS at 03:53

## 2023-07-27 RX ADMIN — PREDNISONE 40 MG: 20 TABLET ORAL at 08:05

## 2023-07-27 RX ADMIN — SODIUM CHLORIDE, POTASSIUM CHLORIDE, SODIUM LACTATE AND CALCIUM CHLORIDE: 600; 310; 30; 20 INJECTION, SOLUTION INTRAVENOUS at 11:18

## 2023-07-27 RX ADMIN — ACETAMINOPHEN 975 MG: 325 TABLET ORAL at 11:18

## 2023-07-27 RX ADMIN — SODIUM CHLORIDE, POTASSIUM CHLORIDE, SODIUM LACTATE AND CALCIUM CHLORIDE: 600; 310; 30; 20 INJECTION, SOLUTION INTRAVENOUS at 19:50

## 2023-07-27 RX ADMIN — ENOXAPARIN SODIUM 70 MG: 80 INJECTION SUBCUTANEOUS at 12:38

## 2023-07-27 RX ADMIN — HYDROMORPHONE HYDROCHLORIDE 0.2 MG: 0.2 INJECTION, SOLUTION INTRAMUSCULAR; INTRAVENOUS; SUBCUTANEOUS at 00:21

## 2023-07-27 RX ADMIN — ACETAMINOPHEN 975 MG: 325 TABLET ORAL at 03:52

## 2023-07-27 RX ADMIN — MONTELUKAST 10 MG: 10 TABLET, FILM COATED ORAL at 21:22

## 2023-07-27 ASSESSMENT — ACTIVITIES OF DAILY LIVING (ADL)
ADLS_ACUITY_SCORE: 23
ADLS_ACUITY_SCORE: 22
ADLS_ACUITY_SCORE: 22
ADLS_ACUITY_SCORE: 24
ADLS_ACUITY_SCORE: 23
ADLS_ACUITY_SCORE: 24
ADLS_ACUITY_SCORE: 22
ADLS_ACUITY_SCORE: 23

## 2023-07-27 NOTE — PROGRESS NOTES
Care Management Follow Up    Length of Stay (days): 2    Expected Discharge Date: 07/27/2023     Concerns to be Addressed:     Care Progression  Patient plan of care discussed at interdisciplinary rounds: Yes    Anticipated Discharge Disposition:  TBD     Anticipated Discharge Services:  NA  Anticipated Discharge DME:  EDUIN    Patient/family educated on Medicare website which has current facility and service quality ratings:  NA  Education Provided on the Discharge Plan:  NA  Patient/Family in Agreement with the Plan:  NA    Referrals Placed by CM/SW:  EDUIN  Private pay costs discussed: Not applicable    Additional Information:  Chart reviewed.     Social HX: Independent, from home, family support. Family transport at time of discharge.     CM will continue to follow care progression and aide in discharge planning as needed.     Maryellen Garcia RN

## 2023-07-27 NOTE — PLAN OF CARE
Problem: Pain Acute  Goal: Optimal Pain Control and Function  Outcome: Progressing  Intervention: Develop Pain Management Plan  Recent Flowsheet Documentation  Taken 7/27/2023 1620 by Miya Shirley, RN  Pain Management Interventions: (5 is tolerable)   emotional support   pillow support provided   quiet environment facilitated   repositioned   rest  Taken 7/27/2023 1117 by Miya Shirley, RN  Pain Management Interventions: medication (see MAR)  Taken 7/27/2023 0802 by Miya Shirley, RN  Pain Management Interventions: medication (see MAR)     Problem: Gas Exchange Impaired  Goal: Optimal Gas Exchange  Outcome: Progressing  Intervention: Optimize Oxygenation and Ventilation  Recent Flowsheet Documentation  Taken 7/27/2023 1620 by Miya Shirley, RN  Head of Bed (HOB) Positioning: HOB at 30 degrees  Taken 7/27/2023 0800 by Miya Shirley, RN  Head of Bed (HOB) Positioning: HOB at 45 degrees   Goal Outcome Evaluation:  Pt less SOB at rest and with activity. Continues with 2LNC sating 99%. Improved pain control to rt side deep rib area and RU/LE's with oxycodone 10mg Q4hrs prn. Pt eating/drinking more today, fluids encouraged.

## 2023-07-27 NOTE — PLAN OF CARE
Problem: Pain Acute  Goal: Optimal Pain Control and Function  Outcome: Progressing  Intervention: Develop Pain Management Plan  Recent Flowsheet Documentation  Taken 7/27/2023 0352 by Sudha Davis RN  Pain Management Interventions: medication (see MAR)  Taken 7/27/2023 0021 by Sudha Davis RN  Pain Management Interventions: medication (see MAR)  Intervention: Prevent or Manage Pain  Recent Flowsheet Documentation  Taken 7/27/2023 0021 by Sudha Davis RN  Medication Review/Management: medications reviewed     Problem: Gas Exchange Impaired  Goal: Optimal Gas Exchange  Outcome: Progressing  Intervention: Optimize Oxygenation and Ventilation  Recent Flowsheet Documentation  Taken 7/27/2023 0400 by Sudha Davis RN  Head of Bed (HOB) Positioning: HOB at 30-45 degrees  Taken 7/27/2023 0021 by Sudha Davis RN  Head of Bed (HOB) Positioning: HOB at 30-45 degrees   Goal Outcome Evaluation:  No acute events overnight.  Pt slept between cares.  Pain managed with Tylenol Oxycodone and dilaudid.  Iv fluids infusing without in incident.

## 2023-07-27 NOTE — PLAN OF CARE
Problem: Plan of Care - These are the overarching goals to be used throughout the patient stay.    Goal: Absence of Hospital-Acquired Illness or Injury  Intervention: Identify and Manage Fall Risk  Recent Flowsheet Documentation  Taken 7/26/2023 2000 by Kelvin Conklin RN  Safety Promotion/Fall Prevention:   assistive device/personal items within reach   clutter free environment maintained   lighting adjusted   nonskid shoes/slippers when out of bed     Problem: Plan of Care - These are the overarching goals to be used throughout the patient stay.    Goal: Absence of Hospital-Acquired Illness or Injury  Intervention: Prevent Skin Injury  Recent Flowsheet Documentation  Taken 7/26/2023 2000 by Kelvin Conklin RN  Body Position: position changed independently     Problem: Pain Acute  Goal: Optimal Pain Control and Function  Intervention: Prevent or Manage Pain  Recent Flowsheet Documentation  Taken 7/26/2023 2000 by Kelvin Conklin RN  Medication Review/Management:   medications reviewed   high-risk medications identified     Problem: Gas Exchange Impaired  Goal: Optimal Gas Exchange  Intervention: Optimize Oxygenation and Ventilation  Recent Flowsheet Documentation  Taken 7/26/2023 2000 by Kelvin Conklin RN  Head of Bed (HOB) Positioning: HOB at 20-30 degrees   Goal Outcome Evaluation:       Report given to Sudha SCHUMACHER all questions answered.

## 2023-07-27 NOTE — PROGRESS NOTES
[facility]    Medicine Progress Note - Hospitalist Service    Date of Admission:  7/25/2023    Assessment & Plan   Kelly Harris is a 52 year old female w/PMHx of of Hemoglobin SC disease and asthma with recent hospitalization for sickle cell pain crisis and is admitted 7/25/2023 for another sickle cell disease with pain crisis, fth have bilateral PEs on CT chest, now on LMWH and supplemental O2.     Sickle Cell Disease  Pain Crisis  Bilateral PE on CTA  Patient admitted from hematology clinic for sickle cell pain crisis. Of note, patient was admitted to [facility] 7/15 for pain control of a similar episode w/o clear trigger and discharged with PO oxycodone. Suspect that pain crisis was caused by a combination of asthma exacerbation discussed below and bilateral PE's found on CT chest 7/26. Started on LWMH. She continues to be on 2L O2 NC, satting in the high 90s. Patient reports improvement in pain - has not needed dilaudid and oxycodone overnight, but R-side of body still continues to ache. R lower chest pain intermittent, but less frequent than yesterday. Will continue w/mIVF and current pain regimen and continue to monitor.  - LMWH 70mg IV Q12h  - IV hydration with maintenance fluids - LR @125 mls/hr  - Scheduled Tylenol 975 mg Q8hrs  - Oxycodone 10 mg Q4hrs PRN  - Dilaudid 0.4 mg IV Q2hrs PRN for breakthrough pain  - CBC and CMP in AM     Asthma exacerbation  Patient was diagnosed with asthma in childhood and this has been mostly managed by her PCP. Per patient, she was changed to symbicort for a rescue inhaler in 2022 and asthma has not been well-controlled since. This likely led to an exacerbation in setting of sickle cell disease. She saw pulmonology on 7/25 w/changes to her regimen and an extended prednisone taper. Sats in the high 90s - 100 on 2L O2 NC, normal WOB, and less wheezing on physical exam today, which is reassuring.   - RT following, recs appreciated   -  "Influenza, RSV, Covid negative   - Follow bcx - NGTD  - Supplemental O2 as needed   - Duonebs Q2 hr PRN  - Continue PTA Singulair  - Prednisone 40mg daily (7/26 - current)     Constipation  Likely constipation secondary to opioid use. Has been on opioids for pain control for 2+ weeks. 1 small BM overnight.   - Scheduled Miralax  - Senna PRN        Diet: Combination Diet Regular Diet Adult    DVT Prophylaxis: On therapeutic lovenox for bilateral PEs  Reyes Catheter: Not present  Lines: None     Cardiac Monitoring: None  Code Status: Full Code      Clinically Significant Risk Factors              # Hypoalbuminemia: Lowest albumin = 3 g/dL at 7/27/2023  5:50 AM, will monitor as appropriate            # Overweight: Estimated body mass index is 26.39 kg/m  as calculated from the following:    Height as of this encounter: 1.6 m (5' 3\").    Weight as of this encounter: 67.6 kg (149 lb).  , PRESENT ON ADMISSION          Disposition Plan      Expected Discharge Date: 07/27/2023    Discharge Delays: IV Medication - consider oral or Home Infusion  Oxygen Needs - Arrange Home O2            The patient's care was discussed with the Attending Physician, Dr. Smith .    Eladia Ricci MD  Hospitalist Service  [facility]  Securely message with [employer] (more info)  Text page via [employer] Paging/Directory   ______________________________________________________________________    Interval History   Improvement in breathing this AM. Feels less SOB on O2 NC.   Slept okay overnight. R-sided body pain improved - only aching and needing tylenol. R lower chest pain intermittent, but less frequent than yesterday.  Denies HA/lightheadedness, fevers/chills, and abdominal pain. 1 BM overnight.   Has been trying to eat more - had a salad last night.   Daughter brought [facility] paperwork to be filled out.     Physical Exam   Vital Signs: Temp: 98.1  F (36.7  C) Temp src: Oral BP: (!) 159/83 (RN notified) Pulse: 69   Resp: 18 " SpO2: 100 % O2 Device: Nasal cannula Oxygen Delivery: 2 LPM  Weight: 149 lbs 0 oz    Physical Exam  Constitutional:       General: She is not in acute distress.     Comments: Sitting reclined comfortable, speaking in full sentences   HENT:      Head: Normocephalic and atraumatic.   Cardiovascular:      Rate and Rhythm: Normal rate and regular rhythm.      Heart sounds: No murmur heard.     No friction rub. No gallop.   Pulmonary:      Effort: Pulmonary effort is normal.      Comments: On 2L O2 NC. Expiratory wheezing.  Abdominal:      General: Abdomen is flat. There is no distension.      Palpations: Abdomen is soft. There is no mass.      Tenderness: There is no abdominal tenderness.   Musculoskeletal:         General: Normal range of motion.      Cervical back: No tenderness.      Right lower leg: No edema.      Left lower leg: No edema.   Skin:     General: Skin is warm and dry.   Neurological:      General: No focal deficit present.      Mental Status: She is oriented to person, place, and time.   Psychiatric:         Thought Content: Thought content normal.         Judgment: Judgment normal.      Comments: Anxious affect

## 2023-07-28 LAB
ALBUMIN SERPL BCG-MCNC: 3.3 G/DL (ref 3.5–5.2)
ALP SERPL-CCNC: 98 U/L (ref 35–104)
ALT SERPL W P-5'-P-CCNC: 45 U/L (ref 0–50)
ANION GAP SERPL CALCULATED.3IONS-SCNC: 9 MMOL/L (ref 7–15)
AST SERPL W P-5'-P-CCNC: 42 U/L (ref 0–45)
BASOPHILS # BLD AUTO: 0.1 10E3/UL (ref 0–0.2)
BASOPHILS NFR BLD AUTO: 0 %
BILIRUB SERPL-MCNC: 0.3 MG/DL
BUN SERPL-MCNC: 7.4 MG/DL (ref 6–20)
CALCIUM SERPL-MCNC: 9.7 MG/DL (ref 8.6–10)
CHLORIDE SERPL-SCNC: 106 MMOL/L (ref 98–107)
CREAT SERPL-MCNC: 0.88 MG/DL (ref 0.51–0.95)
DEPRECATED HCO3 PLAS-SCNC: 28 MMOL/L (ref 22–29)
EOSINOPHIL # BLD AUTO: 0.1 10E3/UL (ref 0–0.7)
EOSINOPHIL NFR BLD AUTO: 0 %
ERYTHROCYTE [DISTWIDTH] IN BLOOD BY AUTOMATED COUNT: 14.3 % (ref 10–15)
GFR SERPL CREATININE-BSD FRML MDRD: 78 ML/MIN/1.73M2
GLUCOSE SERPL-MCNC: 105 MG/DL (ref 70–99)
HCT VFR BLD AUTO: 26.1 % (ref 35–47)
HGB BLD-MCNC: 9.3 G/DL (ref 11.7–15.7)
IMM GRANULOCYTES # BLD: 0.1 10E3/UL
IMM GRANULOCYTES NFR BLD: 1 %
LYMPHOCYTES # BLD AUTO: 3.7 10E3/UL (ref 0.8–5.3)
LYMPHOCYTES NFR BLD AUTO: 23 %
MCH RBC QN AUTO: 26.3 PG (ref 26.5–33)
MCHC RBC AUTO-ENTMCNC: 35.6 G/DL (ref 31.5–36.5)
MCV RBC AUTO: 74 FL (ref 78–100)
MONOCYTES # BLD AUTO: 1 10E3/UL (ref 0–1.3)
MONOCYTES NFR BLD AUTO: 6 %
NEUTROPHILS # BLD AUTO: 11.1 10E3/UL (ref 1.6–8.3)
NEUTROPHILS NFR BLD AUTO: 70 %
NRBC # BLD AUTO: 0.8 10E3/UL
NRBC BLD AUTO-RTO: 5 /100
PLATELET # BLD AUTO: 325 10E3/UL (ref 150–450)
POTASSIUM SERPL-SCNC: 3.9 MMOL/L (ref 3.4–5.3)
PROT SERPL-MCNC: 6 G/DL (ref 6.4–8.3)
RBC # BLD AUTO: 3.53 10E6/UL (ref 3.8–5.2)
SODIUM SERPL-SCNC: 143 MMOL/L (ref 136–145)
WBC # BLD AUTO: 16.1 10E3/UL (ref 4–11)

## 2023-07-28 PROCEDURE — 36415 COLL VENOUS BLD VENIPUNCTURE: CPT

## 2023-07-28 PROCEDURE — 85025 COMPLETE CBC W/AUTO DIFF WBC: CPT

## 2023-07-28 PROCEDURE — 258N000003 HC RX IP 258 OP 636

## 2023-07-28 PROCEDURE — 250N000013 HC RX MED GY IP 250 OP 250 PS 637

## 2023-07-28 PROCEDURE — 120N000001 HC R&B MED SURG/OB

## 2023-07-28 PROCEDURE — 94640 AIRWAY INHALATION TREATMENT: CPT

## 2023-07-28 PROCEDURE — 80053 COMPREHEN METABOLIC PANEL: CPT

## 2023-07-28 PROCEDURE — 250N000009 HC RX 250

## 2023-07-28 PROCEDURE — 99232 SBSQ HOSP IP/OBS MODERATE 35: CPT | Mod: GC | Performed by: FAMILY MEDICINE

## 2023-07-28 PROCEDURE — 999N000157 HC STATISTIC RCP TIME EA 10 MIN

## 2023-07-28 PROCEDURE — 250N000012 HC RX MED GY IP 250 OP 636 PS 637: Performed by: FAMILY MEDICINE

## 2023-07-28 PROCEDURE — 250N000011 HC RX IP 250 OP 636: Mod: JZ

## 2023-07-28 PROCEDURE — 94640 AIRWAY INHALATION TREATMENT: CPT | Mod: 76

## 2023-07-28 RX ADMIN — OXYCODONE HYDROCHLORIDE 10 MG: 5 TABLET ORAL at 09:12

## 2023-07-28 RX ADMIN — HYDROMORPHONE HYDROCHLORIDE 0.2 MG: 0.2 INJECTION, SOLUTION INTRAMUSCULAR; INTRAVENOUS; SUBCUTANEOUS at 21:18

## 2023-07-28 RX ADMIN — PREDNISONE 40 MG: 20 TABLET ORAL at 09:11

## 2023-07-28 RX ADMIN — IPRATROPIUM BROMIDE AND ALBUTEROL SULFATE 3 ML: .5; 3 SOLUTION RESPIRATORY (INHALATION) at 11:42

## 2023-07-28 RX ADMIN — OXYCODONE HYDROCHLORIDE 10 MG: 5 TABLET ORAL at 03:54

## 2023-07-28 RX ADMIN — SODIUM CHLORIDE, POTASSIUM CHLORIDE, SODIUM LACTATE AND CALCIUM CHLORIDE: 600; 310; 30; 20 INJECTION, SOLUTION INTRAVENOUS at 03:55

## 2023-07-28 RX ADMIN — SODIUM CHLORIDE, POTASSIUM CHLORIDE, SODIUM LACTATE AND CALCIUM CHLORIDE: 600; 310; 30; 20 INJECTION, SOLUTION INTRAVENOUS at 11:31

## 2023-07-28 RX ADMIN — SODIUM CHLORIDE, POTASSIUM CHLORIDE, SODIUM LACTATE AND CALCIUM CHLORIDE: 600; 310; 30; 20 INJECTION, SOLUTION INTRAVENOUS at 21:20

## 2023-07-28 RX ADMIN — OXYCODONE HYDROCHLORIDE 10 MG: 5 TABLET ORAL at 16:09

## 2023-07-28 RX ADMIN — OXYCODONE HYDROCHLORIDE 10 MG: 5 TABLET ORAL at 20:10

## 2023-07-28 RX ADMIN — OXYCODONE HYDROCHLORIDE 10 MG: 5 TABLET ORAL at 00:17

## 2023-07-28 RX ADMIN — ACETAMINOPHEN 975 MG: 325 TABLET ORAL at 03:54

## 2023-07-28 RX ADMIN — ACETAMINOPHEN 975 MG: 325 TABLET ORAL at 11:39

## 2023-07-28 RX ADMIN — ACETAMINOPHEN 975 MG: 325 TABLET ORAL at 20:10

## 2023-07-28 RX ADMIN — IPRATROPIUM BROMIDE AND ALBUTEROL SULFATE 3 ML: .5; 3 SOLUTION RESPIRATORY (INHALATION) at 20:16

## 2023-07-28 RX ADMIN — ENOXAPARIN SODIUM 70 MG: 80 INJECTION SUBCUTANEOUS at 00:17

## 2023-07-28 RX ADMIN — MONTELUKAST 10 MG: 10 TABLET, FILM COATED ORAL at 21:18

## 2023-07-28 RX ADMIN — ENOXAPARIN SODIUM 70 MG: 80 INJECTION SUBCUTANEOUS at 12:46

## 2023-07-28 ASSESSMENT — ACTIVITIES OF DAILY LIVING (ADL)
ADLS_ACUITY_SCORE: 23

## 2023-07-28 NOTE — PROGRESS NOTES
"[facility]    Medicine Progress Note - Hospitalist Service    Date of Admission:  7/25/2023    Assessment & Plan   Kelly Harris is a 52 year old female w/PMHx of of Hemoglobin SC disease and asthma with recent hospitalization for sickle cell pain crisis and is admitted 7/25/2023 for another sickle cell disease with pain crisis, fth have bilateral PEs on CT chest, now on LMWH and supplemental O2.     Sickle Cell Disease  Pain Crisis  Bilateral PE on CTA  Patient admitted from hematology clinic for sickle cell pain crisis. Suspect that this was caused by a combination of asthma exacerbation discussed below and bilateral PE's found on CT chest 7/26. Currently on LMWH. Patient reported some improvement in pain and breathing yesterday, but she endorses feeling worse today. She developed a HA and scant nose bleeding overnight. Had increased wheezing on lung auscultation compared to prior. WBC elevated, but suspect this is related to steroids rather than infection. Patient is afebrile and asymptomatic. She reports having a \"blood clot\" in the past. 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   - LMWH 70mg IV Q12h, plan to transition to DOAC tomorrow. Contact pharmacy regarding insurance coverage.   - IV hydration with maintenance fluids - LR @125 mls/hr  - Scheduled Tylenol 975 mg Q8hrs  - Oxycodone 10 mg Q4hrs PRN  - Dilaudid 0.4 mg IV Q2hrs PRN for breakthrough pain  - CBC and CMP in AM     Asthma exacerbation  Patient was diagnosed with asthma in childhood and this has been mostly managed by her PCP. Per patient, she was changed to symbicort for a rescue inhaler in 2022 and asthma has not been well-controlled since. This likely led to an exacerbation in setting of sickle cell disease. She saw pulmonology on 7/25 w/changes to her regimen and an extended prednisone taper. Increased diffuse wheezing on lung auscultation. Attempting " "to wean off oxygen, satting in high 90s on 1L NC O2. Given her respiratory status declined slightly today, will continue to keep her on current regimen. Possibly plan to transition home meds tomorrow.   - RT following, recs appreciated   - Influenza, RSV, Covid negative   - Follow bcx - NGTD  - Supplemental O2 as needed, add humidifier to help with nasal dryness  - Duonebs Q2 hr PRN  - Continue PTA Singulair  - Prednisone 40mg daily (7/26 - current)     Constipation  Likely constipation secondary to opioid use. 1 BM overnight.   - Scheduled Miralax  - Senna PRN        Diet: Combination Diet Regular Diet Adult    DVT Prophylaxis: On therapeutic lovenox for bilateral PEs  Reyes Catheter: Not present  Lines: None     Cardiac Monitoring: None  Code Status: Full Code      Clinically Significant Risk Factors           # Hypercalcemia: corrected calcium is >10.1, will monitor as appropriate    # Hypoalbuminemia: Lowest albumin = 3 g/dL at 7/27/2023  5:50 AM, will monitor as appropriate            # Overweight: Estimated body mass index is 26.39 kg/m  as calculated from the following:    Height as of this encounter: 1.6 m (5' 3\").    Weight as of this encounter: 67.6 kg (149 lb).  , PRESENT ON ADMISSION          Disposition Plan      Expected Discharge Date: 07/29/2023    Discharge Delays: IV Medication - consider oral or Home Infusion  Oxygen Needs - Arrange Home O2            The patient's care was discussed with the Attending Physician, Dr. Berkowitz .    Eladia Ricci MD  Hospitalist Service  [facility]  Securely message with [employer] (more info)  Text page via [employer] Paging/Directory   ______________________________________________________________________    Interval History   Feels a little worse than yesterday. Endorses fatigue, increased wheezing, and HA. Also had scant nosebleeding overnight.   Urine dark in the morning, but more clear as the day goes on.  R-sided body pains improved and mild " aching, but some aching in LLE now. R knee feels swollen.   Reports that she is eager to go home because she is needed at work, but slowly coming to understanding that it is better for her to stay in the hospital and get fully treated and better manage sickle cell and asthma. Does not want to come back.     Physical Exam   Vital Signs: Temp: 98.2  F (36.8  C) Temp src: Oral BP: 138/66 Pulse: 71   Resp: 17 SpO2: 99 % O2 Device: Nasal cannula Oxygen Delivery: 2 LPM  Weight: 149 lbs 0 oz    Physical Exam  Constitutional:       General: She is not in acute distress.     Comments: Resting comfortably in bed, speaking in full sentences   HENT:      Head: Normocephalic and atraumatic.   Cardiovascular:      Rate and Rhythm: Normal rate and regular rhythm.      Heart sounds: No murmur heard.     No friction rub. No gallop.   Pulmonary:      Effort: Pulmonary effort is normal.      Comments: On 2L O2 NC. Diffuse inspiratory and expiratory wheezing on lung auscultation.   Abdominal:      General: Abdomen is flat. There is no distension.      Palpations: Abdomen is soft. There is no mass.      Tenderness: There is no abdominal tenderness.   Musculoskeletal:         General: Normal range of motion.      Cervical back: No tenderness.      Right lower leg: No edema.      Left lower leg: No edema.      Comments: R knee tender to palpation   Skin:     General: Skin is warm and dry.   Neurological:      General: No focal deficit present.      Mental Status: She is oriented to person, place, and time.   Psychiatric:         Thought Content: Thought content normal.         Judgment: Judgment normal.

## 2023-07-28 NOTE — PLAN OF CARE
Problem: Pain Acute  Goal: Optimal Pain Control and Function  Outcome: Progressing  Intervention: Develop Pain Management Plan  Recent Flowsheet Documentation  Taken 7/28/2023 0856 by Miya Shirley RN  Pain Management Interventions: medication (see MAR)  Intervention: Prevent or Manage Pain  Recent Flowsheet Documentation  Taken 7/28/2023 0901 by Miya Shirley, RN  Medication Review/Management: medications reviewed     Problem: Gas Exchange Impaired  Goal: Optimal Gas Exchange  Outcome: Progressing  Intervention: Optimize Oxygenation and Ventilation  Recent Flowsheet Documentation  Taken 7/28/2023 0901 by Miya Shirley, RN  Head of Bed (HOB) Positioning: HOB at 30 degrees   Goal Outcome Evaluation:  Pt weaned off O2 and tolerated well sating 98%. Nebs helpful for wheezing noted throughout lungs this am. Oxycodone 10mg effective for improved pain control. HA resolved from this am.

## 2023-07-28 NOTE — PROGRESS NOTES
Care Management Follow Up    Length of Stay (days): 3    Expected Discharge Date: 07/29/2023     Concerns to be Addressed:     Care Progression  Patient plan of care discussed at interdisciplinary rounds: Yes    Anticipated Discharge Disposition:  TBD     Anticipated Discharge Services:  NA  Anticipated Discharge DME:  EDUIN    Patient/family educated on Medicare website which has current facility and service quality ratings:  NA  Education Provided on the Discharge Plan:  NA  Patient/Family in Agreement with the Plan:  NA    Referrals Placed by CM/SW:  EDUIN  Private pay costs discussed: Not applicable    Additional Information:  Chart reviewed.     Social HX: Independent, from home, family support. Family transport at time of discharge.     CM will continue to follow care progression and aide in discharge planning as needed.     Maryellen Garcia RN

## 2023-07-28 NOTE — PLAN OF CARE
Received patient, ongoing IVF of LR at 125ml/hr. Patient is alert and oriented, on O2 at 2LPM, SOB with mod exertion. Pain managed with scheduled and PRN meds. VSS. Able to ambulate to bathroom with minimal assist.       Problem: Pain Acute  Goal: Optimal Pain Control and Function  Outcome: Progressing     Problem: Gas Exchange Impaired  Goal: Optimal Gas Exchange  Outcome: Progressing  Intervention: Optimize Oxygenation and Ventilation  Recent Flowsheet Documentation  Taken 7/27/2023 6835 by Manolo Berrios, RN  Head of Bed (HOB) Positioning: HOB at 30 degrees   Goal Outcome Evaluation:

## 2023-07-29 DIAGNOSIS — J45.40 MODERATE PERSISTENT ASTHMA WITHOUT COMPLICATION: ICD-10-CM

## 2023-07-29 LAB
ALBUMIN SERPL BCG-MCNC: 3.1 G/DL (ref 3.5–5.2)
ALP SERPL-CCNC: 105 U/L (ref 35–104)
ALT SERPL W P-5'-P-CCNC: 48 U/L (ref 0–50)
ANION GAP SERPL CALCULATED.3IONS-SCNC: 13 MMOL/L (ref 7–15)
AST SERPL W P-5'-P-CCNC: 36 U/L (ref 0–45)
BASOPHILS # BLD AUTO: 0.1 10E3/UL (ref 0–0.2)
BASOPHILS NFR BLD AUTO: 0 %
BILIRUB SERPL-MCNC: 0.4 MG/DL
BUN SERPL-MCNC: 8.8 MG/DL (ref 6–20)
CALCIUM SERPL-MCNC: 8.6 MG/DL (ref 8.6–10)
CHLORIDE SERPL-SCNC: 106 MMOL/L (ref 98–107)
CREAT SERPL-MCNC: 0.86 MG/DL (ref 0.51–0.95)
DEPRECATED HCO3 PLAS-SCNC: 22 MMOL/L (ref 22–29)
EOSINOPHIL # BLD AUTO: 0 10E3/UL (ref 0–0.7)
EOSINOPHIL NFR BLD AUTO: 0 %
ERYTHROCYTE [DISTWIDTH] IN BLOOD BY AUTOMATED COUNT: 14 % (ref 10–15)
GFR SERPL CREATININE-BSD FRML MDRD: 80 ML/MIN/1.73M2
GLUCOSE SERPL-MCNC: 93 MG/DL (ref 70–99)
HCT VFR BLD AUTO: 26.6 % (ref 35–47)
HGB BLD-MCNC: 9.4 G/DL (ref 11.7–15.7)
IMM GRANULOCYTES # BLD: 0.1 10E3/UL
IMM GRANULOCYTES NFR BLD: 1 %
LYMPHOCYTES # BLD AUTO: 5.2 10E3/UL (ref 0.8–5.3)
LYMPHOCYTES NFR BLD AUTO: 33 %
MCH RBC QN AUTO: 25.8 PG (ref 26.5–33)
MCHC RBC AUTO-ENTMCNC: 35.3 G/DL (ref 31.5–36.5)
MCV RBC AUTO: 73 FL (ref 78–100)
MONOCYTES # BLD AUTO: 1 10E3/UL (ref 0–1.3)
MONOCYTES NFR BLD AUTO: 6 %
NEUTROPHILS # BLD AUTO: 9.6 10E3/UL (ref 1.6–8.3)
NEUTROPHILS NFR BLD AUTO: 60 %
NRBC # BLD AUTO: 0.8 10E3/UL
NRBC BLD AUTO-RTO: 5 /100
PLATELET # BLD AUTO: 356 10E3/UL (ref 150–450)
POTASSIUM SERPL-SCNC: 3.6 MMOL/L (ref 3.4–5.3)
PROT SERPL-MCNC: 5 G/DL (ref 6.4–8.3)
RBC # BLD AUTO: 3.64 10E6/UL (ref 3.8–5.2)
SODIUM SERPL-SCNC: 141 MMOL/L (ref 136–145)
WBC # BLD AUTO: 16.1 10E3/UL (ref 4–11)

## 2023-07-29 PROCEDURE — 250N000012 HC RX MED GY IP 250 OP 636 PS 637: Performed by: FAMILY MEDICINE

## 2023-07-29 PROCEDURE — 250N000011 HC RX IP 250 OP 636: Mod: JZ

## 2023-07-29 PROCEDURE — 250N000013 HC RX MED GY IP 250 OP 250 PS 637

## 2023-07-29 PROCEDURE — 250N000009 HC RX 250

## 2023-07-29 PROCEDURE — 258N000003 HC RX IP 258 OP 636

## 2023-07-29 PROCEDURE — 999N000157 HC STATISTIC RCP TIME EA 10 MIN

## 2023-07-29 PROCEDURE — 36415 COLL VENOUS BLD VENIPUNCTURE: CPT

## 2023-07-29 PROCEDURE — 94640 AIRWAY INHALATION TREATMENT: CPT | Mod: 76

## 2023-07-29 PROCEDURE — 80053 COMPREHEN METABOLIC PANEL: CPT

## 2023-07-29 PROCEDURE — 120N000001 HC R&B MED SURG/OB

## 2023-07-29 PROCEDURE — 99232 SBSQ HOSP IP/OBS MODERATE 35: CPT | Mod: GC | Performed by: FAMILY MEDICINE

## 2023-07-29 PROCEDURE — 85025 COMPLETE CBC W/AUTO DIFF WBC: CPT

## 2023-07-29 RX ADMIN — ACETAMINOPHEN 975 MG: 325 TABLET ORAL at 04:52

## 2023-07-29 RX ADMIN — MONTELUKAST 10 MG: 10 TABLET, FILM COATED ORAL at 21:38

## 2023-07-29 RX ADMIN — IPRATROPIUM BROMIDE AND ALBUTEROL SULFATE 3 ML: .5; 3 SOLUTION RESPIRATORY (INHALATION) at 22:04

## 2023-07-29 RX ADMIN — OXYCODONE HYDROCHLORIDE 10 MG: 5 TABLET ORAL at 09:12

## 2023-07-29 RX ADMIN — ACETAMINOPHEN 975 MG: 325 TABLET ORAL at 12:34

## 2023-07-29 RX ADMIN — HYDROMORPHONE HYDROCHLORIDE 0.2 MG: 0.2 INJECTION, SOLUTION INTRAMUSCULAR; INTRAVENOUS; SUBCUTANEOUS at 00:55

## 2023-07-29 RX ADMIN — SODIUM CHLORIDE, POTASSIUM CHLORIDE, SODIUM LACTATE AND CALCIUM CHLORIDE: 600; 310; 30; 20 INJECTION, SOLUTION INTRAVENOUS at 21:38

## 2023-07-29 RX ADMIN — IPRATROPIUM BROMIDE AND ALBUTEROL SULFATE 3 ML: .5; 3 SOLUTION RESPIRATORY (INHALATION) at 11:18

## 2023-07-29 RX ADMIN — APIXABAN 10 MG: 5 TABLET, FILM COATED ORAL at 12:34

## 2023-07-29 RX ADMIN — ACETAMINOPHEN 975 MG: 325 TABLET ORAL at 20:01

## 2023-07-29 RX ADMIN — OXYCODONE HYDROCHLORIDE 10 MG: 5 TABLET ORAL at 20:01

## 2023-07-29 RX ADMIN — PREDNISONE 40 MG: 20 TABLET ORAL at 09:12

## 2023-07-29 RX ADMIN — OXYCODONE HYDROCHLORIDE 10 MG: 5 TABLET ORAL at 15:02

## 2023-07-29 RX ADMIN — APIXABAN 10 MG: 5 TABLET, FILM COATED ORAL at 20:01

## 2023-07-29 RX ADMIN — ENOXAPARIN SODIUM 70 MG: 80 INJECTION SUBCUTANEOUS at 00:55

## 2023-07-29 RX ADMIN — SODIUM CHLORIDE, POTASSIUM CHLORIDE, SODIUM LACTATE AND CALCIUM CHLORIDE: 600; 310; 30; 20 INJECTION, SOLUTION INTRAVENOUS at 04:59

## 2023-07-29 RX ADMIN — OXYCODONE HYDROCHLORIDE 10 MG: 5 TABLET ORAL at 23:54

## 2023-07-29 ASSESSMENT — ACTIVITIES OF DAILY LIVING (ADL)
ADLS_ACUITY_SCORE: 22
ADLS_ACUITY_SCORE: 23
ADLS_ACUITY_SCORE: 22
ADLS_ACUITY_SCORE: 23
ADLS_ACUITY_SCORE: 22
ADLS_ACUITY_SCORE: 22
ADLS_ACUITY_SCORE: 23
ADLS_ACUITY_SCORE: 22

## 2023-07-29 NOTE — PLAN OF CARE
Patient alert and oriented. Pain last nigth ranging from 5-6/10. Patient had PRN dilaudid once overnight and scheduled tylenol. Patient has LR running at 125/hr. Expected to discharge today. Pain located on side radiating to back, denies any changes in urination.   Problem: Plan of Care - These are the overarching goals to be used throughout the patient stay.    Goal: Optimal Comfort and Wellbeing  Outcome: Progressing  Intervention: Monitor Pain and Promote Comfort  Recent Flowsheet Documentation  Taken 7/29/2023 0055 by Dali Campbell RN  Pain Management Interventions:   medication (see MAR)   emotional support   repositioned  Problem: Plan of Care - These are the overarching goals to be used throughout the patient stay.    Goal: Absence of Hospital-Acquired Illness or Injury  Intervention: Identify and Manage Fall Risk  Recent Flowsheet Documentation  Taken 7/29/2023 0000 by Dali Campbell RN  Safety Promotion/Fall Prevention:   activity supervised   lighting adjusted   room organization consistent   supervised activity   Goal Outcome Evaluation:

## 2023-07-29 NOTE — PLAN OF CARE
"  Problem: Plan of Care - These are the overarching goals to be used throughout the patient stay.    Goal: Plan of Care Review  Description: The Plan of Care Review/Shift note should be completed every shift.  The Outcome Evaluation is a brief statement about your assessment that the patient is improving, declining, or no change.  This information will be displayed automatically on your shift note.  Outcome: Progressing  Flowsheets (Taken 7/28/2023 2253)  Plan of Care Reviewed With:   patient   family  Goal: Patient-Specific Goal (Individualized)  Description: You can add care plan individualizations to a care plan. Examples of Individualization might be:  \"Parent requests to be called daily at 9am for status\", \"I have a hard time hearing out of my right ear\", or \"Do not touch me to wake me up as it startles me\".  Outcome: Progressing  Goal: Absence of Hospital-Acquired Illness or Injury  Outcome: Progressing  Intervention: Identify and Manage Fall Risk  Recent Flowsheet Documentation  Taken 7/28/2023 1900 by Seymour Barnard RN  Safety Promotion/Fall Prevention:   activity supervised   lighting adjusted   room organization consistent   supervised activity  Goal: Optimal Comfort and Wellbeing  Outcome: Progressing  Intervention: Monitor Pain and Promote Comfort  Recent Flowsheet Documentation  Taken 7/28/2023 2246 by Seymour Barnard RN  Pain Management Interventions:   medication (see MAR)   emotional support   repositioned  Goal: Readiness for Transition of Care  Outcome: Progressing     Problem: Pain Acute  Goal: Optimal Pain Control and Function  Outcome: Progressing  Intervention: Develop Pain Management Plan  Recent Flowsheet Documentation  Taken 7/28/2023 2246 by Seymour Barnard RN  Pain Management Interventions:   medication (see MAR)   emotional support   repositioned  Intervention: Prevent or Manage Pain  Recent Flowsheet Documentation  Taken 7/28/2023 1900 by Seymour Barnard RN  Medication " Review/Management: medications reviewed     Problem: Gas Exchange Impaired  Goal: Optimal Gas Exchange  Outcome: Progressing     Goal Outcome Evaluation:      Plan of Care Reviewed With: patient, family    Pt A/O x 4, pleasant affect. Vitally stable other than some mild hypertension, systolic in 140s. Pt was in clear respiratory distress at 2000 hours, inspiratory and expiratory wheezes with increased WOB - RT contacted & PRN nebs given with effect per pt, currently saturating well on RA. Pt also complained of bilateral 8/10 pain located in the hip/flank region - pain reduced to 5/10 using PRN tylenol, 10 mg oxycodone, & 0.2 mg dilaudid. Will continue to monitor.     Seymour Barnard RN on 7/28/2023 at 10:58 PM

## 2023-07-29 NOTE — PLAN OF CARE
"  Problem: Plan of Care - These are the overarching goals to be used throughout the patient stay.    Goal: Plan of Care Review  Description: The Plan of Care Review/Shift note should be completed every shift.  The Outcome Evaluation is a brief statement about your assessment that the patient is improving, declining, or no change.  This information will be displayed automatically on your shift note.  Outcome: Progressing  Flowsheets (Taken 7/29/2023 1554)  Plan of Care Reviewed With: patient  Goal: Patient-Specific Goal (Individualized)  Description: You can add care plan individualizations to a care plan. Examples of Individualization might be:  \"Parent requests to be called daily at 9am for status\", \"I have a hard time hearing out of my right ear\", or \"Do not touch me to wake me up as it startles me\".  Outcome: Progressing  Goal: Absence of Hospital-Acquired Illness or Injury  Outcome: Progressing  Goal: Optimal Comfort and Wellbeing  Outcome: Progressing  Intervention: Monitor Pain and Promote Comfort  Recent Flowsheet Documentation  Taken 7/29/2023 1502 by Giovanna Snider RN  Pain Management Interventions:   medication (see MAR)   emotional support   heat applied  Taken 7/29/2023 0955 by Giovanna Snider RN  Pain Management Interventions:   medication (see MAR)   emotional support  Goal: Readiness for Transition of Care  Outcome: Progressing   Goal Outcome Evaluation:      Plan of Care Reviewed With: patient    Patient is taking scheduled Tylenol for pain as well as a heating pad and prn Oxycodone that she has taken a couple times today.  She is still on IV fluids but Lovenox has been changed to PO Eliquis.                 " no

## 2023-07-30 LAB
ALBUMIN SERPL BCG-MCNC: 3.2 G/DL (ref 3.5–5.2)
ALP SERPL-CCNC: 102 U/L (ref 35–104)
ALT SERPL W P-5'-P-CCNC: 52 U/L (ref 0–50)
ANION GAP SERPL CALCULATED.3IONS-SCNC: 9 MMOL/L (ref 7–15)
AST SERPL W P-5'-P-CCNC: 40 U/L (ref 0–45)
BACTERIA BLD CULT: NO GROWTH
BACTERIA BLD CULT: NO GROWTH
BASOPHILS # BLD MANUAL: 0 10E3/UL (ref 0–0.2)
BASOPHILS NFR BLD MANUAL: 0 %
BILIRUB SERPL-MCNC: 0.3 MG/DL
BUN SERPL-MCNC: 9.1 MG/DL (ref 6–20)
CALCIUM SERPL-MCNC: 9.3 MG/DL (ref 8.6–10)
CHLORIDE SERPL-SCNC: 102 MMOL/L (ref 98–107)
CREAT SERPL-MCNC: 0.96 MG/DL (ref 0.51–0.95)
DEPRECATED HCO3 PLAS-SCNC: 27 MMOL/L (ref 22–29)
EOSINOPHIL # BLD MANUAL: 0 10E3/UL (ref 0–0.7)
EOSINOPHIL NFR BLD MANUAL: 0 %
ERYTHROCYTE [DISTWIDTH] IN BLOOD BY AUTOMATED COUNT: 14.3 % (ref 10–15)
GFR SERPL CREATININE-BSD FRML MDRD: 70 ML/MIN/1.73M2
GLUCOSE SERPL-MCNC: 97 MG/DL (ref 70–99)
HCT VFR BLD AUTO: 27.2 % (ref 35–47)
HGB BLD-MCNC: 9.4 G/DL (ref 11.7–15.7)
LYMPHOCYTES # BLD MANUAL: 4.6 10E3/UL (ref 0.8–5.3)
LYMPHOCYTES NFR BLD MANUAL: 28 %
MCH RBC QN AUTO: 25.5 PG (ref 26.5–33)
MCHC RBC AUTO-ENTMCNC: 34.6 G/DL (ref 31.5–36.5)
MCV RBC AUTO: 74 FL (ref 78–100)
MONOCYTES # BLD MANUAL: 1.2 10E3/UL (ref 0–1.3)
MONOCYTES NFR BLD MANUAL: 7 %
NEUTROPHILS # BLD MANUAL: 10.7 10E3/UL (ref 1.6–8.3)
NEUTROPHILS NFR BLD MANUAL: 65 %
NRBC # BLD AUTO: 1 10E3/UL
NRBC BLD MANUAL-RTO: 6 %
PLAT MORPH BLD: ABNORMAL
PLATELET # BLD AUTO: 376 10E3/UL (ref 150–450)
POTASSIUM SERPL-SCNC: 4 MMOL/L (ref 3.4–5.3)
PROT SERPL-MCNC: 6 G/DL (ref 6.4–8.3)
RBC # BLD AUTO: 3.68 10E6/UL (ref 3.8–5.2)
RBC MORPH BLD: ABNORMAL
SMUDGE CELLS BLD QL SMEAR: PRESENT
SODIUM SERPL-SCNC: 138 MMOL/L (ref 136–145)
TARGETS BLD QL SMEAR: SLIGHT
WBC # BLD AUTO: 16.5 10E3/UL (ref 4–11)

## 2023-07-30 PROCEDURE — 250N000013 HC RX MED GY IP 250 OP 250 PS 637

## 2023-07-30 PROCEDURE — 258N000003 HC RX IP 258 OP 636

## 2023-07-30 PROCEDURE — 120N000001 HC R&B MED SURG/OB

## 2023-07-30 PROCEDURE — 85007 BL SMEAR W/DIFF WBC COUNT: CPT

## 2023-07-30 PROCEDURE — 99232 SBSQ HOSP IP/OBS MODERATE 35: CPT | Mod: GC | Performed by: FAMILY MEDICINE

## 2023-07-30 PROCEDURE — 250N000012 HC RX MED GY IP 250 OP 636 PS 637

## 2023-07-30 PROCEDURE — 85014 HEMATOCRIT: CPT

## 2023-07-30 PROCEDURE — 80053 COMPREHEN METABOLIC PANEL: CPT

## 2023-07-30 PROCEDURE — 250N000011 HC RX IP 250 OP 636: Mod: JZ

## 2023-07-30 PROCEDURE — 36415 COLL VENOUS BLD VENIPUNCTURE: CPT

## 2023-07-30 RX ADMIN — ACETAMINOPHEN 975 MG: 325 TABLET ORAL at 11:14

## 2023-07-30 RX ADMIN — ACETAMINOPHEN 975 MG: 325 TABLET ORAL at 19:44

## 2023-07-30 RX ADMIN — HYDROMORPHONE HYDROCHLORIDE 0.2 MG: 0.2 INJECTION, SOLUTION INTRAMUSCULAR; INTRAVENOUS; SUBCUTANEOUS at 11:14

## 2023-07-30 RX ADMIN — OXYCODONE HYDROCHLORIDE 10 MG: 5 TABLET ORAL at 21:08

## 2023-07-30 RX ADMIN — ACETAMINOPHEN 975 MG: 325 TABLET ORAL at 03:40

## 2023-07-30 RX ADMIN — OXYCODONE HYDROCHLORIDE 10 MG: 5 TABLET ORAL at 03:42

## 2023-07-30 RX ADMIN — PREDNISONE 40 MG: 20 TABLET ORAL at 09:59

## 2023-07-30 RX ADMIN — APIXABAN 10 MG: 5 TABLET, FILM COATED ORAL at 09:59

## 2023-07-30 RX ADMIN — SODIUM CHLORIDE, POTASSIUM CHLORIDE, SODIUM LACTATE AND CALCIUM CHLORIDE: 600; 310; 30; 20 INJECTION, SOLUTION INTRAVENOUS at 14:24

## 2023-07-30 RX ADMIN — APIXABAN 10 MG: 5 TABLET, FILM COATED ORAL at 21:08

## 2023-07-30 RX ADMIN — OXYCODONE HYDROCHLORIDE 10 MG: 5 TABLET ORAL at 14:24

## 2023-07-30 RX ADMIN — MONTELUKAST 10 MG: 10 TABLET, FILM COATED ORAL at 21:08

## 2023-07-30 RX ADMIN — HYDROMORPHONE HYDROCHLORIDE 0.2 MG: 0.2 INJECTION, SOLUTION INTRAMUSCULAR; INTRAVENOUS; SUBCUTANEOUS at 05:04

## 2023-07-30 ASSESSMENT — ACTIVITIES OF DAILY LIVING (ADL)
ADLS_ACUITY_SCORE: 22

## 2023-07-30 NOTE — PLAN OF CARE
"  Problem: Plan of Care - These are the overarching goals to be used throughout the patient stay.    Goal: Plan of Care Review  Description: The Plan of Care Review/Shift note should be completed every shift.  The Outcome Evaluation is a brief statement about your assessment that the patient is improving, declining, or no change.  This information will be displayed automatically on your shift note.  Outcome: Progressing  Flowsheets (Taken 7/30/2023 0622)  Plan of Care Reviewed With: patient  Goal: Patient-Specific Goal (Individualized)  Description: You can add care plan individualizations to a care plan. Examples of Individualization might be:  \"Parent requests to be called daily at 9am for status\", \"I have a hard time hearing out of my right ear\", or \"Do not touch me to wake me up as it startles me\".  Outcome: Progressing  Goal: Absence of Hospital-Acquired Illness or Injury  Outcome: Progressing  Intervention: Identify and Manage Fall Risk  Recent Flowsheet Documentation  Taken 7/30/2023 0000 by Seymour Barnard RN  Safety Promotion/Fall Prevention:   activity supervised   assistive device/personal items within reach   clutter free environment maintained   nonskid shoes/slippers when out of bed   safety round/check completed  Goal: Optimal Comfort and Wellbeing  Outcome: Progressing  Intervention: Monitor Pain and Promote Comfort  Recent Flowsheet Documentation  Taken 7/30/2023 0340 by Seymour Barnard RN  Pain Management Interventions:   medication (see MAR)   emotional support   heat applied  Taken 7/29/2023 2354 by Seymour Barnard RN  Pain Management Interventions:   medication (see MAR)   emotional support   heat applied  Goal: Readiness for Transition of Care  Outcome: Progressing     Problem: Pain Acute  Goal: Optimal Pain Control and Function  Outcome: Progressing  Intervention: Develop Pain Management Plan  Recent Flowsheet Documentation  Taken 7/30/2023 0340 by Seymour Barnard RN  Pain Management " Interventions:   medication (see MAR)   emotional support   heat applied  Taken 7/29/2023 2354 by Seymour Barnard RN  Pain Management Interventions:   medication (see MAR)   emotional support   heat applied     Problem: Gas Exchange Impaired  Goal: Optimal Gas Exchange  Outcome: Progressing     Problem: Sickle Cell Disease  Goal: Optimal Cerebral Tissue Perfusion  Outcome: Progressing  Goal: Optimal Coping with Sickle Cell Disease  Outcome: Progressing  Goal: Effective Tissue Perfusion  Outcome: Progressing  Goal: Absence of Infection Signs and Symptoms  Outcome: Progressing  Goal: Optimal Pain Control and Function  Outcome: Progressing  Intervention: Manage Acute Pain  Recent Flowsheet Documentation  Taken 7/30/2023 0340 by Seymour Barnard RN  Pain Management Interventions:   medication (see MAR)   emotional support   heat applied  Taken 7/29/2023 2354 by Seymour Barnard RN  Pain Management Interventions:   medication (see MAR)   emotional support   heat applied  Goal: Optimal Oxygenation  Outcome: Progressing  Intervention: Optimize Oxygenation  Recent Flowsheet Documentation  Taken 7/30/2023 0400 by Seymour Barnard RN  Cough And Deep Breathing: done independently per patient  Taken 7/30/2023 0000 by Seymour Barnard RN  Cough And Deep Breathing: done independently per patient     Goal Outcome Evaluation:      Plan of Care Reviewed With: patient    Pt A/O x 4, pleasant & cooperative. Hypertensive overnight but otherwise vitally stable & saturating well on RA. Pain was poorly controlled overnight, pt was initially resting well with Tylenol & Oxy but RN discovered her in tears at 0500 AM due to the pain. PRN dilaudid was then given. LR continues infusing at 125 ml/hr. Pt wants to discharge home today but is concerned because pain is still not under control. Will continue to monitor.     Seymour Barnard RN on 7/30/2023 at 6:25 AM

## 2023-07-30 NOTE — PROGRESS NOTES
"[facility]    Medicine Progress Note - Hospitalist Service    Date of Admission:  7/25/2023    Assessment & Plan   Kelly Harris is a 52 year old female 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     Sickle Cell Disease  Pain Crisis  Patient admitted from hematology clinic for sickle cell pain crisis. Suspect that this was caused by a combination of asthma exacerbation discussed below and bilateral PE's found on CT chest 7/26. WBC elevated, but suspect this is related to steroids rather than infection. Patient is afebrile and asymptomatic. Currently pain is improving -- feeling more ready to discharge in the next couple of days.  - IV hydration with maintenance fluids - LR @125 mls/hr  - Scheduled Tylenol 975 mg Q8hrs  - Oxycodone 10 mg Q4hrs PRN  - Dilaudid 0.4 mg IV Q2hrs PRN for breakthrough pain  - CBC and CMP in AM    Bilateral PEs  Found to have PEs on chest CT on 7/26. Starting on Lovenox when found. Switch to DOAC today. Unclear if this was provoked at this time. She reports having a \"blood clot\" in the past. 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   - stop lovenox  - start Eliquis      Asthma exacerbation, improving  Patient was diagnosed with asthma in childhood and this has been mostly managed by her PCP. Per patient, she was changed to symbicort for a rescue inhaler in 2022 and asthma has not been well-controlled since. This likely led to an exacerbation in setting of sickle cell disease. She saw pulmonology on 7/25 w/changes to her regimen and an extended prednisone taper. Increased diffuse wheezing on lung auscultation. Influenza, RSV, Covid negative. Patient initially on O2 for a day and now on RA this am.   - RT following, recs " "appreciated   - Supplemental O2 as needed, add humidifier to help with nasal dryness  - Duonebs Q2 hr PRN  - Continue PTA Singulair  - Prednisone 40mg daily for 5 days (started 7/26)     Constipation  Likely constipation secondary to opioid use. 1 BM overnight.   - Scheduled Miralax  - Senna PRN        Diet: Combination Diet Regular Diet Adult    DVT Prophylaxis: Doac  Reyes Catheter: Not present  Lines: None     Cardiac Monitoring: None  Code Status: Full Code      Clinically Significant Risk Factors           # Hypercalcemia: corrected calcium is >10.1, will monitor as appropriate    # Hypoalbuminemia: Lowest albumin = 3 g/dL at 7/27/2023  5:50 AM, will monitor as appropriate            # Overweight: Estimated body mass index is 26.39 kg/m  as calculated from the following:    Height as of this encounter: 1.6 m (5' 3\").    Weight as of this encounter: 67.6 kg (149 lb).             Disposition Plan      Expected Discharge Date: 07/30/2023    Discharge Delays: IV Medication - consider oral or Home Infusion  Oxygen Needs - Arrange Home O2    Discharge Comments: d/c home        The patient's care was discussed with the Attending Physician, Dr. Berkowitz .    Shawna Aj MD  Hospitalist Service  [facility]  Securely message with [employer] (more info)  Text page via [employer] Paging/Directory   ______________________________________________________________________    Interval History   Feeling a lot better this morning. Feels her pain and breathing is improving. Feeling more ready for discharge in the next couple of days. On room air.     Physical Exam   Vital Signs: Temp: 98.2  F (36.8  C) Temp src: Oral BP: (!) 182/96 Pulse: 68   Resp: 18 SpO2: 98 % O2 Device: None (Room air)    Weight: 149 lbs 0 oz  GENERAL: Healthy, alert and no distress  EYES: Eyes grossly normal to inspection.    RESP:  Lungs with scattered wheezing. Good air movement.  CV: Heart RRR. No murmur  Abdomen: Soft, nontender, " nondistended.  MSK: No gross deformity. Normal tone.  SKIN: Visible skin clear. No significant rash, abnormal pigmentation or lesions.  NEURO: Cranial nerves grossly intact.  Mentation and speech appropriate for age.  PSYCH: Mentation appears normal, affect normal/bright, judgement and insight intact, normal speech and appearance well-groomed.

## 2023-07-30 NOTE — PROGRESS NOTES
"[facility]    Medicine Progress Note - Hospitalist Service    Date of Admission:  7/25/2023    Assessment & Plan   Kelly Harris is a 52 year old female w/PMHx of of Hemoglobin SC disease and asthma with recent hospitalization for sickle cell pain crisis and is admitted 7/25/2023 for another sickle cell disease with pain crisis found to have have bilateral PEs on CT chest. Plan for discharge tomorrow morning.     Sickle Cell Disease  Pain Crisis  Patient admitted from hematology clinic for sickle cell pain crisis. Suspect that this was caused by a combination of asthma exacerbation discussed below and bilateral PE's found on CT chest 7/26. WBC elevated, but suspect this is related to steroids rather than infection. Patient has remained afebrile. Patient did have a pain episode last night. Had been hopeful for discharge today, but with this recent episode requiring IV medication, recommended she stay one more night.   - Discontinued IV fluids.   - Scheduled Tylenol 975 mg Q8hrs  - Oxycodone 10 mg Q4hrs PRN  - Dilaudid 0.4 mg IV Q2hrs PRN for breakthrough pain. Attempting to avoid.   - CBC and CMP in AM    Bilateral PE  Found to have PEs on chest CT on 7/26. Starting on Lovenox when found. Switched to DOAC 7/29. Unclear if this was provoked at this time. She reports having a \"blood clot\" in the past. 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   - Continue Eliquis.      Asthma exacerbation, improving  Patient was diagnosed with asthma in childhood and this has been mostly managed by her PCP. Per patient, she was changed to symbicort for a rescue inhaler in 2022 and asthma has not been well-controlled since. This likely led to an exacerbation in setting of sickle cell disease. She saw pulmonology on 7/25 w/changes to her regimen and an extended prednisone taper. Increased diffuse wheezing on lung auscultation. Influenza, RSV, " "Covid negative. Patient initially on O2 for a day, has maintained sats on RA since that time.   - RT following, recs appreciated   - Supplemental O2 as needed, add humidifier to help with nasal dryness  - Duonebs Q2 hr PRN  - Continue PTA Singulair  - Prednisone 40mg daily for 5 days (started 7/26). Last dose today.      Constipation  Likely constipation secondary to opioid use. 1 BM overnight.   - Scheduled Miralax  - Senna PRN        Diet: Combination Diet Regular Diet Adult    DVT Prophylaxis: Doac  Reyes Catheter: Not present  Lines: None     Cardiac Monitoring: None  Code Status: Full Code      Clinically Significant Risk Factors              # Hypoalbuminemia: Lowest albumin = 3 g/dL at 7/27/2023  5:50 AM, will monitor as appropriate            # Overweight: Estimated body mass index is 26.39 kg/m  as calculated from the following:    Height as of this encounter: 1.6 m (5' 3\").    Weight as of this encounter: 67.6 kg (149 lb).           The patient's care was discussed with the Attending Physician, Dr. Berkowitz .    Emily Church MD  Hospitalist Service  [facility]    ______________________________________________________________________    Interval History     Patient had another episode of pain last night affecting her legs that did require IV medication. She is hopeful to go soon, but with her still requiring IV medication and currently on IV fluids, discussed discharge tomorrow morning instead, which patient agrees to. She is eating and drinking well without issue.     Physical Exam   Vital Signs: Temp: 97.9  F (36.6  C) Temp src: Oral BP: (!) 148/74 Pulse: 73   Resp: 18 SpO2: 96 % O2 Device: None (Room air)    Weight: 149 lbs 0 oz    GENERAL: Healthy, alert and no distress  EYES: Eyes grossly normal to inspection.    RESP:  Lungs with scattered wheezing. Good air movement.  CV: Heart RRR. No murmur  Abdomen: Soft, nontender, nondistended.  MSK: No gross deformity. Normal " tone.  SKIN: Visible skin clear. No significant rash, abnormal pigmentation or lesions.

## 2023-07-30 NOTE — PLAN OF CARE
"  Problem: Plan of Care - These are the overarching goals to be used throughout the patient stay.    Goal: Plan of Care Review  Description: The Plan of Care Review/Shift note should be completed every shift.  The Outcome Evaluation is a brief statement about your assessment that the patient is improving, declining, or no change.  This information will be displayed automatically on your shift note.  Outcome: Progressing  Flowsheets (Taken 7/30/2023 1446)  Plan of Care Reviewed With: patient  Goal: Patient-Specific Goal (Individualized)  Description: You can add care plan individualizations to a care plan. Examples of Individualization might be:  \"Parent requests to be called daily at 9am for status\", \"I have a hard time hearing out of my right ear\", or \"Do not touch me to wake me up as it startles me\".  Outcome: Progressing  Goal: Absence of Hospital-Acquired Illness or Injury  Outcome: Progressing  Intervention: Prevent and Manage VTE (Venous Thromboembolism) Risk  Recent Flowsheet Documentation  Taken 7/30/2023 0845 by Giovanna Snider RN  VTE Prevention/Management: SCDs (sequential compression devices) off  Goal: Optimal Comfort and Wellbeing  Outcome: Progressing  Intervention: Monitor Pain and Promote Comfort  Recent Flowsheet Documentation  Taken 7/30/2023 1424 by Giovanna Snider RN  Pain Management Interventions:   medication (see MAR)   emotional support   heat applied  Goal: Readiness for Transition of Care  Outcome: Progressing   Goal Outcome Evaluation:      Plan of Care Reviewed With: patient      Patient has a lot of pain in lower back and aching in both legs.  She has taken Dilaudid IV and PO Oxycodone along with scheduled Tylenol.           "

## 2023-07-30 NOTE — PLAN OF CARE
Pt is alert and oriented x4, VSS.   Had elevated BP which seemed related to pain.  PRN oxycodone given and pt's BP improved.   LR @ 125ml/hr. Pt reported having one BM tonight.  PRN nebulizer given at bedtime per pt's request.    Problem: Pain Acute  Goal: Optimal Pain Control and Function  Outcome: Progressing  Intervention: Prevent or Manage Pain  Recent Flowsheet Documentation  Taken 7/29/2023 1645 by Anita Watt RN  Medication Review/Management: medications reviewed     Problem: Sickle Cell Disease  Goal: Optimal Cerebral Tissue Perfusion  Outcome: Progressing  Goal: Optimal Coping with Sickle Cell Disease  Outcome: Progressing  Goal: Optimal Pain Control and Function  Outcome: Progressing  Intervention: Acknowledge Underlying Chronic Pain  Recent Flowsheet Documentation  Taken 7/29/2023 1645 by Anita Watt RN  Medication Review/Management: medications reviewed  Goal: Optimal Oxygenation  Outcome: Progressing  Intervention: Optimize Oxygenation  Recent Flowsheet Documentation  Taken 7/29/2023 1645 by Anita Watt RN  Administration (IS): self-administered  Level Incentive Spirometer (mL): 1750  Cough And Deep Breathing: done independently per patient  Number of Repetitions (IS): 5  Patient Tolerance (IS): good     Problem: Plan of Care - These are the overarching goals to be used throughout the patient stay.    Goal: Absence of Hospital-Acquired Illness or Injury  Intervention: Identify and Manage Fall Risk  Recent Flowsheet Documentation  Taken 7/29/2023 1645 by Anita Watt, RN  Safety Promotion/Fall Prevention:   activity supervised   assistive device/personal items within reach   clutter free environment maintained   nonskid shoes/slippers when out of bed   safety round/check completed   Goal Outcome Evaluation:

## 2023-07-31 LAB
ALBUMIN SERPL BCG-MCNC: 3.7 G/DL (ref 3.5–5.2)
ALP SERPL-CCNC: 111 U/L (ref 35–104)
ALT SERPL W P-5'-P-CCNC: 104 U/L (ref 0–50)
ANION GAP SERPL CALCULATED.3IONS-SCNC: 9 MMOL/L (ref 7–15)
AST SERPL W P-5'-P-CCNC: 89 U/L (ref 0–45)
BASOPHILS # BLD MANUAL: 0 10E3/UL (ref 0–0.2)
BASOPHILS NFR BLD MANUAL: 0 %
BILIRUB SERPL-MCNC: 0.4 MG/DL
BUN SERPL-MCNC: 10.3 MG/DL (ref 6–20)
CALCIUM SERPL-MCNC: 9.5 MG/DL (ref 8.6–10)
CHLORIDE SERPL-SCNC: 102 MMOL/L (ref 98–107)
CREAT SERPL-MCNC: 0.94 MG/DL (ref 0.51–0.95)
DEPRECATED HCO3 PLAS-SCNC: 26 MMOL/L (ref 22–29)
EOSINOPHIL # BLD MANUAL: 0.2 10E3/UL (ref 0–0.7)
EOSINOPHIL NFR BLD MANUAL: 1 %
ERYTHROCYTE [DISTWIDTH] IN BLOOD BY AUTOMATED COUNT: 14.6 % (ref 10–15)
GFR SERPL CREATININE-BSD FRML MDRD: 72 ML/MIN/1.73M2
GLUCOSE SERPL-MCNC: 97 MG/DL (ref 70–99)
HCT VFR BLD AUTO: 29.4 % (ref 35–47)
HGB BLD-MCNC: 10.4 G/DL (ref 11.7–15.7)
LYMPHOCYTES # BLD MANUAL: 4.3 10E3/UL (ref 0.8–5.3)
LYMPHOCYTES NFR BLD MANUAL: 25 %
MCH RBC QN AUTO: 25.9 PG (ref 26.5–33)
MCHC RBC AUTO-ENTMCNC: 35.4 G/DL (ref 31.5–36.5)
MCV RBC AUTO: 73 FL (ref 78–100)
MONOCYTES # BLD MANUAL: 0.5 10E3/UL (ref 0–1.3)
MONOCYTES NFR BLD MANUAL: 3 %
NEUTROPHILS # BLD MANUAL: 12.2 10E3/UL (ref 1.6–8.3)
NEUTROPHILS NFR BLD MANUAL: 71 %
NRBC # BLD AUTO: 2.9 10E3/UL
NRBC BLD MANUAL-RTO: 17 %
PLAT MORPH BLD: ABNORMAL
PLATELET # BLD AUTO: 419 10E3/UL (ref 150–450)
POTASSIUM SERPL-SCNC: 3.8 MMOL/L (ref 3.4–5.3)
PROT SERPL-MCNC: 6.5 G/DL (ref 6.4–8.3)
RBC # BLD AUTO: 4.01 10E6/UL (ref 3.8–5.2)
RBC MORPH BLD: ABNORMAL
SODIUM SERPL-SCNC: 137 MMOL/L (ref 136–145)
TARGETS BLD QL SMEAR: ABNORMAL
WBC # BLD AUTO: 17.2 10E3/UL (ref 4–11)

## 2023-07-31 PROCEDURE — 85027 COMPLETE CBC AUTOMATED: CPT

## 2023-07-31 PROCEDURE — 99232 SBSQ HOSP IP/OBS MODERATE 35: CPT | Mod: GC

## 2023-07-31 PROCEDURE — 94799 UNLISTED PULMONARY SVC/PX: CPT

## 2023-07-31 PROCEDURE — 250N000013 HC RX MED GY IP 250 OP 250 PS 637

## 2023-07-31 PROCEDURE — G0463 HOSPITAL OUTPT CLINIC VISIT: HCPCS

## 2023-07-31 PROCEDURE — 250N000009 HC RX 250: Performed by: FAMILY MEDICINE

## 2023-07-31 PROCEDURE — 272N000202 HC AEROBIKA WITH MANOMETER

## 2023-07-31 PROCEDURE — 120N000001 HC R&B MED SURG/OB

## 2023-07-31 PROCEDURE — 999N000126 HC STATISTIC PEAK FLOW MEASUREMENT

## 2023-07-31 PROCEDURE — 250N000009 HC RX 250

## 2023-07-31 PROCEDURE — 94640 AIRWAY INHALATION TREATMENT: CPT | Mod: 76

## 2023-07-31 PROCEDURE — 999N000157 HC STATISTIC RCP TIME EA 10 MIN

## 2023-07-31 PROCEDURE — 36415 COLL VENOUS BLD VENIPUNCTURE: CPT

## 2023-07-31 PROCEDURE — 250N000011 HC RX IP 250 OP 636: Mod: JZ

## 2023-07-31 PROCEDURE — 85007 BL SMEAR W/DIFF WBC COUNT: CPT

## 2023-07-31 PROCEDURE — 94640 AIRWAY INHALATION TREATMENT: CPT

## 2023-07-31 PROCEDURE — 80053 COMPREHEN METABOLIC PANEL: CPT

## 2023-07-31 RX ORDER — MONTELUKAST SODIUM 10 MG/1
TABLET ORAL
Qty: 30 TABLET | Refills: 1 | Status: SHIPPED | OUTPATIENT
Start: 2023-07-31 | End: 2023-09-08

## 2023-07-31 RX ORDER — LIDOCAINE 4 G/G
1 PATCH TOPICAL
Status: DISCONTINUED | OUTPATIENT
Start: 2023-08-01 | End: 2023-07-31

## 2023-07-31 RX ORDER — LIDOCAINE 4 G/G
1 PATCH TOPICAL
Status: DISCONTINUED | OUTPATIENT
Start: 2023-07-31 | End: 2023-08-02 | Stop reason: HOSPADM

## 2023-07-31 RX ORDER — IPRATROPIUM BROMIDE AND ALBUTEROL SULFATE 2.5; .5 MG/3ML; MG/3ML
3 SOLUTION RESPIRATORY (INHALATION)
Status: DISCONTINUED | OUTPATIENT
Start: 2023-07-31 | End: 2023-08-02 | Stop reason: HOSPADM

## 2023-07-31 RX ADMIN — IPRATROPIUM BROMIDE AND ALBUTEROL SULFATE 3 ML: .5; 3 SOLUTION RESPIRATORY (INHALATION) at 10:09

## 2023-07-31 RX ADMIN — IPRATROPIUM BROMIDE AND ALBUTEROL SULFATE 3 ML: .5; 3 SOLUTION RESPIRATORY (INHALATION) at 15:30

## 2023-07-31 RX ADMIN — APIXABAN 10 MG: 5 TABLET, FILM COATED ORAL at 20:57

## 2023-07-31 RX ADMIN — LIDOCAINE 1 PATCH: 4 PATCH TOPICAL at 14:16

## 2023-07-31 RX ADMIN — ACETAMINOPHEN 975 MG: 325 TABLET ORAL at 03:43

## 2023-07-31 RX ADMIN — OXYCODONE HYDROCHLORIDE 10 MG: 5 TABLET ORAL at 09:48

## 2023-07-31 RX ADMIN — ACETAMINOPHEN 975 MG: 325 TABLET ORAL at 20:57

## 2023-07-31 RX ADMIN — ACETAMINOPHEN 975 MG: 325 TABLET ORAL at 13:01

## 2023-07-31 RX ADMIN — IPRATROPIUM BROMIDE AND ALBUTEROL SULFATE 3 ML: .5; 3 SOLUTION RESPIRATORY (INHALATION) at 11:40

## 2023-07-31 RX ADMIN — IPRATROPIUM BROMIDE AND ALBUTEROL SULFATE 3 ML: .5; 3 SOLUTION RESPIRATORY (INHALATION) at 19:50

## 2023-07-31 RX ADMIN — Medication 1 MG: at 00:09

## 2023-07-31 RX ADMIN — MONTELUKAST 10 MG: 10 TABLET, FILM COATED ORAL at 20:57

## 2023-07-31 RX ADMIN — APIXABAN 10 MG: 5 TABLET, FILM COATED ORAL at 09:49

## 2023-07-31 RX ADMIN — HYDROMORPHONE HYDROCHLORIDE 0.2 MG: 0.2 INJECTION, SOLUTION INTRAMUSCULAR; INTRAVENOUS; SUBCUTANEOUS at 00:08

## 2023-07-31 ASSESSMENT — ACTIVITIES OF DAILY LIVING (ADL)
ADLS_ACUITY_SCORE: 22

## 2023-07-31 NOTE — DISCHARGE SUMMARY
"[facility]  Hospitalist Discharge Summary      Date of Admission:  7/25/2023  Date of Discharge:  8/2/2023  Discharging Provider: Eladia Ricci MD  Discharge Service: Hospitalist Service    Discharge Diagnoses   Sickle Cell Disease  Pain Crisis  Bilateral PEs   H/o ovarian veing thrombus 2009  Asthma exacerbation, resolved   Tension headache   Acute elevation in LFT   Thrombocytosis   Constipation     Clinically Significant Risk Factors     # Overweight: Estimated body mass index is 26.39 kg/m  as calculated from the following:    Height as of this encounter: 1.6 m (5' 3\").    Weight as of this encounter: 67.6 kg (149 lb).       Follow-ups Needed After Discharge   Hospital follow-up w/PCP on 8/4:  Please recheck hepatic panel to ensure LFTs are downtrending. Further workup as warranted.   Please recheck CBC regarding platelets.   Re-evaluate pain and adjust regimen as needed.   Please discuss duration in which patient needs to take eliquis for BL PE.     Unresulted Labs Ordered in the Past 30 Days of this Admission       Date and Time Order Name Status Description    8/1/2023  9:43 AM Hepatitis Be antigen In process         These results will be followed up by PCP    Discharge Disposition   Discharged to home  Condition at discharge: Stable    Hospital Course   Kelly Harris is a 52 year old female 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     Sickle Cell Disease  Pain Crisis  Patient admitted from hematology clinic for sickle cell pain crisis. Suspect that this was caused by a combination of asthma exacerbation discussed below and bilateral PE's found on CT chest 7/26. Infectious workup was unremarkable. WBC elevated on discharge, but suspect this is related to steroids rather than infection. She has been afebrile and " "asymptomatic. BLE pain controlled on current regimen.    - Tylenol 975 mg Q8hrs  - Oxycodone 10 mg Q6hrs PRN  - Follow-up w/hematology outpatient     Bilateral PE on CTA  H/o ovarian thrombus 2009  Found to have PEs on chest CT on 7/26. Starting on Lovenox when found and switch to DOAC 7/29. Unclear if this was provoked at this time. She reports having a \"blood clot\" in the past. 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   - Eliquis 10mg PO BID (7/29 - 8/4) then transition to 5mg PO BID on 8/5  - Please discuss with PCP about duration of eliquis in setting of ovarian thrombus hx    Asthma exacerbation, resolved  Patient was diagnosed with asthma in childhood and this has been mostly managed by her PCP. Per patient, she was changed to symbicort for a rescue inhaler in 2022 and asthma has not been well-controlled since. This likely led to an exacerbation in setting of sickle cell disease. She saw pulmonology on 7/25 w/changes to her regimen and an extended prednisone taper. Influenza, RSV, Covid negative. Finished prednisone course on 7/30. Lungs w/o wheezing this AM and satting comfortably on RA in mid-to-high 90s.  - Start dulera   - Albuterol   - Singulair   - Spiriva   - Close follow-up w/pulmonology outpatient     Tension headache  Likely related to dehydration. Improved with IV fluids and toradol one time. Neuro exam normal on discharge.   - Encouraged oral hydration     Acute elevation in LFT  LFTs elevated on 7/31/2023. Reassuring that they were downtrending on discharge. Hepatic panel wnl on admission. Unclear why this occurred. R/o acute infection - HepB/C and HIV serologies were negative. Only change in medication since admission has been Eliquis - not known to cause liver damage. Considered portal vein thrombus in setting of sickle cell disease and recent B/L PE's, but on appropriate anticoagulation for this already. Possibly biliary " pathology, but hepatic panel and physical exam not suggestive of this.   - Follow-up HepB surface antigen  - Hepatic panel recheck at hospital follow-up  - Can consider RUQ U/S if LFTs continue to climb     Thrombocytosis   Plt 488 on discharge. Likely related to hemolysis in setting of sickle cell disease and on eliquis for PE. No h/o dysfunctional asplenia. Would be strange to develop this so acutely, however. No si/sx of acute hemorrhaging on physical exam.    - Repeat CBC at hospital follow-up      Constipation  Likely constipation secondary to opioid use.   - Miralax PRN      Consultations This Hospital Stay   None    Code Status   Full Code         Honoree MD Radhames  [address] [address]  Phone: [phone]  Fax: [phone]  ______________________________________________________________________    Physical Exam   Vital Signs: Temp: 98.3  F (36.8  C) Temp src: Oral BP: 138/78 Pulse: 54   Resp: 14 SpO2: 95 % O2 Device: None (Room air)    Weight: 149 lbs 0 oz    Physical Exam  Constitutional:       General: She is not in acute distress.  HENT:      Head: Normocephalic and atraumatic.   Eyes:      General: No scleral icterus.  Cardiovascular:      Rate and Rhythm: Normal rate and regular rhythm.      Heart sounds: No murmur heard.     No friction rub. No gallop.   Pulmonary:      Effort: Pulmonary effort is normal.      Breath sounds: Normal breath sounds. No wheezing.   Abdominal:      General: Abdomen is flat. There is no distension.      Palpations: Abdomen is soft. There is no mass.      Tenderness: There is no abdominal tenderness.   Musculoskeletal:         General: No swelling or tenderness. Normal range of motion.   Skin:     General: Skin is warm and dry.   Neurological:      General: No focal deficit present.      Mental Status: She is alert and oriented to person, place, and time.      Cranial Nerves: No cranial nerve deficit.      Motor:  No weakness.      Gait: Gait normal.              Primary Care Physician   Arcenio Alcantara    Discharge Orders      Adult Pulmonary Medicine  Referral      Adult Oncology/Hematology  Referral      Reason for your hospital stay    You were hospitalized for acute sickle cell pain crisis triggered by asthma exacerbation. You were also found to have blood clots in your lungs and started on blood thinners to treat this.     Follow-up and recommended labs and tests     Follow up with primary care provider, Arcenio Alcantara, within 7 days for hospital follow- up.  The following labs/tests are recommended: hepatic panel and CBC.     Activity    Your activity upon discharge: activity as tolerated     Diet    Follow this diet upon discharge: Orders Placed This Encounter      Combination Diet Regular Diet Adult       Significant Results and Procedures   Most Recent 3 CBC's:  Recent Labs   Lab Test 07/31/23 0518 07/30/23 0603 07/29/23  0536   WBC 17.2* 16.5* 16.1*   HGB 10.4* 9.4* 9.4*   MCV 73* 74* 73*    376 356     Most Recent 3 BMP's:  Recent Labs   Lab Test 07/31/23 0518 07/30/23 0603 07/29/23  0536    138 141   POTASSIUM 3.8 4.0 3.6   CHLORIDE 102 102 106   CO2 26 27 22   BUN 10.3 9.1 8.8   CR 0.94 0.96* 0.86   ANIONGAP 9 9 13   KULWANT 9.5 9.3 8.6   GLC 97 97 93     Most Recent 3 INR's:No lab results found.,   Results for orders placed or performed during the hospital encounter of 07/25/23   XR Chest 2 Views    Narrative    EXAM: XR CHEST 2 VIEWS  LOCATION: [facility] [place]  DATE: 7/25/2023    INDICATION: chest pain  COMPARISON: Chest CT 09/19/2022      Impression    IMPRESSION: Negative chest.   CT Chest Pulmonary Embolism w Contrast    Narrative    EXAM: CT CHEST PULMONARY EMBOLISM W CONTRAST  LOCATION: [facility] [place]  DATE: 7/26/2023    INDICATION: Chest pain. Patient w sickle cell pain crisis.  COMPARISON: 09/19/2022.  TECHNIQUE: CT chest pulmonary  angiogram during arterial phase injection of IV contrast. Multiplanar reformats and MIP reconstructions were performed. Dose reduction techniques were used.   CONTRAST: 90ml isovue 370    FINDINGS:  ANGIOGRAM CHEST: Positive bilateral pulmonary embolism with the most proximal embolism at the bifurcation of the left main pulmonary artery into the left upper and lower lobes. Other mild areas of segmental and subsegmental distribution disease,   including the right lower lobe. Borderline pulmonary arterial enlargement with the pulmonary trunk measuring 3 cm. RV to LV ratio at 0.88.    LUNGS AND PLEURA: Mildly heterogeneous right lower lobe opacities and consolidation. Minimal left lower lobe costophrenic angle opacities. Other areas of mild atelectasis. Minimal right and trace left pleural effusions. No pneumothorax.    MEDIASTINUM/AXILLAE: No adenopathy. No pericardial effusion.    CORONARY ARTERY CALCIFICATION: Motion artifact.    UPPER ABDOMEN: No actionable findings.    MUSCULOSKELETAL: Nothing acute.      Impression    IMPRESSION:    1.  Mild bilateral pulmonary artery embolism. Most proximal embolism is nonocclusive and at the bifurcation of the left main pulmonary artery extending into left upper and lower lobes.    2.  Borderline pulmonary arterial enlargement could reflect pulmonary hypertension. No right heart enlargement.    3.  Heterogeneous consolidation and opacities in the right lower lobe suggests sequela of pulmonary embolism, such as hemorrhage or developing infarct.    4.  Minimal left lower lobe opacities are probably from atelectasis, though definitively cannot exclude sequela of PE.    5.  Minimal right and trace left pleural effusions.    Critical Result: Pulmonary embolism and associated findings and associated findings    Finding was identified on 7/26/2023 at total 5:00 PM.    Patient's Nurse, Miya, was contacted by me on 7/26/2023 12:05 PM CDT and verbalized understanding of the critical  result.          Discharge Medications   Current Discharge Medication List        START taking these medications    Details   acetaminophen (TYLENOL) 325 MG tablet Take 3 tablets (975 mg) by mouth every 8 hours  Qty: 120 tablet, Refills: 1    Comments: Please do not take more than 3 per day. Exceeding 4 tabs per day may lead to toxicity and liver damage.  Associated Diagnoses: Sickle cell pain crisis (H)      !! apixaban ANTICOAGULANT (ELIQUIS) 5 MG tablet Take 2 tablets (10 mg) by mouth 2 times daily for 2 days  Qty: 8 tablet, Refills: 0    Associated Diagnoses: Acute pulmonary embolism without acute cor pulmonale, unspecified pulmonary embolism type (H)      !! apixaban ANTICOAGULANT (ELIQUIS) 5 MG tablet Take 1 tablet (5 mg) by mouth 2 times daily  Qty: 60 tablet, Refills: 1    Associated Diagnoses: Acute pulmonary embolism without acute cor pulmonale, unspecified pulmonary embolism type (H)      polyethylene glycol (MIRALAX) 17 g packet Take 17 g by mouth daily for 30 days  Qty: 30 packet, Refills: 0    Associated Diagnoses: Sickle cell pain crisis (H)       !! - Potential duplicate medications found. Please discuss with provider.        CONTINUE these medications which have CHANGED    Details   oxyCODONE (ROXICODONE) 10 MG tablet Take 1 tablet (10 mg) by mouth every 4 hours as needed for moderate pain (IF pain not managed with non-pharmacological and non-opioid interventions)  Qty: 14 tablet, Refills: 0    Associated Diagnoses: Sickle cell pain crisis (H)           CONTINUE these medications which have NOT CHANGED    Details   albuterol (PROAIR HFA/PROVENTIL HFA/VENTOLIN HFA) 108 (90 Base) MCG/ACT inhaler Inhale 2 puffs into the lungs every 6 hours as needed for shortness of breath, wheezing or cough  Qty: 18 g, Refills: 4    Comments: Pharmacy may dispense brand covered by insurance (Proair, or proventil or ventolin or generic albuterol inhaler)  Associated Diagnoses: Moderate persistent asthma without  complication      cetirizine (ZYRTEC) 10 MG tablet TAKE 1 TABLET BY MOUTH EVERY DAY  Qty: 90 tablet, Refills: 3    Associated Diagnoses: Seasonal allergies      fluticasone (FLONASE) 50 MCG/ACT nasal spray INSTILL 1 SPRAY INTO BOTH NOSTRILS DAILY  Qty: 32 mL, Refills: 4    Associated Diagnoses: Chronic seasonal allergic rhinitis; Seasonal allergic rhinitis due to other allergic trigger      mometasone-formoterol (DULERA) 200-5 MCG/ACT inhaler Inhale 2 puffs into the lungs 2 times daily  Qty: 13 g, Refills: 4    Associated Diagnoses: Moderate persistent asthma without complication      SPIRIVA RESPIMAT 2.5 MCG/ACT inhaler INHALE 2 PUFFS BY MOUTH INTO THE LUNGS DAILY  Qty: 4 g, Refills: 3    Associated Diagnoses: Moderate persistent asthma without complication      montelukast (SINGULAIR) 10 MG tablet TAKE 1 TABLET BY MOUTH EVERYDAY AT BEDTIME  Qty: 30 tablet, Refills: 1    Associated Diagnoses: Moderate persistent asthma without complication           STOP taking these medications       budesonide-formoterol (SYMBICORT) 160-4.5 MCG/ACT Inhaler Comments:   Reason for Stopping:         predniSONE (DELTASONE) 20 MG tablet Comments:   Reason for Stopping:         predniSONE (DELTASONE) 20 MG tablet Comments:   Reason for Stopping:             Allergies   Allergies   Allergen Reactions    Iodine Rash

## 2023-07-31 NOTE — TREATMENT PLAN
[facility] Treatment Plan    Patient Score: 4  Patient Acuity: 5    Clinical Indication for Therapy: Asthma    Therapy Ordered: SRINI Gardiner    Assessment Summary: Pt has a pulmonary history of asthma. Pt does use Symbicort, Dulera and Spiriva. Pt does have expiratory wheezing w/increased aeration post-treatment. Provided pt with a flutter valve and peak flow meter. Pt achieved 150 on peak flow. Will schedule neb treatments.     Светлана Leiva, RT  7/31/2023

## 2023-07-31 NOTE — PLAN OF CARE
"Goal Outcome Evaluation:         A/0 x 4. C/o left sided h/a since yesterday and  bilateral LE pain, rated up to 7/10, received scheduled Tylenol and PRN, Dilaudid and Oxycodone for pain alleviation with effect. Sleeping comfortably on the bed at  this time, will continue to monitor.    Problem: Plan of Care - These are the overarching goals to be used throughout the patient stay.    Goal: Patient-Specific Goal (Individualized)  Description: You can add care plan individualizations to a care plan. Examples of Individualization might be:  \"Parent requests to be called daily at 9am for status\", \"I have a hard time hearing out of my right ear\", or \"Do not touch me to wake me up as it startles me\".  Outcome: Progressing     Problem: Plan of Care - These are the overarching goals to be used throughout the patient stay.    Goal: Absence of Hospital-Acquired Illness or Injury  Outcome: Progressing  Intervention: Identify and Manage Fall Risk  Recent Flowsheet Documentation  Taken 7/30/2023 2354 by Elvia Olmos RN  Safety Promotion/Fall Prevention:   activity supervised   assistive device/personal items within reach   clutter free environment maintained   nonskid shoes/slippers when out of bed   safety round/check completed  Intervention: Prevent Skin Injury  Recent Flowsheet Documentation  Taken 7/30/2023 2354 by Elvia Olmos RN  Body Position: position changed independently  Intervention: Prevent and Manage VTE (Venous Thromboembolism) Risk  Recent Flowsheet Documentation  Taken 7/30/2023 2354 by Elvia Olmos RN  VTE Prevention/Management: SCDs (sequential compression devices) off     Problem: Plan of Care - These are the overarching goals to be used throughout the patient stay.    Goal: Absence of Hospital-Acquired Illness or Injury  Intervention: Identify and Manage Fall Risk  Recent Flowsheet Documentation  Taken 7/30/2023 2354 by Elvia Olmos RN  Safety Promotion/Fall Prevention:   activity " supervised   assistive device/personal items within reach   clutter free environment maintained   nonskid shoes/slippers when out of bed   safety round/check completed     Problem: Plan of Care - These are the overarching goals to be used throughout the patient stay.    Goal: Absence of Hospital-Acquired Illness or Injury  Intervention: Prevent Skin Injury  Recent Flowsheet Documentation  Taken 7/30/2023 5707 by Elvia Olmos RN  Body Position: position changed independently

## 2023-07-31 NOTE — PROGRESS NOTES
[facility]    Medicine Progress Note - Hospitalist Service    Date of Admission:  7/25/2023    Assessment & Plan   Kelly Harris is a 52 year old female w/PMHx of of Hemoglobin SC disease and asthma with recent hospitalization for sickle cell pain crisis and is admitted 7/25/2023 for another sickle cell disease with pain crisis found to have have bilateral PEs on CT chest, now on eliquis. Optimizing pain regimen for discharge, hopeful for tomorrow.     Sickle Cell Disease  Pain Crisis  Patient admitted from hematology clinic for sickle cell pain crisis. Suspect that this was caused by a combination of asthma exacerbation discussed below and bilateral PE's found on CT chest 7/26. WBC elevated, but suspect this is related to steroids rather than infection. Patient has remained afebrile. Continues to have pain overnight requiring IV dilaudid for relief. Complaining about LLE burning sensation. Will continue to assess pain and optimize regiment for discharge - hopeful for tomorrow.    - Scheduled Tylenol 975 mg Q8hrs  - Oxycodone 10 mg Q4hrs PRN  - Dilaudid 0.4 mg IV Q2hrs PRN for breakthrough pain. Attempting to avoid.   - Lidocaine patch for LLE  - CBC and CMP in AM    Bilateral PE  H/o ovarian veing thrombus 2009  Found to have PEs on chest CT on 7/26. Starting on Lovenox when found. Switched to DOAC 7/29. Unclear if this was provoked at this time.  - Continue Eliquis PO      Asthma exacerbation, improving  Patient was diagnosed with asthma in childhood and this has been mostly managed by her PCP. Per patient, she was changed to symbicort for a rescue inhaler in 2022 and asthma has not been well-controlled since. This likely led to an exacerbation in setting of sickle cell disease. Finished 5d course of prednisone on 7/30. Lungs w/o wheezing this AM, though patient still requiring duonebs. Satting comfortably in mid to high 90s on RA. Workup has been negative for respiratory infection. She  "saw pulmonology on 7/25 w/changes to her regimen and an extended prednisone taper - will start these on discharge.   - RT following, recs appreciated   - Supplemental O2 as needed, add humidifier to help with nasal dryness  - Duonebs Q2 hr PRN  - Continue PTA Singulair     Constipation  Likely constipation secondary to opioid use. 1 BM overnight.   - Scheduled Miralax  - Senna PRN        Diet: Combination Diet Regular Diet Adult    DVT Prophylaxis: Doac  Reyes Catheter: Not present  Lines: None     Cardiac Monitoring: None  Code Status: Full Code      Clinically Significant Risk Factors           # Hypercalcemia: corrected calcium is >10.1, will monitor as appropriate    # Hypoalbuminemia: Lowest albumin = 3 g/dL at 7/27/2023  5:50 AM, will monitor as appropriate            # Overweight: Estimated body mass index is 26.39 kg/m  as calculated from the following:    Height as of this encounter: 1.6 m (5' 3\").    Weight as of this encounter: 67.6 kg (149 lb).           The patient's care was discussed with the Attending Physician, Dr. Berkowitz .    Eladia Ricci MD  Hospitalist Service  [facility]    ______________________________________________________________________    Interval History     Episode of L calf pain and HA overnight, making difficult to sleep. One dose of dilaudid. Feels better rested this morning though still complaining of L calf pain.   Discussed possible discharge today, but patient became tearful and anxious. Eager to go home, but afraid she will bounce back if she overexerts herself. Explained that she is someone who will just bear through pain and continue working. Her pain is bearable, but not completely resolved.     Physical Exam   Vital Signs: Temp: 98.3  F (36.8  C) Temp src: Oral BP: 138/78 Pulse: 54   Resp: 14 SpO2: 96 % O2 Device: None (Room air)    Weight: 149 lbs 0 oz    GENERAL: Healthy, alert and no distress  EYES: Eyes grossly normal to inspection.    RESP:  " Lungs clear on auscultation. Good air movement.  CV: Heart RRR. No murmur  Abdomen: Soft, nontender, nondistended.  MSK: No gross deformity. Normal tone.  SKIN: Visible skin clear. No significant rash, abnormal pigmentation or lesions.

## 2023-07-31 NOTE — PROGRESS NOTES
Care Management Follow Up    Length of Stay (days): 6    Expected Discharge Date: 07/31/2023     Concerns to be Addressed:       Patient plan of care discussed at interdisciplinary rounds: Yes    Anticipated Discharge Disposition:  home       Referrals Placed by CM/SW:    Private pay costs discussed: Not applicable    Additional Information:  Chart reviewed, pt is independent at baseline.  Anticipate home at discharge.  Care Management available for any discharge needs.    REGLA Pulido

## 2023-08-01 LAB
ALBUMIN SERPL BCG-MCNC: 3.6 G/DL (ref 3.5–5.2)
ALP SERPL-CCNC: 122 U/L (ref 35–104)
ALT SERPL W P-5'-P-CCNC: 123 U/L (ref 0–50)
ANION GAP SERPL CALCULATED.3IONS-SCNC: 10 MMOL/L (ref 7–15)
AST SERPL W P-5'-P-CCNC: 86 U/L (ref 0–45)
BASOPHILS # BLD MANUAL: 0 10E3/UL (ref 0–0.2)
BASOPHILS NFR BLD MANUAL: 0 %
BILIRUB SERPL-MCNC: 0.3 MG/DL
BUN SERPL-MCNC: 11 MG/DL (ref 6–20)
CALCIUM SERPL-MCNC: 9.6 MG/DL (ref 8.6–10)
CHLORIDE SERPL-SCNC: 103 MMOL/L (ref 98–107)
CREAT SERPL-MCNC: 0.94 MG/DL (ref 0.51–0.95)
DEPRECATED HCO3 PLAS-SCNC: 26 MMOL/L (ref 22–29)
EOSINOPHIL # BLD MANUAL: 0.1 10E3/UL (ref 0–0.7)
EOSINOPHIL NFR BLD MANUAL: 1 %
ERYTHROCYTE [DISTWIDTH] IN BLOOD BY AUTOMATED COUNT: 14.7 % (ref 10–15)
GFR SERPL CREATININE-BSD FRML MDRD: 72 ML/MIN/1.73M2
GLUCOSE SERPL-MCNC: 93 MG/DL (ref 70–99)
HBV SURFACE AB SERPL IA-ACNC: >1000 M[IU]/ML
HBV SURFACE AB SERPL IA-ACNC: REACTIVE M[IU]/ML
HCT VFR BLD AUTO: 29.7 % (ref 35–47)
HCV AB SERPL QL IA: NONREACTIVE
HGB BLD-MCNC: 10.3 G/DL (ref 11.7–15.7)
HIV 1+2 AB+HIV1 P24 AG SERPL QL IA: NONREACTIVE
LYMPHOCYTES # BLD MANUAL: 5.4 10E3/UL (ref 0.8–5.3)
LYMPHOCYTES NFR BLD MANUAL: 39 %
MCH RBC QN AUTO: 25.8 PG (ref 26.5–33)
MCHC RBC AUTO-ENTMCNC: 34.7 G/DL (ref 31.5–36.5)
MCV RBC AUTO: 74 FL (ref 78–100)
MONOCYTES # BLD MANUAL: 0.3 10E3/UL (ref 0–1.3)
MONOCYTES NFR BLD MANUAL: 2 %
NEUTROPHILS # BLD MANUAL: 8.1 10E3/UL (ref 1.6–8.3)
NEUTROPHILS NFR BLD MANUAL: 58 %
NRBC # BLD AUTO: 2.4 10E3/UL
NRBC BLD MANUAL-RTO: 17 %
PLAT MORPH BLD: ABNORMAL
PLATELET # BLD AUTO: 457 10E3/UL (ref 150–450)
POLYCHROMASIA BLD QL SMEAR: SLIGHT
POTASSIUM SERPL-SCNC: 3.8 MMOL/L (ref 3.4–5.3)
PROT SERPL-MCNC: 6.5 G/DL (ref 6.4–8.3)
RBC # BLD AUTO: 3.99 10E6/UL (ref 3.8–5.2)
RBC MORPH BLD: ABNORMAL
RETICS # AUTO: 0.15 10E6/UL (ref 0.01–0.11)
RETICS/RBC NFR AUTO: 3.8 % (ref 0.8–2.7)
SODIUM SERPL-SCNC: 139 MMOL/L (ref 136–145)
TARGETS BLD QL SMEAR: ABNORMAL
WBC # BLD AUTO: 13.9 10E3/UL (ref 4–11)

## 2023-08-01 PROCEDURE — 85007 BL SMEAR W/DIFF WBC COUNT: CPT

## 2023-08-01 PROCEDURE — 94640 AIRWAY INHALATION TREATMENT: CPT | Mod: 76

## 2023-08-01 PROCEDURE — 80053 COMPREHEN METABOLIC PANEL: CPT

## 2023-08-01 PROCEDURE — 87389 HIV-1 AG W/HIV-1&-2 AB AG IA: CPT

## 2023-08-01 PROCEDURE — 120N000001 HC R&B MED SURG/OB

## 2023-08-01 PROCEDURE — 99232 SBSQ HOSP IP/OBS MODERATE 35: CPT | Mod: GC | Performed by: FAMILY MEDICINE

## 2023-08-01 PROCEDURE — 250N000013 HC RX MED GY IP 250 OP 250 PS 637

## 2023-08-01 PROCEDURE — 86803 HEPATITIS C AB TEST: CPT

## 2023-08-01 PROCEDURE — 258N000003 HC RX IP 258 OP 636

## 2023-08-01 PROCEDURE — 85045 AUTOMATED RETICULOCYTE COUNT: CPT

## 2023-08-01 PROCEDURE — 999N000157 HC STATISTIC RCP TIME EA 10 MIN

## 2023-08-01 PROCEDURE — 85027 COMPLETE CBC AUTOMATED: CPT

## 2023-08-01 PROCEDURE — 36415 COLL VENOUS BLD VENIPUNCTURE: CPT

## 2023-08-01 PROCEDURE — 250N000009 HC RX 250: Performed by: FAMILY MEDICINE

## 2023-08-01 PROCEDURE — 87350 HEPATITIS BE AG IA: CPT

## 2023-08-01 PROCEDURE — 250N000011 HC RX IP 250 OP 636: Mod: JZ

## 2023-08-01 PROCEDURE — 94640 AIRWAY INHALATION TREATMENT: CPT

## 2023-08-01 PROCEDURE — 86706 HEP B SURFACE ANTIBODY: CPT

## 2023-08-01 RX ORDER — KETOROLAC TROMETHAMINE 30 MG/ML
30 INJECTION, SOLUTION INTRAMUSCULAR; INTRAVENOUS
Status: COMPLETED | OUTPATIENT
Start: 2023-08-01 | End: 2023-08-01

## 2023-08-01 RX ADMIN — MONTELUKAST 10 MG: 10 TABLET, FILM COATED ORAL at 22:24

## 2023-08-01 RX ADMIN — OXYCODONE HYDROCHLORIDE 10 MG: 5 TABLET ORAL at 02:09

## 2023-08-01 RX ADMIN — OXYCODONE HYDROCHLORIDE 10 MG: 5 TABLET ORAL at 17:59

## 2023-08-01 RX ADMIN — IPRATROPIUM BROMIDE AND ALBUTEROL SULFATE 3 ML: .5; 3 SOLUTION RESPIRATORY (INHALATION) at 19:19

## 2023-08-01 RX ADMIN — OXYCODONE HYDROCHLORIDE 10 MG: 5 TABLET ORAL at 09:34

## 2023-08-01 RX ADMIN — IPRATROPIUM BROMIDE AND ALBUTEROL SULFATE 3 ML: .5; 3 SOLUTION RESPIRATORY (INHALATION) at 07:59

## 2023-08-01 RX ADMIN — KETOROLAC TROMETHAMINE 30 MG: 30 INJECTION, SOLUTION INTRAMUSCULAR at 14:06

## 2023-08-01 RX ADMIN — IPRATROPIUM BROMIDE AND ALBUTEROL SULFATE 3 ML: .5; 3 SOLUTION RESPIRATORY (INHALATION) at 12:01

## 2023-08-01 RX ADMIN — ACETAMINOPHEN 975 MG: 325 TABLET ORAL at 12:38

## 2023-08-01 RX ADMIN — SODIUM CHLORIDE, POTASSIUM CHLORIDE, SODIUM LACTATE AND CALCIUM CHLORIDE 1000 ML: 600; 310; 30; 20 INJECTION, SOLUTION INTRAVENOUS at 12:37

## 2023-08-01 RX ADMIN — LIDOCAINE 1 PATCH: 4 PATCH TOPICAL at 09:40

## 2023-08-01 RX ADMIN — APIXABAN 10 MG: 5 TABLET, FILM COATED ORAL at 20:21

## 2023-08-01 RX ADMIN — ACETAMINOPHEN 975 MG: 325 TABLET ORAL at 03:17

## 2023-08-01 RX ADMIN — APIXABAN 10 MG: 5 TABLET, FILM COATED ORAL at 09:34

## 2023-08-01 RX ADMIN — ACETAMINOPHEN 975 MG: 325 TABLET ORAL at 20:19

## 2023-08-01 ASSESSMENT — ACTIVITIES OF DAILY LIVING (ADL)
ADLS_ACUITY_SCORE: 22

## 2023-08-01 NOTE — PROGRESS NOTES
Pt lung sounds, diminished and tight, scattered expiratory wheeze, increased aeration after nebulizer. Peak flow pre = 200, post = 270.  Pt on RA with sats > 90%, no distress.     Sudha Hernández, RT on 7/31/2023 at 9:51 PM

## 2023-08-01 NOTE — PROGRESS NOTES
RESPIRATORY CARE NOTE     Patient was on room air and had an Sp02 of 97%. They received Duoneb x2.   Breath sounds prior to treatment where diminished to in the upper right and scattered wheezes in the left and post treatment increased aeration.  Patient perceives moderate improvement, patient tolerated it well.      They have completed aerobika therapy and demonstrated good technique.    Per pulmonary note at discharge  Change Symbicort to Dulera  Continue albuterol inhaler  Continue singulair and spiriva respimat  Repeat PFT in 6 weeks when pain is more controlled.       RT will continue to assess and monitor cardiopulmonary status.     Sudha Velasquez, RT

## 2023-08-01 NOTE — PROGRESS NOTES
"CLINICAL NUTRITION SERVICES - ASSESSMENT NOTE     Nutrition Prescription    RECOMMENDATIONS FOR MDs/PROVIDERS TO ORDER:    Malnutrition Status:    Does not meet criteria    Recommendations already ordered by Registered Dietitian (RD):  None at this time    Future/Additional Recommendations:  Monitor intake, weight, labs     REASON FOR ASSESSMENT  Kelly Harris is a/an 52 year old female assessed by the dietitian for LOS    NUTRITION HISTORY  Met with pt, decreased appetite since being in the hospital, was eating fine PTA. Family bringing food from home, lots of soups and fruit. Does not take any supplements. No nutrition questions or concerns.    CURRENT NUTRITION ORDERS  Diet: Regular  Intake/Tolerance: variable, 0 or 100% of meals. Is having family bring food from home.    LABS  Labs reviewed    MEDICATIONS  Medications reviewed    ANTHROPOMETRICS  Height: 160 cm (5' 3\")  Most Recent Weight: 67.6 kg (149 lb)    IBW: 52.3 kg  BMI: Overweight BMI 25-29.9  Weight History:   Wt Readings from Last 20 Encounters:   07/25/23 67.6 kg (149 lb)   07/25/23 67.8 kg (149 lb 6.4 oz)   07/25/23 67.5 kg (148 lb 12.8 oz)   07/20/23 68 kg (150 lb)   07/15/23 68 kg (150 lb)   07/07/23 68.5 kg (151 lb)   05/25/23 68.3 kg (150 lb 9.6 oz)   05/16/23 67.6 kg (149 lb)   05/04/23 67 kg (147 lb 12.8 oz)   04/03/23 67.5 kg (148 lb 12.8 oz)   11/28/22 64.4 kg (142 lb)   09/19/22 66.8 kg (147 lb 4.3 oz)   09/15/22 66.9 kg (147 lb 6.4 oz)   08/08/22 67.5 kg (148 lb 12.8 oz)   07/05/22 67.6 kg (149 lb)   04/07/22 68.1 kg (150 lb 1.6 oz)   02/21/22 68 kg (150 lb)   02/17/22 67.9 kg (149 lb 12.8 oz)   02/09/22 67 kg (147 lb 9.6 oz)   01/06/22 65.7 kg (144 lb 12.8 oz)   Weight stable    Dosing Weight: 56.1 kg (adj wt)    ASSESSED NUTRITION NEEDS  Estimated Energy Needs: 4277-5095 kcals/day (25 - 30 kcals/kg)  Justification: Overweight  Estimated Protein Needs: 56-67 grams protein/day (1 - 1.2 grams of pro/kg)  Justification: " Maintenance  Estimated Fluid Needs: 2691-5825 mL/day (1 mL/kcal)   Justification: Maintenance    PHYSICAL FINDINGS  See malnutrition section below.    MALNUTRITION:  % Weight Loss:  None noted  % Intake:  Decreased intake does not meet criteria for malnutrition   Subcutaneous Fat Loss:  None observed  Muscle Loss:  None observed  Fluid Retention:  None noted    Malnutrition Diagnosis: Patient does not meet two of the above criteria necessary for diagnosing malnutrition    NUTRITION DIAGNOSIS  Inadequate oral intake related to poor appetite as evidenced by report of decreased po intake, po intake 0% of some meals.      INTERVENTIONS  Implementation  Nutrition Education: No education needs assessed at this time   None at this time     Goals  Patient to consume % of nutritionally adequate meals three times per day, or the equivalent with supplements/snacks.     Monitoring/Evaluation  Progress toward goals will be monitored and evaluated per protocol.

## 2023-08-01 NOTE — PLAN OF CARE
Problem: Plan of Care - These are the overarching goals to be used throughout the patient stay.    Goal: Optimal Comfort and Wellbeing  Outcome: Progressing  Intervention: Monitor Pain and Promote Comfort  Recent Flowsheet Documentation  Taken 7/31/2023 2355 by Marlon Stacy RN  Pain Management Interventions:   rest   medication offered but refused     Problem: Pain Acute  Goal: Optimal Pain Control and Function  Outcome: Progressing  Intervention: Develop Pain Management Plan  Recent Flowsheet Documentation  Taken 7/31/2023 2355 by Marlon Stacy RN  Pain Management Interventions:   rest   medication offered but refused  Intervention: Prevent or Manage Pain  Recent Flowsheet Documentation  Taken 7/31/2023 2355 by Marlon Stacy RN  Medication Review/Management: medications reviewed     Problem: Sickle Cell Disease  Goal: Optimal Cerebral Tissue Perfusion  Outcome: Progressing  Goal: Optimal Coping with Sickle Cell Disease  Outcome: Progressing  Goal: Effective Tissue Perfusion  Outcome: Progressing  Goal: Absence of Infection Signs and Symptoms  Outcome: Progressing  Goal: Optimal Pain Control and Function  Outcome: Progressing  Intervention: Manage Acute Pain  Recent Flowsheet Documentation  Taken 7/31/2023 2355 by Marlon Stacy RN  Pain Management Interventions:   rest   medication offered but refused  Intervention: Acknowledge Underlying Chronic Pain  Recent Flowsheet Documentation  Taken 7/31/2023 2355 by Marlon Stacy RN  Medication Review/Management: medications reviewed  Goal: Optimal Oxygenation  Outcome: Progressing  Intervention: Optimize Oxygenation  Recent Flowsheet Documentation  Taken 7/31/2023 2355 by Marlon Stacy RN  Cough And Deep Breathing: done independently per patient   Goal Outcome Evaluation:  VSS on RA  Orientation: A&Ox4  Pain: Headache pain 6-8/10, given scheduled tylenol and PRN oxycodone with little relief.  Lines: RPIV-SL  Activity: Ind. In room  Plan: discharge pending pain  control

## 2023-08-01 NOTE — PLAN OF CARE
Problem: Plan of Care - These are the overarching goals to be used throughout the patient stay.    Goal: Optimal Comfort and Wellbeing  Outcome: Progressing     Problem: Pain Acute  Goal: Optimal Pain Control and Function  Outcome: Progressing  Intervention: Prevent or Manage Pain  Recent Flowsheet Documentation  Taken 8/1/2023 1000 by Reuben Riggs RN  Medication Review/Management: medications reviewed     Problem: Sickle Cell Disease  Goal: Effective Tissue Perfusion  Outcome: Progressing  Goal: Optimal Pain Control and Function  Outcome: Progressing  Intervention: Acknowledge Underlying Chronic Pain  Recent Flowsheet Documentation  Taken 8/1/2023 1000 by Reuben Riggs RN  Medication Review/Management: medications reviewed   Goal Outcome Evaluation:       Patient is A/O x4. VSS. Pain rated at 6/10 for headache pain today. PRN PO medications given, but due to ineffectiveness, Medical Resident ordered IV toradol given at 14:00. Patient was upset this morning about AST and ALT values being increased. Nursing staff consulted medical staff regarding mild increases above normal values and significance of her elevations discussed with patient. Patient described that she is ok with awaiting lab value normalization and results of disease screenings taken today by resident.     Patient in room, napping at this time.

## 2023-08-01 NOTE — PROGRESS NOTES
[facility]    Medicine Progress Note - Hospitalist Service    Date of Admission:  7/25/2023    Assessment & Plan   Kelly Harris is a 52 year old female w/PMHx of of Hemoglobin SC disease and asthma with recent hospitalization for sickle cell pain crisis and is admitted 7/25/2023 for another sickle cell disease with pain crisis found to have have bilateral PEs on CT chest, now on eliquis. Optimizing pain regimen and planning for discharge tomorrow with close follow-up.      Sickle Cell Disease  Pain Crisis  Patient admitted from hematology clinic for sickle cell pain crisis. Suspect that this was caused by a combination of asthma exacerbation discussed below and bilateral PE's found on CT chest 7/26. WBC downtrending. Patient has remained afebrile and asymptomatic. Complaining of HA and  continues to have BLE pain, responsive to lidocaine patch. Did not use dilaudid overnight, though did rate pain 6 - 7/10, though appears comfortable. Will continue to assess pain and optimize regimen for discharge. Plan for discharge tomorrow.   - Scheduled Tylenol 975 mg Q8hrs  - Oxycodone 10 mg Q4hrs PRN  - Dilaudid 0.4 mg IV Q2hrs PRN for breakthrough pain. Attempting to avoid.   - Lidocaine patch for BLE  - CBC and CMP in AM    Bilateral PE  H/o ovarian veing thrombus 2009  Found to have PEs on chest CT on 7/26. Starting on Lovenox when found. Switched to DOAC 7/29. Unclear if this was provoked at this time.  - Continue Eliquis PO      Asthma exacerbation, resolved  Patient was diagnosed with asthma in childhood and this has been mostly managed by her PCP. Per patient, she was changed to symbicort for a rescue inhaler in 2022 and asthma has not been well-controlled since. This likely led to an exacerbation in setting of sickle cell disease. Finished 5d course of prednisone on 7/30. Lungs w/o wheezing this AM, though patient still requiring duonebs. Satting comfortably in mid to high 90s on RA. Workup  has been negative for respiratory infection. She saw pulmonology on 7/25 w/changes to her regimen and an extended prednisone taper - will start these on discharge.   - RT following, recs appreciated   - Supplemental O2 as needed, add humidifier to help with nasal dryness  - Duonebs Q2 hr PRN  - Continue PTA Singulair    Tension headache  Patient complaining of tight sensation across her forehead. Yesterday, it was only on her L side. Started when her IV fluids were discontinued. Tylenol and oxycodone has provided minimal relief. Neuro exam normal - BUE and BLE strength 5/5, CN II - VII grossly intact, no focal neurological deficits. Likely related to poor sleep and dehydration. Low concerns for stroke.   - 1L LR bolus  - Toradol 30mg IV one-time     Acute elevation in LFT  Alk phos 122, , and AST 86 today. Hepatic panel wnl on admission. Abdominal exam without distension, tenderness, or mass. Unclear etiology at this time. Only change in medication since admission has been Eliquis - not known to cause liver damage. Low concerns for acute infection but will obtain HepB/C and HIV serologies to r/o. Considered portal vein thrombus in setting of sickle cell disease and recent B/L PE's, but on appropriate anticoagulation for this already. Possibly biliary pathology, but hepatic panel and physical exam not suggestive of this.   - HepB/C and HIV serologies   - Trend hepatic panel  - Can consider RUQ U/S if LFTs continue to climb    Thrombocytosis   Plt 457 this AM, retic count 3.8. Likely related to hemolysis in setting of sickle cell disease and on eliquis for PE. No h/o dysfunctional asplenia. Would be strange to develop this so acutely, however. No si/sx of acute hemorrhaging on physical exam. Will continue to to trend.   - CBC daily     Constipation  Likely constipation secondary to opioid use.   - Scheduled Miralax  - Senna PRN        Diet: Combination Diet Regular Diet Adult    DVT Prophylaxis: Doac  Reyes  "Catheter: Not present  Lines: None     Cardiac Monitoring: None  Code Status: Full Code      Clinically Significant Risk Factors              # Hypoalbuminemia: Lowest albumin = 3 g/dL at 7/27/2023  5:50 AM, will monitor as appropriate            # Overweight: Estimated body mass index is 26.39 kg/m  as calculated from the following:    Height as of this encounter: 1.6 m (5' 3\").    Weight as of this encounter: 67.6 kg (149 lb).           The patient's care was discussed with the Attending Physician, Dr. Smith .    Eladia Ricci MD  Hospitalist Service  [facility]    ______________________________________________________________________    Interval History     Complaining of tightness across her forehead. Minimal relief w/tylenol and oxycodone. Was only on L side yesterday. Denies changes in vision, lightheadedness, dizziness, and nausea/vomiting. Started when we stopped IV fluids. Poor sleep d/t noise in the hospital.   Denies dyspnea, chest pain, abdominal pain, fevers/chills, and changes in urination/BM.   Continues to have BLE pain. Did not use dilaudid overnight. Lidocaine patch is helpful.   She is very concerned about her elevated LFTs. Asked if we could lower this. Explained that we didn't want to treat numbers and is doing a workup to better understand cause before treating anything. She became very anxious and tearful, explaining she wanted everything to be okay before she leaves the hospital. Does not want to come back. Her thought process is reasonable. Reassured her that we are trying to understand the rise on LFTs and will treat as appropriate, though it will just take some time.   Discussed discharge extensively. Reassured patient that she would be provided appropriate pain medications on discharge w/close follow-up. Management would not change significantly outside hospital. Patient became anxious and not comfortable with leaving today w/BLE pain and now HA. Would like to " get this sorted out first. Agreeable to discharge tomorrow.     Physical Exam   Vital Signs: Temp: 98.5  F (36.9  C) Temp src: Oral BP: 134/79 Pulse: 88   Resp: 16 SpO2: 97 % O2 Device: None (Room air)    Weight: 149 lbs 0 oz    GENERAL: Healthy, alert and no distress  EYES: PERRL, EOM grossly intact.   RESP:  Lungs clear on auscultation. Good air movement.  CV: Heart RRR. No murmur  Abdomen: Soft, nontender, nondistended.  MSK: No gross deformity. Normal tone. BUE and BLE strength 5/5  SKIN: Visible skin clear. No significant rash, abnormal pigmentation or lesions.  NEURO: Alert, oriented x 3. No focal neurological deficits. CN II - VII grossly intact.

## 2023-08-02 VITALS
TEMPERATURE: 97.9 F | OXYGEN SATURATION: 96 % | BODY MASS INDEX: 26.4 KG/M2 | HEART RATE: 84 BPM | SYSTOLIC BLOOD PRESSURE: 130 MMHG | DIASTOLIC BLOOD PRESSURE: 72 MMHG | RESPIRATION RATE: 18 BRPM | WEIGHT: 149 LBS | HEIGHT: 63 IN

## 2023-08-02 LAB
ALBUMIN SERPL BCG-MCNC: 3.6 G/DL (ref 3.5–5.2)
ALP SERPL-CCNC: 114 U/L (ref 35–104)
ALT SERPL W P-5'-P-CCNC: 111 U/L (ref 0–50)
ANION GAP SERPL CALCULATED.3IONS-SCNC: 9 MMOL/L (ref 7–15)
AST SERPL W P-5'-P-CCNC: 52 U/L (ref 0–45)
BASOPHILS # BLD MANUAL: 0 10E3/UL (ref 0–0.2)
BASOPHILS NFR BLD MANUAL: 0 %
BILIRUB SERPL-MCNC: 0.3 MG/DL
BUN SERPL-MCNC: 7.4 MG/DL (ref 6–20)
CALCIUM SERPL-MCNC: 9.6 MG/DL (ref 8.6–10)
CHLORIDE SERPL-SCNC: 102 MMOL/L (ref 98–107)
CREAT SERPL-MCNC: 0.96 MG/DL (ref 0.51–0.95)
DEPRECATED HCO3 PLAS-SCNC: 28 MMOL/L (ref 22–29)
EOSINOPHIL # BLD MANUAL: 0.2 10E3/UL (ref 0–0.7)
EOSINOPHIL NFR BLD MANUAL: 2 %
ERYTHROCYTE [DISTWIDTH] IN BLOOD BY AUTOMATED COUNT: 15.4 % (ref 10–15)
GFR SERPL CREATININE-BSD FRML MDRD: 70 ML/MIN/1.73M2
GLUCOSE SERPL-MCNC: 103 MG/DL (ref 70–99)
HBV E AG SERPL QL IA: NEGATIVE
HCT VFR BLD AUTO: 29.1 % (ref 35–47)
HGB BLD-MCNC: 10.5 G/DL (ref 11.7–15.7)
LYMPHOCYTES # BLD MANUAL: 5 10E3/UL (ref 0.8–5.3)
LYMPHOCYTES NFR BLD MANUAL: 43 %
MCH RBC QN AUTO: 26.6 PG (ref 26.5–33)
MCHC RBC AUTO-ENTMCNC: 36.1 G/DL (ref 31.5–36.5)
MCV RBC AUTO: 74 FL (ref 78–100)
MONOCYTES # BLD MANUAL: 0.8 10E3/UL (ref 0–1.3)
MONOCYTES NFR BLD MANUAL: 7 %
NEUTROPHILS # BLD MANUAL: 5.6 10E3/UL (ref 1.6–8.3)
NEUTROPHILS NFR BLD MANUAL: 48 %
NRBC # BLD AUTO: 2.2 10E3/UL
NRBC BLD MANUAL-RTO: 19 %
PLAT MORPH BLD: ABNORMAL
PLATELET # BLD AUTO: 488 10E3/UL (ref 150–450)
POTASSIUM SERPL-SCNC: 4.1 MMOL/L (ref 3.4–5.3)
PROT SERPL-MCNC: 6.4 G/DL (ref 6.4–8.3)
RBC # BLD AUTO: 3.95 10E6/UL (ref 3.8–5.2)
RBC MORPH BLD: ABNORMAL
SODIUM SERPL-SCNC: 139 MMOL/L (ref 136–145)
TARGETS BLD QL SMEAR: ABNORMAL
WBC # BLD AUTO: 11.6 10E3/UL (ref 4–11)

## 2023-08-02 PROCEDURE — 250N000009 HC RX 250: Performed by: FAMILY MEDICINE

## 2023-08-02 PROCEDURE — 85007 BL SMEAR W/DIFF WBC COUNT: CPT

## 2023-08-02 PROCEDURE — 80053 COMPREHEN METABOLIC PANEL: CPT

## 2023-08-02 PROCEDURE — 999N000157 HC STATISTIC RCP TIME EA 10 MIN

## 2023-08-02 PROCEDURE — 250N000013 HC RX MED GY IP 250 OP 250 PS 637

## 2023-08-02 PROCEDURE — 94640 AIRWAY INHALATION TREATMENT: CPT

## 2023-08-02 PROCEDURE — 85027 COMPLETE CBC AUTOMATED: CPT

## 2023-08-02 PROCEDURE — 94640 AIRWAY INHALATION TREATMENT: CPT | Mod: 76

## 2023-08-02 PROCEDURE — 36415 COLL VENOUS BLD VENIPUNCTURE: CPT

## 2023-08-02 RX ORDER — OXYCODONE HYDROCHLORIDE 10 MG/1
10 TABLET ORAL EVERY 4 HOURS PRN
Qty: 14 TABLET | Refills: 0 | Status: SHIPPED | OUTPATIENT
Start: 2023-08-02 | End: 2024-01-16

## 2023-08-02 RX ORDER — OXYCODONE HYDROCHLORIDE 10 MG/1
5 TABLET ORAL EVERY 6 HOURS PRN
Qty: 6 TABLET | Refills: 0 | Status: CANCELLED | OUTPATIENT
Start: 2023-08-02 | End: 2023-08-05

## 2023-08-02 RX ORDER — ACETAMINOPHEN 325 MG/1
975 TABLET ORAL EVERY 8 HOURS
Qty: 120 TABLET | Refills: 1 | Status: SHIPPED | OUTPATIENT
Start: 2023-08-02

## 2023-08-02 RX ORDER — ACETAMINOPHEN 325 MG/1
975 TABLET ORAL EVERY 8 HOURS
Qty: 120 TABLET | Refills: 1 | Status: SHIPPED | OUTPATIENT
Start: 2023-08-02 | End: 2023-08-02

## 2023-08-02 RX ORDER — POLYETHYLENE GLYCOL 3350 17 G/17G
17 POWDER, FOR SOLUTION ORAL DAILY
Qty: 30 PACKET | Refills: 0 | Status: SHIPPED | OUTPATIENT
Start: 2023-08-03 | End: 2023-09-02

## 2023-08-02 RX ADMIN — LIDOCAINE 1 PATCH: 4 PATCH TOPICAL at 09:39

## 2023-08-02 RX ADMIN — ACETAMINOPHEN 975 MG: 325 TABLET ORAL at 11:59

## 2023-08-02 RX ADMIN — ACETAMINOPHEN 975 MG: 325 TABLET ORAL at 04:54

## 2023-08-02 RX ADMIN — IPRATROPIUM BROMIDE AND ALBUTEROL SULFATE 3 ML: .5; 3 SOLUTION RESPIRATORY (INHALATION) at 11:29

## 2023-08-02 RX ADMIN — OXYCODONE HYDROCHLORIDE 10 MG: 5 TABLET ORAL at 00:56

## 2023-08-02 RX ADMIN — APIXABAN 10 MG: 5 TABLET, FILM COATED ORAL at 09:39

## 2023-08-02 RX ADMIN — IPRATROPIUM BROMIDE AND ALBUTEROL SULFATE 3 ML: .5; 3 SOLUTION RESPIRATORY (INHALATION) at 08:21

## 2023-08-02 ASSESSMENT — ACTIVITIES OF DAILY LIVING (ADL)
ADLS_ACUITY_SCORE: 22

## 2023-08-02 NOTE — PROGRESS NOTES
RESPIRATORY CARE NOTE     Patient was on room air and had an Sp02 of 96%. They received Duoneb x2.   Breath sounds prior to treatment where diminished clear and post treatment minimal change in aeration.  Patient perceives moderate improvement, patient tolerated it well.    Peak flow this  and post treatment 250.    RT will continue to assess and monitor cardiopulmonary status.     Sudha Velasquez, RT

## 2023-08-02 NOTE — PLAN OF CARE
Problem: Sickle Cell Disease  Goal: Optimal Cerebral Tissue Perfusion  Outcome: Progressing  Intervention: Protect and Optimize Cerebral Perfusion  Recent Flowsheet Documentation  Taken 8/1/2023 1700 by Leilani Grant RN  Head of Bed (HOB) Positioning: HOB at 20-30 degrees     Problem: Sickle Cell Disease  Goal: Optimal Coping with Sickle Cell Disease  Outcome: Progressing     Problem: Sickle Cell Disease  Goal: Optimal Pain Control and Function  Outcome: Progressing  Intervention: Manage Acute Pain  Recent Flowsheet Documentation  Taken 8/1/2023 2019 by Leilani Grant RN  Pain Management Interventions: medication (see MAR)  Taken 8/1/2023 1759 by Leilani Grant RN  Pain Management Interventions: medication (see MAR)  Taken 8/1/2023 1700 by Leilani Grant RN  Pain Management Interventions:   medication offered but refused   emotional support  Intervention: Acknowledge Underlying Chronic Pain  Recent Flowsheet Documentation  Taken 8/1/2023 1700 by Leilani Grant RN  Medication Review/Management: medications reviewed   Goal Outcome Evaluation:    Pt is alert and oriented x4, on room air. Denies shortness of breath, breathing is unlabored, lung sounds clear, diminished. Pt has generalized pain in back , legs, arms, managed by scheduled and prn meds- last dose of Oxycodone administered at 1800. Encouraged pt to use incentive spirometer frequently, and to drink plenty of fluids. Pt visited with her family at the bedside this evening. Pt is independent in the room,  able to make her needs known.     Leilani Grant RN.

## 2023-08-02 NOTE — PLAN OF CARE
"Patient alert and oriented. Pain ranging 5-6/10. Patient had prn oxycodone once overnight. Scheduled tylenol given. Patient denied any SOB.   Problem: Plan of Care - These are the overarching goals to be used throughout the patient stay.    Goal: Plan of Care Review  Description: The Plan of Care Review/Shift note should be completed every shift.  The Outcome Evaluation is a brief statement about your assessment that the patient is improving, declining, or no change.  This information will be displayed automatically on your shift note.  Outcome: Progressing     Problem: Plan of Care - These are the overarching goals to be used throughout the patient stay.    Goal: Patient-Specific Goal (Individualized)  Description: You can add care plan individualizations to a care plan. Examples of Individualization might be:  \"Parent requests to be called daily at 9am for status\", \"I have a hard time hearing out of my right ear\", or \"Do not touch me to wake me up as it startles me\".  Outcome: Progressing     Problem: Plan of Care - These are the overarching goals to be used throughout the patient stay.    Goal: Readiness for Transition of Care  Outcome: Progressing     Problem: Pain Acute  Goal: Optimal Pain Control and Function  Outcome: Progressing  Intervention: Develop Pain Management Plan  Recent Flowsheet Documentation  Taken 8/2/2023 0056 by Dali Campbell RN  Pain Management Interventions: medication (see MAR)  Intervention: Prevent or Manage Pain  Recent Flowsheet Documentation  Taken 8/2/2023 0045 by Dali Campbell RN  Medication Review/Management: medications reviewed   Goal Outcome Evaluation:                        "

## 2023-08-02 NOTE — PROGRESS NOTES
Discharge packet was given to and reviewed with the patient.All questions and concerns addressed. Oxycodone prescription was given to the patient along with 2 packets of paperwork re: Certification of Health Care Provider for Employee's Health Condition under the Family and Medical Leave Act. Pt's sister to transport home. Discharged at 1434.

## 2023-08-02 NOTE — PROGRESS NOTES
Increased work of breath not noted but BS diminished/tight. Douneb given. Peak flow pre 205 L/min; post 300 L/min. Aerations increased post treatment expiratory wheezing. RT following.    Ricky Sanders, RT

## 2023-08-03 ENCOUNTER — PATIENT OUTREACH (OUTPATIENT)
Dept: CARE COORDINATION | Facility: CLINIC | Age: 53
End: 2023-08-03
Payer: COMMERCIAL

## 2023-08-03 NOTE — PROGRESS NOTES
"Clinic Care Coordination Contact  [facility]: Post-Discharge Note  SITUATION                                                      Admission:    Admission Date: 07/25/23   Reason for Admission: Sickle Cell Disease  Pain Crisis  Bilateral PEs   H/o ovarian veing thrombus 2009  Asthma exacerbation, resolved   Tension headache   Acute elevation in LFT   Thrombocytosis   Constipation  Discharge:   Discharge Date: 08/02/23  Discharge Diagnosis: Sickle Cell Disease  Pain Crisis  Bilateral PEs   H/o ovarian veing thrombus 2009  Asthma exacerbation, resolved   Tension headache   Acute elevation in LFT   Thrombocytosis   Constipation    BACKGROUND                                                      Kelly Harris is a 52 year old female 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      Sickle Cell Disease  Pain Crisis  Patient admitted from hematology clinic for sickle cell pain crisis. Suspect that this was caused by a combination of asthma exacerbation discussed below and bilateral PE's found on CT chest 7/26. Infectious workup was unremarkable. WBC elevated on discharge, but suspect this is related to steroids rather than infection. She has been afebrile and asymptomatic. BLE pain controlled on current regimen.    - Tylenol 975 mg Q8hrs  - Oxycodone 10 mg Q6hrs PRN  - Follow-up w/hematology outpatient     Bilateral PE on CTA  H/o ovarian thrombus 2009  Found to have PEs on chest CT on 7/26. Starting on Lovenox when found and switch to DOAC 7/29. Unclear if this was provoked at this time. She reports having a \"blood clot\" in the past. 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   - Eliquis 10mg PO BID (7/29 - 8/4) then transition to 5mg PO BID on 8/5  - Please discuss with PCP about " duration of eliquis in setting of ovarian thrombus hx     Asthma exacerbation, resolved  Patient was diagnosed with asthma in childhood and this has been mostly managed by her PCP. Per patient, she was changed to symbicort for a rescue inhaler in 2022 and asthma has not been well-controlled since. This likely led to an exacerbation in setting of sickle cell disease. She saw pulmonology on 7/25 w/changes to her regimen and an extended prednisone taper. Influenza, RSV, Covid negative. Finished prednisone course on 7/30. Lungs w/o wheezing this AM and satting comfortably on RA in mid-to-high 90s.  - Start dulera   - Albuterol   - Singulair   - Spiriva   - Close follow-up w/pulmonology outpatient     Tension headache  Likely related to dehydration. Improved with IV fluids and toradol one time. Neuro exam normal on discharge.   - Encouraged oral hydration     Acute elevation in LFT  LFTs elevated on 7/31/2023. Reassuring that they were downtrending on discharge. Hepatic panel wnl on admission. Unclear why this occurred. R/o acute infection - HepB/C and HIV serologies were negative. Only change in medication since admission has been Eliquis - not known to cause liver damage. Considered portal vein thrombus in setting of sickle cell disease and recent B/L PE's, but on appropriate anticoagulation for this already. Possibly biliary pathology, but hepatic panel and physical exam not suggestive of this.   - Follow-up HepB surface antigen  - Hepatic panel recheck at hospital follow-up  - Can consider RUQ U/S if LFTs continue to climb     Thrombocytosis   Plt 488 on discharge. Likely related to hemolysis in setting of sickle cell disease and on eliquis for PE. No h/o dysfunctional asplenia. Would be strange to develop this so acutely, however. No si/sx of acute hemorrhaging on physical exam.    - Repeat CBC at hospital follow-up      Constipation  Likely constipation secondary to opioid use.   - Miralax PRN          ASSESSMENT            Discharge Assessment  How are you doing now that you are home?: Patient shares that last night was a bit rough, but she did take some pain medication and is feeling much better now. Will have follow up appt tomorrow. No other questions/concnerns or needs at this time.  How are your symptoms? (Red Flag symptoms escalate to triage hotline per guidelines): Improved  Do you feel your condition is stable enough to be safe at home until your provider visit?: Yes  Does the patient have their discharge instructions? : Yes  Does the patient have questions regarding their discharge instructions? : No  Were you started on any new medications or were there changes to any of your previous medications? : Yes  Does the patient have all of their medications?: Yes  Do you have questions regarding any of your medications? : No  Do you have all of your needed medical supplies or equipment (DME)?  (i.e. oxygen tank, CPAP, cane, etc.): Yes  Discharge follow-up appointment scheduled within 14 calendar days? : Yes  Discharge Follow Up Appointment Date: 08/07/23  Discharge Follow Up Appointment Scheduled with?: Primary Care Provider    Post-op ([facility] CTA Only)  If the patient had a surgery or procedure, do they have any questions for a nurse?: No    Post-op (Clinicians Only)  Did the patient have surgery or a procedure: No        PLAN                                                      Outpatient Plan:  ollow-ups Needed After Discharge  Hospital follow-up w/PCP on 8/4:  Please recheck hepatic panel to ensure LFTs are downtrending. Further workup as warranted.   Please recheck CBC regarding platelets.   Re-evaluate pain and adjust regimen as needed.   Please discuss duration in which patient needs to take eliquis for BL PE    Future Appointments   Date Time Provider Department Center   8/4/2023  1:50 PM Heri Gamez DO [facility] [place]   11/14/2023  1:00 PM MPBE PFT RM 1 [facility] [place]   11/14/2023  2:00 PM Najma Phillips MD [facility]  [place]         For any urgent concerns, please contact our 24 hour nurse triage line: [phone] ([phone])         REGLA Arciniega

## 2023-08-04 ENCOUNTER — OFFICE VISIT (OUTPATIENT)
Dept: FAMILY MEDICINE | Facility: CLINIC | Age: 53
End: 2023-08-04
Payer: COMMERCIAL

## 2023-08-04 VITALS
WEIGHT: 147 LBS | HEART RATE: 102 BPM | RESPIRATION RATE: 20 BRPM | HEIGHT: 63 IN | SYSTOLIC BLOOD PRESSURE: 144 MMHG | BODY MASS INDEX: 26.05 KG/M2 | TEMPERATURE: 98.1 F | DIASTOLIC BLOOD PRESSURE: 85 MMHG | OXYGEN SATURATION: 98 %

## 2023-08-04 DIAGNOSIS — Z09 HOSPITAL DISCHARGE FOLLOW-UP: Primary | ICD-10-CM

## 2023-08-04 DIAGNOSIS — D57.00 SICKLE CELL PAIN CRISIS (H): ICD-10-CM

## 2023-08-04 LAB
ALBUMIN SERPL BCG-MCNC: 4.7 G/DL (ref 3.5–5.2)
ALP SERPL-CCNC: 129 U/L (ref 35–104)
ALT SERPL W P-5'-P-CCNC: 103 U/L (ref 0–50)
ANION GAP SERPL CALCULATED.3IONS-SCNC: 12 MMOL/L (ref 7–15)
AST SERPL W P-5'-P-CCNC: 48 U/L (ref 0–45)
BILIRUB SERPL-MCNC: 0.4 MG/DL
BUN SERPL-MCNC: 9.9 MG/DL (ref 6–20)
CALCIUM SERPL-MCNC: 10.3 MG/DL (ref 8.6–10)
CHLORIDE SERPL-SCNC: 101 MMOL/L (ref 98–107)
CREAT SERPL-MCNC: 0.83 MG/DL (ref 0.51–0.95)
DEPRECATED HCO3 PLAS-SCNC: 25 MMOL/L (ref 22–29)
ERYTHROCYTE [DISTWIDTH] IN BLOOD BY AUTOMATED COUNT: 15.5 % (ref 10–15)
GFR SERPL CREATININE-BSD FRML MDRD: 84 ML/MIN/1.73M2
GLUCOSE SERPL-MCNC: 84 MG/DL (ref 70–99)
HCT VFR BLD AUTO: 32.7 % (ref 35–47)
HGB BLD-MCNC: 12 G/DL (ref 11.7–15.7)
MCH RBC QN AUTO: 26.1 PG (ref 26.5–33)
MCHC RBC AUTO-ENTMCNC: 36.7 G/DL (ref 31.5–36.5)
MCV RBC AUTO: 71 FL (ref 78–100)
PLATELET # BLD AUTO: 339 10E3/UL (ref 150–450)
POTASSIUM SERPL-SCNC: 4.9 MMOL/L (ref 3.4–5.3)
PROT SERPL-MCNC: 7.9 G/DL (ref 6.4–8.3)
RBC # BLD AUTO: 4.59 10E6/UL (ref 3.8–5.2)
SODIUM SERPL-SCNC: 138 MMOL/L (ref 136–145)
WBC # BLD AUTO: 12 10E3/UL (ref 4–11)

## 2023-08-04 PROCEDURE — 99495 TRANSJ CARE MGMT MOD F2F 14D: CPT | Mod: GC

## 2023-08-04 PROCEDURE — 36415 COLL VENOUS BLD VENIPUNCTURE: CPT

## 2023-08-04 PROCEDURE — 85027 COMPLETE CBC AUTOMATED: CPT

## 2023-08-04 PROCEDURE — 80053 COMPREHEN METABOLIC PANEL: CPT

## 2023-08-04 RX ORDER — LIDOCAINE 50 MG/G
1 PATCH TOPICAL EVERY 24 HOURS
Qty: 30 PATCH | Refills: 0 | Status: SHIPPED | OUTPATIENT
Start: 2023-08-04 | End: 2023-09-03

## 2023-08-04 NOTE — PROGRESS NOTES
Assessment & Plan     Sickle cell pain crisis, hospital follow up  Patient presenting for hospital follow up following pain crisis. She was hospitalized from 7/25-8/2. She was discharged on Tylenol 975 mg Q8hrs and Oxycodone 10 mg Q6hrs PRN for pain. At this time, she continues to have pain in her back and bilateral legs. Exam unremarkable. Will prescribe lidocaine patches for additional pain management as they worked for her in the hospital.   - lidocaine (LIDODERM) 5 % patch ; Place 1 patch onto the skin every 24 hours for 30 days To prevent lidocaine toxicity, patient should be patch free for 12 hrs daily   - Follow up in 1 week    Acute elevation in LFTs  During her hospital stay, she was found to have elevated LFTs. Hep surface antigen negative. Will recheck today to ensure that these are still down trending. Consider RUQ US if LFTs climbing.  - CMP    Bilateral PE  H/o ovarian thrombus 2009  Patient found to have bilateral PEs during her hospital stay. Started on 10 mg eliquis BID. Will decrease to 5 mg BID starting tomorrow. Brief discussion with patient regarding duration of treatment. Will discuss this further at her follow up in 1 week.    Thrombocytosis  Plt 488 on discharge. Will recheck today.  - CBC with platelets    Return in about 1 week (around 8/11/2023) for Follow up.    Violeta Amor, MS3  [employer]    Resident/Fellow Attestation   I, Heri Gamez DO, was present with the medical/SAÚL student who participated in the service and in the documentation of the note.  I have verified the history and personally performed the physical exam and medical decision making.  I agree with the assessment and plan of care as documented in the note.      Heri Gamez DO  PGY3   [facility]    Andrew Mendoza is a 52 year old, presenting for the following health issues:  Hospital F/U (TCM, discharged on Wednesday  need to follow up with PCP and get some lab  work done )        7/20/2023     9:20 AM   Additional Questions   Roomed by edelmira   Accompanied by daughter       TACOS Mendoza is a 52 year old woman with a PMHx significant for sickle cell disease, asthma, PE, ovarian thrombus who presents for hospital follow up. She was hospitalized from 7/25-8/2 for sickle cell pain crisis. At that time, she was also found to have bilateral pulmonary embolisms.   Discharged on tylenol and oxycodone for pain management.  Discharged on Eliquis 10 mg BID for bilateral PEs. Plan to titrate down to 5 mg BID after 1 week.    Since discharge, she has still been having back pain and bilateral knee and calf pain. Pain is worse in the left calf, but no swelling.  No fevers, chills.  Shortness of breath with exertion, but improved since hospitalization. No chest pain.  Constipation from oxycodone, but she has been eating more fruits for this, with relief . BMs 1x daily.  Friend and daughter support.          8/3/2023    11:17 AM   Post Discharge Outreach   Admission Date 7/25/2023   Reason for Admission Sickle Cell Disease  Pain Crisis  Bilateral PEs   H/o ovarian veing thrombus 2009  Asthma exacerbation, resolved   Tension headache   Acute elevation in LFT   Thrombocytosis   Constipation   Discharge Date 8/2/2023   Discharge Diagnosis Sickle Cell Disease  Pain Crisis  Bilateral PEs   H/o ovarian veing thrombus 2009  Asthma exacerbation, resolved   Tension headache   Acute elevation in LFT   Thrombocytosis   Constipation   How are you doing now that you are home? Patient shares that last night was a bit rough, but she did take some pain medication and is feeling much better now. Will have follow up appt tomorrow. No other questions/concnerns or needs at this time.   How are your symptoms? (Red Flag symptoms escalate to triage hotline per guidelines) Improved   Do you feel your condition is stable enough to be safe at home until your provider visit? Yes   Does the patient have their discharge  "instructions?  Yes   Does the patient have questions regarding their discharge instructions?  No   Were you started on any new medications or were there changes to any of your previous medications?  Yes   Does the patient have all of their medications? Yes   Do you have questions regarding any of your medications?  No   Do you have all of your needed medical supplies or equipment (DME)?  (i.e. oxygen tank, CPAP, cane, etc.) Yes   Discharge follow-up appointment scheduled within 14 calendar days?  Yes   Discharge Follow Up Appointment Date 8/7/2023   Discharge Follow Up Appointment Scheduled with? Primary Care Provider     Hospital Follow-up Visit:    Hospital/Nursing Home/IP Rehab Facility: [facility]  Date of Admission: 7/25/23  Date of Discharge: 8/2/23  Reason(s) for Admission: sickle cell crisis    Was your hospitalization related to COVID-19? No   Problems taking medications regularly:  None  Medication changes since discharge: 10 mg oxycodone Q6hrs PRN, dulera inhaler, 10 mg Eliquis BID  Problems adhering to non-medication therapy:  None    Summary of hospitalization:  [facility] discharge summary reviewed  Diagnostic Tests/Treatments reviewed.  Follow up needed: CMP, platelets  Other Healthcare Providers Involved in Patient s Care:          pulmonology, hematology  Update since discharge: improved.         Plan of care communicated with patient             Review of Systems   Constitutional, HEENT, cardiovascular, pulmonary, gi and gu systems are negative, except as otherwise noted.      Objective    BP (!) 144/85 (BP Location: Left arm, Patient Position: Sitting, Cuff Size: Adult Regular)   Pulse 102   Temp 98.1  F (36.7  C) (Oral)   Resp 20   Ht 1.608 m (5' 3.31\")   Wt 66.7 kg (147 lb)   LMP 04/12/2017   SpO2 98%   BMI 25.79 kg/m    Body mass index is 25.79 kg/m .  Physical Exam   GENERAL: healthy, alert and no distress  EYES: Eyes grossly normal to " inspection, PERRL and conjunctivae and sclerae normal  HENT: ear canals and TM's normal, nose and mouth without ulcers or lesions  NECK: no adenopathy, no asymmetry, masses, or scars and thyroid normal to palpation  RESP: lungs clear to auscultation - no rales, rhonchi or wheezes  CV: regular rate and rhythm, normal S1 S2, no S3 or S4, no murmur, click or rub, no peripheral edema and peripheral pulses strong  ABDOMEN: soft, nontender, no hepatosplenomegaly, no masses and bowel sounds normal  MS: no gross musculoskeletal defects noted, no edema  SKIN: no suspicious lesions or rashes  NEURO: mentation intact and speech normal  PSYCH: mentation appears normal, affect normal/bright    No results found for this or any previous visit (from the past 24 hour(s)).    ----- Services Performed by a MEDICAL STUDENT in Presence of RESIDENT/FELLOW Physician-------

## 2023-08-10 ENCOUNTER — ONCOLOGY VISIT (OUTPATIENT)
Dept: ONCOLOGY | Facility: HOSPITAL | Age: 53
End: 2023-08-10
Attending: INTERNAL MEDICINE
Payer: COMMERCIAL

## 2023-08-10 ENCOUNTER — OFFICE VISIT (OUTPATIENT)
Dept: FAMILY MEDICINE | Facility: CLINIC | Age: 53
End: 2023-08-10
Payer: COMMERCIAL

## 2023-08-10 ENCOUNTER — ANCILLARY ORDERS (OUTPATIENT)
Dept: ONCOLOGY | Facility: HOSPITAL | Age: 53
End: 2023-08-10

## 2023-08-10 ENCOUNTER — HOSPITAL ENCOUNTER (OUTPATIENT)
Dept: ULTRASOUND IMAGING | Facility: HOSPITAL | Age: 53
Discharge: HOME | End: 2023-08-10
Attending: INTERNAL MEDICINE
Payer: COMMERCIAL

## 2023-08-10 VITALS
HEART RATE: 82 BPM | OXYGEN SATURATION: 100 % | HEIGHT: 63 IN | RESPIRATION RATE: 16 BRPM | SYSTOLIC BLOOD PRESSURE: 138 MMHG | WEIGHT: 148 LBS | DIASTOLIC BLOOD PRESSURE: 93 MMHG | BODY MASS INDEX: 26.22 KG/M2

## 2023-08-10 VITALS
TEMPERATURE: 98.3 F | HEART RATE: 91 BPM | WEIGHT: 147.8 LBS | HEIGHT: 63 IN | RESPIRATION RATE: 18 BRPM | SYSTOLIC BLOOD PRESSURE: 150 MMHG | BODY MASS INDEX: 26.19 KG/M2 | OXYGEN SATURATION: 100 % | DIASTOLIC BLOOD PRESSURE: 86 MMHG

## 2023-08-10 DIAGNOSIS — I26.93 SINGLE SUBSEGMENTAL PULMONARY EMBOLISM WITHOUT ACUTE COR PULMONALE (H): ICD-10-CM

## 2023-08-10 DIAGNOSIS — G89.29 CHRONIC BILATERAL LOW BACK PAIN WITH LEFT-SIDED SCIATICA: Primary | ICD-10-CM

## 2023-08-10 DIAGNOSIS — R79.89 ELEVATED LFTS: Primary | ICD-10-CM

## 2023-08-10 DIAGNOSIS — I26.99 BILATERAL PULMONARY EMBOLISM (H): ICD-10-CM

## 2023-08-10 DIAGNOSIS — D57.20 SICKLE-CELL/HB-C DISEASE WITHOUT CRISIS (H): ICD-10-CM

## 2023-08-10 DIAGNOSIS — I82.890 THROMBOSIS OF OVARIAN VEIN: ICD-10-CM

## 2023-08-10 DIAGNOSIS — Z02.89 ENCOUNTER FOR COMPLETION OF FORM WITH PATIENT: ICD-10-CM

## 2023-08-10 DIAGNOSIS — M54.42 CHRONIC BILATERAL LOW BACK PAIN WITH LEFT-SIDED SCIATICA: ICD-10-CM

## 2023-08-10 DIAGNOSIS — M54.42 CHRONIC BILATERAL LOW BACK PAIN WITH LEFT-SIDED SCIATICA: Primary | ICD-10-CM

## 2023-08-10 DIAGNOSIS — G89.29 CHRONIC BILATERAL LOW BACK PAIN WITH LEFT-SIDED SCIATICA: ICD-10-CM

## 2023-08-10 LAB
ALBUMIN SERPL BCG-MCNC: 4.7 G/DL (ref 3.5–5.2)
ALP SERPL-CCNC: 131 U/L (ref 35–104)
ALT SERPL W P-5'-P-CCNC: 78 U/L (ref 0–50)
AST SERPL W P-5'-P-CCNC: 53 U/L (ref 0–45)
BILIRUB DIRECT SERPL-MCNC: <0.2 MG/DL (ref 0–0.3)
BILIRUB SERPL-MCNC: 0.4 MG/DL
ERYTHROCYTE [DISTWIDTH] IN BLOOD BY AUTOMATED COUNT: 16.1 % (ref 10–15)
HCT VFR BLD AUTO: 32.2 % (ref 35–47)
HGB BLD-MCNC: 11.5 G/DL (ref 11.7–15.7)
MCH RBC QN AUTO: 25.8 PG (ref 26.5–33)
MCHC RBC AUTO-ENTMCNC: 35.7 G/DL (ref 31.5–36.5)
MCV RBC AUTO: 72 FL (ref 78–100)
PLATELET # BLD AUTO: 444 10E3/UL (ref 150–450)
PROT SERPL-MCNC: 7.6 G/DL (ref 6.4–8.3)
RBC # BLD AUTO: 4.45 10E6/UL (ref 3.8–5.2)
WBC # BLD AUTO: 12.9 10E3/UL (ref 4–11)

## 2023-08-10 PROCEDURE — 80076 HEPATIC FUNCTION PANEL: CPT

## 2023-08-10 PROCEDURE — 85027 COMPLETE CBC AUTOMATED: CPT

## 2023-08-10 PROCEDURE — 93970 EXTREMITY STUDY: CPT

## 2023-08-10 PROCEDURE — 99214 OFFICE O/P EST MOD 30 MIN: CPT | Performed by: INTERNAL MEDICINE

## 2023-08-10 PROCEDURE — 99214 OFFICE O/P EST MOD 30 MIN: CPT | Mod: GC

## 2023-08-10 PROCEDURE — G0463 HOSPITAL OUTPT CLINIC VISIT: HCPCS | Mod: 25 | Performed by: INTERNAL MEDICINE

## 2023-08-10 PROCEDURE — 36415 COLL VENOUS BLD VENIPUNCTURE: CPT

## 2023-08-10 ASSESSMENT — PAIN SCALES - GENERAL: PAINLEVEL: MODERATE PAIN (4)

## 2023-08-10 NOTE — PROGRESS NOTES
"Preceptor attestation:  Vital signs reviewed: BP (!) 150/86   Pulse 91   Temp 98.3  F (36.8  C) (Oral)   Resp 18   Ht 1.6 m (5' 3\")   Wt 67 kg (147 lb 12.8 oz)   LMP 04/12/2017   SpO2 100%   BMI 26.18 kg/m      Patient seen, evaluated, and discussed with the resident.  I verified the content of the note, which accurately reflects my assessment of the patient and the plan of care.    Supervising physician: Maci Fritz MD  [facility]  "

## 2023-08-10 NOTE — PROGRESS NOTES
[facility] Hematology and Oncology Progress Note    Patient: Kelly Harris  MRN: [id number]  Date of Service: Aug 10, 2023         Reason for Visit    Chief Complaint   Patient presents with    Hematology     Return Sickle cell disease with crisis, hospital follow up       Assessment and Plan    Bilateral pulmonary embolism, diagnosed July 2023  Sickle cell pain crisis  Scattered wheezing  History of hemoglobin SC disease  Bilateral low back pain with left-sided sciatica    Patient was hospitalized from clinic about 2 weeks ago.  Found to have bilateral low volume pulmonary emboli and exacerbation of asthma.  Overall much improved.    Currently on Eliquis 5 mg p.o. twice daily.  For now recommend minimum 3 months of anticoagulation.  Will get bilateral lower extremity ultrasounds to check for DVT.  Provoking factor may have been her decreased mobility for a few weeks prior to being diagnosed with a pulmonary emboli.  No other obvious predisposing factor.  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  Thrombophilia workup at that time was negative.    Will see back in 3 months and then make decision about duration of anticoagulation.  Discussed bleeding precautions while she is on Eliquis.    Continue folic acid, multivitamin without iron and calcium and vitamin D supplementation due to underlying sickle cell disease.  No indication for hydroxyurea.    [place] answered.    Plan: Will get bilateral lower extremity ultrasounds  Follow-up in about 3 months  Continue Eliquis until then    Measurable disease: CT of the chest, hemoglobin electrophoresis    Current therapy: Eliquis 5 mg p.o. twice daily      ECOG Performance    1 - Can't do physically strenuous work, but fully ambyulatory and can do light sedentary work     Pain  Pain Score: Moderate Pain (4) (with Tylenol)  Pain Loc: Upper Leg              Encounter Diagnoses:    Problem List Items Addressed This Visit           Nervous and Auditory    Bilateral low back pain with left-sided sciatica - Primary    Relevant Orders    US Lower Extremity Venous Duplex Bilateral    Check Out Appointment Request       Circulatory    Thrombosis of ovarian vein    Relevant Orders    Check Out Appointment Request       Hematologic    Sickle-cell/Hb-C disease without crisis (H)     Other Visit Diagnoses       Bilateral pulmonary embolism (H)                   CC: Arcenio Alcantara MD   ______________________________________________________________________________    History of Present Illness    Kelly is here for follow-up.  Seen in clinic about 2-1/2 weeks ago and was hospitalized.  Documented to have low volume bilateral pulmonary emboli and asthma exacerbation.  Currently on Eliquis 5 mg p.o. twice daily and is overall much improved.  Still with some lower back pain with radiation down the left leg.  Some fatigue.  No other new symptoms or problems.  No family history of thrombosis.    July 25, 2023:  Ms. Kelly Harris is a 52-year-old black female with previous known diagnosis of hemoglobin SC disease and asthma and remote history of ovarian vein thrombosis referred for recent hospitalization with sickle cell pain crisis.  She started to have some joint pain back in April and was treated with Tylenol.    She had worsening pain couple weeks ago involving the right chest wall and back and was hospitalized for management for 1 night.  Since discharge she has continued to have significant pain.  Using oxycodone 5 mg about 3 times a day but has not been able to resume work.  She describes being cold but does not have fever.  Has had some headaches dizziness and lightheadedness.  No real shortness of breath or cough.  No urinary symptoms.  Some constipation likely related to her pain medication.    It has been several years since previous hospitalization or requiring narcotic pain medications to manage pain crisis.  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    No previous problems with root canal, 2 jaw tumor surgeries.     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  Has joint pains and crises every 2 to 3 months but manages with Tylenol and hydration.  No history of stroke.  Has had vision issues and does follow with ophthalmology.     No abdominal symptoms.  No change with bowels or urine.  No infectious problems.     She is originally from [facility].  She is single with 2 kids who also have hemoglobin SC disease.  She works as a nutritionist.     Other personal or family history of thrombosis.        Review of systems.  No fever or night sweats.  No loss of weight.  No lumps or bumps anywhere.  No unusual headaches or eyesight issues.  No dizziness.  No bleeding from the nose.  No sores in the mouth. No problems with swallowing.  No chest pain. No shortness of breath. No cough.  No abdominal pain. No nausea or vomiting.  No diarrhea or constipation.  No blood in stool or black colored stools.  No problems passing urine.  No numbness or tingling in hands or feet.  No skin rashes.  A 14 point review of systems is otherwise negative.        Past History    Past Medical History:   Diagnosis Date    Sickle cell pain crisis (H) 7/16/2023       Past Surgical History:   Procedure Laterality Date    MOUTH SURGERY  09/07/2019    Removed benign mouth tumor         Physical Exam    BP (!) 138/93   Pulse 82   Resp 16   Ht 1.6 m (5' 3\")   Wt 67.1 kg (148 lb)   LMP 04/12/2017   SpO2 100%   BMI 26.22 kg/m        GENERAL: Alert and oriented to time place and person. Seated comfortably. In no distress.    HEAD: Atraumatic and normocephalic.    EYES: SETH, EOMI.  No pallor.  No icterus.    Oral cavity: no mucosal lesion or tonsillar enlargement.    NECK: supple. JVP normal.  No thyroid enlargement.    LYMPH NODES: No palpable, cervical, axillary or inguinal lymphadenopathy.    CHEST: clear to auscultation bilaterally.  Resonant " to percussion throughout bilaterally.  Symmetrical breath movements bilaterally.    CVS: S1 and S2 are heard. Regular rate and rhythm.  No murmur or gallop or rub heard.  No peripheral edema.    ABDOMEN: Soft. Not tender. Not distended.  No palpable hepatomegaly or splenomegaly.  No other mass palpable.  Bowel sounds heard.    EXTREMITIES: Warm.    SKIN: no rash, or bruising or purpura.  Has a full head of hair.    Lab Results    Recent Results (from the past 168 hour(s))   CBC with platelets   Result Value Ref Range    WBC Count 12.0 (H) 4.0 - 11.0 10e3/uL    RBC Count 4.59 3.80 - 5.20 10e6/uL    Hemoglobin 12.0 11.7 - 15.7 g/dL    Hematocrit 32.7 (L) 35.0 - 47.0 %    MCV 71 (L) 78 - 100 fL    MCH 26.1 (L) 26.5 - 33.0 pg    MCHC 36.7 (H) 31.5 - 36.5 g/dL    RDW 15.5 (H) 10.0 - 15.0 %    Platelet Count 339 150 - 450 10e3/uL   Comprehensive metabolic panel   Result Value Ref Range    Sodium 138 136 - 145 mmol/L    Potassium 4.9 3.4 - 5.3 mmol/L    Chloride 101 98 - 107 mmol/L    Carbon Dioxide (CO2) 25 22 - 29 mmol/L    Anion Gap 12 7 - 15 mmol/L    Urea Nitrogen 9.9 6.0 - 20.0 mg/dL    Creatinine 0.83 0.51 - 0.95 mg/dL    Calcium 10.3 (H) 8.6 - 10.0 mg/dL    Glucose 84 70 - 99 mg/dL    Alkaline Phosphatase 129 (H) 35 - 104 U/L    AST 48 (H) 0 - 45 U/L     (H) 0 - 50 U/L    Protein Total 7.9 6.4 - 8.3 g/dL    Albumin 4.7 3.5 - 5.2 g/dL    Bilirubin Total 0.4 <=1.2 mg/dL    GFR Estimate 84 >60 mL/min/1.73m2       Imaging    CT Chest Pulmonary Embolism w Contrast    Result Date: 7/26/2023  EXAM: CT CHEST PULMONARY EMBOLISM W CONTRAST LOCATION: [facility] DATE: 7/26/2023 INDICATION: Chest pain. Patient w sickle cell pain crisis. COMPARISON: 09/19/2022. TECHNIQUE: CT chest pulmonary angiogram during arterial phase injection of IV contrast. Multiplanar reformats and MIP reconstructions were performed. Dose reduction techniques were used. CONTRAST: 90ml isovue 370 FINDINGS: ANGIOGRAM CHEST:  Positive bilateral pulmonary embolism with the most proximal embolism at the bifurcation of the left main pulmonary artery into the left upper and lower lobes. Other mild areas of segmental and subsegmental distribution disease, including the right lower lobe. Borderline pulmonary arterial enlargement with the pulmonary trunk measuring 3 cm. RV to LV ratio at 0.88. LUNGS AND PLEURA: Mildly heterogeneous right lower lobe opacities and consolidation. Minimal left lower lobe costophrenic angle opacities. Other areas of mild atelectasis. Minimal right and trace left pleural effusions. No pneumothorax. MEDIASTINUM/AXILLAE: No adenopathy. No pericardial effusion. CORONARY ARTERY CALCIFICATION: Motion artifact. UPPER ABDOMEN: No actionable findings. MUSCULOSKELETAL: Nothing acute.     IMPRESSION: 1.  Mild bilateral pulmonary artery embolism. Most proximal embolism is nonocclusive and at the bifurcation of the left main pulmonary artery extending into left upper and lower lobes. 2.  Borderline pulmonary arterial enlargement could reflect pulmonary hypertension. No right heart enlargement. 3.  Heterogeneous consolidation and opacities in the right lower lobe suggests sequela of pulmonary embolism, such as hemorrhage or developing infarct. 4.  Minimal left lower lobe opacities are probably from atelectasis, though definitively cannot exclude sequela of PE. 5.  Minimal right and trace left pleural effusions. Critical Result: Pulmonary embolism and associated findings and associated findings Finding was identified on 7/26/2023 at total 5:00 PM. Patient's Nurse, Miya, was contacted by me on 7/26/2023 12:05 PM CDT and verbalized understanding of the critical result.     XR Chest 2 Views    Result Date: 7/25/2023  EXAM: XR CHEST 2 VIEWS LOCATION: [facility] DATE: 7/25/2023 INDICATION: chest pain COMPARISON: Chest CT 09/19/2022     IMPRESSION: Negative chest.       Signed by: Shari Bartlett MD

## 2023-08-10 NOTE — LETTER
"    8/10/2023         RE: Kelly Harris  [address] [facility] [address]        Dear Colleague,    Thank you for referring your patient, Kelly Harris, to the [facility] [place]. Please see a copy of my visit note below.    Oncology Rooming Note    August 10, 2023 8:27 AM   Kelly Harris is a 52 year old female who presents for:    Chief Complaint   Patient presents with     Hematology     Return Sickle cell disease with crisis, hospital follow up     Initial Vitals: BP (!) 138/93   Pulse 82   Resp 16   Ht 1.6 m (5' 3\")   Wt 67.1 kg (148 lb)   LMP 04/12/2017   SpO2 100%   BMI 26.22 kg/m   Estimated body mass index is 26.22 kg/m  as calculated from the following:    Height as of this encounter: 1.6 m (5' 3\").    Weight as of this encounter: 67.1 kg (148 lb). Body surface area is 1.73 meters squared.  Moderate Pain (4) Comment: with Tylenol   Patient's last menstrual period was 04/12/2017.  Allergies reviewed: Yes  Medications reviewed: Yes    Medications: Medication refills not needed today.  Pharmacy name entered into [employer]:    [id number] - [place], [place] - [address] [place] [address] [place] [address] [address]    Clinical concerns:  hospital follow up      Betty Mckeon              [facility] Hematology and Oncology Progress Note    Patient: Kelly Harris  MRN: [id number]  Date of Service: Aug 10, 2023         Reason for Visit    Chief Complaint   Patient presents with     Hematology     Return Sickle cell disease with crisis, hospital follow up       Assessment and Plan    Bilateral pulmonary embolism, diagnosed July 2023  Sickle cell pain crisis  Scattered wheezing  History of hemoglobin SC disease  Bilateral low back pain with left-sided sciatica    Patient was hospitalized from clinic about 2 weeks ago.  Found to have bilateral low volume pulmonary emboli and " exacerbation of asthma.  Overall much improved.    Currently on Eliquis 5 mg p.o. twice daily.  For now recommend minimum 3 months of anticoagulation.  Will get bilateral lower extremity ultrasounds to check for DVT.  Provoking factor may have been her decreased mobility for a few weeks prior to being diagnosed with a pulmonary emboli.  No other obvious predisposing factor.  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  Thrombophilia workup at that time was negative.    Will see back in 3 months and then make decision about duration of anticoagulation.  Discussed bleeding precautions while she is on Eliquis.    Continue folic acid, multivitamin without iron and calcium and vitamin D supplementation due to underlying sickle cell disease.  No indication for hydroxyurea.    Russians answered.    Plan: Will get bilateral lower extremity ultrasounds  Follow-up in about 3 months  Continue Eliquis until then    Measurable disease: CT of the chest, hemoglobin electrophoresis    Current therapy: Eliquis 5 mg p.o. twice daily      ECOG Performance    1 - Can't do physically strenuous work, but fully ambyulatory and can do light sedentary work     Pain  Pain Score: Moderate Pain (4) (with Tylenol)  Pain Loc: Upper Leg              Encounter Diagnoses:    Problem List Items Addressed This Visit          Nervous and Auditory    Bilateral low back pain with left-sided sciatica - Primary    Relevant Orders    US Lower Extremity Venous Duplex Bilateral    Check Out Appointment Request       Circulatory    Thrombosis of ovarian vein    Relevant Orders    Check Out Appointment Request       Hematologic    Sickle-cell/Hb-C disease without crisis (H)     Other Visit Diagnoses       Bilateral pulmonary embolism (H)                   CC: Arcenio Alcantara MD   ______________________________________________________________________________    History of Present Illness    Kelly is here for follow-up.   Seen in clinic about 2-1/2 weeks ago and was hospitalized.  Documented to have low volume bilateral pulmonary emboli and asthma exacerbation.  Currently on Eliquis 5 mg p.o. twice daily and is overall much improved.  Still with some lower back pain with radiation down the left leg.  Some fatigue.  No other new symptoms or problems.  No family history of thrombosis.    July 25, 2023:  Ms. Kelly Harris is a 52-year-old black female with previous known diagnosis of hemoglobin SC disease and asthma and remote history of ovarian vein thrombosis referred for recent hospitalization with sickle cell pain crisis.  She started to have some joint pain back in April and was treated with Tylenol.    She had worsening pain couple weeks ago involving the right chest wall and back and was hospitalized for management for 1 night.  Since discharge she has continued to have significant pain.  Using oxycodone 5 mg about 3 times a day but has not been able to resume work.  She describes being cold but does not have fever.  Has had some headaches dizziness and lightheadedness.  No real shortness of breath or cough.  No urinary symptoms.  Some constipation likely related to her pain medication.    It has been several years since previous hospitalization or requiring narcotic pain medications to manage pain crisis.  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    No previous problems with root canal, 2 jaw tumor surgeries.     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  Has joint pains and crises every 2 to 3 months but manages with Tylenol and hydration.  No history of stroke.  Has had vision issues and does follow with ophthalmology.     No abdominal symptoms.  No change with bowels or urine.  No infectious problems.     She is originally from [facility].  She is single with 2 kids who also have hemoglobin SC disease.  She works as a nutritionist.     Other personal or family history of  "thrombosis.        Review of systems.  No fever or night sweats.  No loss of weight.  No lumps or bumps anywhere.  No unusual headaches or eyesight issues.  No dizziness.  No bleeding from the nose.  No sores in the mouth. No problems with swallowing.  No chest pain. No shortness of breath. No cough.  No abdominal pain. No nausea or vomiting.  No diarrhea or constipation.  No blood in stool or black colored stools.  No problems passing urine.  No numbness or tingling in hands or feet.  No skin rashes.  A 14 point review of systems is otherwise negative.        Past History    Past Medical History:   Diagnosis Date     Sickle cell pain crisis (H) 7/16/2023       Past Surgical History:   Procedure Laterality Date     MOUTH SURGERY  09/07/2019    Removed benign mouth tumor         Physical Exam    BP (!) 138/93   Pulse 82   Resp 16   Ht 1.6 m (5' 3\")   Wt 67.1 kg (148 lb)   LMP 04/12/2017   SpO2 100%   BMI 26.22 kg/m        GENERAL: Alert and oriented to time place and person. Seated comfortably. In no distress.    HEAD: Atraumatic and normocephalic.    EYES: SETH, EOMI.  No pallor.  No icterus.    Oral cavity: no mucosal lesion or tonsillar enlargement.    NECK: supple. JVP normal.  No thyroid enlargement.    LYMPH NODES: No palpable, cervical, axillary or inguinal lymphadenopathy.    CHEST: clear to auscultation bilaterally.  Resonant to percussion throughout bilaterally.  Symmetrical breath movements bilaterally.    CVS: S1 and S2 are heard. Regular rate and rhythm.  No murmur or gallop or rub heard.  No peripheral edema.    ABDOMEN: Soft. Not tender. Not distended.  No palpable hepatomegaly or splenomegaly.  No other mass palpable.  Bowel sounds heard.    EXTREMITIES: Warm.    SKIN: no rash, or bruising or purpura.  Has a full head of hair.    Lab Results    Recent Results (from the past 168 hour(s))   CBC with platelets   Result Value Ref Range    WBC Count 12.0 (H) 4.0 - 11.0 10e3/uL    RBC Count 4.59 " 3.80 - 5.20 10e6/uL    Hemoglobin 12.0 11.7 - 15.7 g/dL    Hematocrit 32.7 (L) 35.0 - 47.0 %    MCV 71 (L) 78 - 100 fL    MCH 26.1 (L) 26.5 - 33.0 pg    MCHC 36.7 (H) 31.5 - 36.5 g/dL    RDW 15.5 (H) 10.0 - 15.0 %    Platelet Count 339 150 - 450 10e3/uL   Comprehensive metabolic panel   Result Value Ref Range    Sodium 138 136 - 145 mmol/L    Potassium 4.9 3.4 - 5.3 mmol/L    Chloride 101 98 - 107 mmol/L    Carbon Dioxide (CO2) 25 22 - 29 mmol/L    Anion Gap 12 7 - 15 mmol/L    Urea Nitrogen 9.9 6.0 - 20.0 mg/dL    Creatinine 0.83 0.51 - 0.95 mg/dL    Calcium 10.3 (H) 8.6 - 10.0 mg/dL    Glucose 84 70 - 99 mg/dL    Alkaline Phosphatase 129 (H) 35 - 104 U/L    AST 48 (H) 0 - 45 U/L     (H) 0 - 50 U/L    Protein Total 7.9 6.4 - 8.3 g/dL    Albumin 4.7 3.5 - 5.2 g/dL    Bilirubin Total 0.4 <=1.2 mg/dL    GFR Estimate 84 >60 mL/min/1.73m2       Imaging    CT Chest Pulmonary Embolism w Contrast    Result Date: 7/26/2023  EXAM: CT CHEST PULMONARY EMBOLISM W CONTRAST LOCATION: [facility] DATE: 7/26/2023 INDICATION: Chest pain. Patient w sickle cell pain crisis. COMPARISON: 09/19/2022. TECHNIQUE: CT chest pulmonary angiogram during arterial phase injection of IV contrast. Multiplanar reformats and MIP reconstructions were performed. Dose reduction techniques were used. CONTRAST: 90ml isovue 370 FINDINGS: ANGIOGRAM CHEST: Positive bilateral pulmonary embolism with the most proximal embolism at the bifurcation of the left main pulmonary artery into the left upper and lower lobes. Other mild areas of segmental and subsegmental distribution disease, including the right lower lobe. Borderline pulmonary arterial enlargement with the pulmonary trunk measuring 3 cm. RV to LV ratio at 0.88. LUNGS AND PLEURA: Mildly heterogeneous right lower lobe opacities and consolidation. Minimal left lower lobe costophrenic angle opacities. Other areas of mild atelectasis. Minimal right and trace left pleural  effusions. No pneumothorax. MEDIASTINUM/AXILLAE: No adenopathy. No pericardial effusion. CORONARY ARTERY CALCIFICATION: Motion artifact. UPPER ABDOMEN: No actionable findings. MUSCULOSKELETAL: Nothing acute.     IMPRESSION: 1.  Mild bilateral pulmonary artery embolism. Most proximal embolism is nonocclusive and at the bifurcation of the left main pulmonary artery extending into left upper and lower lobes. 2.  Borderline pulmonary arterial enlargement could reflect pulmonary hypertension. No right heart enlargement. 3.  Heterogeneous consolidation and opacities in the right lower lobe suggests sequela of pulmonary embolism, such as hemorrhage or developing infarct. 4.  Minimal left lower lobe opacities are probably from atelectasis, though definitively cannot exclude sequela of PE. 5.  Minimal right and trace left pleural effusions. Critical Result: Pulmonary embolism and associated findings and associated findings Finding was identified on 7/26/2023 at total 5:00 PM. Patient's Nurse, Miya, was contacted by me on 7/26/2023 12:05 PM CDT and verbalized understanding of the critical result.     XR Chest 2 Views    Result Date: 7/25/2023  EXAM: XR CHEST 2 VIEWS LOCATION: [facility] DATE: 7/25/2023 INDICATION: chest pain COMPARISON: Chest CT 09/19/2022     IMPRESSION: Negative chest.       Signed by: Shari Bartlett MD       Again, thank you for allowing me to participate in the care of your patient.        Sincerely,        Shari Bartlett MD

## 2023-08-10 NOTE — PROGRESS NOTES
Assessment & Plan     Elevated LFTs  Pt was noted to have elevating LFTs throughout hospital stay. No abdominal tenderness or hepatosplenomegaly noted. On F/u 2 days after her discharge, pts AST and ALT slightly decreased and alk phos slightly increased. Given LFTs are still elevated, hepatic function panel and RUQ US are ordered. CBC ordered given abnormal CBCs in hospital and on f/u.   - Hepatic function panel  - CBC with platelets  - US Abdomen Limited RUQ    Pulmonary embolism; on anticoagulation treatment   Pt here for follow-up after 8-day hospitalization for sickle cell crisis and bilateral pulmonary emboli. Pt was discharged on Eloquis, Tylenol, oxycodone, and lidocaine patches. Pt is still in 7/10 pain and says it wakes her up from her sleep. She feels overwhelmed with what's happened the last 2 weeks and her job has not been accommodating of her current medical condition. Has some tenderness to palpation of the L. Gastrocnemius muscle. After visiting with her oncologist today, she was scheduled for a LE duplex US. No changes to current anticoagulation. Per oncology, at least 3 month duration of anticoagulation.   - Continue Elliquis 5 mg BID    No follow-ups on file.    Jesus Morris MS3    I have seen and examined the patient.  I have discussed the case with Student Jesus Morirs MS3.  I agree with the findings, assessment and plan.     Arcenio Alcantara MD PGY3  [facility]  August 10, 2023     Andrew Mendoza is a 52 year old, presenting for the following health issues:  RECHECK (F/U SICKLE CELL)      8/10/2023     1:53 PM   Additional Questions   Roomed by quinton   Accompanied by self     HPI     Pt is here for follow-up after hospitalization for sickle cell disease and bilateral PE with LFT elevation. She states that she is still in pain and rates it a 7/10. She says her pain is mostly in her lower back and left leg and it hurts to squeeze her left calf.  Pt states the pain  "is severe and causes her to wake up in her sleep. She is overwhelmed with these changes and states that she is usually very active but has not been able to be active due to the pain and the medications she's on. 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 She says that her job has not accepted the form she provided them saying she has a medical excuse to miss work because it is \"not detailed enough.\" She said she met  with her oncologist this morning who scheduled her for an ultrasound of her legs because they believe there was underlying cause for her PEs.          8/3/2023    11:17 AM   Post Discharge Outreach   Admission Date 7/25/2023   Reason for Admission Sickle Cell Disease  Pain Crisis  Bilateral PEs   H/o ovarian veing thrombus 2009  Asthma exacerbation, resolved   Tension headache   Acute elevation in LFT   Thrombocytosis   Constipation   Discharge Date 8/2/2023   Discharge Diagnosis Sickle Cell Disease  Pain Crisis  Bilateral PEs   H/o ovarian veing thrombus 2009  Asthma exacerbation, resolved   Tension headache   Acute elevation in LFT   Thrombocytosis   Constipation   How are you doing now that you are home? Patient shares that last night was a bit rough, but she did take some pain medication and is feeling much better now. Will have follow up appt tomorrow. No other questions/concnerns or needs at this time.   How are your symptoms? (Red Flag symptoms escalate to triage hotline per guidelines) Improved   Do you feel your condition is stable enough to be safe at home until your provider visit? Yes   Does the patient have their discharge instructions?  Yes   Does the patient have questions regarding their discharge instructions?  No   Were you started on any new medications or were there changes to any of your previous medications?  Yes   Does the patient have all of their medications? Yes   Do you have questions regarding any of your medications? " " No   Do you have all of your needed medical supplies or equipment (DME)?  (i.e. oxygen tank, CPAP, cane, etc.) Yes   Discharge follow-up appointment scheduled within 14 calendar days?  Yes   Discharge Follow Up Appointment Date 8/7/2023   Discharge Follow Up Appointment Scheduled with? Primary Care Provider     Hospital Follow-up Visit:    Hospital/Nursing Home/IP Rehab Facility: [facility]  Date of Admission:  7/25/2023  Date of Discharge: 8/2/2023  Reason(s) for Admission: Sickle Cell Disease, Pulmonary Embolism    Was your hospitalization related to COVID-19? No   Problems taking medications regularly:  None  Medication changes since discharge: Eliquis 5 mg, Tylenol 975 mg, Oxycodone 10 mg, lidocaine patch  Problems adhering to non-medication therapy:  None    Summary of hospitalization:  [facility] discharge summary reviewed  Diagnostic Tests/Treatments reviewed.  Follow up needed: CBC, LFTs  Other Healthcare Providers Involved in Patient s Care:         Imaging and Oncology  Update since discharge: stable.     Plan of care communicated with patient      Review of Systems   Constitutional, HEENT, cardiovascular, pulmonary, gi and gu systems are negative, except as otherwise noted.      Objective    BP (!) 150/86   Pulse 91   Temp 98.3  F (36.8  C) (Oral)   Resp 18   Ht 1.6 m (5' 3\")   Wt 67 kg (147 lb 12.8 oz)   LMP 04/12/2017   SpO2 100%   BMI 26.18 kg/m    Body mass index is 26.18 kg/m .  Physical Exam   GENERAL: healthy, alert and no distress  EYES: Eyes grossly normal to inspection, PERRL and conjunctivae and sclerae normal  HENT: ear canals and TM's normal, nose and mouth without ulcers or lesions  NECK: no adenopathy, no asymmetry, masses, or scars and thyroid normal to palpation  RESP: lungs clear to auscultation - no rales, rhonchi or wheezes  CV: regular rate and rhythm, normal S1 S2, no S3 or S4, no murmur, click or rub, no peripheral edema and " peripheral pulses strong  ABDOMEN: soft, nontender, no hepatosplenomegaly, no masses and bowel sounds normal  MS: no gross musculoskeletal defects noted, no edema. Pain on palpation of LLE, particularly gastrocnemius muscle.  SKIN: no suspicious lesions or rashes  NEURO: Normal strength and tone, mentation intact and speech normal  PSYCH: mentation appears normal, affect normal/bright    ----- Services Performed by a MEDICAL STUDENT in Presence of RESIDENT/FELLOW Physician-------

## 2023-08-10 NOTE — PROGRESS NOTES
"Oncology Rooming Note    August 10, 2023 8:27 AM   Kelly Harris is a 52 year old female who presents for:    Chief Complaint   Patient presents with    Hematology     Return Sickle cell disease with crisis, hospital follow up     Initial Vitals: BP (!) 138/93   Pulse 82   Resp 16   Ht 1.6 m (5' 3\")   Wt 67.1 kg (148 lb)   LMP 04/12/2017   SpO2 100%   BMI 26.22 kg/m   Estimated body mass index is 26.22 kg/m  as calculated from the following:    Height as of this encounter: 1.6 m (5' 3\").    Weight as of this encounter: 67.1 kg (148 lb). Body surface area is 1.73 meters squared.  Moderate Pain (4) Comment: with Tylenol   Patient's last menstrual period was 04/12/2017.  Allergies reviewed: Yes  Medications reviewed: Yes    Medications: Medication refills not needed today.  Pharmacy name entered into [employer]:    [id number] - [place], [place] - [address] [place] [address] & [facility] [address] [place] [address] [address]    Clinical concerns:  hospital follow up      Betty Mckeon            "

## 2023-08-11 ENCOUNTER — TELEPHONE (OUTPATIENT)
Dept: ONCOLOGY | Facility: HOSPITAL | Age: 53
End: 2023-08-11
Payer: COMMERCIAL

## 2023-08-11 NOTE — TELEPHONE ENCOUNTER
I call Kelly to report that her ultrasound is negative for blood clots. Per Dr. Bartlett, she is to continue Eliquis and follow-up as planned.     Kelly verbalized understanding and appreciation of our conversation.     Gretchen Downey  RN Care Coordinator  [facility]

## 2023-08-21 ENCOUNTER — ANCILLARY PROCEDURE (OUTPATIENT)
Dept: ULTRASOUND IMAGING | Facility: CLINIC | Age: 53
End: 2023-08-21
Attending: FAMILY MEDICINE
Payer: COMMERCIAL

## 2023-08-21 DIAGNOSIS — R79.89 ELEVATED LFTS: ICD-10-CM

## 2023-08-21 PROCEDURE — 76705 ECHO EXAM OF ABDOMEN: CPT | Mod: TC | Performed by: RADIOLOGY

## 2023-09-03 DIAGNOSIS — J45.40 MODERATE PERSISTENT ASTHMA WITHOUT COMPLICATION: ICD-10-CM

## 2023-09-08 RX ORDER — MONTELUKAST SODIUM 10 MG/1
TABLET ORAL
Qty: 30 TABLET | Refills: 1 | Status: SHIPPED | OUTPATIENT
Start: 2023-09-08 | End: 2023-11-24

## 2023-09-28 ENCOUNTER — TRANSFERRED RECORDS (OUTPATIENT)
Dept: HEALTH INFORMATION MANAGEMENT | Facility: CLINIC | Age: 53
End: 2023-09-28
Payer: COMMERCIAL

## 2023-09-28 DIAGNOSIS — I26.99 ACUTE PULMONARY EMBOLISM WITHOUT ACUTE COR PULMONALE, UNSPECIFIED PULMONARY EMBOLISM TYPE (H): ICD-10-CM

## 2023-09-28 NOTE — TELEPHONE ENCOUNTER
Message to physician:     Date of last visit: 8/10/2023    Date of next visit if scheduled:     Potassium   Date Value Ref Range Status   08/04/2023 4.9 3.4 - 5.3 mmol/L Final   07/16/2023 4.1 3.5 - 5.0 mmol/L Final   11/09/2015 4.5 3.5 - 5.0 mmol/L Final     Creatinine   Date Value Ref Range Status   08/04/2023 0.83 0.51 - 0.95 mg/dL Final   11/09/2015 0.89 0.60 - 1.10 mg/dL Final     GFR Estimate   Date Value Ref Range Status   08/04/2023 84 >60 mL/min/1.73m2 Final   11/09/2015 >60 >60 mL/min/1.73m2 Final       BP Readings from Last 3 Encounters:   08/10/23 (!) 150/86   08/10/23 (!) 138/93   08/04/23 (!) 144/85       Hemoglobin A1C   Date Value Ref Range Status   07/07/2023 4.5 0.0 - 5.6 % Final     Comment:     Normal <5.7%   Prediabetes 5.7-6.4%    Diabetes 6.5% or higher     Note: Adopted from ADA consensus guidelines.   06/12/2015 4.4 4.1 - 5.7 % Final       Please complete refill and CLOSE ENCOUNTER.  Closing the encounter signifies the refill is complete.

## 2023-10-02 ENCOUNTER — OFFICE VISIT (OUTPATIENT)
Dept: FAMILY MEDICINE | Facility: CLINIC | Age: 53
End: 2023-10-02
Payer: COMMERCIAL

## 2023-10-02 VITALS
DIASTOLIC BLOOD PRESSURE: 93 MMHG | HEART RATE: 83 BPM | HEIGHT: 63 IN | WEIGHT: 154 LBS | BODY MASS INDEX: 27.29 KG/M2 | SYSTOLIC BLOOD PRESSURE: 148 MMHG | TEMPERATURE: 99.5 F | OXYGEN SATURATION: 100 % | RESPIRATION RATE: 20 BRPM

## 2023-10-02 DIAGNOSIS — M54.41 CHRONIC BILATERAL LOW BACK PAIN WITH BILATERAL SCIATICA: Primary | ICD-10-CM

## 2023-10-02 DIAGNOSIS — M54.42 CHRONIC BILATERAL LOW BACK PAIN WITH BILATERAL SCIATICA: Primary | ICD-10-CM

## 2023-10-02 DIAGNOSIS — Z11.1 SCREENING EXAMINATION FOR PULMONARY TUBERCULOSIS: ICD-10-CM

## 2023-10-02 DIAGNOSIS — D57.20 SICKLE-CELL/HB-C DISEASE WITHOUT CRISIS (H): ICD-10-CM

## 2023-10-02 DIAGNOSIS — G89.29 CHRONIC BILATERAL LOW BACK PAIN WITH BILATERAL SCIATICA: Primary | ICD-10-CM

## 2023-10-02 PROCEDURE — 99214 OFFICE O/P EST MOD 30 MIN: CPT | Mod: GC

## 2023-10-02 RX ORDER — TRAMADOL HYDROCHLORIDE 50 MG/1
50 TABLET ORAL EVERY 6 HOURS PRN
Qty: 30 TABLET | Refills: 0 | Status: SHIPPED | OUTPATIENT
Start: 2023-10-02 | End: 2023-10-10

## 2023-10-02 NOTE — PROGRESS NOTES
Assessment & Plan     Chronic bilateral low back pain with bilateral sciatica  Continues to have low back pain and bilateral leg pain.  She describes the pain as cramping.  Worse with long hours on the feet.  On exam she has negative straight leg raise and does not have any neurologic deficits in the lower extremities.  She has some tenderness to palpation over the lumbar paraspinous muscles.  Differential includes muscle strain, sickle cell pain, spinal stenosis, sciatica.  Her clinical history and exam do not give a clear indication as to which of these is most likely.  We will start with x-ray of lumbar spine and start prescribing tramadol as needed for the pain.  May need MRI to further evaluate for sciatica or other neurologic pathology.  - XR Lumbar Spine 2-3 Views*  - traMADol (ULTRAM) 50 MG tablet  Dispense: 30 tablet; Refill: 0    Screening examination for pulmonary tuberculosis  - Quantiferon-TB Gold Plus      Arcenio Alcantara MD  [facility]    Andrew Mendoza is a 52 year old, presenting for the following health issues:  RECHECK (F/U UP and Pt would like to get a TB test.)      10/2/2023     4:22 PM   Additional Questions   Roomed by quniton   Accompanied by self       TACOS   Continues to have pain in the low back and both legs since being discharged in the hospital for sickle cell crisis and pulmonary embolus.  She had been discharged with some oxycodone which she used when the pain got really bad but this makes her itchy and she does not like to use it.  She reports that the [facility] paperwork that was filled out for her work to reduce her number of hours following her illness has not been practically followed.  They frequently ask her to cover shifts due to being understaffed and she notes that she has had worsening pain with longer hours.  We discussed her ability to change jobs and she states that she is starting to look for other employment and encouraged her to pursue this if  "this is not something that she particularly enjoys or is getting good financial compensation for and may be worsening her overall health.           Objective    BP (!) 148/93   Pulse 83   Temp 99.5  F (37.5  C) (Oral)   Resp 20   Ht 1.6 m (5' 3\")   Wt 69.9 kg (154 lb)   LMP 04/12/2017   SpO2 100%   BMI 27.28 kg/m    Body mass index is 27.28 kg/m .  Physical Exam   GENERAL: healthy, alert and no distress  MS: Has some mild tenderness palpation on the paraspinal muscles bilaterally.  No gross musculoskeletal defects noted, no edema.  Negative straight leg raise bilaterally.  Neuro: No neurologic deficits noted in lower extremities.    No results found for any visits on 10/02/23.    ----- Service Performed and Documented by Resident or Fellow ------              "

## 2023-10-02 NOTE — LETTER
RETURN TO WORK/SCHOOL FORM    10/2/2023    Re: Kelly Harris  1970      To Whom It May Concern:     Kelly Harris was seen in clinic today. She may return to work with restrictions on 10/3/23          Restrictions: limited to 5 hour workday 4 days per week.      Arcenio Alcantara MD  10/2/2023 5:11 PM

## 2023-10-03 ENCOUNTER — LAB (OUTPATIENT)
Dept: LAB | Facility: CLINIC | Age: 53
End: 2023-10-03
Payer: COMMERCIAL

## 2023-10-03 ENCOUNTER — ANCILLARY PROCEDURE (OUTPATIENT)
Dept: GENERAL RADIOLOGY | Facility: CLINIC | Age: 53
End: 2023-10-03
Attending: FAMILY MEDICINE
Payer: COMMERCIAL

## 2023-10-03 DIAGNOSIS — Z11.1 SCREENING EXAMINATION FOR PULMONARY TUBERCULOSIS: ICD-10-CM

## 2023-10-03 DIAGNOSIS — M54.41 CHRONIC BILATERAL LOW BACK PAIN WITH BILATERAL SCIATICA: ICD-10-CM

## 2023-10-03 DIAGNOSIS — G89.29 CHRONIC BILATERAL LOW BACK PAIN WITH BILATERAL SCIATICA: ICD-10-CM

## 2023-10-03 DIAGNOSIS — M54.42 CHRONIC BILATERAL LOW BACK PAIN WITH BILATERAL SCIATICA: ICD-10-CM

## 2023-10-03 PROCEDURE — 72100 X-RAY EXAM L-S SPINE 2/3 VWS: CPT | Mod: TC | Performed by: RADIOLOGY

## 2023-10-03 PROCEDURE — 86481 TB AG RESPONSE T-CELL SUSP: CPT

## 2023-10-03 PROCEDURE — 36415 COLL VENOUS BLD VENIPUNCTURE: CPT

## 2023-10-05 LAB
GAMMA INTERFERON BACKGROUND BLD IA-ACNC: 0.08 IU/ML
M TB IFN-G BLD-IMP: NEGATIVE
M TB IFN-G CD4+ BCKGRND COR BLD-ACNC: 9.92 IU/ML
MITOGEN IGNF BCKGRD COR BLD-ACNC: 0.01 IU/ML
MITOGEN IGNF BCKGRD COR BLD-ACNC: 0.02 IU/ML
QUANTIFERON MITOGEN: 10 IU/ML
QUANTIFERON NIL TUBE: 0.08 IU/ML
QUANTIFERON TB1 TUBE: 0.1 IU/ML
QUANTIFERON TB2 TUBE: 0.09

## 2023-10-06 DIAGNOSIS — M54.41 CHRONIC BILATERAL LOW BACK PAIN WITH BILATERAL SCIATICA: Primary | ICD-10-CM

## 2023-10-06 DIAGNOSIS — M54.42 CHRONIC BILATERAL LOW BACK PAIN WITH BILATERAL SCIATICA: Primary | ICD-10-CM

## 2023-10-06 DIAGNOSIS — G89.29 CHRONIC BILATERAL LOW BACK PAIN WITH BILATERAL SCIATICA: Primary | ICD-10-CM

## 2023-10-10 ENCOUNTER — THERAPY VISIT (OUTPATIENT)
Dept: PHYSICAL THERAPY | Facility: REHABILITATION | Age: 53
End: 2023-10-10
Attending: FAMILY MEDICINE
Payer: COMMERCIAL

## 2023-10-10 DIAGNOSIS — M54.41 CHRONIC BILATERAL LOW BACK PAIN WITH BILATERAL SCIATICA: ICD-10-CM

## 2023-10-10 DIAGNOSIS — G89.29 CHRONIC BILATERAL LOW BACK PAIN WITH BILATERAL SCIATICA: ICD-10-CM

## 2023-10-10 DIAGNOSIS — M54.42 CHRONIC BILATERAL LOW BACK PAIN WITH BILATERAL SCIATICA: ICD-10-CM

## 2023-10-10 PROCEDURE — 97161 PT EVAL LOW COMPLEX 20 MIN: CPT | Mod: GP | Performed by: PHYSICAL THERAPIST

## 2023-10-10 PROCEDURE — 97110 THERAPEUTIC EXERCISES: CPT | Mod: GP | Performed by: PHYSICAL THERAPIST

## 2023-10-10 NOTE — PROGRESS NOTES
PHYSICAL THERAPY EVALUATION  Type of Visit: Evaluation    See electronic medical record for Abuse and Falls Screening details.    Andrew Mendoza is a 52 year old woman who presents to physical therapy with complaints of lower back pain that started in July. She was in the hospital for sickle cell crisis in July when the pain started, although it has gotten slightly better since initial onset. She describes the pain as shooting down her back and into her legs (L>R). She reports occasional numbness and tingling down her legs, mostly when lying down, but denies any recent bowel or bladder changes. She has difficulty with prolonged sitting (>1 hour), standing for >30 minutes, driving, and bending forward. She is currently only able to work for 5 hours a day due to pain. She has been prescribed pain meds, but she does not like to take them due to drowsiness. Pain is relieved by repositioning, ice (occasionally), and Tylenol. She has tried topical medications with minimal relief. She rates her worst pain as 10/10, best as 4/10, and current 4/10. She has had physical therapy before and found it helpful. Her goals for physical therapy include being able to go about her daily life without pain.      Presenting condition or subjective complaint: Lower back pain  Date of onset: 07/25/23    Relevant medical history: Asthma; Vision problems   Dates & types of surgery:      Prior diagnostic imaging/testing results: X-ray     Prior therapy history for the same diagnosis, illness or injury:        Prior Level of Function  Transfers:   Ambulation:   ADL:   IADL:     Living Environment  Social support: With family members   Type of home: [facility]; 1 level   Stairs to enter the home: No       Ramp: No   Stairs inside the home: Yes   Is there a railing: Yes   Help at home: None  Equipment owned:       Employment: Yes Nutritionist  Hobbies/Interests: reading, cooking    Patient goals for therapy: I cannot  lift heavy, cannot stand  for long, bend low    Pain assessment:      Objective   LUMBAR SPINE EVALUATION  PAIN:   INTEGUMENTARY (edema, incisions):   POSTURE:   GAIT:   Weightbearing Status:   Assistive Device(s):   Gait Deviations:   BALANCE/PROPRIOCEPTION:   WEIGHTBEARING ALIGNMENT:   NON-WEIGHTBEARING ALIGNMENT:    ROM:   (Degrees) Left AROM Left PROM  Right AROM Right PROM   Hip Flexion Mod limited  Mod limited    Hip Extension       Hip Abduction       Hip Adduction       Hip Internal Rotation Max limited  Max limited    Hip External Rotation Mod limited  Max limited    Knee Flexion       Knee Extension       Lumbar Side glide     Lumbar Flexion Proximal shins   Lumbar Extension Mod to max limited pain with all   Lumbar side bending: R: distal thigh. L: proximal/mid thigh  Lumbar rotation: R: mod to max limited. L: min/mod limited    **All motions somewhat painful and guarded    PELVIC/SI SCREEN:   STRENGTH/MYOTOMES:    Left Right   T12-L3 (Hip Flexion) 3 3   L2-4 (Quads)  3+ 5-   L4 (Ankle DF) 3+ 3   L5 (Great Toe Ext)     S1 (Toe Raise) 5 5-   Knee flexion: L: 4-/5 (uneven force production).  R: 5-/5   Hip adduction: 5/5 Bilaterally  Hip abduction: 4+ Bilaterally  Hip extension: able to  bridge in partial ROM, but painful  Core: Unable to sit from supine without UE assist or momentum  DTR S:   CORD SIGNS:   DERMATOMES:  slightly reduced on L side for L3-S1. L2 normal bilaterally .   NEURAL TENSION:  Slump and SLR positive bilaterally (L>R)  FLEXIBILITY:  Hamstring flexibility mod/max limited bilaterally  LUMBAR/HIP Special Tests:    PELVIS/SI SPECIAL TESTS:   FUNCTIONAL TESTS:   PALPATION: General tenderness bilaterally in lumbar paraspinals, QL, hip abductors, and superior glutes. Spinal segmental mobility unable to be tested due to irritability.    SPINAL SEGMENTAL CONCLUSIONS:       Assessment & Plan   CLINICAL IMPRESSIONS  Medical Diagnosis: Chronic bilateral low back pain with bilateral sciatica    Treatment Diagnosis: Bilateral  low back pain with radiating symptoms   Impression/Assessment: Patient is a 52 year old female with low back and bilateral leg pain complaints.  The following significant findings have been identified: Pain, Decreased ROM/flexibility, Decreased strength, Impaired sensation, Impaired muscle performance, and Decreased activity tolerance. These impairments interfere with their ability to perform work tasks, recreational activities, household chores, and driving  as compared to previous level of function.     Clinical Decision Making (Complexity):  Clinical Presentation: Stable/Uncomplicated  Clinical Presentation Rationale: based on medical and personal factors listed in PT evaluation  Clinical Decision Making (Complexity): Low complexity    PLAN OF CARE  Treatment Interventions:  Modalities: Cryotherapy, Dry Needling, E-stim, Hot Pack  Interventions: Gait Training, Manual Therapy, Neuromuscular Re-education, Therapeutic Activity, Therapeutic Exercise, Self-Care/Home Management    Long Term Goals     PT Goal 1  Goal Identifier: HEP  Goal Description: Kelly will demonstrate mastery of her exercises as evidenced by her ability to perform her HEP with proper form and minimal/no cueing needed 5/7 days per week to facillitate accelerated symptom resolution.  Goal Progress: In progress  Target Date: 12/05/23  PT Goal 2  Goal Identifier: TOLU  Goal Description: Kelly will demonstrate improved daily function as evidenced by an improved (decreased) TOLU score of 32% or less to facillitate independent function and safety.  Goal Progress: 46%  Target Date: 12/05/23  PT Goal 3  Goal Identifier: Standing  Goal Description: Kelly will demonstrate improved activity tolerance as evidenced by her ability to stand for 30 minutes or more with self reported pain as 2/10 or less to facillitate independent function  Goal Progress: Unable  Target Date: 12/05/23  PT Goal 4  Goal Identifier: Sitting  Goal Description: Kelly will  demonstrate improved activity tolerance as evidenced by her ability to sit for 1 hour or more with self reported pain as 2/10 or less to facillitate independent function and improved work day tolerance.  Goal Progress: Unable  Target Date: 12/05/23      Frequency of Treatment: 1x/week  Duration of Treatment: 8 weeks    Recommended Referrals to Other Professionals:  None  Education Assessment:   Learner/Method: Patient;Pictures/Video;Demonstration    Risks and benefits of evaluation/treatment have been explained.   Patient/Family/caregiver agrees with Plan of Care.     Evaluation Time:     PT Eval, Low Complexity Minutes (54132): 31       Signing Clinician: SWETHA MENDOZA PT

## 2023-10-23 ENCOUNTER — ONCOLOGY VISIT (OUTPATIENT)
Dept: ONCOLOGY | Facility: HOSPITAL | Age: 53
End: 2023-10-23
Attending: INTERNAL MEDICINE
Payer: COMMERCIAL

## 2023-10-23 VITALS
SYSTOLIC BLOOD PRESSURE: 143 MMHG | WEIGHT: 152.1 LBS | HEIGHT: 63 IN | OXYGEN SATURATION: 99 % | RESPIRATION RATE: 24 BRPM | DIASTOLIC BLOOD PRESSURE: 78 MMHG | HEART RATE: 98 BPM | BODY MASS INDEX: 26.95 KG/M2 | TEMPERATURE: 98.4 F

## 2023-10-23 DIAGNOSIS — I26.99 BILATERAL PULMONARY EMBOLISM (H): ICD-10-CM

## 2023-10-23 DIAGNOSIS — M54.42 CHRONIC BILATERAL LOW BACK PAIN WITH LEFT-SIDED SCIATICA: ICD-10-CM

## 2023-10-23 DIAGNOSIS — I82.890 THROMBOSIS OF OVARIAN VEIN: ICD-10-CM

## 2023-10-23 DIAGNOSIS — G89.29 CHRONIC BILATERAL LOW BACK PAIN WITH LEFT-SIDED SCIATICA: ICD-10-CM

## 2023-10-23 DIAGNOSIS — D57.20 SICKLE-CELL/HB-C DISEASE WITHOUT CRISIS (H): Primary | ICD-10-CM

## 2023-10-23 PROCEDURE — 99213 OFFICE O/P EST LOW 20 MIN: CPT | Performed by: INTERNAL MEDICINE

## 2023-10-23 PROCEDURE — 99214 OFFICE O/P EST MOD 30 MIN: CPT | Performed by: INTERNAL MEDICINE

## 2023-10-23 RX ORDER — METHYLPREDNISOLONE 32 MG/1
TABLET ORAL
Qty: 2 TABLET | Refills: 0 | Status: SHIPPED | OUTPATIENT
Start: 2023-10-23 | End: 2024-01-16

## 2023-10-23 ASSESSMENT — PAIN SCALES - GENERAL: PAINLEVEL: MODERATE PAIN (5)

## 2023-10-23 NOTE — LETTER
"    10/23/2023         RE: Kelly Harris  [address] [facility] [address]        Dear Colleague,    Thank you for referring your patient, Kelly Harris, to the [facility]. Please see a copy of my visit note below.    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    Weight as of this encounter: 69 kg (152 lb 1.6 oz). Body surface area is 1.75 meters squared.  Moderate Pain (5) Comment: Data Unavailable   Patient's last menstrual period was 04/12/2017.  Allergies reviewed: Yes  Medications reviewed: Yes    Medications: Medication refills not needed today.  Pharmacy name entered into [employer]:    [id number] - [place], [place] - [address] [place] [address] [place] [address] [address]    Clinical concerns: 3 month follow up related to Thrombosis of ovarian vein.      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  Previous ultrasound showed no evidence of DVT.  She likely had a provoked pulmonary embolism.  I think she is at low risk for recurrence.  Therefore recommend to complete current supply of Xarelto and stop the medication.  We will get repeat CT of the chest PE protocol within the next week to establish a new baseline.    She had itching after the last scan and may have allergy to the iodine contrast.  Therefore prescribed methylprednisolone 32 mg x 2 doses to be taken 12 hours and 2 hours prior to the scan.    Sickle cell appears to be under good control without significant pain issues.  Recommend to take folic acid daily, multivitamin and calcium with vitamin D.  We will do 1 year follow-up with recheck of labs.  She will call if she is having pain issues.    Questions answered.    Plan: As above  Stop Xarelto  Repeat CT of the chest with PE protocol with 2 doses of methylprednisolone for prevention of contrast allergy  Continue supplementation for sickle cell  Follow-up in a year with labs      Measurable disease: CT of the chest, hemoglobin electrophoresis    Current therapy: Eliquis 5 mg p.o. 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 Bili, TP)     Other Visit Diagnoses       Bilateral pulmonary embolism (H)        Relevant Medications    methylPREDNISolone (MEDROL) 32 MG tablet    Other Relevant Orders    CT Chest Pulmonary Embolism w Contrast               CC: Arcenio Alcantara MD   ______________________________________________________________________________    History of Present Illness      Kelly returns for follow-up.  Seen 2 months ago.  Overall doing well.  Breathing back to normal.  No lower extremity pain or swelling.  No current issues with sickle cell bone pain.  No other new symptoms or problems.  ECOG status 0.    August 2023:    Kelly is here for follow-up.  Seen in clinic about 2-1/2 weeks ago and was hospitalized.  Documented to have low volume bilateral pulmonary emboli and asthma exacerbation.  Currently on Eliquis 5 mg p.o. twice daily and is overall much improved.  Still with some lower back pain with radiation down the left leg.  Some fatigue.  No other new symptoms or problems.  No family history of thrombosis.    July 25, 2023:  Ms. Kelly Harris is a 52-year-old black female with previous known diagnosis of hemoglobin SC disease and asthma and remote history of ovarian vein thrombosis referred for recent hospitalization with sickle cell pain crisis.  She started to have some joint pain back in April and was treated with Tylenol.    She had worsening pain couple weeks ago involving the right chest wall and back and was hospitalized for management for 1 night.  Since discharge she has continued to have significant pain.  Using oxycodone 5 mg about 3 times a day but has not been able to resume work.  She describes being cold but does not have fever.  Has had some headaches dizziness and lightheadedness.  No real shortness of breath or cough.  No urinary symptoms.  Some constipation likely related to her pain medication.    It has been several years  "since previous hospitalization or requiring narcotic pain medications to manage pain crisis.  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    No previous problems with root canal, 2 jaw tumor surgeries.     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  Has joint pains and crises every 2 to 3 months but manages with Tylenol and hydration.  No history of stroke.  Has had vision issues and does follow with ophthalmology.     No abdominal symptoms.  No change with bowels or urine.  No infectious problems.     She is originally from [facility].  She is single with 2 kids who also have hemoglobin SC disease.  She works as a nutritionist.     Other personal or family history of thrombosis.        Review of systems.  No fever or night sweats.  No loss of weight.  No lumps or bumps anywhere.  No unusual headaches or eyesight issues.  No dizziness.  No bleeding from the nose.  No sores in the mouth. No problems with swallowing.  No chest pain. No shortness of breath. No cough.  No abdominal pain. No nausea or vomiting.  No diarrhea or constipation.  No blood in stool or black colored stools.  No problems passing urine.  No numbness or tingling in hands or feet.  No skin rashes.  A 14 point review of systems is otherwise negative.        Past History    Past Medical History:   Diagnosis Date     Sickle cell pain crisis (H) 7/16/2023       Past Surgical History:   Procedure Laterality Date     MOUTH SURGERY  09/07/2019    Removed benign mouth tumor         Physical Exam    BP (!) 143/78 (BP Location: Right leg, Patient Position: Sitting, Cuff Size: Adult Regular)   Pulse 98   Temp 98.4  F (36.9  C) (Oral)   Resp 24   Ht 1.607 m (5' 3.25\")   Wt 69 kg (152 lb 1.6 oz)   LMP 04/12/2017   SpO2 99%   BMI 26.73 kg/m        GENERAL: Alert and oriented to time place and person. Seated comfortably. In no distress.    HEAD: Atraumatic and normocephalic.    EYES: SETH, EOMI.  No pallor.  No " icterus.    Oral cavity: no mucosal lesion or tonsillar enlargement.    NECK: supple. JVP normal.  No thyroid enlargement.    LYMPH NODES: No palpable, cervical, axillary or inguinal lymphadenopathy.    CHEST: clear to auscultation bilaterally.  Resonant to percussion throughout bilaterally.  Symmetrical breath movements bilaterally.    CVS: S1 and S2 are heard. Regular rate and rhythm.  No murmur or gallop or rub heard.  No peripheral edema.    ABDOMEN: Soft. Not tender. Not distended.  No palpable hepatomegaly or splenomegaly.  No other mass palpable.  Bowel sounds heard.    EXTREMITIES: Warm.    SKIN: no rash, or bruising or purpura.  Has a full head of hair.    Lab Results    No results found for this or any previous visit (from the past 168 hour(s)).      Imaging    XR Lumbar Spine 2-3 Views*    Result Date: 10/3/2023  EXAM: XR LUMBAR SPINE 2/3 VIEWS LOCATION: [facility] DATE: 10/3/2023 INDICATION:  Chronic bilateral low back pain with bilateral sciatica COMPARISON: None.     IMPRESSION: 5 lumbar type vertebra. Minor grade 1 anterolisthesis at L4-L5. Otherwise unremarkable lumbar alignment. Preserved vertebral body heights without evidence for fracture. Mild lumbar spondylosis, with minor loss of disc height, subtle endplate spurring, and facet arthropathy at L4-L5 and L5-S1.       Signed by: Shari Bartlett MD       Again, thank you for allowing me to participate in the care of your patient.        Sincerely,        Shari Bartlett MD

## 2023-10-23 NOTE — PROGRESS NOTES
[facility] Hematology and Oncology Progress Note    Patient: Kelly Harris  MRN: [id number]  Date of Service: Oct 23, 2023         Reason for Visit    Chief Complaint   Patient presents with    Oncology Clinic Visit     3 month follow up related to Thrombosis of ovarian vein       Assessment and Plan    Bilateral pulmonary embolism, diagnosed July 2023  Sickle cell pain crisis  Scattered wheezing  History of hemoglobin SC disease  Bilateral low back pain with left-sided sciatica    Her breathing has normalized.  Previous ultrasound showed no evidence of DVT.  She likely had a provoked pulmonary embolism.  I think she is at low risk for recurrence.  Therefore recommend to complete current supply of Xarelto and stop the medication.  We will get repeat CT of the chest PE protocol within the next week to establish a new baseline.    She had itching after the last scan and may have allergy to the iodine contrast.  Therefore prescribed methylprednisolone 32 mg x 2 doses to be taken 12 hours and 2 hours prior to the scan.    Sickle cell appears to be under good control without significant pain issues.  Recommend to take folic acid daily, multivitamin and calcium with vitamin D.  We will do 1 year follow-up with recheck of labs.  She will call if she is having pain issues.    Questions answered.    Plan: As above  Stop Xarelto  Repeat CT of the chest with PE protocol with 2 doses of methylprednisolone for prevention of contrast allergy  Continue supplementation for sickle cell  Follow-up in a year with labs      Measurable disease: CT of the chest, hemoglobin electrophoresis    Current therapy: Eliquis 5 mg p.o. 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 Bili, TP)     Other Visit Diagnoses       Bilateral pulmonary embolism (H)        Relevant Medications    methylPREDNISolone (MEDROL) 32 MG tablet    Other Relevant Orders    CT Chest Pulmonary Embolism w Contrast               CC: Arcenio Alcantara MD   ______________________________________________________________________________    History of Present Illness      Kelly returns for follow-up.  Seen 2 months ago.  Overall doing well.  Breathing back to normal.  No lower extremity pain or swelling.  No current issues with sickle cell bone pain.  No other new symptoms or problems.  ECOG status 0.    August 2023:    Kelly is here for follow-up.  Seen in clinic about 2-1/2 weeks ago and was hospitalized.  Documented to have low volume bilateral pulmonary emboli and asthma exacerbation.  Currently on Eliquis 5 mg p.o. twice daily and is overall much improved.  Still with some lower back pain with radiation down the left leg.  Some fatigue.  No other new symptoms or problems.  No family history of thrombosis.    July 25, 2023:  Ms. Kelly Harris is a 52-year-old black female with previous known diagnosis of hemoglobin SC disease and asthma and remote history of ovarian vein thrombosis referred for recent hospitalization with sickle cell pain crisis.  She started to have some joint pain back in April and was treated with Tylenol.    She had worsening pain couple weeks ago involving the right chest wall and back and was hospitalized for  management for 1 night.  Since discharge she has continued to have significant pain.  Using oxycodone 5 mg about 3 times a day but has not been able to resume work.  She describes being cold but does not have fever.  Has had some headaches dizziness and lightheadedness.  No real shortness of breath or cough.  No urinary symptoms.  Some constipation likely related to her pain medication.    It has been several years since previous hospitalization or requiring narcotic pain medications to manage pain crisis.  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    No previous problems with root canal, 2 jaw tumor surgeries.     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  Has joint pains and crises every 2 to 3 months but manages with Tylenol and hydration.  No history of stroke.  Has had vision issues and does follow with ophthalmology.     No abdominal symptoms.  No change with bowels or urine.  No infectious problems.     She is [place] [facility] [place] [place]  She is single with 2 kids who also have hemoglobin SC disease.  She works as a nutritionist.     Other personal or family history of thrombosis.        Review of systems.  No fever or night sweats.  No loss of weight.  No lumps or bumps anywhere.  No unusual headaches or eyesight issues.  No dizziness.  No bleeding from the nose.  No sores in the mouth. No problems with swallowing.  No chest pain. No shortness of breath. No cough.  No abdominal pain. No nausea or vomiting.  No diarrhea or constipation.  No blood in stool or black colored stools.  No problems passing urine.  No numbness or tingling in hands or feet.  No skin rashes.  A 14 point review of systems is otherwise negative.        Past History    Past Medical History:   Diagnosis Date    Sickle cell pain crisis (H) 7/16/2023       Past Surgical History:   Procedure Laterality Date    MOUTH SURGERY  09/07/2019    Removed benign mouth tumor         Physical Exam    BP (!) 143/78  "(BP Location: Right leg, Patient Position: Sitting, Cuff Size: Adult Regular)   Pulse 98   Temp 98.4  F (36.9  C) (Oral)   Resp 24   Ht 1.607 m (5' 3.25\")   Wt 69 kg (152 lb 1.6 oz)   LMP 04/12/2017   SpO2 99%   BMI 26.73 kg/m        GENERAL: Alert and oriented to time place and person. Seated comfortably. In no distress.    HEAD: Atraumatic and normocephalic.    EYES: SETH, EOMI.  No pallor.  No icterus.    Oral cavity: no mucosal lesion or tonsillar enlargement.    NECK: supple. JVP normal.  No thyroid enlargement.    LYMPH NODES: No palpable, cervical, axillary or inguinal lymphadenopathy.    CHEST: clear to auscultation bilaterally.  Resonant to percussion throughout bilaterally.  Symmetrical breath movements bilaterally.    CVS: S1 and S2 are heard. Regular rate and rhythm.  No murmur or gallop or rub heard.  No peripheral edema.    ABDOMEN: Soft. Not tender. Not distended.  No palpable hepatomegaly or splenomegaly.  No other mass palpable.  Bowel sounds heard.    EXTREMITIES: Warm.    SKIN: no rash, or bruising or purpura.  Has a full head of hair.    Lab Results    No results found for this or any previous visit (from the past 168 hour(s)).      Imaging    XR Lumbar Spine 2-3 Views*    Result Date: 10/3/2023  EXAM: XR LUMBAR SPINE 2/3 VIEWS LOCATION: [facility] DATE: 10/3/2023 INDICATION:  Chronic bilateral low back pain with bilateral sciatica COMPARISON: None.     IMPRESSION: 5 lumbar type vertebra. Minor grade 1 anterolisthesis at L4-L5. Otherwise unremarkable lumbar alignment. Preserved vertebral body heights without evidence for fracture. Mild lumbar spondylosis, with minor loss of disc height, subtle endplate spurring, and facet arthropathy at L4-L5 and L5-S1.       Signed by: Shari Bartlett MD   "

## 2023-10-23 NOTE — PROGRESS NOTES
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    Weight as of this encounter: 69 kg (152 lb 1.6 oz). Body surface area is 1.75 meters squared.  Moderate Pain (5) Comment: Data Unavailable   Patient's last menstrual period was 04/12/2017.  Allergies reviewed: Yes  Medications reviewed: Yes    Medications: Medication refills not needed today.  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      Yokasta Cotto MA            "

## 2023-10-27 ENCOUNTER — THERAPY VISIT (OUTPATIENT)
Dept: PHYSICAL THERAPY | Facility: REHABILITATION | Age: 53
End: 2023-10-27
Payer: COMMERCIAL

## 2023-10-27 DIAGNOSIS — M54.42 CHRONIC BILATERAL LOW BACK PAIN WITH BILATERAL SCIATICA: Primary | ICD-10-CM

## 2023-10-27 DIAGNOSIS — G89.29 CHRONIC BILATERAL LOW BACK PAIN WITH BILATERAL SCIATICA: Primary | ICD-10-CM

## 2023-10-27 DIAGNOSIS — M54.41 CHRONIC BILATERAL LOW BACK PAIN WITH BILATERAL SCIATICA: Primary | ICD-10-CM

## 2023-10-27 PROCEDURE — 97110 THERAPEUTIC EXERCISES: CPT | Mod: GP | Performed by: PHYSICAL THERAPIST

## 2023-10-30 ENCOUNTER — HOSPITAL ENCOUNTER (OUTPATIENT)
Dept: CT IMAGING | Facility: CLINIC | Age: 53
Discharge: HOME | End: 2023-10-30
Attending: INTERNAL MEDICINE | Admitting: INTERNAL MEDICINE
Payer: COMMERCIAL

## 2023-10-30 DIAGNOSIS — I26.99 BILATERAL PULMONARY EMBOLISM (H): ICD-10-CM

## 2023-10-30 PROCEDURE — 250N000011 HC RX IP 250 OP 636: Mod: JZ | Performed by: INTERNAL MEDICINE

## 2023-10-30 PROCEDURE — 71275 CT ANGIOGRAPHY CHEST: CPT

## 2023-10-30 RX ORDER — IOPAMIDOL 755 MG/ML
75 INJECTION, SOLUTION INTRAVASCULAR ONCE
Status: COMPLETED | OUTPATIENT
Start: 2023-10-30 | End: 2023-10-30

## 2023-10-30 RX ADMIN — IOPAMIDOL 75 ML: 755 INJECTION, SOLUTION INTRAVENOUS at 15:37

## 2023-10-31 ENCOUNTER — TELEPHONE (OUTPATIENT)
Dept: ONCOLOGY | Facility: HOSPITAL | Age: 53
End: 2023-10-31
Payer: COMMERCIAL

## 2023-10-31 NOTE — TELEPHONE ENCOUNTER
[facility]: Cancer Care                                                                                          I call Kelly to report that Dr. Bartlett has reviewed her scan and he indicates that it looks good. Nothing new nor different to be done at this time. She is to return to clinic for follow up in a year. Kelly verbalized understanding and appreciation of our conversation.     Gretchen Downey  RN Care Coordinator  [facility]  Cancer [facility]

## 2023-11-03 ENCOUNTER — THERAPY VISIT (OUTPATIENT)
Dept: PHYSICAL THERAPY | Facility: REHABILITATION | Age: 53
End: 2023-11-03
Payer: COMMERCIAL

## 2023-11-03 DIAGNOSIS — M54.42 CHRONIC BILATERAL LOW BACK PAIN WITH BILATERAL SCIATICA: Primary | ICD-10-CM

## 2023-11-03 DIAGNOSIS — M54.41 CHRONIC BILATERAL LOW BACK PAIN WITH BILATERAL SCIATICA: Primary | ICD-10-CM

## 2023-11-03 DIAGNOSIS — G89.29 CHRONIC BILATERAL LOW BACK PAIN WITH BILATERAL SCIATICA: Primary | ICD-10-CM

## 2023-11-03 PROCEDURE — 97110 THERAPEUTIC EXERCISES: CPT | Mod: GP | Performed by: PHYSICAL THERAPIST

## 2023-11-06 ENCOUNTER — THERAPY VISIT (OUTPATIENT)
Dept: PHYSICAL THERAPY | Facility: REHABILITATION | Age: 53
End: 2023-11-06
Payer: COMMERCIAL

## 2023-11-06 DIAGNOSIS — M54.42 CHRONIC BILATERAL LOW BACK PAIN WITH BILATERAL SCIATICA: Primary | ICD-10-CM

## 2023-11-06 DIAGNOSIS — G89.29 CHRONIC BILATERAL LOW BACK PAIN WITH BILATERAL SCIATICA: Primary | ICD-10-CM

## 2023-11-06 DIAGNOSIS — M54.41 CHRONIC BILATERAL LOW BACK PAIN WITH BILATERAL SCIATICA: Primary | ICD-10-CM

## 2023-11-06 PROCEDURE — 97110 THERAPEUTIC EXERCISES: CPT | Mod: GP | Performed by: PHYSICAL THERAPIST

## 2023-11-06 PROCEDURE — 97012 MECHANICAL TRACTION THERAPY: CPT | Mod: GP | Performed by: PHYSICAL THERAPIST

## 2023-11-09 ENCOUNTER — OFFICE VISIT (OUTPATIENT)
Dept: FAMILY MEDICINE | Facility: CLINIC | Age: 53
End: 2023-11-09
Payer: COMMERCIAL

## 2023-11-09 VITALS
OXYGEN SATURATION: 99 % | SYSTOLIC BLOOD PRESSURE: 135 MMHG | WEIGHT: 153 LBS | HEIGHT: 63 IN | TEMPERATURE: 98.7 F | RESPIRATION RATE: 18 BRPM | BODY MASS INDEX: 27.11 KG/M2 | HEART RATE: 92 BPM | DIASTOLIC BLOOD PRESSURE: 84 MMHG

## 2023-11-09 DIAGNOSIS — G89.29 CHRONIC BILATERAL LOW BACK PAIN WITH BILATERAL SCIATICA: Primary | ICD-10-CM

## 2023-11-09 DIAGNOSIS — M99.04 SEGMENTAL AND SOMATIC DYSFUNCTION OF SACRAL REGION: ICD-10-CM

## 2023-11-09 DIAGNOSIS — M54.41 CHRONIC BILATERAL LOW BACK PAIN WITH BILATERAL SCIATICA: Primary | ICD-10-CM

## 2023-11-09 DIAGNOSIS — M54.42 CHRONIC BILATERAL LOW BACK PAIN WITH BILATERAL SCIATICA: Primary | ICD-10-CM

## 2023-11-09 DIAGNOSIS — M99.03 SEGMENTAL AND SOMATIC DYSFUNCTION OF LUMBAR REGION: ICD-10-CM

## 2023-11-09 DIAGNOSIS — M99.02 SEGMENTAL AND SOMATIC DYSFUNCTION OF THORACIC REGION: ICD-10-CM

## 2023-11-09 PROCEDURE — 98926 OSTEOPATH MANJ 3-4 REGIONS: CPT

## 2023-11-09 PROCEDURE — 99213 OFFICE O/P EST LOW 20 MIN: CPT | Mod: 25

## 2023-11-09 RX ORDER — PREDNISONE 20 MG/1
40 TABLET ORAL DAILY
Qty: 10 TABLET | Refills: 0 | Status: SHIPPED | OUTPATIENT
Start: 2023-11-09 | End: 2023-11-14

## 2023-11-09 NOTE — PROGRESS NOTES
Assessment & Plan     Chronic bilateral low back pain with bilateral sciatica  52-year-old female with history of sickle cell disease presenting today for roughly 4 months of bilateral low back pain with bilateral sciatica.  Previous imaging reviewed including x-ray lumbar spine which revealed minor L4-5 anterolisthesis however otherwise unremarkable.  Continuing to have sciatic symptoms bilaterally.  On exam patient tender to bilateral SI joints.  Very gentle OMT including stills technique and muscle energy was performed.  Given patient's duration of symptoms and discomfort will obtain lumbar MRI and have patient follow-up in 4 weeks duration.  Patient in agreement with plan.  For pain control will continue tramadol.  Discussed red flag symptoms and when to report to emergency department.  - MRI LUMBAR SPINE W/O CONTRAST; Future  - predniSONE (DELTASONE) 20 MG tablet; Take 2 tablets (40 mg) by mouth daily for 5 days  - OSTEOPATHIC MANIP,3-4 BODY REGN    Segmental and somatic dysfunction of thoracic region  - OSTEOPATHIC MANIP,3-4 BODY REGN    Segmental and somatic dysfunction of lumbar region  - OSTEOPATHIC MANIP,3-4 BODY REGN    Segmental and somatic dysfunction of sacral region  - OSTEOPATHIC MANIP,3-4 BODY REGN    Treatment  muscle energy: direct and Still's techniques    Risk, benefits, and consent for therapies explained and obtained, and patient agreed with treatment today. Potential side effects of increased warmth, increased ROM, decreased pain, and transient increased pain that should decrease with time were explained to patient before treatment.     Return in about 4 weeks (around 12/7/2023) for Follow up, with me.    DO GANESH [facility]    Andrew Mendoza is a 52 year old, presenting for the following health issues:  other (OMT)      HPI   Chief complaint: Bilateral low back pain with bilateral sciatica  Onset/Duration: 4 months  Inciting event: Hospitalization no  "known inciting event  Description: Sharp low back pain L>R  Location/Radiation: Bilateral legs L>R  Improving/Worsening factors: Worsened with standing or specific movements  Pain ratin/10  Therapies tried: Tramadol, oxycodone, therapy with mild improvement--in lots of pain  History of OMT/chiropractor/Physical therapy: Yes - Physical therapy currently  Recent imaging: Xray    Surgical History  Surgical history to affected area: No  Recent surgeries (<6 months): No  History of spinal surgery/fusion: No  History of joint replacement: No  History of joint dislocation: No    Medical History  Current or recent infection to affected area: No  Current or recent fracture(s): No  History of connective tissue disorders: No  Down syndrome: No  Rheumatoid Arthritis: No  Osteoporosis: No  Malignancy/Tumors: No  Vascular diseases: No  Other pertinent medical history:  Sickle cell  with crises usually in joints.     Review of Systems   Constitutional, HEENT, cardiovascular, pulmonary, gi and gu systems are negative, except as otherwise noted.      Objective    /84 (BP Location: Right arm, Patient Position: Sitting, Cuff Size: Adult Regular)   Pulse 92   Temp 98.7  F (37.1  C) (Oral)   Resp 18   Ht 1.59 m (5' 2.6\")   Wt 69.4 kg (153 lb)   LMP 2017   SpO2 99%   BMI 27.45 kg/m    Body mass index is 27.45 kg/m .  Physical Exam   GENERAL:  alert and no distress  RESP: Breathing comfortably on room air  CV: Well-perfused  MSK: Tenderness to palpation bilateral SI joint.  Reporting differing sensation in bilateral lower extremities.  No saddle anesthesia.  Ambulating well.  Normal strength bilateral lower extremities.  Thoracic Dysfunction: T10: Flexed, Sidebent left, and Rotated left  Lumbar Dysfunction: L4: Extended, Sidebent left, and Rotated left  Sacrum Dysfunction: Torsion: left on left    Tests    Straight leg raise: Left: positive    ----- Service Performed and Documented by Resident or Fellow ------    "

## 2023-11-09 NOTE — PATIENT INSTRUCTIONS
What is Osteopathic Manipulative treatment (OMT)?  OMT is a hands on treatment method done by Doctors of Osteopathic Medicine (DOs). This is a treatment designed to treat a wide range of health conditions with techniques that provide pressure which manipulates muscles, soft tissues, and joints. The philosophy is to encourage your body to heal itself by ensuring alignment and balance of muscles and bones.     Who can get OMT?  Almost anyone can receive OMT, however, some of the more common conditions treated are back pain, neck pain, joint pain, migraines/headaches, constipation/IBS, among others.     What happens before treatment?  Your doctor will ask about your symptoms, lifestyle, and health history to get a full picture of the disease process. The physician will then examine different parts of your body and possibly order imaging studies (such as X-ray or MRI).   You should wear comfortable, flexible clothing for your OMT appointment.     What happens during OMT?  The physician will examine and manipulate your body and limbs into different positions. There are several different OMT techniques which may be used depending on your condition. You may be asked to follow instructions, such as breathing, holding your breathe, or resist movements. These treatments may be slow movements with continuous pressure, or may be sudden manipulations. While these movements may be awkward, they should not hurt. You should let your physician know right away if there is significant pain or discomfort.     What should you do after treatment?  After OMT, you may feel achy or sore, this usually resolves within 24 hours. It is important to drink plenty of water, get plenty of rest, and follow the instructions and/or exercises your physician prescribes. You also may be prescribed or instructed to take medicines like tylenol or ibuprofen.     ([email or web address])

## 2023-11-14 ENCOUNTER — OFFICE VISIT (OUTPATIENT)
Dept: PULMONOLOGY | Facility: CLINIC | Age: 53
End: 2023-11-14
Payer: COMMERCIAL

## 2023-11-14 ENCOUNTER — OFFICE VISIT (OUTPATIENT)
Dept: PULMONOLOGY | Facility: CLINIC | Age: 53
End: 2023-11-14
Attending: INTERNAL MEDICINE
Payer: COMMERCIAL

## 2023-11-14 VITALS
SYSTOLIC BLOOD PRESSURE: 157 MMHG | HEART RATE: 70 BPM | BODY MASS INDEX: 27.56 KG/M2 | DIASTOLIC BLOOD PRESSURE: 95 MMHG | OXYGEN SATURATION: 100 % | WEIGHT: 153.6 LBS

## 2023-11-14 DIAGNOSIS — J45.40 MODERATE PERSISTENT ASTHMA WITHOUT COMPLICATION: ICD-10-CM

## 2023-11-14 DIAGNOSIS — I26.99 ACUTE PULMONARY EMBOLISM WITHOUT ACUTE COR PULMONALE, UNSPECIFIED PULMONARY EMBOLISM TYPE (H): ICD-10-CM

## 2023-11-14 LAB — HGB BLD-MCNC: 13.1 G/DL

## 2023-11-14 PROCEDURE — 94060 EVALUATION OF WHEEZING: CPT | Performed by: INTERNAL MEDICINE

## 2023-11-14 PROCEDURE — 85018 HEMOGLOBIN: CPT | Mod: QW | Performed by: INTERNAL MEDICINE

## 2023-11-14 PROCEDURE — 94726 PLETHYSMOGRAPHY LUNG VOLUMES: CPT | Performed by: INTERNAL MEDICINE

## 2023-11-14 PROCEDURE — 94729 DIFFUSING CAPACITY: CPT | Performed by: INTERNAL MEDICINE

## 2023-11-14 PROCEDURE — 99214 OFFICE O/P EST MOD 30 MIN: CPT | Mod: 25 | Performed by: INTERNAL MEDICINE

## 2023-11-14 RX ORDER — TIOTROPIUM BROMIDE INHALATION SPRAY 3.12 UG/1
SPRAY, METERED RESPIRATORY (INHALATION)
Qty: 4 G | Refills: 4 | Status: SHIPPED | OUTPATIENT
Start: 2023-11-14 | End: 2024-07-02

## 2023-11-14 ASSESSMENT — ASTHMA QUESTIONNAIRES: ACT_TOTALSCORE: 17

## 2023-11-14 NOTE — PROGRESS NOTES
Pulmonary Clinic follow up Note  Date of Service: 11/14/2023    Reason for follow up: asthma in pt with sickle cell    History:     HPI: Patient is a pleasant 52-year-old female with past medical history significant for sickle cell disease and asthma who was referred here for evaluation of her asthma.    Patient was diagnosed with asthma during childhood.  She notes that she is currently on Symbicort 160-4.52 puffs twice daily, Singulair 10 mg nightly, Spiriva Respimat 2 puffs daily.  Additionally, patient is on Zyrtec 10 mg daily.  She notes that she usually gets 1-2 exacerbations per year where she is given steroids.  Her last course of steroids was bout 1-2 weeks ago and pt was given 5 days of prednisone. She has never been intubated for asthma.  Triggers include: chemicals, dust, smoke, weather changes, pollen, cats. She has an albuterol inhaler but has not used it much. Patient has occasional nocturnal symptoms. When she has asthma exacerbation, patient usually coughs and wheezes.    Interval History:   Pt is here for follow up. She notes that she uses her albuterol inhaler a few times a week. Feels dulera 1 puff bid is working better than her previous Symbicort (did not feel it helped her). She remains on Singulair and Spriva respimat.  Pt notes that she was recently diagnosed with PE a was started on blood thinners. No abx or steroids for respiratory issues since her last visit. She notes that she was given prednisone for back pain however.     PMHx/PSHx:  Sickle cell disease  Asthma - childhood.  PE - diagnosed on 10/2023.    Social Hx:   Tobacco: lifelong nonsmoker.  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  Travel: no   Pets: no  Lives in a house    Review of Systems - 10 point review of system negative except for what is mentioned in the HPI.    Exam/Data:   BP (!) 157/95 (BP Location: Left arm, Patient Position: Sitting, Cuff Size: Adult Regular)   Pulse 70   Wt 69.7 kg (153 lb  9.6 oz)   LMP 04/12/2017   SpO2 100%   BMI 27.56 kg/m      EXAM:  GEN: In some distress from pain related to sickle cell disease   HEENT: NCAT, EMOI  CVS: S1S2, RRR  Lung: There is scattered wheezing, good air entry  Abd: soft, positive BS  Ext: no c/c/e  Neuro: nonfocal  Skin: no visible rash  Psych: appropriate    DATA    Labs: reviewed in EMR and outside records where pertinent.     CTA done on 9/2022:  IMPRESSION:  1.  No evidence for pulmonary embolism.    Spirometry done on 7/25/2023:  Decreased FEV1 and FVC to 66 and 63% respectively.  FEV1 FVC 84.  Bronchodilator, pleth, DLCO, were deferred as pt had difficulty performing test.    Repeat PFT's were able to be compeleted this time as pt is not in pain. Her FEV1 and FVC are decreased. Lung volumes okay. DLCO normal.     Assessment/Plan:   Kelly Harris is a 52 year old female, lifelong non-smoker, with h/o asthma since childhood and SSD.  With respect to her asthma, patient gets 1-2 exacerbations per year.  She is on Symbicort 160-4.52 puffs twice daily, Spiriva Respimat, montelukast, Flonase, and Zyrtec.  Patient completed 5 days of prednisone 40 mg, however I will extend for another 5 days of prednisone 20 mg daily given her scattered wheezing on examination.    PFT's were done however patient was unable to complete.  FEV1 and FVC were reduced and FEV1 over FVC ratio was 84.  Suspect her PFT was extremely limited given patient's pain.  She was unable to complete DLCO or Pleth    Recommendations:  Changed Symbicort to Dulera on last visit, as pt felt it is not helping her.  on albuterol inhaler   Continue singulair and spiriva respimat  Will check IgE, eo count, aspergillous/abs, galactomanan if pt is off steroids  May need to send to allergy and immunology if not controlled    Follow up in 4-8 weeks    Najma Phillips MD  Pulmonary and Critical Care Medicine    Family History   Problem Relation Age of Onset    Diabetes Father     Coronary Artery Disease  Father     Hypertension Father     Asthma Daughter     Sickle Cell Trait Daughter         S/C    Asthma Daughter     Sickle Cell Trait Daughter         S/C    Heart Disease Daughter         Heart valve regurgitation, abnormal vein with shunt    Hypertension Mother     Lung Cancer Paternal Cousin     Cancer No family hx of      Allergies   Allergen Reactions    Oxycodone Itching    Iodine Rash       Medications:     Current Outpatient Medications   Medication Sig Dispense Refill    acetaminophen (TYLENOL) 325 MG tablet Take 3 tablets (975 mg) by mouth every 8 hours 120 tablet 1    albuterol (PROAIR HFA/PROVENTIL HFA/VENTOLIN HFA) 108 (90 Base) MCG/ACT inhaler Inhale 2 puffs into the lungs every 6 hours as needed for shortness of breath, wheezing or cough 18 g 4    cetirizine (ZYRTEC) 10 MG tablet TAKE 1 TABLET BY MOUTH EVERY DAY 90 tablet 3    fluticasone (FLONASE) 50 MCG/ACT nasal spray INSTILL 1 SPRAY INTO BOTH NOSTRILS DAILY 32 mL 4    mometasone-formoterol (DULERA) 200-5 MCG/ACT inhaler Inhale 2 puffs into the lungs 2 times daily 13 g 4    montelukast (SINGULAIR) 10 MG tablet TAKE 1 TABLET BY MOUTH EVERYDAY AT BEDTIME 30 tablet 1    SPIRIVA RESPIMAT 2.5 MCG/ACT inhaler INHALE 2 PUFFS BY MOUTH INTO THE LUNGS DAILY 4 g 3    apixaban ANTICOAGULANT (ELIQUIS) 5 MG tablet Take 1 tablet (5 mg) by mouth 2 times daily 60 tablet 1    methylPREDNISolone (MEDROL) 32 MG tablet Take 12 hours and 2 hours before contrast 2 tablet 0    oxyCODONE (ROXICODONE) 10 MG tablet Take 1 tablet (10 mg) by mouth every 4 hours as needed for moderate pain (IF pain not managed with non-pharmacological and non-opioid interventions) 14 tablet 0    predniSONE (DELTASONE) 20 MG tablet Take 2 tablets (40 mg) by mouth daily for 5 days 10 tablet 0       Much or all of the text in this note was generated through the use of the Dragon Dictate voice-to-text software. Errors in spelling or words which seem out of context are unintentional. Sound alike  errors, in particular, may have escaped editing.

## 2023-11-15 LAB
DLCOCOR-%PRED-PRE: 96 %
DLCOCOR-PRE: 19 ML/MIN/MMHG
DLCOUNC-%PRED-PRE: 95 %
DLCOUNC-PRE: 18.82 ML/MIN/MMHG
DLCOUNC-PRED: 19.77 ML/MIN/MMHG
ERV-%PRED-PRE: 67 %
ERV-PRE: 0.74 L
ERV-PRED: 1.1 L
EXPTIME-PRE: 6.77 SEC
FEF2575-%PRED-POST: 190 %
FEF2575-%PRED-PRE: 39 %
FEF2575-POST: 4.5 L/SEC
FEF2575-PRE: 0.94 L/SEC
FEF2575-PRED: 2.37 L/SEC
FEFMAX-%PRED-PRE: 56 %
FEFMAX-PRE: 3.64 L/SEC
FEFMAX-PRED: 6.44 L/SEC
FEV1-%PRED-PRE: 50 %
FEV1-PRE: 1.22 L
FEV1FEV6-PRE: 76 %
FEV1FEV6-PRED: 82 %
FEV1FVC-PRE: 76 %
FEV1FVC-PRED: 81 %
FEV1SVC-PRE: 52 %
FEV1SVC-PRED: 75 %
FIFMAX-PRE: 3.99 L/SEC
FRCPLETH-%PRED-PRE: 97 %
FRCPLETH-PRE: 2.58 L
FRCPLETH-PRED: 2.64 L
FVC-%PRED-PRE: 53 %
FVC-PRE: 1.6 L
FVC-PRED: 2.97 L
IC-%PRED-PRE: 74 %
IC-PRE: 1.63 L
IC-PRED: 2.2 L
RVPLETH-%PRED-PRE: 104 %
RVPLETH-PRE: 1.84 L
RVPLETH-PRED: 1.76 L
TLCPLETH-%PRED-PRE: 88 %
TLCPLETH-PRE: 4.22 L
TLCPLETH-PRED: 4.77 L
VA-%PRED-PRE: 82 %
VA-PRE: 3.79 L
VC-%PRED-PRE: 73 %
VC-PRE: 2.38 L
VC-PRED: 3.22 L

## 2023-11-17 ENCOUNTER — TELEPHONE (OUTPATIENT)
Dept: PHYSICAL THERAPY | Facility: REHABILITATION | Age: 53
End: 2023-11-17

## 2023-11-17 NOTE — TELEPHONE ENCOUNTER
Kelly missed their scheduled appointment today at 2:30. I called and spoke to them, informing them of this and reminding them of their next appointment on 11/24/23 at 2:30. She reported that she thought her appointment was at 3:15 today.

## 2023-11-24 ENCOUNTER — THERAPY VISIT (OUTPATIENT)
Dept: PHYSICAL THERAPY | Facility: REHABILITATION | Age: 53
End: 2023-11-24
Payer: COMMERCIAL

## 2023-11-24 DIAGNOSIS — M54.41 CHRONIC BILATERAL LOW BACK PAIN WITH BILATERAL SCIATICA: Primary | ICD-10-CM

## 2023-11-24 DIAGNOSIS — M54.42 CHRONIC BILATERAL LOW BACK PAIN WITH BILATERAL SCIATICA: Primary | ICD-10-CM

## 2023-11-24 DIAGNOSIS — G89.29 CHRONIC BILATERAL LOW BACK PAIN WITH BILATERAL SCIATICA: Primary | ICD-10-CM

## 2023-11-24 PROCEDURE — 97012 MECHANICAL TRACTION THERAPY: CPT | Mod: GP | Performed by: PHYSICAL THERAPIST

## 2023-11-24 PROCEDURE — 97110 THERAPEUTIC EXERCISES: CPT | Mod: GP | Performed by: PHYSICAL THERAPIST

## 2023-12-01 ENCOUNTER — THERAPY VISIT (OUTPATIENT)
Dept: PHYSICAL THERAPY | Facility: REHABILITATION | Age: 53
End: 2023-12-01
Payer: COMMERCIAL

## 2023-12-01 DIAGNOSIS — M54.41 CHRONIC BILATERAL LOW BACK PAIN WITH BILATERAL SCIATICA: Primary | ICD-10-CM

## 2023-12-01 DIAGNOSIS — G89.29 CHRONIC BILATERAL LOW BACK PAIN WITH BILATERAL SCIATICA: Primary | ICD-10-CM

## 2023-12-01 DIAGNOSIS — M54.42 CHRONIC BILATERAL LOW BACK PAIN WITH BILATERAL SCIATICA: Primary | ICD-10-CM

## 2023-12-01 PROCEDURE — 97110 THERAPEUTIC EXERCISES: CPT | Mod: GP | Performed by: PHYSICAL THERAPIST

## 2023-12-01 PROCEDURE — 97012 MECHANICAL TRACTION THERAPY: CPT | Mod: GP | Performed by: PHYSICAL THERAPIST

## 2023-12-04 ENCOUNTER — HOSPITAL ENCOUNTER (OUTPATIENT)
Dept: MRI IMAGING | Facility: CLINIC | Age: 53
Discharge: HOME | End: 2023-12-04
Attending: FAMILY MEDICINE | Admitting: FAMILY MEDICINE
Payer: COMMERCIAL

## 2023-12-04 DIAGNOSIS — M54.41 CHRONIC BILATERAL LOW BACK PAIN WITH BILATERAL SCIATICA: ICD-10-CM

## 2023-12-04 DIAGNOSIS — M54.42 CHRONIC BILATERAL LOW BACK PAIN WITH BILATERAL SCIATICA: ICD-10-CM

## 2023-12-04 DIAGNOSIS — G89.29 CHRONIC BILATERAL LOW BACK PAIN WITH BILATERAL SCIATICA: ICD-10-CM

## 2023-12-04 PROCEDURE — 72148 MRI LUMBAR SPINE W/O DYE: CPT

## 2023-12-08 ENCOUNTER — LAB (OUTPATIENT)
Dept: LAB | Facility: CLINIC | Age: 53
End: 2023-12-08
Payer: COMMERCIAL

## 2023-12-08 ENCOUNTER — THERAPY VISIT (OUTPATIENT)
Dept: PHYSICAL THERAPY | Facility: REHABILITATION | Age: 53
End: 2023-12-08
Payer: COMMERCIAL

## 2023-12-08 DIAGNOSIS — M54.41 CHRONIC BILATERAL LOW BACK PAIN WITH BILATERAL SCIATICA: Primary | ICD-10-CM

## 2023-12-08 DIAGNOSIS — M54.42 CHRONIC BILATERAL LOW BACK PAIN WITH BILATERAL SCIATICA: Primary | ICD-10-CM

## 2023-12-08 DIAGNOSIS — G89.29 CHRONIC BILATERAL LOW BACK PAIN WITH BILATERAL SCIATICA: Primary | ICD-10-CM

## 2023-12-08 DIAGNOSIS — J45.40 MODERATE PERSISTENT ASTHMA WITHOUT COMPLICATION: ICD-10-CM

## 2023-12-08 LAB
BASOPHILS # BLD AUTO: ABNORMAL 10*3/UL
BASOPHILS # BLD MANUAL: 0 10E3/UL (ref 0–0.2)
BASOPHILS NFR BLD AUTO: ABNORMAL %
BASOPHILS NFR BLD MANUAL: 0 %
EOSINOPHIL # BLD AUTO: ABNORMAL 10*3/UL
EOSINOPHIL # BLD MANUAL: 0.2 10E3/UL (ref 0–0.7)
EOSINOPHIL NFR BLD AUTO: ABNORMAL %
EOSINOPHIL NFR BLD MANUAL: 2 %
ERYTHROCYTE [DISTWIDTH] IN BLOOD BY AUTOMATED COUNT: 14.6 % (ref 10–15)
HCT VFR BLD AUTO: 34.2 % (ref 35–47)
HGB BLD-MCNC: 12.1 G/DL (ref 11.7–15.7)
IMM GRANULOCYTES # BLD: ABNORMAL 10*3/UL
IMM GRANULOCYTES NFR BLD: ABNORMAL %
LYMPHOCYTES # BLD AUTO: ABNORMAL 10*3/UL
LYMPHOCYTES # BLD MANUAL: 4 10E3/UL (ref 0.8–5.3)
LYMPHOCYTES NFR BLD AUTO: ABNORMAL %
LYMPHOCYTES NFR BLD MANUAL: 44 %
MCH RBC QN AUTO: 26.9 PG (ref 26.5–33)
MCHC RBC AUTO-ENTMCNC: 35.4 G/DL (ref 31.5–36.5)
MCV RBC AUTO: 76 FL (ref 78–100)
MONOCYTES # BLD AUTO: ABNORMAL 10*3/UL
MONOCYTES # BLD MANUAL: 0.4 10E3/UL (ref 0–1.3)
MONOCYTES NFR BLD AUTO: ABNORMAL %
MONOCYTES NFR BLD MANUAL: 4 %
NEUTROPHILS # BLD AUTO: ABNORMAL 10*3/UL
NEUTROPHILS # BLD MANUAL: 4.6 10E3/UL (ref 1.6–8.3)
NEUTROPHILS NFR BLD AUTO: ABNORMAL %
NEUTROPHILS NFR BLD MANUAL: 50 %
NRBC # BLD AUTO: 0.8 10E3/UL
NRBC # BLD AUTO: 0.9 10E3/UL
NRBC BLD AUTO-RTO: 8 /100
NRBC BLD MANUAL-RTO: 10 %
PLAT MORPH BLD: ABNORMAL
PLATELET # BLD AUTO: 399 10E3/UL (ref 150–450)
RBC # BLD AUTO: 4.5 10E6/UL (ref 3.8–5.2)
RBC MORPH BLD: ABNORMAL
SICKLE CELLS BLD QL SMEAR: ABNORMAL
TARGETS BLD QL SMEAR: ABNORMAL
WBC # BLD AUTO: 9.2 10E3/UL (ref 4–11)

## 2023-12-08 PROCEDURE — 97110 THERAPEUTIC EXERCISES: CPT | Mod: GP | Performed by: PHYSICAL THERAPIST

## 2023-12-08 PROCEDURE — 86606 ASPERGILLUS ANTIBODY: CPT | Mod: 90

## 2023-12-08 PROCEDURE — 99000 SPECIMEN HANDLING OFFICE-LAB: CPT

## 2023-12-08 PROCEDURE — 87305 ASPERGILLUS AG IA: CPT | Mod: 90

## 2023-12-08 PROCEDURE — 85027 COMPLETE CBC AUTOMATED: CPT

## 2023-12-08 PROCEDURE — 36415 COLL VENOUS BLD VENIPUNCTURE: CPT

## 2023-12-08 PROCEDURE — 86003 ALLG SPEC IGE CRUDE XTRC EA: CPT

## 2023-12-08 PROCEDURE — 85007 BL SMEAR W/DIFF WBC COUNT: CPT

## 2023-12-08 PROCEDURE — 82785 ASSAY OF IGE: CPT

## 2023-12-10 LAB
GALACTOMANNAN AG SERPL QL IA: NEGATIVE
GALACTOMANNAN AG SPEC IA-ACNC: 0.05

## 2023-12-11 ENCOUNTER — OFFICE VISIT (OUTPATIENT)
Dept: FAMILY MEDICINE | Facility: CLINIC | Age: 53
End: 2023-12-11
Payer: COMMERCIAL

## 2023-12-11 VITALS
TEMPERATURE: 99.3 F | BODY MASS INDEX: 27.11 KG/M2 | WEIGHT: 153 LBS | HEIGHT: 63 IN | HEART RATE: 82 BPM | RESPIRATION RATE: 18 BRPM | SYSTOLIC BLOOD PRESSURE: 134 MMHG | DIASTOLIC BLOOD PRESSURE: 87 MMHG | OXYGEN SATURATION: 97 %

## 2023-12-11 DIAGNOSIS — M54.42 CHRONIC BILATERAL LOW BACK PAIN WITH BILATERAL SCIATICA: Primary | ICD-10-CM

## 2023-12-11 DIAGNOSIS — M54.41 CHRONIC BILATERAL LOW BACK PAIN WITH BILATERAL SCIATICA: Primary | ICD-10-CM

## 2023-12-11 DIAGNOSIS — M99.03 SEGMENTAL AND SOMATIC DYSFUNCTION OF LUMBAR REGION: ICD-10-CM

## 2023-12-11 DIAGNOSIS — M99.04 SEGMENTAL AND SOMATIC DYSFUNCTION OF SACRAL REGION: ICD-10-CM

## 2023-12-11 DIAGNOSIS — G89.29 CHRONIC BILATERAL LOW BACK PAIN WITH BILATERAL SCIATICA: Primary | ICD-10-CM

## 2023-12-11 PROCEDURE — 99214 OFFICE O/P EST MOD 30 MIN: CPT | Mod: 25

## 2023-12-11 PROCEDURE — 98925 OSTEOPATH MANJ 1-2 REGIONS: CPT | Mod: GC

## 2023-12-11 NOTE — PROGRESS NOTES
Assessment & Plan     Chronic bilateral low back pain with bilateral sciatica  Segmental and somatic dysfunction of sacral region  Segmental and somatic dysfunction of lumbar region  52-year-old female with now roughly 6 months of bilateral low back pain with bilateral sciatica.  Last visit her pain was fairly extreme and MRI was obtained which revealed L5-S1 bilateral foraminal stenosis.  On exam today she continues to endorse pain including bilateral straight leg raise positive on both sides.  OMT was completed including stills technique, muscle energy, MFR, which was tolerated well.  Unfortunately she has failed outpatient physical therapy and did not receive significant improvement from OMT, with only 3 days of improvement of her symptoms.  Given this and foraminal stenosis will refer to spinal surgery for further management. ,Risk and benefits of therapies explained to patient, who agreed with treatment today. Potential side effects of increased warmth, increased ROM, decreased pain, and transient increased pain that should decrease with time were explained to patient before treatment.   - Spine  Referral; Future  - [facility]3-[facility]    Andrew Mendoza is a 52 year old, presenting for the following health issues:  Other (OMT)      12/11/2023     2:33 PM   Additional Questions   Roomed by ML   Accompanied by self       HPI   Patient is in for follow-up of her back pain.  Unfortunately only got roughly 3 days of benefit after previous Yair session.  She is also been going to therapy for 3 weeks duration without any improvement of her symptoms at all.  She reports she is able to ambulate slightly better but still has significant amount of pain that is described as sharp stabbing pain that shoots into her bilateral lower extremities.  We discussed the MRI results of last visit.  She is reporting that she continues to have no red  "flag symptoms including saddle anesthesia or bladder bowel incontinence.    Review of Systems   Constitutional, HEENT, cardiovascular, pulmonary, gi and gu systems are negative, except as otherwise noted.      Objective    /87 (BP Location: Right arm, Patient Position: Sitting, Cuff Size: Adult Regular)   Pulse 82   Temp 99.3  F (37.4  C) (Oral)   Resp 18   Ht 1.59 m (5' 2.6\")   Wt 69.4 kg (153 lb)   LMP 04/12/2017   SpO2 97%   BMI 27.45 kg/m    Body mass index is 27.45 kg/m .  Physical Exam   General: alert, appears uncomfortable with mild movement  Neck: supple  Cardiac: RRR w/o audible murmur  Resp: bilateral clear lungs w/o wheezing, crackles or rhonchi; breathing comfortably on RA  Abdomen: soft, non-tender to palpation, no masses. BS normal  Extremities: no peripheral edema  Skin: no rashes or suspicious legions on exposed skin  Neuro: grossly normal CN; normal strength, sensation, & tone  Psych: affect congruent with mood  Lumbar Dysfunction: L3: Flexed, Sidebent left, and Rotated left  Sacrum Dysfunction: Unilateral: left  Straight leg raise: Bilateral positive    No results found for this or any previous visit (from the past 24 hour(s)).    ----- Service Performed and Documented by Resident or Fellow ------        "

## 2023-12-11 NOTE — PROGRESS NOTES
Preceptor Attestation:    I discussed the patient with the resident and evaluated the patient in person. I have verified the content of the note, which accurately reflects my assessment of the patient and the plan of care.   Supervising Physician:  Seymour Delvalle MD.

## 2023-12-12 LAB
A FUMIGATUS IGE QN: <0.1 KU(A)/L
IGE SERPL-ACNC: 17 KU/L (ref 0–114)

## 2023-12-13 LAB — ASPERGILLUS AB SER QL ID: NOT DETECTED

## 2023-12-15 ENCOUNTER — VIRTUAL VISIT (OUTPATIENT)
Dept: PULMONOLOGY | Facility: CLINIC | Age: 53
End: 2023-12-15
Attending: INTERNAL MEDICINE
Payer: COMMERCIAL

## 2023-12-15 ENCOUNTER — THERAPY VISIT (OUTPATIENT)
Dept: PHYSICAL THERAPY | Facility: REHABILITATION | Age: 53
End: 2023-12-15
Payer: COMMERCIAL

## 2023-12-15 DIAGNOSIS — J45.909 UNCOMPLICATED ASTHMA, UNSPECIFIED ASTHMA SEVERITY, UNSPECIFIED WHETHER PERSISTENT: Primary | ICD-10-CM

## 2023-12-15 DIAGNOSIS — G89.29 CHRONIC BILATERAL LOW BACK PAIN WITH BILATERAL SCIATICA: Primary | ICD-10-CM

## 2023-12-15 DIAGNOSIS — M54.41 CHRONIC BILATERAL LOW BACK PAIN WITH BILATERAL SCIATICA: Primary | ICD-10-CM

## 2023-12-15 DIAGNOSIS — M54.42 CHRONIC BILATERAL LOW BACK PAIN WITH BILATERAL SCIATICA: Primary | ICD-10-CM

## 2023-12-15 PROCEDURE — 97110 THERAPEUTIC EXERCISES: CPT | Mod: GP | Performed by: PHYSICAL THERAPIST

## 2023-12-15 PROCEDURE — 99213 OFFICE O/P EST LOW 20 MIN: CPT | Mod: VID | Performed by: INTERNAL MEDICINE

## 2023-12-15 ASSESSMENT — PAIN SCALES - GENERAL: PAINLEVEL: NO PAIN (0)

## 2023-12-15 NOTE — NURSING NOTE
Is the patient currently in the state of [place]? YES    Visit mode:VIDEO    If the visit is dropped, the patient can be reconnected by: VIDEO VISIT: Text to cell phone:   Telephone Information:   Mobile [phone]       Will anyone else be joining the visit? NO  (If patient encounters technical issues they should call [phone]    How would you like to obtain your AVS? MyChart    Are changes needed to the allergy or medication list? No    Reason for visit: RECHECK (Follow up)    Logan SCHMIDT

## 2023-12-15 NOTE — PROGRESS NOTES
Video visit  Time 20 minutes.    Pulmonary Clinic follow up Note  Date of Service: 12/15/2023    Reason for follow up: asthma in pt with sickle cell    History:     HPI: Patient is a pleasant 52-year-old female with past medical history significant for sickle cell disease and asthma who was referred here for evaluation of her asthma.    Patient was diagnosed with asthma during childhood.  She notes that she is currently on Symbicort 160-4.52 puffs twice daily, Singulair 10 mg nightly, Spiriva Respimat 2 puffs daily.  Additionally, patient is on Zyrtec 10 mg daily.  She notes that she usually gets 1-2 exacerbations per year where she is given steroids.  Her last course of steroids was bout 1-2 weeks ago and pt was given 5 days of prednisone. She has never been intubated for asthma.  Triggers include: chemicals, dust, smoke, weather changes, pollen, cats. She has an albuterol inhaler but has not used it much. Patient has occasional nocturnal symptoms. When she has asthma exacerbation, patient usually coughs and wheezes.    Interval History: Had a virtual visit with patient this morning.  She notes that she is at baseline.  She is currently using Dulera as well as Singulair and Spiriva Respimat.  She denies any exacerbations requiring antibiotics or steroids.  She denies any nocturnal symptoms.  No functional limitations.  She feels that her current regiment is working fine.      PMHx/PSHx:  Sickle cell disease  Asthma - childhood.  PE - diagnosed on 10/2023.    Social Hx:   Tobacco: lifelong nonsmoker.  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  Travel: no   Pets: no  Lives in a house    Review of Systems - 10 point review of system negative except for what is mentioned in the HPI.    Exam/Data:   LMP 04/12/2017     EXAM:      DATA    Labs: reviewed in EMR and outside records where pertinent.     CTA done on 9/2022:  IMPRESSION:  1.  No evidence for pulmonary embolism.    Spirometry done on  7/25/2023:  Decreased FEV1 and FVC to 66 and 63% respectively.  FEV1 FVC 84.  Bronchodilator, pleth, DLCO, were deferred as pt had difficulty performing test.    Repeat PFT's were able to be compeleted this time as pt is not in pain. Her FEV1 and FVC are decreased. Lung volumes okay. DLCO normal.     PFTs':11/2023  Pulmonary Functions Testing Results:   FEV1/FVC  is normal   FEV1 is reduced at 50% predited   FVC is reduced at 53% predicted   There was 31% improvement in spirometry after a single inhaled dose of bronchodilator   TLC is normal   DLCO is normal when it is corrected for hemoglobin.   Impression:  Non-obstructive lung function with bronchodilator response     Assessment/Plan:   Kelly Harris is a 52 year old female, lifelong non-smoker, with h/o asthma since childhood and SSD.  With respect to her asthma, patient gets 1-2 exacerbations per year.  She is on S Dulera, Spiriva Respimat, montelukast, Flonase, and Zyrtec.  IgE, eosinophil count, Aspergillus antibodies and antigen were negative.    Recommendations:  Continue Dulera.  It worked better than Symbicort for her.    on albuterol inhaler   Continue singulair and spiriva respimat  Check [place] allergen panel  May need to send to allergy and immunology if not controlled    Najma Phillips MD  Pulmonary and Critical Care Medicine    Family History   Problem Relation Age of Onset    Diabetes Father     Coronary Artery Disease Father     Hypertension Father     Asthma Daughter     Sickle Cell Trait Daughter         S/C    Asthma Daughter     Sickle Cell Trait Daughter         S/C    Heart Disease Daughter         Heart valve regurgitation, abnormal vein with shunt    Hypertension Mother     Lung Cancer Paternal Cousin     Cancer No family hx of      Allergies   Allergen Reactions    Oxycodone Itching    Iodine Rash       Medications:     Current Outpatient Medications   Medication Sig Dispense Refill    acetaminophen (TYLENOL) 325 MG tablet Take 3  tablets (975 mg) by mouth every 8 hours 120 tablet 1    albuterol (PROAIR HFA/PROVENTIL HFA/VENTOLIN HFA) 108 (90 Base) MCG/ACT inhaler Inhale 2 puffs into the lungs every 6 hours as needed for shortness of breath, wheezing or cough 18 g 4    apixaban ANTICOAGULANT (ELIQUIS) 5 MG tablet Take 1 tablet (5 mg) by mouth 2 times daily 60 tablet 1    cetirizine (ZYRTEC) 10 MG tablet TAKE 1 TABLET BY MOUTH EVERY DAY 90 tablet 3    fluticasone (FLONASE) 50 MCG/ACT nasal spray INSTILL 1 SPRAY INTO BOTH NOSTRILS DAILY 32 mL 4    methylPREDNISolone (MEDROL) 32 MG tablet Take 12 hours and 2 hours before contrast 2 tablet 0    mometasone-formoterol (DULERA) 200-5 MCG/ACT inhaler Inhale 2 puffs into the lungs 2 times daily 13 g 4    montelukast (SINGULAIR) 10 MG tablet Take 1 tablet (10 mg) by mouth at bedtime 30 tablet 1    oxyCODONE (ROXICODONE) 10 MG tablet Take 1 tablet (10 mg) by mouth every 4 hours as needed for moderate pain (IF pain not managed with non-pharmacological and non-opioid interventions) 14 tablet 0    tiotropium (SPIRIVA RESPIMAT) 2.5 MCG/ACT inhaler INHALE 2 PUFFS BY MOUTH INTO THE LUNGS DAILY 4 g 4       Much or all of the text in this note was generated through the use of the Dragon Dictate voice-to-text software. Errors in spelling or words which seem out of context are unintentional. Sound alike errors, in particular, may have escaped editing.

## 2023-12-21 DIAGNOSIS — J45.40 MODERATE PERSISTENT ASTHMA WITHOUT COMPLICATION: ICD-10-CM

## 2023-12-22 RX ORDER — MONTELUKAST SODIUM 10 MG/1
1 TABLET ORAL AT BEDTIME
Qty: 90 TABLET | Refills: 1 | Status: SHIPPED | OUTPATIENT
Start: 2023-12-22 | End: 2024-06-24

## 2023-12-22 NOTE — TELEPHONE ENCOUNTER
Medication requested: MONTELUKAST SOD 10 MG TABLET   Last office visit: 12/11/23  Future [facility] appointments: none  Medication last refilled: 11/29/23  Last qualifying labs: none     Routing refill request to provider for review/approval because:  Changes Requested     Name from pharmacy: MONTELUKAST SOD 10 MG TABLET         Will file in chart as: montelukast (SINGULAIR) 10 MG tablet    Sig: TAKE 1 TABLET BY MOUTH EVERYDAY AT BEDTIME    Disp: 90 tablet    Refills: 1    Start: 12/21/2023    Class: E-Prescribe    Non-formulary For: Moderate persistent asthma without complication    Last ordered: 3 weeks ago (11/28/2023) by Ana Laura Mancini MD    Last refill: 11/29/2023    Rx #: [id number]    Pharmacy comment: REQUEST FOR 90 DAYS PRESCRIPTION. DX Code Needed.    Leukotriene Inhibitors Protocol [id number]/21/2023 11:32 AM   Protocol Details Asthma control assessment score within normal limits in last 6 months    Patient is age 12 or older    Medication is active on med list    Recent (6 mo) or future (30 days) visit within the authorizing provider's specialty      This request has changes from the previous prescription.   To be filled at: [facility] [id number] [place] [facility] [address] Wilfred Perez, RN, BSN

## 2024-01-04 ENCOUNTER — OFFICE VISIT (OUTPATIENT)
Dept: PHYSICAL MEDICINE AND REHAB | Facility: CLINIC | Age: 54
End: 2024-01-04
Attending: FAMILY MEDICINE
Payer: COMMERCIAL

## 2024-01-04 ENCOUNTER — HOSPITAL ENCOUNTER (OUTPATIENT)
Dept: GENERAL RADIOLOGY | Facility: HOSPITAL | Age: 54
Discharge: HOME | End: 2024-01-04
Attending: PHYSICAL MEDICINE & REHABILITATION
Payer: COMMERCIAL

## 2024-01-04 VITALS
BODY MASS INDEX: 26.91 KG/M2 | DIASTOLIC BLOOD PRESSURE: 80 MMHG | SYSTOLIC BLOOD PRESSURE: 143 MMHG | HEART RATE: 94 BPM | WEIGHT: 150 LBS

## 2024-01-04 DIAGNOSIS — M53.3 PAIN OF BOTH SACROILIAC JOINTS: Primary | ICD-10-CM

## 2024-01-04 DIAGNOSIS — M25.652 DECREASED RANGE OF MOTION OF BOTH HIPS: ICD-10-CM

## 2024-01-04 DIAGNOSIS — M43.16 SPONDYLOLISTHESIS OF LUMBAR REGION: ICD-10-CM

## 2024-01-04 DIAGNOSIS — M25.651 DECREASED RANGE OF MOTION OF BOTH HIPS: ICD-10-CM

## 2024-01-04 DIAGNOSIS — M47.816 LUMBAR FACET ARTHROPATHY: ICD-10-CM

## 2024-01-04 DIAGNOSIS — M79.18 MYOFASCIAL PAIN: ICD-10-CM

## 2024-01-04 PROCEDURE — 99204 OFFICE O/P NEW MOD 45 MIN: CPT | Performed by: PHYSICAL MEDICINE & REHABILITATION

## 2024-01-04 PROCEDURE — 72120 X-RAY BEND ONLY L-S SPINE: CPT

## 2024-01-04 PROCEDURE — 73522 X-RAY EXAM HIPS BI 3-4 VIEWS: CPT

## 2024-01-04 RX ORDER — TRAMADOL HYDROCHLORIDE 50 MG/1
50 TABLET ORAL EVERY 6 HOURS PRN
COMMUNITY
End: 2024-02-05

## 2024-01-04 RX ORDER — NAPROXEN 500 MG/1
500 TABLET ORAL 2 TIMES DAILY PRN
Qty: 60 TABLET | Refills: 1 | Status: SHIPPED | OUTPATIENT
Start: 2024-01-04 | End: 2024-01-16

## 2024-01-04 ASSESSMENT — PAIN SCALES - GENERAL: PAINLEVEL: SEVERE PAIN (6)

## 2024-01-04 NOTE — PROGRESS NOTES
Assessment/Plan:      Kelly was seen today for back pain.    Diagnoses and all orders for this visit:    Pain of both sacroiliac joints  -     naproxen (NAPROSYN) 500 MG tablet; Take 1 tablet (500 mg) by mouth 2 times daily as needed for moderate pain  -     PAIN Joint Injection Sacroiliac Joint Bilateral; Future    Lumbar facet arthropathy  -     XR Lumbar Flex/Ext 2/3 Views; Future    Spondylolisthesis of lumbar region  -     XR Lumbar Flex/Ext 2/3 Views; Future    Decreased range of motion of both hips  -     XR Pelvis w 2 VW Hips Bilateral; Future    Myofascial pain  -     naproxen (NAPROSYN) 500 MG tablet; Take 1 tablet (500 mg) by mouth 2 times daily as needed for moderate pain    Other orders  -     Spine  Referral         Assessment: Pleasant 53 year old female with a history of asthma and pulmonary embolism no longer on Eliquis with    1.  6 to 8-month history of bilateral lumbar spine and gluteal pain consistent with sacroiliac joint pain with left greater than right pseudo radicular pain.  She has had pain despite physical therapy, Osteopathic manipulative medicine oxycodone and tramadol along with Medrol Dosepak trials.  She has had approximately 8 visits of physical therapy per her report.  She has L4-5 trace spondylolisthesis with severe facet arthropathy and moderate severe facet arthropathy L5-S1 but no high-grade central canal or foraminal stenosis and her pain is consistent with SI pain with positive thigh thrust left greater than right Tere test bilaterally Gaenslen's bilaterally SI compression and AP distraction.    2.  Decreased range of motion of the hips with some left hip pain and Tere testing as well.      3.  Myofascial pain lumbar spine and gluteal region.    Discussion:    1.  I discussed the diagnosis and treatment options.  I discussed options of further imaging along with injections and medications.    2.  Recommend trial naproxen 500 mg twice daily as needed for pain.   She is no longer on Eliquis for her pulmonary embolism.    3.  Flexion-extension x-rays lumbar spine as well as plain films of the hip to evaluate for any instability or hip osteoarthritis respectively.    4.  Bilateral sacroiliac joint injections will be ordered.  She has numerous positive provocative maneuvers today as above and is failed conservative management of physical therapy, Osteopathic manipulative medicine Tatian's as well as time.    5.  If SI joint injections not helpful can consider medial branch blocks    6.  Follow-up at her earliest convenience for injection.      It was our pleasure caring for your patient today, if there any questions or concerns please do not hesitate to contact us.      Subjective:   Patient ID: Kelly Harris is a 53 year old female.    History of Present Illness: Patient presents with request of Dr. Seymour Delvalle for evaluation of bilateral low back and gluteal pain.  Patient had a history of pain a couple of years ago and the pain resolved.  Then in June of last year without any new injury began having pain which became quite severe and is constant.  Waxes and wanes in intensity but is always constant left greater than right across the sacrum SI joints PSIS.  Travels down the left greater than right leg occasionally with some paresthesias to the ankle.  Pain is worse in the low back and SI region however.  Pain is worse with prolonged standing or sitting better with laying down walking seems to be okay/changing positions is helpful.  Pain is a 10/10 at worst 6/10 today 5/10 at best.  Has tried 8 weeks of physical therapy without benefit Osteopathic manipulative medicine not helpful.  No chiropractor.  Has been on tramadol oxycodone and Medrol Dosepak.  Medrol helped very temporarily.  Has had an MRI.      Imaging: MRI report and images were personally reviewed and discussed with the patient.  A plastic model was utilized during the discussion.  MRI of the lumbar spine  personally reviewed.  Normal disc T12-L1 through L3-4 no central canal or foraminal stenosis at those levels only mild facet arthropathy.  At L4-5 mild broad-based disc bulge moderate facet arthropathy results in mild central stenosis and mild bilateral foraminal stenosis.  L5-S1 moderate facet arthropathy with degenerative changes of the disc results in moderate bilateral foraminal stenosis.  Trace spondylolisthesis L4 on L5.  Facet joint effusions at L4-5 on my review with at least moderate facet arthropathy.         Review of Systems: Complains of hoarseness, change in vision, shortness of breath, wheezing, joint pain muscle pain muscle fatigue, sciatica, sickle cell anemia, insomnia and excessive tiredness.  Denies fever, weight loss, waking, ringing in the ears, eye pain, chest pain, cough, abdominal pain, nausea vomiting, bowel or bladder incontinence, skin issues, balance issues, bruising, anxiety or depression.  Remainder of 12 point review systems negative unless listed above.    Past Medical History:   Diagnosis Date    Asthma     Sickle cell pain crisis (H) 07/16/2023       Family History   Problem Relation Age of Onset    Diabetes Father     Coronary Artery Disease Father     Hypertension Father     Asthma Daughter     Sickle Cell Trait Daughter         S/C    Asthma Daughter     Sickle Cell Trait Daughter         S/C    Heart Disease Daughter         Heart valve regurgitation, abnormal vein with shunt    Hypertension Mother     Lung Cancer Paternal Cousin     Cancer No family hx of          Social History     Socioeconomic History    Marital status: Single     Spouse name: None    Number of children: None    Years of education: None    Highest education level: None   Tobacco Use    Smoking status: Never     Passive exposure: Never    Smokeless tobacco: Never   Vaping Use    Vaping Use: Never used   Substance and Sexual Activity    Alcohol use: No    Drug use: No    Sexual activity: Yes     Partners:  Male     Social Determinants of Health     Financial Resource Strain: Low Risk  (12/11/2023)    Financial Resource Strain     Within the past 12 months, have you or your family members you live with been unable to get utilities (heat, electricity) when it was really needed?: No   Food Insecurity: Low Risk  (12/11/2023)    Food Insecurity     Within the past 12 months, did you worry that your food would run out before you got money to buy more?: No     Within the past 12 months, did the food you bought just not last and you didn t have money to get more?: No   Transportation Needs: Low Risk  (12/11/2023)    Transportation Needs     Within the past 12 months, has lack of transportation kept you from medical appointments, getting your medicines, non-medical meetings or appointments, work, or from getting things that you need?: No   Interpersonal Safety: Low Risk  (10/2/2023)    Interpersonal Safety     Do you feel physically and emotionally safe where you currently live?: Yes     Within the past 12 months, have you been hit, slapped, kicked or otherwise physically hurt by someone?: No     Within the past 12 months, have you been humiliated or emotionally abused in other ways by your partner or ex-partner?: No   Housing Stability: Low Risk  (12/11/2023)    Housing Stability     Do you have housing? : Yes     Are you worried about losing your housing?: No     Social history: Single.  Works as a nutritionist.  No tobacco or alcohol.    The following portions of the patient's history were reviewed and updated as appropriate: allergies, current medications, past family history, past medical history, past social history, past surgical history and problem list.    Oswestry (TOLU) Questionnaire        12/15/2023     3:00 PM   OSWESTRY DISABILITY INDEX   Count 10   Sum 26   Oswestry Score (%) 52 %       Neck Disability Index:       No data to display                   PHQ-2 Score:         1/4/2024     8:15 AM 12/11/2023     2:31  PM   PHQ-2 ( 1999 [employer])   Q1: Little interest or pleasure in doing things 0 0   Q2: Feeling down, depressed or hopeless 0 0   PHQ-2 Score 0 0                  Objective:   Physical Exam:    BP (!) 143/80   Pulse 94   Wt 150 lb (68 kg)   LMP 04/12/2017   BMI 26.91 kg/m    Body mass index is 26.91 kg/m .      General:  Well-appearing female in no acute distress.  Pleasant, cooperative, and interactive throughout the examination and interview.  CV: No lower extremity edema on inspection or paltation.  Lymphatics: No cervical lymphadenopathy palpated. Eyes: sclera clear. Skin: No rashes or lesions seen over the head/neck, hairline, arms, legs   Respirations unlabored.  MSK: Gait is mildly cautious and minimally antalgic left lower extremity.  Able to heel-toe stand without difficulty.  Negative Romberg.  Spine: normal AP curves of the C, T, and L spine.  Moderately reduced lumbar flexion on finger to floor testing and mildly reduced extension with pain.  Palpation: Tenderness to palpation over lumbar paraspinals PSIS and SI joints gluteal tissues bilaterally.   Extremities: Full range of motion of the elbows, and wrists with no effusions or tenderness to palpation.  Decreased range of motion both hips left greater than right in flexion along with external rotation bilaterally.  Minimally reduced internal rotation bilaterally.  Full range of motion of the   knees, and ankles with no effusions or tenderness to palpation.  SI pain left greater than right with thigh thrust, Tere test, Gaenslen's, SI compression and SI distraction.  Also some left hip pain with TERE.  No hypermobility of the upper or lower extremities.  Neurologic exam: Mental status: Patient is alert and oriented with normal affect.  Attention, knowledge, memory, and language are intact.  Normal coordination throughout the examination.  Reflexes are 2+ and symmetric biceps, triceps, brachioradialis, patellar, and Achilles with down-going toes and  Negative Leandro's.  Sensation is minimally diffusely reduced to light touch left upper and lower extremity compared to right,    Manual muscle testing reveals 5 out of 5 in the hip flexors, knee flexors/extensors, ankle plantar flexors, ankle  dorsiflexors, and EHL.  Upper extremities: Grossly normal strength . Normal muscle bulk and tone in the arms and legs.    Negative seated and supine straight leg raise bilaterally.

## 2024-01-04 NOTE — TELEPHONE ENCOUNTER
[facility] phone call message- general phone call:    Reason for call: She was put on a new inhaler she want to get the new covid vaccine but would like your opinion.    Action desired: call back.    Return call needed: Yes    OK to leave a message on voice mail? Yes    Advised patient to response may take up to 2 business days: Yes    Primary language: English      needed? No    Call taken on December 24, 2020 at 9:47 AM by Kamran Gao  
Requested Information relayed to patient     btrn  
stable

## 2024-01-04 NOTE — LETTER
1/4/2024         RE: Kelly Harris  [address]  [place] [address]        Dear Colleague,    Thank you for referring your patient, Kelly Harris, to the [facility]. Please see a copy of my visit note below.    Assessment/Plan:      Kelly was seen today for back pain.    Diagnoses and all orders for this visit:    Pain of both sacroiliac joints  -     naproxen (NAPROSYN) 500 MG tablet; Take 1 tablet (500 mg) by mouth 2 times daily as needed for moderate pain  -     PAIN Joint Injection Sacroiliac Joint Bilateral; Future    Lumbar facet arthropathy  -     XR Lumbar Flex/Ext 2/3 Views; Future    Spondylolisthesis of lumbar region  -     XR Lumbar Flex/Ext 2/3 Views; Future    Decreased range of motion of both hips  -     XR Pelvis w 2 VW Hips Bilateral; Future    Myofascial pain  -     naproxen (NAPROSYN) 500 MG tablet; Take 1 tablet (500 mg) by mouth 2 times daily as needed for moderate pain    Other orders  -     Spine  Referral         Assessment: Pleasant 53 year old female with a history of asthma and pulmonary embolism no longer on Eliquis with    1.  6 to 8-month history of bilateral lumbar spine and gluteal pain consistent with sacroiliac joint pain with left greater than right pseudo radicular pain.  She has had pain despite physical therapy, Osteopathic manipulative medicine oxycodone and tramadol along with Medrol Dosepak trials.  She has had approximately 8 visits of physical therapy per her report.  She has L4-5 trace spondylolisthesis with severe facet arthropathy and moderate severe facet arthropathy L5-S1 but no high-grade central canal or foraminal stenosis and her pain is consistent with SI pain with positive thigh thrust left greater than right Tere test bilaterally Gaenslen's bilaterally SI compression and AP distraction.    2.  Decreased range of motion of the hips with some left hip pain and Tere testing as well.      3.  Myofascial pain  lumbar spine and gluteal region.    Discussion:    1.  I discussed the diagnosis and treatment options.  I discussed options of further imaging along with injections and medications.    2.  Recommend trial naproxen 500 mg twice daily as needed for pain.  She is no longer on Eliquis for her pulmonary embolism.    3.  Flexion-extension x-rays lumbar spine as well as plain films of the hip to evaluate for any instability or hip osteoarthritis respectively.    4.  Bilateral sacroiliac joint injections will be ordered.  She has numerous positive provocative maneuvers today as above and is failed conservative management of physical therapy, Osteopathic manipulative medicine Tatian's as well as time.    5.  If SI joint injections not helpful can consider medial branch blocks    6.  Follow-up at her earliest convenience for injection.      It was our pleasure caring for your patient today, if there any questions or concerns please do not hesitate to contact us.      Subjective:   Patient ID: Kelly Harris is a 53 year old female.    History of Present Illness: Patient presents with request of Dr. Seymour Delvalle for evaluation of bilateral low back and gluteal pain.  Patient had a history of pain a couple of years ago and the pain resolved.  Then in June of last year without any new injury began having pain which became quite severe and is constant.  Waxes and wanes in intensity but is always constant left greater than right across the sacrum SI joints PSIS.  Travels down the left greater than right leg occasionally with some paresthesias to the ankle.  Pain is worse in the low back and SI region however.  Pain is worse with prolonged standing or sitting better with laying down walking seems to be okay/changing positions is helpful.  Pain is a 10/10 at worst 6/10 today 5/10 at best.  Has tried 8 weeks of physical therapy without benefit Osteopathic manipulative medicine not helpful.  No chiropractor.  Has been on tramadol  oxycodone and Medrol Dosepak.  Medrol helped very temporarily.  Has had an MRI.      Imaging: MRI report and images were personally reviewed and discussed with the patient.  A plastic model was utilized during the discussion.  MRI of the lumbar spine personally reviewed.  Normal disc T12-L1 through L3-4 no central canal or foraminal stenosis at those levels only mild facet arthropathy.  At L4-5 mild broad-based disc bulge moderate facet arthropathy results in mild central stenosis and mild bilateral foraminal stenosis.  L5-S1 moderate facet arthropathy with degenerative changes of the disc results in moderate bilateral foraminal stenosis.  Trace spondylolisthesis L4 on L5.  Facet joint effusions at L4-5 on my review with at least moderate facet arthropathy.         Review of Systems: Complains of hoarseness, change in vision, shortness of breath, wheezing, joint pain muscle pain muscle fatigue, sciatica, sickle cell anemia, insomnia and excessive tiredness.  Denies fever, weight loss, waking, ringing in the ears, eye pain, chest pain, cough, abdominal pain, nausea vomiting, bowel or bladder incontinence, skin issues, balance issues, bruising, anxiety or depression.  Remainder of 12 point review systems negative unless listed above.    Past Medical History:   Diagnosis Date     Asthma      Sickle cell pain crisis (H) 07/16/2023       Family History   Problem Relation Age of Onset     Diabetes Father      Coronary Artery Disease Father      Hypertension Father      Asthma Daughter      Sickle Cell Trait Daughter         S/C     Asthma Daughter      Sickle Cell Trait Daughter         S/C     Heart Disease Daughter         Heart valve regurgitation, abnormal vein with shunt     Hypertension Mother      Lung Cancer Paternal Cousin      Cancer No family hx of          Social History     Socioeconomic History     Marital status: Single     Spouse name: None     Number of children: None     Years of education: None      Highest education level: None   Tobacco Use     Smoking status: Never     Passive exposure: Never     Smokeless tobacco: Never   Vaping Use     Vaping Use: Never used   Substance and Sexual Activity     Alcohol use: No     Drug use: No     Sexual activity: Yes     Partners: Male     Social Determinants of Health     Financial Resource Strain: Low Risk  (12/11/2023)    Financial Resource Strain      Within the past 12 months, have you or your family members you live with been unable to get utilities (heat, electricity) when it was really needed?: No   Food Insecurity: Low Risk  (12/11/2023)    Food Insecurity      Within the past 12 months, did you worry that your food would run out before you got money to buy more?: No      Within the past 12 months, did the food you bought just not last and you didn t have money to get more?: No   Transportation Needs: Low Risk  (12/11/2023)    Transportation Needs      Within the past 12 months, has lack of transportation kept you from medical appointments, getting your medicines, non-medical meetings or appointments, work, or from getting things that you need?: No   Interpersonal Safety: Low Risk  (10/2/2023)    Interpersonal Safety      Do you feel physically and emotionally safe where you currently live?: Yes      Within the past 12 months, have you been hit, slapped, kicked or otherwise physically hurt by someone?: No      Within the past 12 months, have you been humiliated or emotionally abused in other ways by your partner or ex-partner?: No   Housing Stability: Low Risk  (12/11/2023)    Housing Stability      Do you have housing? 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    The following portions of the patient's history were reviewed and updated as appropriate: allergies, current medications, past family history, past medical history, past social history, past surgical history and  problem list.    Oswestry (TOLU) Questionnaire        12/15/2023     3:00 PM   OSWESTRY DISABILITY INDEX   Count 10   Sum 26   Oswestry Score (%) 52 %       Neck Disability Index:       No data to display                   PHQ-2 Score:         1/4/2024     8:15 AM 12/11/2023     2:31 PM   PHQ-2 ( 1999 [employer])   Q1: Little interest or pleasure in doing things 0 0   Q2: Feeling down, depressed or hopeless 0 0   PHQ-2 Score 0 0                  Objective:   Physical Exam:    BP (!) 143/80   Pulse 94   Wt 150 lb (68 kg)   LMP 04/12/2017   BMI 26.91 kg/m    Body mass index is 26.91 kg/m .      General:  Well-appearing female in no acute distress.  Pleasant, cooperative, and interactive throughout the examination and interview.  CV: No lower extremity edema on inspection or paltation.  Lymphatics: No cervical lymphadenopathy palpated. Eyes: sclera clear. Skin: No rashes or lesions seen over the head/neck, hairline, arms, legs   Respirations unlabored.  MSK: Gait is mildly cautious and minimally antalgic left lower extremity.  Able to heel-toe stand without difficulty.  Negative Romberg.  Spine: normal AP curves of the C, T, and L spine.  Moderately reduced lumbar flexion on finger to floor testing and mildly reduced extension with pain.  Palpation: Tenderness to palpation over lumbar paraspinals PSIS and SI joints gluteal tissues bilaterally.   Extremities: Full range of motion of the elbows, and wrists with no effusions or tenderness to palpation.  Decreased range of motion both hips left greater than right in flexion along with external rotation bilaterally.  Minimally reduced internal rotation bilaterally.  Full range of motion of the   knees, and ankles with no effusions or tenderness to palpation.  SI pain left greater than right with thigh thrust, Tere test, Gaenslen's, SI compression and SI distraction.  Also some left hip pain with TERE.  No hypermobility of the upper or lower extremities.  Neurologic exam:  Mental status: Patient is alert and oriented with normal affect.  Attention, knowledge, memory, and language are intact.  Normal coordination throughout the examination.  Reflexes are 2+ and symmetric biceps, triceps, brachioradialis, patellar, and Achilles with down-going toes and Negative Leandro's.  Sensation is minimally diffusely reduced to light touch left upper and lower extremity compared to right,    Manual muscle testing reveals 5 out of 5 in the hip flexors, knee flexors/extensors, ankle plantar flexors, ankle  dorsiflexors, and EHL.  Upper extremities: Grossly normal strength . Normal muscle bulk and tone in the arms and legs.    Negative seated and supine straight leg raise bilaterally.          Again, thank you for allowing me to participate in the care of your patient.        Sincerely,        Orlando Rice, DO

## 2024-01-04 NOTE — PATIENT INSTRUCTIONS
Naproxen (which is an anti-inflammatory) medication is prescribed today. Take 1 tablet 2 times a day as needed for pain.This medication should be taken with food and water to prevent any stomach upset. Do not take ibuprofen/Advil/Motrin/Aleve  while you take Naproxen. Please call if you have any side effects.   An xray was ordered for you today.  Please call Radiology at [phone].     A sacroiliac joint injection has been ordered today. Please schedule this injection at least  2 weeks from now to allow time for insurance prior authorization. On the day of your injection, you cannot be sick or taking antibiotics. If you become sick and are prescribed, please call the clinic so your injection can be rescheduled for once you have completed your antibiotics. You will need to bring a  with you for your injection. If you have any questions or concerns prior to your injection, please do not hesitate to call the nurse navigation line at [phone].

## 2024-01-11 ENCOUNTER — TELEPHONE (OUTPATIENT)
Dept: PHYSICAL MEDICINE AND REHAB | Facility: CLINIC | Age: 54
End: 2024-01-11
Payer: COMMERCIAL

## 2024-01-11 NOTE — TELEPHONE ENCOUNTER
Patient returned call. Explained that her injection has been authorized.    She plans to fly on a plane the following day. Discussed steroids have immunosuppressant properties which could potentially put patient at a higher risk for a worsened course of any infection including COVID. Encouraged her to be vigilant in hand washing and masking should she travel. Stated understanding and appreciation for information.

## 2024-01-11 NOTE — TELEPHONE ENCOUNTER
[facility] Call Center    Phone Message    May a detailed message be left on voicemail: yes     Reason for Call: Other: Patient is calling with two questions, wanting to know if her injection scheduled for 1/18 was approved and then also if she would be able to travel the next day after her injection, would like to speak to the care team      Action Taken: Other: MPSKEYLA-Gregory     Travel Screening: Not Applicable

## 2024-01-16 ENCOUNTER — HOSPITAL ENCOUNTER (INPATIENT)
Facility: HOSPITAL | Age: 54
LOS: 2 days | Discharge: HOME | DRG: 812 | End: 2024-01-21
Attending: EMERGENCY MEDICINE | Admitting: FAMILY MEDICINE
Payer: COMMERCIAL

## 2024-01-16 ENCOUNTER — OFFICE VISIT (OUTPATIENT)
Dept: FAMILY MEDICINE | Facility: CLINIC | Age: 54
End: 2024-01-16
Payer: COMMERCIAL

## 2024-01-16 ENCOUNTER — APPOINTMENT (OUTPATIENT)
Dept: CT IMAGING | Facility: HOSPITAL | Age: 54
DRG: 812 | End: 2024-01-16
Attending: EMERGENCY MEDICINE
Payer: COMMERCIAL

## 2024-01-16 VITALS
BODY MASS INDEX: 26.82 KG/M2 | SYSTOLIC BLOOD PRESSURE: 152 MMHG | TEMPERATURE: 98 F | HEART RATE: 113 BPM | HEIGHT: 63 IN | WEIGHT: 151.4 LBS | DIASTOLIC BLOOD PRESSURE: 91 MMHG | OXYGEN SATURATION: 98 % | RESPIRATION RATE: 18 BRPM

## 2024-01-16 DIAGNOSIS — R07.9 CHEST PAIN, UNSPECIFIED TYPE: Primary | ICD-10-CM

## 2024-01-16 DIAGNOSIS — R07.9 LEFT-SIDED CHEST PAIN: ICD-10-CM

## 2024-01-16 DIAGNOSIS — R06.00 DYSPNEA, UNSPECIFIED TYPE: ICD-10-CM

## 2024-01-16 DIAGNOSIS — D57.00 SICKLE CELL PAIN CRISIS (H): ICD-10-CM

## 2024-01-16 DIAGNOSIS — D57.00 SICKLE CELL DISEASE WITH CRISIS (H): Primary | ICD-10-CM

## 2024-01-16 LAB
ANION GAP SERPL CALCULATED.3IONS-SCNC: 9 MMOL/L (ref 7–15)
BASOPHILS # BLD AUTO: ABNORMAL 10*3/UL
BASOPHILS # BLD MANUAL: 0.1 10E3/UL (ref 0–0.2)
BASOPHILS NFR BLD AUTO: ABNORMAL %
BASOPHILS NFR BLD MANUAL: 1 %
BUN SERPL-MCNC: 8.9 MG/DL (ref 6–20)
CALCIUM SERPL-MCNC: 9 MG/DL (ref 8.6–10)
CHLORIDE SERPL-SCNC: 104 MMOL/L (ref 98–107)
CREAT SERPL-MCNC: 0.82 MG/DL (ref 0.51–0.95)
DEPRECATED HCO3 PLAS-SCNC: 26 MMOL/L (ref 22–29)
EGFRCR SERPLBLD CKD-EPI 2021: 85 ML/MIN/1.73M2
ELLIPTOCYTES BLD QL SMEAR: SLIGHT
EOSINOPHIL # BLD AUTO: ABNORMAL 10*3/UL
EOSINOPHIL # BLD MANUAL: 0 10E3/UL (ref 0–0.7)
EOSINOPHIL NFR BLD AUTO: ABNORMAL %
EOSINOPHIL NFR BLD MANUAL: 0 %
ERYTHROCYTE [DISTWIDTH] IN BLOOD BY AUTOMATED COUNT: 14.7 % (ref 10–15)
GLUCOSE SERPL-MCNC: 130 MG/DL (ref 70–99)
HCT VFR BLD AUTO: 34.5 % (ref 35–47)
HGB BLD-MCNC: 12.3 G/DL (ref 11.7–15.7)
HOLD SPECIMEN: NORMAL
HOLD SPECIMEN: NORMAL
IMM GRANULOCYTES # BLD: ABNORMAL 10*3/UL
IMM GRANULOCYTES NFR BLD: ABNORMAL %
LYMPHOCYTES # BLD AUTO: ABNORMAL 10*3/UL
LYMPHOCYTES # BLD MANUAL: 3.9 10E3/UL (ref 0.8–5.3)
LYMPHOCYTES NFR BLD AUTO: ABNORMAL %
LYMPHOCYTES NFR BLD MANUAL: 42 %
MCH RBC QN AUTO: 26.5 PG (ref 26.5–33)
MCHC RBC AUTO-ENTMCNC: 35.7 G/DL (ref 31.5–36.5)
MCV RBC AUTO: 74 FL (ref 78–100)
MONOCYTES # BLD AUTO: ABNORMAL 10*3/UL
MONOCYTES # BLD MANUAL: 0.4 10E3/UL (ref 0–1.3)
MONOCYTES NFR BLD AUTO: ABNORMAL %
MONOCYTES NFR BLD MANUAL: 4 %
NEUTROPHILS # BLD AUTO: ABNORMAL 10*3/UL
NEUTROPHILS # BLD MANUAL: 4.9 10E3/UL (ref 1.6–8.3)
NEUTROPHILS NFR BLD AUTO: ABNORMAL %
NEUTROPHILS NFR BLD MANUAL: 53 %
NRBC # BLD AUTO: 1.1 10E3/UL
NRBC BLD MANUAL-RTO: 12 %
PLAT MORPH BLD: ABNORMAL
PLATELET # BLD AUTO: 334 10E3/UL (ref 150–450)
POTASSIUM SERPL-SCNC: 3.9 MMOL/L (ref 3.4–5.3)
RBC # BLD AUTO: 4.64 10E6/UL (ref 3.8–5.2)
RBC MORPH BLD: ABNORMAL
RETICS # AUTO: 0.13 10E6/UL (ref 0.01–0.11)
RETICS/RBC NFR AUTO: 2.8 % (ref 0.8–2.7)
SODIUM SERPL-SCNC: 139 MMOL/L (ref 135–145)
TARGETS BLD QL SMEAR: ABNORMAL
TROPONIN T SERPL HS-MCNC: <6 NG/L
WBC # BLD AUTO: 9.2 10E3/UL (ref 4–11)

## 2024-01-16 PROCEDURE — 71275 CT ANGIOGRAPHY CHEST: CPT

## 2024-01-16 PROCEDURE — 250N000013 HC RX MED GY IP 250 OP 250 PS 637: Performed by: EMERGENCY MEDICINE

## 2024-01-16 PROCEDURE — 99215 OFFICE O/P EST HI 40 MIN: CPT | Mod: GC

## 2024-01-16 PROCEDURE — 80048 BASIC METABOLIC PNL TOTAL CA: CPT | Performed by: EMERGENCY MEDICINE

## 2024-01-16 PROCEDURE — 96376 TX/PRO/DX INJ SAME DRUG ADON: CPT

## 2024-01-16 PROCEDURE — 250N000011 HC RX IP 250 OP 636: Performed by: EMERGENCY MEDICINE

## 2024-01-16 PROCEDURE — 85045 AUTOMATED RETICULOCYTE COUNT: CPT | Performed by: EMERGENCY MEDICINE

## 2024-01-16 PROCEDURE — G0378 HOSPITAL OBSERVATION PER HR: HCPCS

## 2024-01-16 PROCEDURE — 96375 TX/PRO/DX INJ NEW DRUG ADDON: CPT

## 2024-01-16 PROCEDURE — 93000 ELECTROCARDIOGRAM COMPLETE: CPT | Mod: 77

## 2024-01-16 PROCEDURE — 93005 ELECTROCARDIOGRAM TRACING: CPT | Performed by: EMERGENCY MEDICINE

## 2024-01-16 PROCEDURE — 84484 ASSAY OF TROPONIN QUANT: CPT | Performed by: EMERGENCY MEDICINE

## 2024-01-16 PROCEDURE — 85007 BL SMEAR W/DIFF WBC COUNT: CPT | Performed by: EMERGENCY MEDICINE

## 2024-01-16 PROCEDURE — 93005 ELECTROCARDIOGRAM TRACING: CPT | Performed by: STUDENT IN AN ORGANIZED HEALTH CARE EDUCATION/TRAINING PROGRAM

## 2024-01-16 PROCEDURE — 36415 COLL VENOUS BLD VENIPUNCTURE: CPT | Performed by: EMERGENCY MEDICINE

## 2024-01-16 PROCEDURE — 258N000003 HC RX IP 258 OP 636: Performed by: EMERGENCY MEDICINE

## 2024-01-16 PROCEDURE — 85027 COMPLETE CBC AUTOMATED: CPT | Performed by: EMERGENCY MEDICINE

## 2024-01-16 PROCEDURE — 96361 HYDRATE IV INFUSION ADD-ON: CPT

## 2024-01-16 PROCEDURE — 96374 THER/PROPH/DIAG INJ IV PUSH: CPT | Mod: 59

## 2024-01-16 PROCEDURE — 99285 EMERGENCY DEPT VISIT HI MDM: CPT | Mod: 25

## 2024-01-16 RX ORDER — IOPAMIDOL 755 MG/ML
90 INJECTION, SOLUTION INTRAVASCULAR ONCE
Status: COMPLETED | OUTPATIENT
Start: 2024-01-16 | End: 2024-01-16

## 2024-01-16 RX ORDER — LIDOCAINE 40 MG/G
CREAM TOPICAL
Status: DISCONTINUED | OUTPATIENT
Start: 2024-01-16 | End: 2024-01-21 | Stop reason: HOSPADM

## 2024-01-16 RX ORDER — HYDROMORPHONE HCL IN WATER/PF 6 MG/30 ML
0.4 PATIENT CONTROLLED ANALGESIA SYRINGE INTRAVENOUS
Status: DISCONTINUED | OUTPATIENT
Start: 2024-01-16 | End: 2024-01-17

## 2024-01-16 RX ORDER — HYDROXYZINE HYDROCHLORIDE 25 MG/1
25 TABLET, FILM COATED ORAL ONCE
Status: COMPLETED | OUTPATIENT
Start: 2024-01-16 | End: 2024-01-16

## 2024-01-16 RX ORDER — TRAMADOL HYDROCHLORIDE 50 MG/1
50 TABLET ORAL EVERY 6 HOURS PRN
Status: DISCONTINUED | OUTPATIENT
Start: 2024-01-16 | End: 2024-01-21 | Stop reason: HOSPADM

## 2024-01-16 RX ORDER — MONTELUKAST SODIUM 10 MG/1
10 TABLET ORAL AT BEDTIME
Status: DISCONTINUED | OUTPATIENT
Start: 2024-01-17 | End: 2024-01-21 | Stop reason: HOSPADM

## 2024-01-16 RX ORDER — HYDROMORPHONE HYDROCHLORIDE 2 MG/1
2 TABLET ORAL
Status: DISCONTINUED | OUTPATIENT
Start: 2024-01-16 | End: 2024-01-17

## 2024-01-16 RX ORDER — DIPHENHYDRAMINE HYDROCHLORIDE 50 MG/ML
50 INJECTION INTRAMUSCULAR; INTRAVENOUS ONCE
Status: COMPLETED | OUTPATIENT
Start: 2024-01-16 | End: 2024-01-16

## 2024-01-16 RX ORDER — POLYETHYLENE GLYCOL 3350 17 G/17G
17 POWDER, FOR SOLUTION ORAL DAILY PRN
Status: DISCONTINUED | OUTPATIENT
Start: 2024-01-16 | End: 2024-01-17

## 2024-01-16 RX ORDER — ACETAMINOPHEN 325 MG/1
975 TABLET ORAL EVERY 8 HOURS
Status: DISCONTINUED | OUTPATIENT
Start: 2024-01-17 | End: 2024-01-21 | Stop reason: HOSPADM

## 2024-01-16 RX ORDER — AMOXICILLIN 250 MG
2 CAPSULE ORAL 2 TIMES DAILY PRN
Status: DISCONTINUED | OUTPATIENT
Start: 2024-01-16 | End: 2024-01-21 | Stop reason: HOSPADM

## 2024-01-16 RX ORDER — MAGNESIUM HYDROXIDE/ALUMINUM HYDROXICE/SIMETHICONE 120; 1200; 1200 MG/30ML; MG/30ML; MG/30ML
30 SUSPENSION ORAL EVERY 6 HOURS PRN
Status: DISCONTINUED | OUTPATIENT
Start: 2024-01-16 | End: 2024-01-21 | Stop reason: HOSPADM

## 2024-01-16 RX ORDER — LIDOCAINE 4 G/G
2 PATCH TOPICAL
Status: DISCONTINUED | OUTPATIENT
Start: 2024-01-17 | End: 2024-01-21 | Stop reason: HOSPADM

## 2024-01-16 RX ORDER — FLUTICASONE FUROATE AND VILANTEROL 200; 25 UG/1; UG/1
1 POWDER RESPIRATORY (INHALATION) DAILY
Status: DISCONTINUED | OUTPATIENT
Start: 2024-01-17 | End: 2024-01-21 | Stop reason: HOSPADM

## 2024-01-16 RX ORDER — CETIRIZINE HYDROCHLORIDE 10 MG/1
10 TABLET ORAL DAILY
Status: DISCONTINUED | OUTPATIENT
Start: 2024-01-17 | End: 2024-01-21 | Stop reason: HOSPADM

## 2024-01-16 RX ORDER — FLUTICASONE PROPIONATE 50 MCG
1 SPRAY, SUSPENSION (ML) NASAL DAILY
Status: DISCONTINUED | OUTPATIENT
Start: 2024-01-17 | End: 2024-01-21 | Stop reason: HOSPADM

## 2024-01-16 RX ORDER — ALBUTEROL SULFATE 90 UG/1
2 AEROSOL, METERED RESPIRATORY (INHALATION) EVERY 6 HOURS PRN
Status: DISCONTINUED | OUTPATIENT
Start: 2024-01-16 | End: 2024-01-21 | Stop reason: HOSPADM

## 2024-01-16 RX ORDER — AMOXICILLIN 250 MG
1 CAPSULE ORAL 2 TIMES DAILY PRN
Status: DISCONTINUED | OUTPATIENT
Start: 2024-01-16 | End: 2024-01-21 | Stop reason: HOSPADM

## 2024-01-16 RX ADMIN — HYDROXYZINE HYDROCHLORIDE 25 MG: 25 TABLET, FILM COATED ORAL at 22:03

## 2024-01-16 RX ADMIN — SODIUM CHLORIDE, POTASSIUM CHLORIDE, SODIUM LACTATE AND CALCIUM CHLORIDE 1000 ML: 600; 310; 30; 20 INJECTION, SOLUTION INTRAVENOUS at 20:12

## 2024-01-16 RX ADMIN — HYDROMORPHONE HYDROCHLORIDE 1 MG: 1 INJECTION, SOLUTION INTRAMUSCULAR; INTRAVENOUS; SUBCUTANEOUS at 20:12

## 2024-01-16 RX ADMIN — IOPAMIDOL 90 ML: 755 INJECTION, SOLUTION INTRAVENOUS at 19:24

## 2024-01-16 RX ADMIN — SODIUM CHLORIDE 1000 ML: 9 INJECTION, SOLUTION INTRAVENOUS at 18:15

## 2024-01-16 RX ADMIN — DIPHENHYDRAMINE HYDROCHLORIDE 50 MG: 50 INJECTION, SOLUTION INTRAMUSCULAR; INTRAVENOUS at 18:58

## 2024-01-16 RX ADMIN — HYDROMORPHONE HYDROCHLORIDE 1 MG: 1 INJECTION, SOLUTION INTRAMUSCULAR; INTRAVENOUS; SUBCUTANEOUS at 18:44

## 2024-01-16 ASSESSMENT — ACTIVITIES OF DAILY LIVING (ADL)
ADLS_ACUITY_SCORE: 35

## 2024-01-16 ASSESSMENT — PAIN SCALES - GENERAL: PAINLEVEL: EXTREME PAIN (8)

## 2024-01-16 NOTE — PROGRESS NOTES
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  Initial vital signs reveal a pulse of 113 with.  Patient breathing at regular rate.  EKG revealed normal sinus rhythm with a regular rate of 74.  No ST changes noted.  Given patient's severe pain, significant history and risk factors discussed with patient and sent patient to the emergency room via friend's car.  I spoke with [facility] emergency physician given my concern for but not limited to acute coronary syndrome, pulmonary emboli, acute chest syndrome among others.  Patient in agreement with plan.  - EKG 12-lead, tracing only     BMI:   Estimated body mass index is 27.16 kg/m  as calculated from the following:    Height as of this encounter: 1.59 m (5' 2.6\").    Weight as of this encounter: 68.7 kg (151 lb 6.4 oz).     Heri Gamez DO  [facility] ADAN Mendoza is a 53 year old, presenting for the following health issues:  Joint Pain (Start for a while, pain level 8 today ) and Rib Pain (Left rib pain, been going on a week now, pain level 8)      1/16/2024     4:13 PM   Additional Questions   Roomed by ML   Accompanied by self       HPI     Chest Pain  Onset/Duration: 7 days intermittent  Description:   Location: left side near ribs 9-10  Character: sharp  Radiation: None  Duration: 7 days   Intensity: 8/10  Progression of Symptoms: same  Accompanying Signs & Symptoms:  Shortness of breath: No  Sweating: No  Nausea/vomiting: No  Lightheadedness: No  Palpitations: No  Fever/Chills: No  Cough: No           Heartburn: No  History:   Family history of heart disease: YES  Tobacco use: No  Previous similar symptoms: no   Precipitating factors:   Worse with exertion: No  Worse with deep breaths: YES           Related to eating: No           " "Better with burping: No  Alleviating factors: None  Therapies tried and outcome: Naproxen without improvement    Review of Systems   Constitutional, HEENT, cardiovascular, pulmonary, gi and gu systems are negative, except as otherwise noted.      Objective    BP (!) 152/91 (BP Location: Right arm, Patient Position: Sitting, Cuff Size: Adult Regular)   Pulse 113   Temp 98  F (36.7  C) (Oral)   Resp 18   Ht 1.59 m (5' 2.6\")   Wt 68.7 kg (151 lb 6.4 oz)   LMP 04/12/2017   SpO2 98%   BMI 27.16 kg/m    Body mass index is 27.16 kg/m .  Physical Exam   GENERAL: tearful, alert and no distress  NECK: no adenopathy, no asymmetry, masses, or scars and thyroid normal to palpation  RESP: lungs clear to auscultation - no rales, rhonchi or wheezes  CV: regular rate and rhythm, normal S1 S2, no S3 or S4, no murmur, click or rub, no peripheral edema and peripheral pulses strong  ABDOMEN: soft, nontender, no hepatosplenomegaly, no masses and bowel sounds normal  MS: Reporting tenderness to area underneath ribcage but no TTP, no erythema, no bruising  SKIN: no suspicious lesions or rashes    EKG - Reviewed and interpreted by me appears normal, NSR, normal axis, normal intervals, no acute ST/T changes c/w ischemia, no LVH by voltage criteria, unchanged from previous tracings    ----- Service Performed and Documented by Resident or Fellow ------              "

## 2024-01-16 NOTE — PROGRESS NOTES
Preceptor Attestation:    I discussed the patient with the resident & medical student and evaluated the patient in person. I personally viewed the EKG and agree with the interpretation documented by the resident ( No acute changes sinus rate 78.) I have verified the content of the note, which accurately reflects my assessment of the patient and the plan of care.   Supervising Physician:  Maksim Woo MD.

## 2024-01-16 NOTE — PROGRESS NOTES
"  Assessment & Plan     Visit for screening mammogram  ***    Cervical cancer screening  ***       BMI:   Estimated body mass index is 26.91 kg/m  as calculated from the following:    Height as of 12/11/23: 1.59 m (5' 2.6\").    Weight as of 1/4/24: 68 kg (150 lb).       No follow-ups on file.    Eros Marin  MS4  [facility]    Heri Gamez [facility] ADAN Mendoza is a 53 year old, presenting for the following health issues:  Joint Pain (Start for a while, pain level 8 today ) and Rib Pain (Left rib pain, been going on a week now, pain level 8)        1/16/2024     4:13 PM   Additional Questions   Roomed by ML   Accompanied by self       HPI   Ms. Harris is a 53 y.o F with PMH of asthma and sickle cell who presents for joint pain.     Review of Systems         Objective    LMP 04/12/2017   There is no height or weight on file to calculate BMI.  Physical Exam       ----- Services Performed by a MEDICAL STUDENT in Presence of RESIDENT/FELLOW Physician-------              "

## 2024-01-16 NOTE — LETTER
[place] [address]  [address] [address]  Phone: [phone]  Fax: [phone]    January 21, 2024        Kelly Harris  [address] [place] [address]          To whom it may concern:    RE: Kelly Mendoza was hospitalized from 1/16/24 - 1/21/24 at [facility]. Please excuse her from work for this period of time, and until she can follow-up with her primary physician next week and be cleared for return to work.     Please contact me for questions or concerns.      Sincerely,      Emily Church MD

## 2024-01-16 NOTE — ED TRIAGE NOTES
Patient arrives from home with friend.   C/o left side rib pain that radiates to back that has been ongoing for past two days. Has attempted meds and other home remedies with no relief in symptoms.   Was seen by PCP and told to come to ED for further workup.       EKG performed in triage.

## 2024-01-17 LAB
ANION GAP SERPL CALCULATED.3IONS-SCNC: 8 MMOL/L (ref 7–15)
BUN SERPL-MCNC: 5.5 MG/DL (ref 6–20)
CALCIUM SERPL-MCNC: 8.4 MG/DL (ref 8.6–10)
CHLORIDE SERPL-SCNC: 101 MMOL/L (ref 98–107)
CREAT SERPL-MCNC: 0.92 MG/DL (ref 0.51–0.95)
DEPRECATED HCO3 PLAS-SCNC: 26 MMOL/L (ref 22–29)
EGFRCR SERPLBLD CKD-EPI 2021: 74 ML/MIN/1.73M2
ERYTHROCYTE [DISTWIDTH] IN BLOOD BY AUTOMATED COUNT: 14.3 % (ref 10–15)
GLUCOSE SERPL-MCNC: 129 MG/DL (ref 70–99)
HCT VFR BLD AUTO: 29.9 % (ref 35–47)
HGB BLD-MCNC: 10.5 G/DL (ref 11.7–15.7)
MCH RBC QN AUTO: 26.4 PG (ref 26.5–33)
MCHC RBC AUTO-ENTMCNC: 35.1 G/DL (ref 31.5–36.5)
MCV RBC AUTO: 75 FL (ref 78–100)
PLATELET # BLD AUTO: 265 10E3/UL (ref 150–450)
POTASSIUM SERPL-SCNC: 4 MMOL/L (ref 3.4–5.3)
RBC # BLD AUTO: 3.98 10E6/UL (ref 3.8–5.2)
SODIUM SERPL-SCNC: 135 MMOL/L (ref 135–145)
WBC # BLD AUTO: 10.9 10E3/UL (ref 4–11)

## 2024-01-17 PROCEDURE — 80048 BASIC METABOLIC PNL TOTAL CA: CPT

## 2024-01-17 PROCEDURE — G0378 HOSPITAL OBSERVATION PER HR: HCPCS

## 2024-01-17 PROCEDURE — 96376 TX/PRO/DX INJ SAME DRUG ADON: CPT

## 2024-01-17 PROCEDURE — 250N000011 HC RX IP 250 OP 636

## 2024-01-17 PROCEDURE — 99223 1ST HOSP IP/OBS HIGH 75: CPT | Mod: AI

## 2024-01-17 PROCEDURE — 250N000013 HC RX MED GY IP 250 OP 250 PS 637

## 2024-01-17 PROCEDURE — 36415 COLL VENOUS BLD VENIPUNCTURE: CPT

## 2024-01-17 PROCEDURE — 96361 HYDRATE IV INFUSION ADD-ON: CPT

## 2024-01-17 PROCEDURE — 85027 COMPLETE CBC AUTOMATED: CPT

## 2024-01-17 PROCEDURE — 96372 THER/PROPH/DIAG INJ SC/IM: CPT

## 2024-01-17 RX ORDER — HYDROMORPHONE HCL IN WATER/PF 6 MG/30 ML
0.4 PATIENT CONTROLLED ANALGESIA SYRINGE INTRAVENOUS
Status: DISCONTINUED | OUTPATIENT
Start: 2024-01-17 | End: 2024-01-17

## 2024-01-17 RX ORDER — HYDROMORPHONE HYDROCHLORIDE 2 MG/1
2 TABLET ORAL
Status: DISCONTINUED | OUTPATIENT
Start: 2024-01-17 | End: 2024-01-18

## 2024-01-17 RX ORDER — ENOXAPARIN SODIUM 100 MG/ML
40 INJECTION SUBCUTANEOUS AT BEDTIME
Status: DISCONTINUED | OUTPATIENT
Start: 2024-01-17 | End: 2024-01-21 | Stop reason: HOSPADM

## 2024-01-17 RX ORDER — HYDROMORPHONE HCL IN WATER/PF 6 MG/30 ML
0.2 PATIENT CONTROLLED ANALGESIA SYRINGE INTRAVENOUS
Status: DISCONTINUED | OUTPATIENT
Start: 2024-01-17 | End: 2024-01-17

## 2024-01-17 RX ORDER — BISACODYL 10 MG
10 SUPPOSITORY, RECTAL RECTAL DAILY PRN
Status: DISCONTINUED | OUTPATIENT
Start: 2024-01-17 | End: 2024-01-21 | Stop reason: HOSPADM

## 2024-01-17 RX ADMIN — MONTELUKAST 10 MG: 10 TABLET, FILM COATED ORAL at 22:11

## 2024-01-17 RX ADMIN — ACETAMINOPHEN 975 MG: 325 TABLET ORAL at 16:44

## 2024-01-17 RX ADMIN — LIDOCAINE 2 PATCH: 4 PATCH TOPICAL at 20:39

## 2024-01-17 RX ADMIN — FLUTICASONE PROPIONATE 1 SPRAY: 50 SPRAY, METERED NASAL at 09:01

## 2024-01-17 RX ADMIN — ACETAMINOPHEN 975 MG: 325 TABLET ORAL at 00:19

## 2024-01-17 RX ADMIN — DEXTROSE AND SODIUM CHLORIDE: 5; 450 INJECTION, SOLUTION INTRAVENOUS at 00:24

## 2024-01-17 RX ADMIN — UMECLIDINIUM 1 PUFF: 62.5 AEROSOL, POWDER ORAL at 09:01

## 2024-01-17 RX ADMIN — ENOXAPARIN SODIUM 40 MG: 40 INJECTION SUBCUTANEOUS at 22:11

## 2024-01-17 RX ADMIN — HYDROMORPHONE HYDROCHLORIDE 0.4 MG: 0.2 INJECTION, SOLUTION INTRAMUSCULAR; INTRAVENOUS; SUBCUTANEOUS at 09:00

## 2024-01-17 RX ADMIN — HYDROMORPHONE HYDROCHLORIDE 2 MG: 2 TABLET ORAL at 22:11

## 2024-01-17 RX ADMIN — LIDOCAINE 2 PATCH: 4 PATCH TOPICAL at 00:26

## 2024-01-17 RX ADMIN — HYDROMORPHONE HYDROCHLORIDE 0.4 MG: 0.2 INJECTION, SOLUTION INTRAMUSCULAR; INTRAVENOUS; SUBCUTANEOUS at 03:16

## 2024-01-17 RX ADMIN — MONTELUKAST 10 MG: 10 TABLET, FILM COATED ORAL at 00:18

## 2024-01-17 RX ADMIN — HYDROMORPHONE HYDROCHLORIDE 0.4 MG: 0.2 INJECTION, SOLUTION INTRAMUSCULAR; INTRAVENOUS; SUBCUTANEOUS at 00:18

## 2024-01-17 RX ADMIN — CETIRIZINE HYDROCHLORIDE 10 MG: 10 TABLET, FILM COATED ORAL at 09:00

## 2024-01-17 RX ADMIN — ENOXAPARIN SODIUM 40 MG: 40 INJECTION SUBCUTANEOUS at 03:16

## 2024-01-17 RX ADMIN — ACETAMINOPHEN 975 MG: 325 TABLET ORAL at 09:00

## 2024-01-17 RX ADMIN — HYDROMORPHONE HYDROCHLORIDE 0.4 MG: 0.2 INJECTION, SOLUTION INTRAMUSCULAR; INTRAVENOUS; SUBCUTANEOUS at 15:13

## 2024-01-17 RX ADMIN — FLUTICASONE FUROATE AND VILANTEROL TRIFENATATE 1 PUFF: 200; 25 POWDER RESPIRATORY (INHALATION) at 09:01

## 2024-01-17 ASSESSMENT — ACTIVITIES OF DAILY LIVING (ADL)
ADLS_ACUITY_SCORE: 35

## 2024-01-17 NOTE — PROVIDER NOTIFICATION
Patient went home w/ [facility].  Patient was transported by family to home.  Patient belongings sent with the patient.

## 2024-01-17 NOTE — UTILIZATION REVIEW
Concurrent stay review; Secondary Review Determination     Under the authority of the Utilization Management Committee, the utilization review process indicated a secondary review on Kelly Harris.  The review outcome is based on review of the medical records, discussions with staff, and applying clinical experience noted on the date of the review.        (x) Observation Status Appropriate - Concurrent stay review    RATIONALE FOR DETERMINATION   53 year old female with a past medical history of sickle cell pain crisis, bilateral pulmonary emboli, asthma who presented to ER for L chest wall pain, admitted with possible vaso-occlusive crisis, pain is improving , transitioning to oral pain medications , off oxygen and IV fluid, stable O2 sat in room air    Patient is clinically improving and there is no clear indication to change patient's status to inpatient. The severity of illness, intensity of service provided, expected LOS and risk for adverse outcome make the care appropriate for observation.    The information on this document is developed by the utilization review team in order for the business office to ensure compliance.  This only denotes the appropriateness of proper admission status and does not reflect the quality of care rendered.         The definitions of Inpatient Status and Observation Status used in making the determination above are those provided in the CMS Coverage Manual, Chapter 1 and Chapter 6, section 70.4.      Sincerely,   Ac Roberson MD  Utilization Review  Physician Advisor  [facility]

## 2024-01-17 NOTE — MEDICATION SCRIBE - ADMISSION MEDICATION HISTORY
Medication Scribe Admission Medication History    Admission medication history is complete. The information provided in this note is only as accurate as the sources available at the time of the update.    Information Source(s): Patient via in-person    Pertinent Information: Patient reports self management of medications.     Patient reports no longer taking: Eliquis, Medrol, Naprosyn, Oxycodone    Changes made to PTA medication list:  Added: Folic Acid  Deleted: Eliquis, Medrol, Naprosyn, Oxycodone  Changed: None    Medication Affordability:  Not including over the counter (OTC) medications, was there a time in the past 3 months when you did not take your medications as prescribed because of cost?: No    Allergies reviewed with patient and updates made in EHR: yes    Medication History Completed By: Jax Girard 1/16/2024 9:23 PM    PTA Med List   Medication Sig Last Dose    acetaminophen (TYLENOL) 325 MG tablet Take 3 tablets (975 mg) by mouth every 8 hours Past Week at prn    albuterol (PROAIR HFA/PROVENTIL HFA/VENTOLIN HFA) 108 (90 Base) MCG/ACT inhaler Inhale 2 puffs into the lungs every 6 hours as needed for shortness of breath, wheezing or cough 1/16/2024 at am    cetirizine (ZYRTEC) 10 MG tablet TAKE 1 TABLET BY MOUTH EVERY DAY 1/16/2024 at am    fluticasone (FLONASE) 50 MCG/ACT nasal spray INSTILL 1 SPRAY INTO BOTH NOSTRILS DAILY 1/16/2024 at am    FOLIC ACID PO Take 1 tablet by mouth daily 1/16/2024 at am    mometasone-formoterol (DULERA) 200-5 MCG/ACT inhaler Inhale 2 puffs into the lungs 2 times daily 1/16/2024 at am    montelukast (SINGULAIR) 10 MG tablet TAKE 1 TABLET BY MOUTH EVERYDAY AT BEDTIME 1/15/2024 at pm    tiotropium (SPIRIVA RESPIMAT) 2.5 MCG/ACT inhaler INHALE 2 PUFFS BY MOUTH INTO THE LUNGS DAILY 1/16/2024 at am    traMADol (ULTRAM) 50 MG tablet Take 50 mg by mouth every 6 hours as needed for severe pain 1/15/2024 at pm

## 2024-01-17 NOTE — PLAN OF CARE
PRIMARY DIAGNOSIS: ACUTE PAIN  OUTPATIENT/OBSERVATION GOALS TO BE MET BEFORE DISCHARGE:  1. Pain Status: Improved but still requiring IV narcotics.    2. Return to near baseline physical activity: Yes    3. Cleared for discharge by consultants (if involved): N/A    Discharge Planner Nurse   Safe discharge environment identified: Yes  Barriers to discharge: Yes       Entered by: Eun Lu RN 01/17/2024 3:28 AM     Please review provider order for any additional goals.   Nurse to notify provider when observation goals have been met and patient is ready for discharge.   Patient's insurance requires that she fill at [facility].  [place] was sent there.

## 2024-01-17 NOTE — PLAN OF CARE
"PRIMARY DIAGNOSIS: ACUTE PAIN  OUTPATIENT/OBSERVATION GOALS TO BE MET BEFORE DISCHARGE:  1. Pain Status: Improved but still requiring IV narcotics.   Reported 8/10 pain to left chest, extending up to left shoulder.  Described as \"sharp\" and more painful with deep breaths. Iv dilaudid given x1 with improvement.     2. Return to near baseline physical activity: Yes    3. Cleared for discharge by consultants (if involved): N/A    Discharge Planner Nurse   Safe discharge environment identified: Yes  Barriers to discharge: Yes       Entered by: Eun Lu RN 01/17/2024 6:35 AM     Please review provider order for any additional goals.   Nurse to notify provider when observation goals have been met and patient is ready for discharge.  "

## 2024-01-17 NOTE — PROGRESS NOTES
[facility]    Medicine Progress Note - Hospitalist Service    Date of Admission:  1/16/2024    Assessment & Plan      Kelly Harris is a 53 year old female admitted on 1/16/2024. She has a history of sickle cell pain crisis, bilateral pulmonary emboli, asthma and is admitted for L chest wall pain.     L chest wall pain, suspect vaso-occlusive crisis  H/o sickle cell/HgC disease, bilateral pulmonary emboli  Patient presents from clinic visit with PCP after several days of continuous multi joint pain and new left sided chest pain radiating into back, not relieved by home pain meds, worsened with inspiration. Endorses feeling similar to when she had bilateral PE this past July, for which she is no longer on Eliquis. EKG in clinic showed NSR with regular rate, no ST changes, sent to ED for further evaluation. Found to be slightly hypertension but otherwise afebrile hemodynamically stable on RA. Trop negative, CT negative for PE or new consolidation. Suspect recurrent sickle cell pain crisis, less concern for acute chest syndrome. Potential triggers include cold temperatures, stress or dehydration. Hgb normal and actually improved from previous, thus do not suspect splenic sequestration. Reassured against bone marrow suppression by elevated retic count. Pain slowly improving with dilaudid.   - pain control:    - Switch to PO dilaudid today to see if manages pain   - scheduled tylenol, lidocaine patch, heating pad   - holding PTA tramadol   - monitor respiratory status and vitals closely  - encourage incentive spirometry and early ambulation  - bowel regimen  - VTE ppx     Asthma: PTA inhalers, montelukast  Seasonal allergies: PTA Zyrtec, Flonase          Diet: Regular Diet Adult    DVT Prophylaxis: Enoxaparin (Lovenox) SQ  Reyes Catheter: Not present  Lines: None     Cardiac Monitoring: None  Code Status: Full Code      Clinically Significant Risk Factors Present on Admission                      "  # Overweight: Estimated body mass index is 26.82 kg/m  as calculated from the following:    Height as of this encounter: 1.6 m (5' 3\").    Weight as of this encounter: 68.7 kg (151 lb 6.4 oz).       # Asthma: noted on problem list        Disposition Plan      Expected Discharge Date: 01/17/2024                  The patient's care was discussed with the Attending Physician, Dr. Smith .    Shawna Aj MD  Hospitalist Service  [facility]  Securely message with [employer] (more info)  Text page via [facility] Paging/Directory   ______________________________________________________________________    Interval History   Patient stating her left chest pain has slowly improved. She says a week ago her joints hurt all over which felt like when she was in crisis. Then the left sided chest wall pain came which felt like her previous PE. She says the pain is slowly improving with medications but is still present. She's endorsing a new headache starting this morning. Has history of headaches and this feels similar. Endorsing neck and bilateral shoulder tension. Denies vision changes, speech changes, numbness or tingling. No photophobia or phonophobia or nausea.     She hasn't been on Eliquis since October when her hematologist stopped this.     Physical Exam   Vital Signs: Temp: 97.9  F (36.6  C) Temp src: Oral BP: 115/70 Pulse: 74   Resp: 16 SpO2: 93 % O2 Device: None (Room air) Oxygen Delivery: 2 LPM  Weight: 151 lbs 6.4 oz  GENERAL: Healthy, alert and no distress. Laying comfortably  EYES: PERRL, EOMI  RESP:  Lungs clear throughout. No wheeze or crackles.   CV: Heart RRR. No murmur  CHEST WALL: without signs of blistering or rash, lidocaine patch in place, tenderness on palpation of left side chest wall under left breast   Abdomen: Soft, nontender, nondistended.  MSK: No gross deformity. Normal tone. No LE edema  NEURO: Cranial nerves 2-12 intact.  Mentation and speech appropriate for age.      Data "     I have personally reviewed the following data over the past 24 hrs:    10.9  \   10.5 (L)   / 265     135 101 5.5 (L) /  129 (H)   4.0 26 0.92 \     Trop: <6 BNP: N/A     Ferritin:  N/A % Retic:  2.8 (H) LDH:  N/A       Imaging results reviewed over the past 24 hrs:   Recent Results (from the past 24 hour(s))   CT Chest Pulmonary Embolism w Contrast    Narrative    EXAM: CT CHEST PULMONARY EMBOLISM W CONTRAST  LOCATION: [facility]  DATE: 1/16/2024    INDICATION: chest pain, dyspnea; hx of PE  COMPARISON: 10/30/2023  TECHNIQUE: CT chest pulmonary angiogram during arterial phase injection of IV contrast. Multiplanar reformats and MIP reconstructions were performed. Dose reduction techniques were used.   CONTRAST: isovue 370 90ml    FINDINGS:  ANGIOGRAM CHEST: Pulmonary arteries are normal caliber and negative for pulmonary emboli. Thoracic aorta is negative for dissection. No CT evidence of right heart strain.    LUNGS AND PLEURA: Biapical pleural parenchymal scarring. Dependent groundglass opacities bilaterally, likely atelectasis. No lobar consolidation. Trace left pleural effusion.     MEDIASTINUM/AXILLAE: No significant mediastinal or hilar adenopathy.    CORONARY ARTERY CALCIFICATION: None.    UPPER ABDOMEN: No acute abnormalities    MUSCULOSKELETAL: No acute bony abnormalities.      Impression    IMPRESSION:  1.  No evidence of pulmonary embolus.  2.  Trace left pleural effusion and bibasilar likely atelectasis.

## 2024-01-17 NOTE — ED NOTES
"[facility] ED Handoff Report    ED Chief Complaint: Rib pain    ED Diagnosis:  (R07.9) Left-sided chest pain    (R06.00) Dyspnea, unspecified type    (D57.00) Sickle cell pain crisis (H)       PMH:    Past Medical History:   Diagnosis Date    Asthma     Sickle cell pain crisis (H) 07/16/2023        Code Status:  Full Code     Falls Risk: No Band: Not applicable    Current Living Situation/Residence: lives in a house     Elimination Status: Continent: Yes     Activity Level: SBA    Patients Preferred Language:  English     Needed: No    Vital Signs:  /76   Pulse 70   Temp 98  F (36.7  C) (Temporal)   Resp 24   Ht 1.6 m (5' 3\")   Wt 68.7 kg (151 lb 6.4 oz)   LMP 04/12/2017   SpO2 94%   BMI 26.82 kg/m       Cardiac Rhythm: NSR    Pain Score: 8/10    Is the Patient Confused:  No    Last Food or Drink: 01/17/24    Focused Assessment: Pt is alert and oriented, calm and cooperative. Pt has hx of sickle cell. Pain located on left side of chest under left breast and left side of upper back. VSS on RA. Lidocaine patches in place on left side under breast and left side of upper back. Last dilaudid given @ 0018. IV fluids running at 100 mL/hr.     Tests Performed: Done: Labs and Imaging    Treatments Provided:  Pain medications    Family Dynamics/Concerns: No    Family Updated On Visitor Policy: No    Plan of Care Communicated to Family: No    Who Was Updated about Plan of Care: Patient    Belongings Checklist Done and Signed by Patient: No      Covid: asymptomatic , not tested    RN: Iván Wan RN  1/17/2024 2:23 AM       "

## 2024-01-17 NOTE — ED PROVIDER NOTES
EMERGENCY DEPARTMENT ENCOUNTER      NAME: Kelly Harris  YOB: 1970  MRN: [id number]    FINAL IMPRESSION  1. Left-sided chest pain    2. Dyspnea, unspecified type    3. Sickle cell pain crisis (H)        MEDICAL DECISION MAKING   Pertinent Labs & Imaging studies reviewed. (See chart for details)    Kelly Harris is a 53 year old female who presents for evaluation of left-sided chest pain.  Outside records reviewed.  Patient has a history of chronic bilateral low back pain with sciatica and follows with outpatient providers PM&R.  She also with a history of asthma as well as PE, follows with Dr. Phillips of pulmonology.  She was last seen in clinic for a virtual visit on 12/15/2023.  She was admitted in July 2023 and found to have an acute PE.  She was on anticoagulation but this has since been discontinued.  Today, patient presents with complaints of left-sided chest and flank pain that she reports feels different than her typical back pain.  She has tried taking oral medications for this pain but has not had any improvement in symptoms.  She was seen by her primary clinic earlier today and had an EKG that was reassuring.  Given severity of her pain and significant history with risk factors, she was advised to come here to the emergency department for evaluation.    I considered a broad differential including not limited to acute chest syndrome, pneumonia, pleural effusion, rib fracture, ACS/ischemia, unstable angina, pericarditis, myocarditis, PE, referred pain, dissection, sickle cell pain crisis, asthma exacerbation, other.  Discussed options for workup and management with patient.  We have agreed on plan for labs, EKG, CT PE study, and management of symptoms with IV analgesic and antiemetic.  Also give IV fluids.    Laboratory workup overall reassuring.  No acute anemia or leukocytosis.  Reticulocyte count 2.8%.  High sensitivity troponin below age adjusted cutoff. ACS less likely in the setting  of ECG without acute ischemic changes and I do not feel delta troponin necessary given timing of symptoms.  BMP reassuring. No evidence of CORBY, acidosis, or significant electrolyte derangement.  EKG was reassuring without evidence of ischemia.  CT PE study showed no evidence of fracture, PE, or infiltrate but did show a trace left pleural effusion and likely atelectasis.  I rechecked the patient multiple times.      She had transient improvement after dose of Dilaudid but recurrence when she returned from imaging.  I reviewed results and she did feel reassured by findings today.  At this point, etiology of symptoms is unclear but I do have some concern for sickle cell pain crisis.  Does not appear that she is having any obvious evidence of acute chest syndrome, infectious, or cardiopulmonary process.  Discussed options for ongoing workup and management.  We agreed on plan to give additional IV analgesic and admit for symptom control.    I discussed the case with hospital medicine team who agreed to facilitate admission.  Patient was given additional analgesic with improvement in symptoms.  She did develop some itchiness and was subsequently given a dose of Atarax.        Medical Decision Making  Obtained supplemental history:Supplemental history obtained?: No  Reviewed external records: External records reviewed?: Documented in chart and Outpatient Record: [facility] 1/16/24  Care impacted by chronic illness:Other: sickle-cell disease, asthma, thrombosis of ovarian vein, PE  Care significantly affected by social determinants of health:Access to Medical Care  Did you consider but not order tests?: Work up considered but not performed and documented in chart, if applicable  Did you interpret images independently?: Independent interpretation of ECG and images noted in documentation, when applicable.  Consultation discussion with other provider:Did you involve another provider (consultant, ,  pharmacy, etc.)?: I discussed the care with another health care provider, see documentation for details.  Admit.      ED COURSE  6:08 PM I met with the patient for the initial interview and physical examination. Discussed plan for treatment and workup in the ED.     8:01 PM I updated the patient on their results and plan and re-evaluated them.    9:05 PM Patient walked to the bathroom and and was having trouble breathing. Endorses still having pain.   9:19 PM I discussed the case with [place] resident who accepts the patient.        MEDICATIONS GIVEN IN THE ED  Medications   HYDROmorphone (DILAUDID) half-tab 1 mg (has no administration in time range)     Or   HYDROmorphone (DILAUDID) tablet 2 mg (has no administration in time range)   HYDROmorphone (DILAUDID) injection 0.4 mg (has no administration in time range)   HYDROmorphone (DILAUDID) injection 1 mg (1 mg Intravenous $Given 1/16/24 1844)   sodium chloride 0.9% BOLUS 1,000 mL (0 mLs Intravenous Stopped 1/16/24 1931)   diphenhydrAMINE (BENADRYL) injection 50 mg (50 mg Intravenous $Given 1/16/24 1858)   iopamidol (ISOVUE-370) solution 90 mL (90 mLs Intravenous $Given 1/16/24 1924)   HYDROmorphone (DILAUDID) injection 1 mg (1 mg Intravenous $Given 1/16/24 2012)   lactated ringers BOLUS 1,000 mL (0 mLs Intravenous Stopped 1/16/24 2156)   hydrOXYzine HCl (ATARAX) tablet 25 mg (25 mg Oral $Given 1/16/24 2203)       NEW PRESCRIPTIONS STARTED AT TODAY'S VISIT  New Prescriptions    No medications on file          =================================================================    Chief Complaint   Patient presents with    Rib Pain    Back Pain         HPI:    Patient information was obtained from: patient     Use of : N/A     Kelly Harris is a 53 year old female who presents with rib pain and back pain.     Per chart review:   Patient was seen at [facility] on 01/16/24 for rib pain. EKG revealed normal sinus rhythm with a  "regular rate of 74. No ST changes noted. Patient sent to [facility] ED for a higher level of care and evaluation.     Patient reports left sided rib pain radiating into her left flank. Patient took tramadol last night with no improvement. Deep breathing worsens pain. Has a history of sickle cell and notes this is not similar pain. It is a similar pain as to when she had a blood clot 7/2023. Was taken off of her blood thinner. Denies shortness of breath, fever, cough, nausea, abdominal pain, leg swelling.     Patient reports midline back pain radiating down into her legs. Endorses a history of back pain but notes this is not the same.       RELEVANT HISTORY, MEDICATIONS, & ALLERGIES   Past medical history, surgical history, family history, medications, and allergies reviewed and pertinent noted in HPI.    REVIEW OF SYSTEMS:  A complete review of systems was performed with pertinent positives and negatives noted in the HPI. All other systems negative.     PHYSICAL EXAM:    Vitals: BP (!) 148/87   Pulse 76   Temp 98  F (36.7  C) (Temporal)   Resp 24   Ht 1.6 m (5' 3\")   Wt 68.7 kg (151 lb 6.4 oz)   LMP 04/12/2017   SpO2 96%   BMI 26.82 kg/m     General: Alert and interactive, uncomfortable appearing. Tearful appearing. No distress  HENT: Atraumatic. Full AROM of neck. Conjunctiva clear.   Cardiovascular: Regular rate and rhythm.   Chest/Pulmonary: Normal work of breathing. Speaking in complete sentences. Lungs CTAB. Tenderness lateral left ribs.  Abdomen: Soft, nondistended. No guarding or rebound.mild LUQ tenderness  Extremities: Normal AROM of all major joints. L-sided flank pain. No calf swelling or tenderness  Skin: Warm and dry. Normal skin color.   Neuro: Speech clear. CNs grossly intact. Moves all extremities spontaneously.   Psych: Normal affect/mood, cooperative, memory appropriate.      LAB  Labs Ordered and Resulted from Time of ED Arrival to Time of ED Departure   BASIC METABOLIC PANEL - " Abnormal       Result Value    Sodium 139      Potassium 3.9      Chloride 104      Carbon Dioxide (CO2) 26      Anion Gap 9      Urea Nitrogen 8.9      Creatinine 0.82      GFR Estimate 85      Calcium 9.0      Glucose 130 (*)    RETICULOCYTE COUNT - Abnormal    % Reticulocyte 2.8 (*)     Absolute Reticulocyte 0.130 (*)    CBC WITH PLATELETS AND DIFFERENTIAL - Abnormal    WBC Count 9.2      RBC Count 4.64      Hemoglobin 12.3      Hematocrit 34.5 (*)     MCV 74 (*)     MCH 26.5      MCHC 35.7      RDW 14.7      Platelet Count 334      % Neutrophils        % Lymphocytes        % Monocytes        % Eosinophils        % Basophils        % Immature Granulocytes        Absolute Neutrophils        Absolute Lymphocytes        Absolute Monocytes        Absolute Eosinophils        Absolute Basophils        Absolute Immature Granulocytes       DIFFERENTIAL - Abnormal    % Neutrophils 53      % Lymphocytes 42      % Monocytes 4      % Eosinophils 0      % Basophils 1      NRBCs per 100 WBC 12 (*)     Absolute Neutrophils 4.9      Absolute Lymphocytes 3.9      Absolute Monocytes 0.4      Absolute Eosinophils 0.0      Absolute Basophils 0.1      Absolute NRBCs 1.1 (*)     RBC Morphology Confirmed RBC Indices      Platelet Assessment        Value: Automated Count Confirmed. Platelet morphology is normal.    Elliptocytes Slight (*)     Target Cells Marked (*)    TROPONIN T, HIGH SENSITIVITY - Normal    Troponin T, High Sensitivity <6         RADIOLOGY  CT Chest Pulmonary Embolism w Contrast   Final Result   IMPRESSION:   1.  No evidence of pulmonary embolus.   2.  Trace left pleural effusion and bibasilar likely atelectasis.          EKG  Performed at: 1728  Impression: Sinus rhythm with sinus arrhythmia.  No acute ischemic changes.  Normal intervals.  No significant change from previous.  Rate: 95  Rhythm: Sinus  QRS Interval: 78  QTc Interval: 427  Comparison: 7/25/2023    All laboratory and imaging results and EKG's were  personally reviewed and interpreted by myself prior to disposition decision.       PROCEDURES  N/A       I, Maura Gentile, am serving as a scribe to document services personally performed by Dr. Rosaura Jackson based on my observation and the provider's statements to me. I, Rosaura Jackson MD attest that Maura Gentile is acting in a scribe capacity, has observed my performance of the services and has documented them in accordance with my direction.    Rosaura Jackson M.D.  Emergency Medicine  [facility] EMERGENCY DEPARTMENT  [address] [place] [id number]  [phone]  Dept: [phone]     Rosaura Jackson MD  01/16/24 3719

## 2024-01-17 NOTE — H&P
[facility]    History and Physical - Hospitalist Service       Date of Admission:  1/16/2024    Assessment & Plan      Kelly Harris is a 53 year old female admitted on 1/16/2024. She has a history of sickle cell pain crisis, bilateral pulmonary emboli, asthma and is admitted for L chest wall pain.       L chest wall pain, suspect vaso-occlusive crisis  H/o sickle cell/HgC disease, bilateral pulmonary emboli  Patient presents from clinic visit with PCP after several days of continuous chest pain radiating into back, not relieved by home pain meds, worsened with inspiration. Endorses feeling similar to when she had bilateral PE this past July, for which she is no longer on Eliquis. EKG in clinic showed NSR with regular rate, no ST changes, sent to ED for further evaluation. Found to be slightly hypertension but otherwise afebrile hemodynamically stable on RA. Trop negative, CT negative for PE or new consolidation. Suspect recurrent sickle cell pain crisis, less concern for acute chest syndrome. Previous pain crisis thought to be provoked by PEs and asthma exacerbation, but not the case this time. No concern for infectious etiology. Potential triggers include cold temperatures, stress or dehydration. Hgb normal and actually improved from previous, thus do not suspect splenic sequestration. Reassured against bone marrow suppression by elevated retic count. Patient continues to have uncontrolled pain despite receiving several doses IV dilaudid and IVF boluses x2 in ED.  - continue mIVF overnight  - pain control: scheduled tylenol, lidocaine patch, prn IV dilaudid, heating pad   - holding PTA tramadol   - monitor respiratory status and vitals closely  - encourage incentive spirometry and early ambulation  - bowel regimen  - VTE ppx     Asthma: PTA inhalers, montelukast  Seasonal allergies: PTA Zyrtec, Flonase        Diet: Regular Diet Adult    DVT Prophylaxis: Enoxaparin (Lovenox) SQ  Reyes  "Catheter: Not present  Fluids: mIVF+PO  Lines: None     Cardiac Monitoring: None  Code Status: Full Code      Clinically Significant Risk Factors Present on Admission                       # Overweight: Estimated body mass index is 26.82 kg/m  as calculated from the following:    Height as of this encounter: 1.6 m (5' 3\").    Weight as of this encounter: 68.7 kg (151 lb 6.4 oz).       # Asthma: noted on problem list        Disposition Plan      Expected Discharge Date: 01/17/2024                The patient's care will be discussed with the Attending Physician, Dr. Smith .      Enzo Rodriguez MD  Hospitalist Service  [facility]  Securely message with RE[employer] (more info)  Text page via [facility] Paging/Directory   ______________________________________________________________________    Chief Complaint   L chest wall pain over past several days, radiating to back.    History is obtained from the patient and ED provider.    History of Present Illness   Kelly Harris is a 53 year old female admitted on 1/16/2024. She has a history of sickle cell pain crisis, bilateral pulmonary emboli, asthma and is admitted for L chest wall pain.     Symptom onset several days ago, has been \"continuous.\" Endorses being worse with breathing in, describes as \"stabbing and pulling\" that keeps her awake all night. PTA tramadol has provided no relief. Seen by PCP today who sent patient in for further evaluation. EKG in clinic showed NSR with regular rate, no ST changes. Has history of bilateral PE this past July for which she was hospitalized and discharged on Eliquis, no longer taking this. Pain today feels similar to when she had these clots. Also has history of sickle cell crisis, thought to be provoked by PE and asthma exacerbation. Patient is heterozygous for hemoglobin S and C.     Denies SOB, wheezing, cough, N/V, fevers/chills. Pain continues to be poorly controlled.       Past Medical History    Past Medical " History:   Diagnosis Date    Asthma     Sickle cell pain crisis (H) 07/16/2023       Past Surgical History   Past Surgical History:   Procedure Laterality Date    MOUTH SURGERY  09/07/2019    Removed benign mouth tumor       Prior to Admission Medications   Prior to Admission Medications   Prescriptions Last Dose Informant Patient Reported? Taking?   FOLIC ACID PO 1/16/2024 at am  Yes Yes   Sig: Take 1 tablet by mouth daily   acetaminophen (TYLENOL) 325 MG tablet Past Week at prn  No Yes   Sig: Take 3 tablets (975 mg) by mouth every 8 hours   albuterol (PROAIR HFA/PROVENTIL HFA/VENTOLIN HFA) 108 (90 Base) MCG/ACT inhaler 1/16/2024 at am  No Yes   Sig: Inhale 2 puffs into the lungs every 6 hours as needed for shortness of breath, wheezing or cough   cetirizine (ZYRTEC) 10 MG tablet 1/16/2024 at am  No Yes   Sig: TAKE 1 TABLET BY MOUTH EVERY DAY   fluticasone (FLONASE) 50 MCG/ACT nasal spray 1/16/2024 at am  No Yes   Sig: INSTILL 1 SPRAY INTO BOTH NOSTRILS DAILY   mometasone-formoterol (DULERA) 200-5 MCG/ACT inhaler 1/16/2024 at am  No Yes   Sig: Inhale 2 puffs into the lungs 2 times daily   montelukast (SINGULAIR) 10 MG tablet 1/15/2024 at pm  No Yes   Sig: TAKE 1 TABLET BY MOUTH EVERYDAY AT BEDTIME   tiotropium (SPIRIVA RESPIMAT) 2.5 MCG/ACT inhaler 1/16/2024 at am  No Yes   Sig: INHALE 2 PUFFS BY MOUTH INTO THE LUNGS DAILY   traMADol (ULTRAM) 50 MG tablet 1/15/2024 at pm  Yes Yes   Sig: Take 50 mg by mouth every 6 hours as needed for severe pain      Facility-Administered Medications: None         Physical Exam   Vital Signs: Temp: 98  F (36.7  C) Temp src: Temporal BP: 130/76 Pulse: 69   Resp: 24 SpO2: 94 % O2 Device: Nasal cannula Oxygen Delivery: 2 LPM  Weight: 151 lbs 6.4 oz    General Appearance: lying in bed, appears uncomfortable, rubbing left chest wall, alert, mild distress.  HEENT: Normocephalic, atraumatic. EOMi. MMM.   Respiratory: No increased work of breathing. Diminished breath sounds- no wheezing,  crackles, rhonchi. TTP left lateral chest wall.  Cardiovascular: RRR, no murmurs. Strong peripheral pulses, no edema.  GI: Soft, nontender, nondistended. No masses, rebound tenderness, guarding.   Skin: Warm, dry. No rashes, bruising, jaundice.   Musculoskeletal: Moving extremities spontaneously.   Neurologic: No focal deficits.   Psychiatric: A&Ox3. Thought process linear and logical. Mood and affect normal.     Medical Decision Making       Please see A&P for additional details of medical decision making.      Data   ------------------------- PAST 24 HR DATA REVIEWED -----------------------------------------------

## 2024-01-17 NOTE — ED NOTES
Pt states there was no trauma associated with her pain in her ribs and back. Pain came on a few days ago without any traumatic event.

## 2024-01-18 LAB
ANION GAP SERPL CALCULATED.3IONS-SCNC: 4 MMOL/L (ref 7–15)
BUN SERPL-MCNC: 9 MG/DL (ref 6–20)
CALCIUM SERPL-MCNC: 8.8 MG/DL (ref 8.6–10)
CHLORIDE SERPL-SCNC: 104 MMOL/L (ref 98–107)
CREAT SERPL-MCNC: 1.02 MG/DL (ref 0.51–0.95)
DEPRECATED HCO3 PLAS-SCNC: 29 MMOL/L (ref 22–29)
EGFRCR SERPLBLD CKD-EPI 2021: 65 ML/MIN/1.73M2
ERYTHROCYTE [DISTWIDTH] IN BLOOD BY AUTOMATED COUNT: 14.6 % (ref 10–15)
GLUCOSE SERPL-MCNC: 86 MG/DL (ref 70–99)
HCT VFR BLD AUTO: 31.6 % (ref 35–47)
HGB BLD-MCNC: 11.2 G/DL (ref 11.7–15.7)
MCH RBC QN AUTO: 26.4 PG (ref 26.5–33)
MCHC RBC AUTO-ENTMCNC: 35.4 G/DL (ref 31.5–36.5)
MCV RBC AUTO: 74 FL (ref 78–100)
PLATELET # BLD AUTO: 280 10E3/UL (ref 150–450)
POTASSIUM SERPL-SCNC: 4 MMOL/L (ref 3.4–5.3)
RBC # BLD AUTO: 4.25 10E6/UL (ref 3.8–5.2)
SODIUM SERPL-SCNC: 137 MMOL/L (ref 135–145)
WBC # BLD AUTO: 10.6 10E3/UL (ref 4–11)

## 2024-01-18 PROCEDURE — G0378 HOSPITAL OBSERVATION PER HR: HCPCS

## 2024-01-18 PROCEDURE — 250N000013 HC RX MED GY IP 250 OP 250 PS 637

## 2024-01-18 PROCEDURE — 96372 THER/PROPH/DIAG INJ SC/IM: CPT

## 2024-01-18 PROCEDURE — 85027 COMPLETE CBC AUTOMATED: CPT

## 2024-01-18 PROCEDURE — 80048 BASIC METABOLIC PNL TOTAL CA: CPT

## 2024-01-18 PROCEDURE — 250N000011 HC RX IP 250 OP 636

## 2024-01-18 PROCEDURE — 99232 SBSQ HOSP IP/OBS MODERATE 35: CPT | Mod: GC

## 2024-01-18 PROCEDURE — 36415 COLL VENOUS BLD VENIPUNCTURE: CPT

## 2024-01-18 PROCEDURE — 96376 TX/PRO/DX INJ SAME DRUG ADON: CPT

## 2024-01-18 RX ORDER — NALOXONE HYDROCHLORIDE 0.4 MG/ML
0.4 INJECTION, SOLUTION INTRAMUSCULAR; INTRAVENOUS; SUBCUTANEOUS
Status: DISCONTINUED | OUTPATIENT
Start: 2024-01-18 | End: 2024-01-21 | Stop reason: HOSPADM

## 2024-01-18 RX ORDER — NALOXONE HYDROCHLORIDE 0.4 MG/ML
0.2 INJECTION, SOLUTION INTRAMUSCULAR; INTRAVENOUS; SUBCUTANEOUS
Status: DISCONTINUED | OUTPATIENT
Start: 2024-01-18 | End: 2024-01-21 | Stop reason: HOSPADM

## 2024-01-18 RX ORDER — HYDROMORPHONE HYDROCHLORIDE 2 MG/1
2 TABLET ORAL
Status: DISCONTINUED | OUTPATIENT
Start: 2024-01-18 | End: 2024-01-21 | Stop reason: HOSPADM

## 2024-01-18 RX ADMIN — ACETAMINOPHEN 975 MG: 325 TABLET ORAL at 15:40

## 2024-01-18 RX ADMIN — LIDOCAINE 2 PATCH: 4 PATCH TOPICAL at 19:47

## 2024-01-18 RX ADMIN — UMECLIDINIUM 1 PUFF: 62.5 AEROSOL, POWDER ORAL at 08:26

## 2024-01-18 RX ADMIN — ACETAMINOPHEN 975 MG: 325 TABLET ORAL at 08:13

## 2024-01-18 RX ADMIN — HYDROMORPHONE HYDROCHLORIDE 0.4 MG: 1 INJECTION, SOLUTION INTRAMUSCULAR; INTRAVENOUS; SUBCUTANEOUS at 14:34

## 2024-01-18 RX ADMIN — HYDROMORPHONE HYDROCHLORIDE 2 MG: 2 TABLET ORAL at 11:28

## 2024-01-18 RX ADMIN — ENOXAPARIN SODIUM 40 MG: 40 INJECTION SUBCUTANEOUS at 21:06

## 2024-01-18 RX ADMIN — ACETAMINOPHEN 975 MG: 325 TABLET ORAL at 00:07

## 2024-01-18 RX ADMIN — HYDROMORPHONE HYDROCHLORIDE 0.3 MG: 1 INJECTION, SOLUTION INTRAMUSCULAR; INTRAVENOUS; SUBCUTANEOUS at 08:12

## 2024-01-18 RX ADMIN — MONTELUKAST 10 MG: 10 TABLET, FILM COATED ORAL at 21:06

## 2024-01-18 RX ADMIN — HYDROMORPHONE HYDROCHLORIDE 0.4 MG: 1 INJECTION, SOLUTION INTRAMUSCULAR; INTRAVENOUS; SUBCUTANEOUS at 17:47

## 2024-01-18 RX ADMIN — HYDROMORPHONE HYDROCHLORIDE 2 MG: 2 TABLET ORAL at 05:12

## 2024-01-18 RX ADMIN — CETIRIZINE HYDROCHLORIDE 10 MG: 10 TABLET, FILM COATED ORAL at 08:27

## 2024-01-18 RX ADMIN — HYDROMORPHONE HYDROCHLORIDE 0.4 MG: 1 INJECTION, SOLUTION INTRAMUSCULAR; INTRAVENOUS; SUBCUTANEOUS at 21:07

## 2024-01-18 RX ADMIN — FLUTICASONE PROPIONATE 1 SPRAY: 50 SPRAY, METERED NASAL at 08:26

## 2024-01-18 RX ADMIN — FLUTICASONE FUROATE AND VILANTEROL TRIFENATATE 1 PUFF: 200; 25 POWDER RESPIRATORY (INHALATION) at 08:27

## 2024-01-18 RX ADMIN — HYDROMORPHONE HYDROCHLORIDE 2 MG: 2 TABLET ORAL at 01:42

## 2024-01-18 ASSESSMENT — ACTIVITIES OF DAILY LIVING (ADL)
ADLS_ACUITY_SCORE: 35
ADLS_ACUITY_SCORE: 20
DEPENDENT_IADLS:: INDEPENDENT
ADLS_ACUITY_SCORE: 35
ADLS_ACUITY_SCORE: 35
ADLS_ACUITY_SCORE: 20
ADLS_ACUITY_SCORE: 35
ADLS_ACUITY_SCORE: 20
ADLS_ACUITY_SCORE: 35
ADLS_ACUITY_SCORE: 20
ADLS_ACUITY_SCORE: 35

## 2024-01-18 NOTE — UTILIZATION REVIEW
Concurrent stay review; Secondary Review Determination   CONDITIONAL DETERMINATON    Under the authority of the Utilization Management Committee, the utilization review process indicated a secondary review on the above patient.  The review outcome is based on review of the medical records, discussions with staff, and applying clinical experience noted on the date of the review.          (x) Observation Status Appropriate - Concurrent stay review    RATIONALE FOR DETERMINATION     53 year old female with a past medical history of sickle cell pain crisis, bilateral pulmonary emboli, asthma who presented to ER for L chest wall pain, admitted with possible vaso-occlusive crisis.   Continuing to have chest pain and is still requiring both po and IV medications for pain.  Vitals are stable, she is tolerating an advancing diet and not on IVF.      If she continues to have worsening symptoms that require PCA and pain team consult, abnormal vitals and/or heme consult then would consider re-evaluating for INPATIENT status.       Patient is clinically improving and there is no clear indication to change patient's status to inpatient. The severity of illness, intensity of service provided, expected LOS and risk for adverse outcome make the care appropriate for observation.      The information on this document is developed by the utilization review team in order for the business office to ensure compliance.  This only denotes the appropriateness of proper admission status and does not reflect the quality of care rendered.         The definitions of Inpatient Status and Observation Status used in making the determination above are those provided in the CMS Coverage Manual, Chapter 1 and Chapter 6, section 70.4.          Sincerely,       Bebe Cuellar, DO  Utilization Review  Physician Advisor  [facility].

## 2024-01-18 NOTE — PROGRESS NOTES
"PRIMARY DIAGNOSIS: \"GENERIC\" NURSING  OUTPATIENT/OBSERVATION GOALS TO BE MET BEFORE DISCHARGE:  ADLs back to baseline: No    Activity and level of assistance: Up with standby assistance.    Pain status: No improvement noted. Consider adjustment in pain regimen.    Return to near baseline physical activity: No     Discharge Planner Nurse   Safe discharge environment identified: No  Barriers to discharge:  needs pain control       Entered by: Hiral Gamboa RN 01/17/2024 10:30 PM     Please review provider order for any additional goals.   Nurse to notify provider when observation goals have been met and patient is ready for discharge.  "

## 2024-01-18 NOTE — PROGRESS NOTES
"PRIMARY DIAGNOSIS: \"GENERIC\" NURSING  OUTPATIENT/OBSERVATION GOALS TO BE MET BEFORE DISCHARGE:  ADLs back to baseline: No    Activity and level of assistance: Up with standby assistance.    Pain status: No improvement noted. Consider adjustment in pain regimen.    Return to near baseline physical activity: No     Discharge Planner Nurse   Safe discharge environment identified: No  Barriers to discharge:  pain control       Entered by: Hiral Gamboa RN 01/17/2024 10:34 PM     Please review provider order for any additional goals.   Nurse to notify provider when observation goals have been met and patient is ready for discharge.  "

## 2024-01-18 NOTE — PROGRESS NOTES
[facility]    Medicine Progress Note - Hospitalist Service    Date of Admission:  1/16/2024    Assessment & Plan      Kelly Harris is a 53 year old female admitted on 1/16/2024. She has a history of sickle cell pain crisis, bilateral pulmonary emboli, asthma and is admitted for L chest wall pain.     L chest wall pain, suspect vaso-occlusive crisis  H/o sickle cell/HgC disease, bilateral pulmonary emboli  Patient presents from clinic visit with PCP after several days of continuous multi joint pain and new left sided chest pain radiating into back, not relieved by home pain meds, worsened with inspiration. Endorses feeling similar to when she had bilateral PE this past July, for which she is no longer on Eliquis. EKG in clinic showed NSR with regular rate, no ST changes, sent to ED for further evaluation. Found to be slightly hypertension but otherwise afebrile hemodynamically stable on RA. Trop negative, CT negative for PE or new consolidation. Suspect recurrent sickle cell pain crisis, less concern for acute chest syndrome. Potential triggers include cold temperatures, stress or dehydration. Hgb normal and actually improved from previous, thus do not suspect splenic sequestration. Reassured against bone marrow suppression by elevated retic count. Attempted to switch to PO dilaudid which did not alleviate symptoms. Will add back IV dilaudid.   - pain control:    - PO and IV Dilaudid  - scheduled tylenol, lidocaine patch, heating pad   - holding PTA tramadol   - monitor respiratory status and vitals closely  - encourage incentive spirometry and early ambulation  - bowel regimen  - VTE ppx     CORBY  Cr bump to 1.02 on 1/18. Patient feels she is staying well hydrated. Will continue to encourage fluids.  - BMP in am    Asthma: PTA inhalers, montelukast  Seasonal allergies: PTA Zyrtec, Flonase          Diet: Regular Diet Adult    DVT Prophylaxis: Enoxaparin (Lovenox) SQ  Reyes Catheter: Not  "present  Lines: None     Cardiac Monitoring: None  Code Status: Full Code      Clinically Significant Risk Factors Present on Admission                       # Overweight: Estimated body mass index is 26.82 kg/m  as calculated from the following:    Height as of this encounter: 1.6 m (5' 3\").    Weight as of this encounter: 68.7 kg (151 lb 6.4 oz).       # Asthma: noted on problem list        Disposition Plan           The patient's care was discussed with the Attending Physician, Dr. Smith .    Shawna Aj MD  Hospitalist Service  [facility]  Securely message with [employer] (more info)  Text page via [facility] Paging/Directory   ______________________________________________________________________    Interval History   Patient's pain was not controlled with PO dilaudid. Her headache is now gone. Notes 6/10 all over joint pain which is manageable. Her left chest wall pain is 10/10 and then goes to 8/10 with PO dilaudid.     Physical Exam   Vital Signs: Temp: 98.3  F (36.8  C) Temp src: Oral BP: 130/80 Pulse: 105   Resp: 25 SpO2: 99 % O2 Device: None (Room air)    Weight: 151 lbs 6.4 oz  GENERAL: Healthy, alert and no distress. Laying comfortably  EYES: PERRL, EOMI  RESP:  Lungs clear throughout. No wheeze or crackles.   CV: Heart RRR. No murmur  CHEST WALL: without signs of blistering or rash, lidocaine patch in place, tenderness on palpation of left side chest wall under left breast   Abdomen: Soft, nontender, nondistended.  MSK: No gross deformity. Normal tone. No LE edema  NEURO: Cranial nerves 2-12 intact.  Mentation and speech appropriate for age.      Data     I have personally reviewed the following data over the past 24 hrs:    10.6  \   11.2 (L)   / 280     137 104 9.0 /  86   4.0 29 1.02 (H) \       Imaging results reviewed over the past 24 hrs:   No results found for this or any previous visit (from the past 24 hour(s)).    "

## 2024-01-18 NOTE — PROGRESS NOTES
"PRIMARY DIAGNOSIS: \"GENERIC\" NURSING  OUTPATIENT/OBSERVATION GOALS TO BE MET BEFORE DISCHARGE:  ADLs back to baseline: No    Activity and level of assistance: Up with standby assistance.    Pain status: No improvement noted. Consider adjustment in pain regimen.    Return to near baseline physical activity: No     Discharge Planner Nurse   Safe discharge environment identified: No  Barriers to discharge:  pain control       Entered by: Hiral Gamboa RN 01/17/2024 10:35 PM     Please review provider order for any additional goals.   Nurse to notify provider when observation goals have been met and patient is ready for discharge.  "

## 2024-01-18 NOTE — CARE PLAN
PRIMARY DIAGNOSIS: ACUTE PAIN, Sickle cell crisis  OUTPATIENT/OBSERVATION GOALS TO BE MET BEFORE DISCHARGE:  1. Pain Status: Improved but still requiring IV narcotics.    2. Return to near baseline physical activity: No    3. Cleared for discharge by consultants (if involved): N/A    Discharge Planner Nurse   Safe discharge environment identified: Yes, home  Barriers to discharge: Yes, pain requiring IV medication       Entered by: Onur Hunter RN 01/18/2024 8:33 AM     Pt having left sided chest pain that worsens with deep breathing and radiates to shoulder. Pain rated 9/10, down to 8/10 after 0.3mg IV dilaudid. Pt's stated pain goal is 4-5/10.

## 2024-01-18 NOTE — PLAN OF CARE
PRIMARY DIAGNOSIS: ACUTE PAIN  OUTPATIENT/OBSERVATION GOALS TO BE MET BEFORE DISCHARGE:  1. Pain Status: Improved-controlled with oral pain medications.    2. Return to near baseline physical activity: No    3. Cleared for discharge by consultants (if involved): No    Discharge Planner Nurse   Safe discharge environment identified: No  Barriers to discharge: YES       Entered by: Kye Lauren RN 01/18/2024 6:28 AM     Please review provider order for any additional goals.   Nurse to notify provider when observation goals have been met and patient is ready for discharge.Goal Outcome Evaluation:    Pt alert and oriented x4. C/o pain 8/10 decreases to 7/10 with prn Dilaudid. SL. Call light within reach. Plan of care on going.

## 2024-01-18 NOTE — PLAN OF CARE
"PRIMARY DIAGNOSIS: \"GENERIC\" NURSING  OUTPATIENT/OBSERVATION GOALS TO BE MET BEFORE DISCHARGE:  ADLs back to baseline: No    Activity and level of assistance: Up with standby assistance.    Pain status: Improved-controlled with oral pain medications.    Return to near baseline physical activity: No     Discharge Planner Nurse   Safe discharge environment identified: Yes  Barriers to discharge: Yes       Entered by: Kye Lauren RN 01/18/2024 2:43 AM     Please review provider order for any additional goals.   Nurse to notify provider when observation goals have been met and patient is ready for discharge.Goal Outcome Evaluation:    Pt alert and oriented x4. C/o pain of 8/10 prn dilaudid 2 mg & scheduled tylenol given with minimal relief.      "

## 2024-01-18 NOTE — CONSULTS
Care Management Initial Consult    General Information  Assessment completed with: PatientKelly  Type of CM/SW Visit: Initial Assessment    Primary Care Provider verified and updated as needed: Yes   Readmission within the last 30 days: no previous admission in last 30 days         Advance Care Planning: Advance Care Planning Reviewed: other (see comments) (No ACP documents)          Communication Assessment  Patient's communication style: spoken language (English or Bilingual)             Cognitive  Cognitive/Neuro/Behavioral: WDL                      Living Environment:   People in home: child(bonnie), adult  Xena and Rosaura  Current living Arrangements: house      Able to return to prior arrangements: yes       Family/Social Support:  Care provided by: self  Provides care for: child(bonnie)  Marital Status: Single  Children          Description of Support System: Supportive         Current Resources:   Patient receiving home care services: No     Community Resources: None  Equipment currently used at home:    Supplies currently used at home: None    Employment/Financial:  Employment Status: employed part-time (Had to go down to part time due to back issues)        Financial Concerns:             Does the patient's insurance plan have a 3 day qualifying hospital stay waiver?  No    Lifestyle & Psychosocial Needs:  Social Determinants of Health     Food Insecurity: Low Risk  (1/16/2024)    Food Insecurity     Within the past 12 months, did you worry that your food would run out before you got money to buy more?: No     Within the past 12 months, did the food you bought just not last and you didn t have money to get more?: No   Depression: Not at risk (1/4/2024)    PHQ-2     PHQ-2 Score: 0   Housing Stability: Low Risk  (1/16/2024)    Housing Stability     Do you have housing? : Yes     Are you worried about losing your housing?: No   Tobacco Use: Low Risk  (1/16/2024)    Patient History     Smoking Tobacco Use: Never      Smokeless Tobacco Use: Never     Passive Exposure: Never   Financial Resource Strain: Low Risk  (1/16/2024)    Financial Resource Strain     Within the past 12 months, have you or your family members you live with been unable to get utilities (heat, electricity) when it was really needed?: No   Alcohol Use: Not on file   Transportation Needs: Low Risk  (1/16/2024)    Transportation Needs     Within the past 12 months, has lack of transportation kept you from medical appointments, getting your medicines, non-medical meetings or appointments, work, or from getting things that you need?: No   Physical Activity: Not on file   Interpersonal Safety: Low Risk  (10/2/2023)    Interpersonal Safety     Do you feel physically and emotionally safe where you currently live?: Yes     Within the past 12 months, have you been hit, slapped, kicked or otherwise physically hurt by someone?: No     Within the past 12 months, have you been humiliated or emotionally abused in other ways by your partner or ex-partner?: No   Stress: Not on file   Social Connections: Not on file       Functional Status:  Prior to admission patient needed assistance:   Dependent ADLs:: Independent (Does need a STBA for stairs)  Dependent IADLs:: Independent (Slower at cleaning when SOB)       Mental Health Status:          Chemical Dependency Status:                Values/Beliefs:  Spiritual, Cultural Beliefs, Jewish Practices, Values that affect care:                 Additional Information:  Assessed. RNCM introduced self and CM role. Pt lives in a house with adult daughters Dexter Mccallum. Pt Ind with ADLS, and most IADLS. Per pt, pts back has been hurting, and has been SOB so daughters have been a STBA when pt goes up the stairs at home. Pt can drive. Does work part time, had to drop down from full time , due to back issues. Pt has [employer]. Can drive. Did go to OP PT at [facility] in the past. Has established PCP. No mobility devices, and no home  care svcs at this time. Family to transport.       Cm will continue to follow plan of care,review recommendations, and assist with any discharge needs anticipated.     Sussy Huizar RN

## 2024-01-19 PROBLEM — D57.00 SICKLE CELL PAIN CRISIS (H): Status: ACTIVE | Noted: 2023-07-16

## 2024-01-19 LAB
ANION GAP SERPL CALCULATED.3IONS-SCNC: 6 MMOL/L (ref 7–15)
BUN SERPL-MCNC: 7.8 MG/DL (ref 6–20)
CALCIUM SERPL-MCNC: 9 MG/DL (ref 8.6–10)
CHLORIDE SERPL-SCNC: 103 MMOL/L (ref 98–107)
CREAT SERPL-MCNC: 0.92 MG/DL (ref 0.51–0.95)
DEPRECATED HCO3 PLAS-SCNC: 30 MMOL/L (ref 22–29)
EGFRCR SERPLBLD CKD-EPI 2021: 74 ML/MIN/1.73M2
ERYTHROCYTE [DISTWIDTH] IN BLOOD BY AUTOMATED COUNT: 13.8 % (ref 10–15)
GLUCOSE SERPL-MCNC: 98 MG/DL (ref 70–99)
HCT VFR BLD AUTO: 30.5 % (ref 35–47)
HGB BLD-MCNC: 10.6 G/DL (ref 11.7–15.7)
MCH RBC QN AUTO: 25.9 PG (ref 26.5–33)
MCHC RBC AUTO-ENTMCNC: 34.8 G/DL (ref 31.5–36.5)
MCV RBC AUTO: 74 FL (ref 78–100)
PLATELET # BLD AUTO: 286 10E3/UL (ref 150–450)
POTASSIUM SERPL-SCNC: 4.2 MMOL/L (ref 3.4–5.3)
RBC # BLD AUTO: 4.1 10E6/UL (ref 3.8–5.2)
SODIUM SERPL-SCNC: 139 MMOL/L (ref 135–145)
WBC # BLD AUTO: 8.7 10E3/UL (ref 4–11)

## 2024-01-19 PROCEDURE — 80048 BASIC METABOLIC PNL TOTAL CA: CPT

## 2024-01-19 PROCEDURE — 85027 COMPLETE CBC AUTOMATED: CPT

## 2024-01-19 PROCEDURE — 120N000001 HC R&B MED SURG/OB

## 2024-01-19 PROCEDURE — 99231 SBSQ HOSP IP/OBS SF/LOW 25: CPT | Mod: GC

## 2024-01-19 PROCEDURE — 250N000013 HC RX MED GY IP 250 OP 250 PS 637

## 2024-01-19 PROCEDURE — 250N000011 HC RX IP 250 OP 636

## 2024-01-19 PROCEDURE — 36415 COLL VENOUS BLD VENIPUNCTURE: CPT

## 2024-01-19 PROCEDURE — 96376 TX/PRO/DX INJ SAME DRUG ADON: CPT

## 2024-01-19 PROCEDURE — G0378 HOSPITAL OBSERVATION PER HR: HCPCS

## 2024-01-19 RX ORDER — AMOXICILLIN 250 MG
1 CAPSULE ORAL 2 TIMES DAILY
Status: DISCONTINUED | OUTPATIENT
Start: 2024-01-19 | End: 2024-01-21 | Stop reason: HOSPADM

## 2024-01-19 RX ORDER — POLYETHYLENE GLYCOL 3350 17 G/17G
17 POWDER, FOR SOLUTION ORAL 2 TIMES DAILY PRN
Status: DISCONTINUED | OUTPATIENT
Start: 2024-01-19 | End: 2024-01-21 | Stop reason: HOSPADM

## 2024-01-19 RX ADMIN — HYDROMORPHONE HYDROCHLORIDE 2 MG: 2 TABLET ORAL at 15:57

## 2024-01-19 RX ADMIN — MONTELUKAST 10 MG: 10 TABLET, FILM COATED ORAL at 21:30

## 2024-01-19 RX ADMIN — HYDROMORPHONE HYDROCHLORIDE 2 MG: 2 TABLET ORAL at 20:53

## 2024-01-19 RX ADMIN — ACETAMINOPHEN 975 MG: 325 TABLET ORAL at 15:50

## 2024-01-19 RX ADMIN — ACETAMINOPHEN 975 MG: 325 TABLET ORAL at 08:50

## 2024-01-19 RX ADMIN — FLUTICASONE PROPIONATE 1 SPRAY: 50 SPRAY, METERED NASAL at 08:51

## 2024-01-19 RX ADMIN — UMECLIDINIUM 1 PUFF: 62.5 AEROSOL, POWDER ORAL at 08:51

## 2024-01-19 RX ADMIN — HYDROMORPHONE HYDROCHLORIDE 0.4 MG: 1 INJECTION, SOLUTION INTRAMUSCULAR; INTRAVENOUS; SUBCUTANEOUS at 00:47

## 2024-01-19 RX ADMIN — FLUTICASONE FUROATE AND VILANTEROL TRIFENATATE 1 PUFF: 200; 25 POWDER RESPIRATORY (INHALATION) at 08:52

## 2024-01-19 RX ADMIN — HYDROMORPHONE HYDROCHLORIDE 0.4 MG: 1 INJECTION, SOLUTION INTRAMUSCULAR; INTRAVENOUS; SUBCUTANEOUS at 14:57

## 2024-01-19 RX ADMIN — LIDOCAINE 2 PATCH: 4 PATCH TOPICAL at 21:29

## 2024-01-19 RX ADMIN — ACETAMINOPHEN 975 MG: 325 TABLET ORAL at 00:40

## 2024-01-19 RX ADMIN — HYDROMORPHONE HYDROCHLORIDE 0.4 MG: 1 INJECTION, SOLUTION INTRAMUSCULAR; INTRAVENOUS; SUBCUTANEOUS at 09:01

## 2024-01-19 RX ADMIN — CETIRIZINE HYDROCHLORIDE 10 MG: 10 TABLET, FILM COATED ORAL at 08:50

## 2024-01-19 RX ADMIN — ENOXAPARIN SODIUM 40 MG: 40 INJECTION SUBCUTANEOUS at 21:34

## 2024-01-19 RX ADMIN — HYDROMORPHONE HYDROCHLORIDE 2 MG: 2 TABLET ORAL at 12:16

## 2024-01-19 ASSESSMENT — ACTIVITIES OF DAILY LIVING (ADL)
ADLS_ACUITY_SCORE: 20
ADLS_ACUITY_SCORE: 26
ADLS_ACUITY_SCORE: 20
ADLS_ACUITY_SCORE: 26

## 2024-01-19 NOTE — PLAN OF CARE
PRIMARY DIAGNOSIS: ACUTE PAIN  OUTPATIENT/OBSERVATION GOALS TO BE MET BEFORE DISCHARGE:  1. Pain Status: Improved but still requiring IV narcotics.    2. Return to near baseline physical activity: Yes    3. Cleared for discharge by consultants (if involved): No    Discharge Planner Nurse   Safe discharge environment identified: No  Barriers to discharge: Yes  IV pain meds       Entered by: Molly Munoz RN 01/18/2024 10:01 PM     Please review provider order for any additional goals.   Nurse to notify provider when observation goals have been met and patient is ready for discharge.Goal Outcome Evaluation:

## 2024-01-19 NOTE — PLAN OF CARE
PRIMARY DIAGNOSIS: ACUTE PAIN  OUTPATIENT/OBSERVATION GOALS TO BE MET BEFORE DISCHARGE:  1. Pain Status: Improved but still requiring IV narcotics.    2. Return to near baseline physical activity: Yes    3. Cleared for discharge by consultants (if involved): No    Discharge Planner Nurse   Safe discharge environment identified: Yes  Barriers to discharge: Yes - iv pain medication        Entered by: Temitope Holley RN 01/19/2024 6:22 AM     Please review provider order for any additional goals.   Nurse to notify provider when observation goals have been met and patient is ready for discharge.Goal Outcome Evaluation:

## 2024-01-19 NOTE — PLAN OF CARE
PRIMARY DIAGNOSIS: ACUTE PAIN  OUTPATIENT/OBSERVATION GOALS TO BE MET BEFORE DISCHARGE:  1. Pain Status: Improved but still requiring IV narcotics.    2. Return to near baseline physical activity: Yes    3. Cleared for discharge by consultants (if involved): No    Discharge Planner Nurse   Safe discharge environment identified: Yes  Barriers to discharge: No       Entered by: Jermaine Nunez RN 01/19/2024 12:53 PM   Patient received PRN Dilaudid PO this shift.  Please review provider order for any additional goals.   Nurse to notify provider when observation goals have been met and patient is ready for discharge.Goal Outcome Evaluation:

## 2024-01-19 NOTE — PLAN OF CARE
PRIMARY DIAGNOSIS: ACUTE PAIN  OUTPATIENT/OBSERVATION GOALS TO BE MET BEFORE DISCHARGE:  1. Pain Status: Improved but still requiring IV narcotics.    2. Return to near baseline physical activity: Yes    3. Cleared for discharge by consultants (if involved): No    Discharge Planner Nurse   Safe discharge environment identified: No  Barriers to discharge: Yes IV meds       Entered by: Mloly Munoz RN 01/18/2024 9:26 PM     Please review provider order for any additional goals.   Nurse to notify provider when observation goals have been met and patient is ready for discharge.Goal Outcome Evaluation:       C/o 8/10 pain L chest radiating to neck & back. IV diladid given, effective.

## 2024-01-19 NOTE — PLAN OF CARE
PRIMARY DIAGNOSIS: ACUTE PAIN  OUTPATIENT/OBSERVATION GOALS TO BE MET BEFORE DISCHARGE:  1. Pain Status: Improved but still requiring IV narcotics.    2. Return to near baseline physical activity: Yes    3. Cleared for discharge by consultants (if involved): No    Discharge Planner Nurse   Safe discharge environment identified: Yes  Barriers to discharge: Yes       Entered by: Temitope Holley RN 01/19/2024 3:28 AM     Please review provider order for any additional goals.   Nurse to notify provider when observation goals have been met and patient is ready for discharge.Goal Outcome Evaluation:

## 2024-01-19 NOTE — UTILIZATION REVIEW
Inpatient appropriate    Admission Status; Secondary Review Determination       Under the authority of the Utilization Management Committee, the utilization review process indicated a secondary review on the above patient. The review outcome is based on review of the medical records, discussions with staff, and applying clinical experience noted on the date of the review.     (x) Inpatient Status Appropriate - This patient's medical care is consistent with medical management for inpatient care and reasonable inpatient medical practice.     RATIONALE FOR DETERMINATION   53 year old female admitted on 1/16/2024. She has a history of sickle cell pain crisis, bilateral pulmonary emboli, asthma and is admitted for multiple joint pain, and L chest wall pain Suspect recurrent sickle cell crisis.  Patient continues to require frequent IV narcotic treatment for pain control.    At the time of admission with the information available to the attending physician more than 2 nights Hospital complex care was anticipated, based on patient risk of adverse outcome if treated as outpatient and complex care required. Inpatient admission is appropriate based on the Medicare guidelines.     The information on this document is developed by the utilization review team in order for the business office to ensure compliance. This only denotes the appropriateness of proper admission status and does not reflect the quality of care rendered.   The definitions of Inpatient Status and Observation Status used in making the determination above are those provided in the CMS Coverage Manual, Chapter 1 and Chapter 6, section 70.4.   Sincerely,   Fermin Grey MD  Utilization Review  Physician Advisor  [facility].

## 2024-01-19 NOTE — PROGRESS NOTES
[facility]    Progress Note - Hospitalist Service       Date of Admission:  1/16/2024    Assessment & Plan   Kelly Harris is a 53 year old female admitted on 1/16/2024. She has a history of sickle cell pain crisis, bilateral pulmonary emboli, asthma and is admitted for L chest wall pain.      L chest wall pain, suspect vaso-occlusive crisis  H/o sickle cell/HgC disease, bilateral pulmonary emboli  Patient presents from clinic visit with PCP after several days of continuous multi joint pain and new left sided chest pain radiating into back, not relieved by home pain meds, worsened with inspiration. Endorses feeling similar to when she had bilateral PE this past July, for which she is no longer on Eliquis. EKG in clinic showed NSR with regular rate, no ST changes, sent to ED for further evaluation. Found to be slightly hypertension but otherwise afebrile hemodynamically stable on RA. Trop negative, CT negative for PE or new consolidation. Suspect recurrent sickle cell pain crisis, less concern for acute chest syndrome. Potential triggers include cold temperatures, stress or dehydration. Hgb normal and actually improved from previous, thus do not suspect splenic sequestration. Reassured against bone marrow suppression by elevated retic count.  Encouraging oral Dilaudid as able.  - pain control:               - PO and IV Dilaudid  - scheduled tylenol, lidocaine patch, heating pad              - holding PTA tramadol   - monitor respiratory status and vitals closely  - encourage incentive spirometry and early ambulation  - bowel regimen  - VTE ppx      CORBY, resolved  Cr bump to 1.02 on 1/18. Patient feels she is staying well hydrated. Will continue to encourage fluids.  - BMP in am     Asthma: PTA inhalers, montelukast  Seasonal allergies: PTA Zyrtec, Flonase        Diet: Regular Diet Adult    DVT Prophylaxis: Enoxaparin (Lovenox) SQ  Reyes Catheter: Not present  Fluids: Oral  Lines: None    "  Cardiac Monitoring: None  Code Status: Full Code      Clinically Significant Risk Factors Present on Admission                       # Overweight: Estimated body mass index is 26.82 kg/m  as calculated from the following:    Height as of this encounter: 1.6 m (5' 3\").    Weight as of this encounter: 68.7 kg (151 lb 6.4 oz).       # Asthma: noted on problem list        Disposition Plan      Expected Discharge Date: 01/19/2024    Discharge Delays: IV Medication - consider oral or Home Infusion  Destination: home with family          The patient's care was discussed with the Attending Physician, Dr. Dia .    Abel Vegas MD  Hospitalist Service  [facility]  Securely message with [employer] (more info)  Text page via [employer] Paging/Directory   ______________________________________________________________________    Interval History   No acute events overnight.  Was tried to use oral Dilaudid as much as possible yesterday, did require IV Dilaudid.  To get his dose this morning.  Continues to have pain.  Rates it 8 out of 10.  Felt some shortness of breath this morning, transient.  No new chest pain, fevers, chills, cough.  Encouraged albuterol use.    Physical Exam   Vital Signs: Temp: 98.3  F (36.8  C) Temp src: Oral BP: 127/65 Pulse: 76   Resp: 19 SpO2: 96 % O2 Device: None (Room air)    Weight: 151 lbs 6.4 oz    General Appearance: Comfortable.  Intermittently tearful.  Alert and oriented.  Respiratory: Comfortable respiratory effort.  Clear to auscultation bilaterally.  No crackles or wheezing.  Cardiovascular: Regular rate and rhythm.  No murmurs rubs or gallops.  GI: Nondistended.  Bowel sounds present.  Soft nontender  Skin: No visible rashes or bruising   Neuro: Neutral affect.  Fluent speech.            Data     I have personally reviewed the following data over the past 24 hrs:    8.7  \   10.6 (L)   / 286     139 103 7.8 /  98   4.2 30 (H) 0.92 \       "

## 2024-01-19 NOTE — PLAN OF CARE
PRIMARY DIAGNOSIS: ACUTE PAIN  OUTPATIENT/OBSERVATION GOALS TO BE MET BEFORE DISCHARGE:  1. Pain Status: Improved but still requiring IV narcotics.    2. Return to near baseline physical activity: No    3. Cleared for discharge by consultants (if involved): No    Discharge Planner Nurse   Safe discharge environment identified: Yes  Barriers to discharge: No       Entered by: Jermaine Nunez RN 01/19/2024 10:42 AM   Patient received PRN Dilaudid IV which was moderately effective.  Please review provider order for any additional goals.   Nurse to notify provider when observation goals have been met and patient is ready for discharge.Goal Outcome Evaluation:

## 2024-01-20 LAB
ANION GAP SERPL CALCULATED.3IONS-SCNC: 8 MMOL/L (ref 7–15)
BUN SERPL-MCNC: 10.1 MG/DL (ref 6–20)
CALCIUM SERPL-MCNC: 9.1 MG/DL (ref 8.6–10)
CHLORIDE SERPL-SCNC: 105 MMOL/L (ref 98–107)
CREAT SERPL-MCNC: 0.92 MG/DL (ref 0.51–0.95)
DEPRECATED HCO3 PLAS-SCNC: 28 MMOL/L (ref 22–29)
EGFRCR SERPLBLD CKD-EPI 2021: 74 ML/MIN/1.73M2
ERYTHROCYTE [DISTWIDTH] IN BLOOD BY AUTOMATED COUNT: 14 % (ref 10–15)
GLUCOSE SERPL-MCNC: 89 MG/DL (ref 70–99)
HCT VFR BLD AUTO: 31.1 % (ref 35–47)
HGB BLD-MCNC: 10.9 G/DL (ref 11.7–15.7)
MCH RBC QN AUTO: 26.1 PG (ref 26.5–33)
MCHC RBC AUTO-ENTMCNC: 35 G/DL (ref 31.5–36.5)
MCV RBC AUTO: 75 FL (ref 78–100)
PLATELET # BLD AUTO: 298 10E3/UL (ref 150–450)
POTASSIUM SERPL-SCNC: 4.6 MMOL/L (ref 3.4–5.3)
RBC # BLD AUTO: 4.17 10E6/UL (ref 3.8–5.2)
SODIUM SERPL-SCNC: 141 MMOL/L (ref 135–145)
WBC # BLD AUTO: 7.4 10E3/UL (ref 4–11)

## 2024-01-20 PROCEDURE — 99231 SBSQ HOSP IP/OBS SF/LOW 25: CPT | Mod: GC

## 2024-01-20 PROCEDURE — 85027 COMPLETE CBC AUTOMATED: CPT

## 2024-01-20 PROCEDURE — 250N000013 HC RX MED GY IP 250 OP 250 PS 637

## 2024-01-20 PROCEDURE — 80048 BASIC METABOLIC PNL TOTAL CA: CPT | Performed by: FAMILY MEDICINE

## 2024-01-20 PROCEDURE — 250N000011 HC RX IP 250 OP 636

## 2024-01-20 PROCEDURE — 120N000001 HC R&B MED SURG/OB

## 2024-01-20 PROCEDURE — 36415 COLL VENOUS BLD VENIPUNCTURE: CPT

## 2024-01-20 RX ADMIN — UMECLIDINIUM 1 PUFF: 62.5 AEROSOL, POWDER ORAL at 08:39

## 2024-01-20 RX ADMIN — MONTELUKAST 10 MG: 10 TABLET, FILM COATED ORAL at 21:05

## 2024-01-20 RX ADMIN — HYDROMORPHONE HYDROCHLORIDE 2 MG: 2 TABLET ORAL at 16:35

## 2024-01-20 RX ADMIN — ACETAMINOPHEN 975 MG: 325 TABLET ORAL at 23:56

## 2024-01-20 RX ADMIN — ACETAMINOPHEN 975 MG: 325 TABLET ORAL at 16:35

## 2024-01-20 RX ADMIN — ACETAMINOPHEN 975 MG: 325 TABLET ORAL at 00:35

## 2024-01-20 RX ADMIN — ENOXAPARIN SODIUM 40 MG: 40 INJECTION SUBCUTANEOUS at 21:05

## 2024-01-20 RX ADMIN — HYDROMORPHONE HYDROCHLORIDE 2 MG: 2 TABLET ORAL at 23:58

## 2024-01-20 RX ADMIN — HYDROMORPHONE HYDROCHLORIDE 0.4 MG: 1 INJECTION, SOLUTION INTRAMUSCULAR; INTRAVENOUS; SUBCUTANEOUS at 09:13

## 2024-01-20 RX ADMIN — FLUTICASONE PROPIONATE 1 SPRAY: 50 SPRAY, METERED NASAL at 08:39

## 2024-01-20 RX ADMIN — FLUTICASONE FUROATE AND VILANTEROL TRIFENATATE 1 PUFF: 200; 25 POWDER RESPIRATORY (INHALATION) at 08:39

## 2024-01-20 RX ADMIN — CETIRIZINE HYDROCHLORIDE 10 MG: 10 TABLET, FILM COATED ORAL at 08:38

## 2024-01-20 RX ADMIN — HYDROMORPHONE HYDROCHLORIDE 0.4 MG: 1 INJECTION, SOLUTION INTRAMUSCULAR; INTRAVENOUS; SUBCUTANEOUS at 13:31

## 2024-01-20 RX ADMIN — HYDROMORPHONE HYDROCHLORIDE 2 MG: 2 TABLET ORAL at 20:43

## 2024-01-20 RX ADMIN — HYDROMORPHONE HYDROCHLORIDE 2 MG: 2 TABLET ORAL at 00:34

## 2024-01-20 RX ADMIN — ACETAMINOPHEN 975 MG: 325 TABLET ORAL at 08:38

## 2024-01-20 RX ADMIN — LIDOCAINE 2 PATCH: 4 PATCH TOPICAL at 20:43

## 2024-01-20 ASSESSMENT — ACTIVITIES OF DAILY LIVING (ADL)
ADLS_ACUITY_SCORE: 26
ADLS_ACUITY_SCORE: 20
ADLS_ACUITY_SCORE: 20
ADLS_ACUITY_SCORE: 26
ADLS_ACUITY_SCORE: 20
ADLS_ACUITY_SCORE: 20

## 2024-01-20 NOTE — PLAN OF CARE
Problem: Adult Inpatient Plan of Care  Goal: Plan of Care Review  Description: The Plan of Care Review/Shift note should be completed every shift.  The Outcome Evaluation is a brief statement about your assessment that the patient is improving, declining, or no change.  This information will be displayed automatically on your shift  note.  Outcome: Progressing     Problem: Pain Acute  Goal: Optimal Pain Control and Function  Outcome: Progressing     Problem: Comorbidity Management  Goal: Maintenance of Asthma Control  Outcome: Progressing   Goal Outcome Evaluation:       Pt alert & oriented. Independent in room. Multiple joint pain managed by oral dilaudid 2mg and scheduled tylenol and lidocaine patch with relief. No shortness of breath. On room air, Vital Signs stable. Will continue to monitor.

## 2024-01-20 NOTE — PROGRESS NOTES
[facility]    Progress Note - Hospitalist Service       Date of Admission:  1/16/2024    Assessment & Plan   Kelly Harris is a 53 year old female admitted on 1/16/2024. She has a history of sickle cell pain crisis, bilateral pulmonary emboli, asthma and is admitted for L chest wall pain.      L chest wall pain, suspect vaso-occlusive crisis  H/o sickle cell/HgC disease, bilateral pulmonary emboli  Patient presents from clinic visit with PCP after several days of continuous multi joint pain and new left sided chest pain radiating into back, not relieved by home pain meds, worsened with inspiration. Endorses feeling similar to when she had bilateral PE this past July, for which she is no longer on Eliquis. EKG in clinic showed NSR with regular rate, no ST changes, sent to ED for further evaluation. Found to be slightly hypertension but otherwise afebrile hemodynamically stable on RA. Trop negative, CT negative for PE or new consolidation. Suspect recurrent sickle cell pain crisis, less concern for acute chest syndrome. Potential triggers include cold temperatures, stress or dehydration. Hgb normal and actually improved from previous, thus do not suspect splenic sequestration. Reassured against bone marrow suppression by elevated retic count.  Requiring much less IV Dilaudid, encouraging orals as tolerated  - pain control:               - PO and IV Dilaudid  - scheduled tylenol, lidocaine patch, heating pad              - holding PTA tramadol   - monitor respiratory status and vitals closely  - encourage incentive spirometry and early ambulation  - bowel regimen  - VTE ppx   -Hopeful discharge on 1/21 -1/22     CORBY, resolved  Cr bump to 1.02 on 1/18. Patient feels she is staying well hydrated. Will continue to encourage fluids.  - BMP in am     Asthma: PTA inhalers, montelukast  Seasonal allergies: PTA Zyrtec, Flonase        Diet: Regular Diet Adult    DVT Prophylaxis: Enoxaparin  "(Lovenox) SQ  Reyes Catheter: Not present  Fluids: Oral  Lines: None     Cardiac Monitoring: None  Code Status: Full Code      Clinically Significant Risk Factors Present on Admission                       # Overweight: Estimated body mass index is 26.82 kg/m  as calculated from the following:    Height as of this encounter: 1.6 m (5' 3\").    Weight as of this encounter: 68.7 kg (151 lb 6.4 oz).       # Asthma: noted on problem list        Disposition Plan     Expected Discharge Date: 01/21/2024    Discharge Delays: IV Medication - consider oral or Home Infusion  Destination: home with family          The patient's care was discussed with the Attending Physician, Dr. Dia .    Abel Vegas MD  Hospitalist Service  [facility]  Securely message with "[employer] (more info)  Text page via [employer] Paging/Directory   ______________________________________________________________________    Interval History   No events overnight.  Had been doing well with just oral Dilaudid.  Had significant full body pain this morning, required a dose of IV Dilaudid.  Currently resting in bed.  Did encourage continued ambulation, movement as able.  No shortness of breath, symptoms improved with inhaler use.    Physical Exam   Vital Signs: Temp: 97.8  F (36.6  C) Temp src: Oral BP: (!) 142/70 Pulse: 68   Resp: 18 SpO2: 98 % O2 Device: None (Room air)    Weight: 151 lbs 6.4 oz    General Appearance: Appears uncomfortable.  Lying in bed.  No distress.  Respiratory: Comfortable respiratory effort.  Clear to auscultation bilaterally.  No crackles or wheezing.  Cardiovascular: Regular rate and rhythm.  No murmurs rubs or gallops.  GI: Nondistended.  Bowel sounds present.  Soft nontender  Skin: No visible rashes or bruising   Neuro: Neutral affect.  Fluent speech.       Medical Decision Making             Data     "

## 2024-01-20 NOTE — PLAN OF CARE
Problem: Adult Inpatient Plan of Care  Goal: Plan of Care Review  Description: The Plan of Care Review/Shift note should be completed every shift.  The Outcome Evaluation is a brief statement about your assessment that the patient is improving, declining, or no change.  This information will be displayed automatically on your shift  note.  Outcome: Progressing  Patient alert, oriented and engaged in plan of care.    Problem: Pain Acute  Goal: Optimal Pain Control and Function  Outcome: Progressing  Patient received dose of PO Dilaudid at approximately 1600. Reports pain has been manageable since that time.     Maria Dolores Granados RN

## 2024-01-20 NOTE — PLAN OF CARE
"  Problem: Adult Inpatient Plan of Care  Goal: Patient-Specific Goal (Individualized)  Description: You can add care plan individualizations to a care plan. Examples of Individualization might be:  \"Parent requests to be called daily at 9am for status\", \"I have a hard time hearing out of my right ear\", or \"Do not touch me to wake me up as it startles  me\".  Outcome: Progressing     Problem: Adult Inpatient Plan of Care  Goal: Absence of Hospital-Acquired Illness or Injury  Intervention: Identify and Manage Fall Risk  Recent Flowsheet Documentation  Taken 1/20/2024 0800 by Jermaine Nunez, RN  Safety Promotion/Fall Prevention: activity supervised   Goal Outcome Evaluation:  Patient Independent in room, able to verbalize needs, received PRN IV Dilaudid x 2. Patient attempted to only take PO dilaudid, but the pain was to great.                       "

## 2024-01-21 VITALS
HEIGHT: 63 IN | DIASTOLIC BLOOD PRESSURE: 75 MMHG | TEMPERATURE: 97.9 F | WEIGHT: 151.4 LBS | RESPIRATION RATE: 20 BRPM | HEART RATE: 65 BPM | BODY MASS INDEX: 26.82 KG/M2 | OXYGEN SATURATION: 97 % | SYSTOLIC BLOOD PRESSURE: 131 MMHG

## 2024-01-21 LAB
ERYTHROCYTE [DISTWIDTH] IN BLOOD BY AUTOMATED COUNT: 14.3 % (ref 10–15)
HCT VFR BLD AUTO: 31.9 % (ref 35–47)
HGB BLD-MCNC: 11.1 G/DL (ref 11.7–15.7)
HOLD SPECIMEN: NORMAL
MCH RBC QN AUTO: 26.1 PG (ref 26.5–33)
MCHC RBC AUTO-ENTMCNC: 34.8 G/DL (ref 31.5–36.5)
MCV RBC AUTO: 75 FL (ref 78–100)
PLATELET # BLD AUTO: 328 10E3/UL (ref 150–450)
RBC # BLD AUTO: 4.26 10E6/UL (ref 3.8–5.2)
WBC # BLD AUTO: 8.4 10E3/UL (ref 4–11)

## 2024-01-21 PROCEDURE — 250N000013 HC RX MED GY IP 250 OP 250 PS 637

## 2024-01-21 PROCEDURE — 99238 HOSP IP/OBS DSCHRG MGMT 30/<: CPT | Mod: GC

## 2024-01-21 PROCEDURE — 36415 COLL VENOUS BLD VENIPUNCTURE: CPT

## 2024-01-21 PROCEDURE — 85027 COMPLETE CBC AUTOMATED: CPT

## 2024-01-21 RX ORDER — HYDROMORPHONE HYDROCHLORIDE 2 MG/1
2 TABLET ORAL EVERY 6 HOURS PRN
Qty: 16 TABLET | Refills: 0 | Status: SHIPPED | OUTPATIENT
Start: 2024-01-21 | End: 2024-02-05

## 2024-01-21 RX ORDER — AMOXICILLIN 250 MG
1 CAPSULE ORAL 2 TIMES DAILY
Qty: 10 TABLET | Refills: 0 | Status: SHIPPED | OUTPATIENT
Start: 2024-01-21 | End: 2024-02-05

## 2024-01-21 RX ORDER — LIDOCAINE 4 G/G
2 PATCH TOPICAL EVERY 24 HOURS
Qty: 14 PATCH | Refills: 0 | Status: SHIPPED | OUTPATIENT
Start: 2024-01-21 | End: 2024-02-05

## 2024-01-21 RX ADMIN — UMECLIDINIUM 1 PUFF: 62.5 AEROSOL, POWDER ORAL at 09:15

## 2024-01-21 RX ADMIN — CETIRIZINE HYDROCHLORIDE 10 MG: 10 TABLET, FILM COATED ORAL at 09:12

## 2024-01-21 RX ADMIN — HYDROMORPHONE HYDROCHLORIDE 2 MG: 2 TABLET ORAL at 09:12

## 2024-01-21 RX ADMIN — HYDROMORPHONE HYDROCHLORIDE 2 MG: 2 TABLET ORAL at 04:50

## 2024-01-21 RX ADMIN — ACETAMINOPHEN 975 MG: 325 TABLET ORAL at 09:12

## 2024-01-21 RX ADMIN — FLUTICASONE PROPIONATE 1 SPRAY: 50 SPRAY, METERED NASAL at 09:15

## 2024-01-21 RX ADMIN — FLUTICASONE FUROATE AND VILANTEROL TRIFENATATE 1 PUFF: 200; 25 POWDER RESPIRATORY (INHALATION) at 09:15

## 2024-01-21 ASSESSMENT — ACTIVITIES OF DAILY LIVING (ADL)
ADLS_ACUITY_SCORE: 20

## 2024-01-21 NOTE — PLAN OF CARE
Problem: Adult Inpatient Plan of Care  Goal: Optimal Comfort and Wellbeing  Outcome: Progressing     Problem: Pain Acute  Goal: Optimal Pain Control and Function  Outcome: Progressing   Goal Outcome Evaluation:       Pt alert and oriented X 4. Continuous with pain, dilaudid 2mg po given. Pt independent with adls and is able to let needs be known.

## 2024-01-21 NOTE — PLAN OF CARE
Problem: Adult Inpatient Plan of Care  Goal: Plan of Care Review  Description: The Plan of Care Review/Shift note should be completed every shift.  The Outcome Evaluation is a brief statement about your assessment that the patient is improving, declining, or no change.  This information will be displayed automatically on your shift  note.  Outcome: Progressing     Problem: Adult Inpatient Plan of Care  Goal: Absence of Hospital-Acquired Illness or Injury  Intervention: Identify and Manage Fall Risk  Recent Flowsheet Documentation  Taken 1/21/2024 0800 by Jermaine Nunez, RN  Safety Promotion/Fall Prevention: activity supervised     Problem: Adult Inpatient Plan of Care  Goal: Absence of Hospital-Acquired Illness or Injury  Intervention: Prevent Infection  Recent Flowsheet Documentation  Taken 1/21/2024 0800 by Jermaine Nunez, RN  Infection Prevention: rest/sleep promoted   Goal Outcome Evaluation:       Patient received PRN Dilaudid 2 mg PO which has improved effectiveness from yesterday, alert oriented x 4, Independent in room, able to express needs. Patient discharged at 1400 home with belongings. Spouse will transport.

## 2024-01-21 NOTE — DISCHARGE SUMMARY
[facility]  Discharge Summary - Medicine & Pediatrics       Date of Admission:  1/16/2024  Date of Discharge:  1/21/2024  Discharging Provider: Emily Church MD and Jasmin Dia MD   Discharge Service: Hospitalist Service    Discharge Diagnoses     1. Sickle cell disease with crisis (H)    2. Left-sided chest pain    3. Dyspnea, unspecified type    4. Sickle cell pain crisis (H)      Follow-ups Needed After Discharge       - Follow up with primary care provider, Heri Gamez, within 4 days for hospital follow- up and to follow-up on pain. Short course of oral pain medications given on discharge. Patient also wanted to discuss disability/return to work at follow-up visit.  No follow up labs or test are needed.    - Follow up with [facility] to establish care.      Discharge Disposition   Discharged to home  Condition at discharge: Stable    Hospital Course     Kelly Harris is a 53 year old female with history of sickle cell pain crisis, bilateral pulmonary emboli, asthma and admitted for L chest wall pain with suspected vas-occlusive crisis      L chest wall pain, suspect vaso-occlusive crisis  H/o sickle cell/HgC disease, bilateral pulmonary emboli  Patient presented from clinic visit with PCP after several days of continuous multi joint pain and new left sided chest pain radiating into back, not relieved by home pain meds, worsened with inspiration. EKG in clinic showed NSR with regular rate, no ST changes, sent to ED for further evaluation. Found to be slightly hypertensive but otherwise afebrile, hemodynamically stable on RA. Trop negative, CT negative for PE or new consolidation. Suspected recurrent sickle cell pain crisis, less concern for acute chest syndrome. Initially required multiple doses of IV dilaudid daily, but was able to slowly wean to oral. Discharging after ~24 hours without IV pain medication and stable symptoms. Discussed recommendations to establish  with [facility] on discharge to have their support in managing her sickle cell disease.     - Discharged with short course of oral dilaudid and bowel regimen. Instructed her to not take Tramadol while taking oral dilaudid.   - Discharged with lidocaine patch and Tylenol instructions.   - Follow-up in the middle of next week for pain follow-up. Patient had some questions about disability on discharge, recommended that she discuss this more with primary provider.      CORBY, resolved  Cr bump to 1.02 on 1/18. Patient was able to maintain adequate hydration, and creatinine returned to baseline by day of discharge.    Consultations This Hospital Stay   CARE MANAGEMENT / SOCIAL WORK IP CONSULT    Code Status   Full Code     The patient was discussed with Dr. Ifeoma Church MD  [facility], PGY-3  ______________________________________________________________________    Physical Exam   Vital Signs: Temp: 97.9  F (36.6  C) Temp src: Oral BP: 131/75 Pulse: 65   Resp: 20 SpO2: 97 % O2 Device: None (Room air)    Weight: 151 lbs 6.4 oz    General Appearance: Sitting at the edge of he bed, appears comfortable  Respiratory: Comfortable respiratory effort.  Clear to auscultation bilaterally.  No crackles or wheezing.  Cardiovascular: Regular rate and rhythm.  No murmurs rubs or gallops.  GI: Nondistended.  Bowel sounds present.  Soft nontender  Skin: No visible rashes or bruising   Neuro: Neutral affect.  Fluent speech      Primary Care Physician   Heri Gamez    Discharge Orders      Reason for your hospital stay    Kelly was admitted with left chest wall pain, found to be in sickle cell pain crisis. She was treated with IV opioids, and by day of discharge she ws able to be off IV pain medications for ~24 hours, and discharged with short course of oral opioids for pain control.     Follow-up and recommended labs and tests     - Follow up with primary care provider, Heri  Franco, within 4 days for hospital follow- up and to follow-up on pain. Short course of oral pain medications given on discharge. Patient also wanted to discuss disability/return to work.   No follow up labs or test are needed.    - Follow up with [facility] to establish care.     Activity    Your activity upon discharge: activity as tolerated     Discharge Instructions    - Manage your pain with lidocaine patch and Tylenol.   - Use the dilaudid every 6 hours for severe pain.  - Do not use Tramadol while taking the dilaudid. You can start using that again when you are finished with the dilaudid.   - Take Senna  (laxative) while taking the pain medication.     Diet    Follow this diet upon discharge: Regular       Significant Results and Procedures   Most Recent 3 CBC's:  Recent Labs   Lab Test 01/21/24  0600 01/20/24  0613 01/19/24  0515   WBC 8.4 7.4 8.7   HGB 11.1* 10.9* 10.6*   MCV 75* 75* 74*    298 286     Most Recent 3 BMP's:  Recent Labs   Lab Test 01/20/24  0613 01/19/24  0515 01/18/24  0525    139 137   POTASSIUM 4.6 4.2 4.0   CHLORIDE 105 103 104   CO2 28 30* 29   BUN 10.1 7.8 9.0   CR 0.92 0.92 1.02*   ANIONGAP 8 6* 4*   KULWANT 9.1 9.0 8.8   GLC 89 98 86   ,   Results for orders placed or performed during the hospital encounter of 01/16/24   CT Chest Pulmonary Embolism w Contrast    Narrative    EXAM: CT CHEST PULMONARY EMBOLISM W CONTRAST  LOCATION: [facility]  DATE: 1/16/2024    INDICATION: chest pain, dyspnea; hx of PE  COMPARISON: 10/30/2023  TECHNIQUE: CT chest pulmonary angiogram during arterial phase injection of IV contrast. Multiplanar reformats and MIP reconstructions were performed. Dose reduction techniques were used.   CONTRAST: isovue 370 90ml    FINDINGS:  ANGIOGRAM CHEST: Pulmonary arteries are normal caliber and negative for pulmonary emboli. Thoracic aorta is negative for dissection. No CT evidence of right heart strain.    LUNGS AND PLEURA:  Biapical pleural parenchymal scarring. Dependent groundglass opacities bilaterally, likely atelectasis. No lobar consolidation. Trace left pleural effusion.     MEDIASTINUM/AXILLAE: No significant mediastinal or hilar adenopathy.    CORONARY ARTERY CALCIFICATION: None.    UPPER ABDOMEN: No acute abnormalities    MUSCULOSKELETAL: No acute bony abnormalities.      Impression    IMPRESSION:  1.  No evidence of pulmonary embolus.  2.  Trace left pleural effusion and bibasilar likely atelectasis.       Discharge Medications   Current Discharge Medication List        START taking these medications    Details   HYDROmorphone (DILAUDID) 2 MG tablet Take 1 tablet (2 mg) by mouth every 6 hours as needed for severe pain  Qty: 16 tablet, Refills: 0    Associated Diagnoses: Sickle cell disease with crisis (H)      Lidocaine (LIDOCARE) 4 % Patch Place 2 patches onto the skin every 24 hours To prevent lidocaine toxicity, patient should be patch free for 12 hrs daily.  Qty: 14 patch, Refills: 0    Associated Diagnoses: Sickle cell disease with crisis (H)      senna-docusate (SENOKOT-S/PERICOLACE) 8.6-50 MG tablet Take 1 tablet by mouth 2 times daily  Qty: 10 tablet, Refills: 0    Associated Diagnoses: Sickle cell disease with crisis (H)           CONTINUE these medications which have NOT CHANGED    Details   acetaminophen (TYLENOL) 325 MG tablet Take 3 tablets (975 mg) by mouth every 8 hours  Qty: 120 tablet, Refills: 1    Comments: Do not exceed more than 4g per day.  Associated Diagnoses: Sickle cell pain crisis (H)      albuterol (PROAIR HFA/PROVENTIL HFA/VENTOLIN HFA) 108 (90 Base) MCG/ACT inhaler Inhale 2 puffs into the lungs every 6 hours as needed for shortness of breath, wheezing or cough  Qty: 18 g, Refills: 4    Comments: Pharmacy may dispense brand covered by insurance (Proair, or proventil or ventolin or generic albuterol inhaler)  Associated Diagnoses: Moderate persistent asthma without complication      cetirizine  (ZYRTEC) 10 MG tablet TAKE 1 TABLET BY MOUTH EVERY DAY  Qty: 90 tablet, Refills: 3    Associated Diagnoses: Seasonal allergies      fluticasone (FLONASE) 50 MCG/ACT nasal spray INSTILL 1 SPRAY INTO BOTH NOSTRILS DAILY  Qty: 32 mL, Refills: 4    Associated Diagnoses: Chronic seasonal allergic rhinitis; Seasonal allergic rhinitis due to other allergic trigger      FOLIC ACID PO Take 1 tablet by mouth daily      mometasone-formoterol (DULERA) 200-5 MCG/ACT inhaler Inhale 2 puffs into the lungs 2 times daily  Qty: 13 g, Refills: 4    Associated Diagnoses: Moderate persistent asthma without complication      montelukast (SINGULAIR) 10 MG tablet TAKE 1 TABLET BY MOUTH EVERYDAY AT BEDTIME  Qty: 90 tablet, Refills: 1    Associated Diagnoses: Moderate persistent asthma without complication      tiotropium (SPIRIVA RESPIMAT) 2.5 MCG/ACT inhaler INHALE 2 PUFFS BY MOUTH INTO THE LUNGS DAILY  Qty: 4 g, Refills: 4    Associated Diagnoses: Moderate persistent asthma without complication      traMADol (ULTRAM) 50 MG tablet Take 50 mg by mouth every 6 hours as needed for severe pain           Allergies   Allergies   Allergen Reactions    Oxycodone Itching    Iodine Rash

## 2024-01-21 NOTE — PLAN OF CARE
Goal Outcome Evaluation:    Problem: Adult Inpatient Plan of Care  Goal: Optimal Comfort and Wellbeing  Outcome: Progressing  Intervention: Monitor Pain and Promote Comfort    Problem: Pain Acute  Goal: Optimal Pain Control and Function  Outcome: Progressing  Intervention: Develop Pain Management Plan         Pt aox4, indp in room. Rates pain at 8/10, PO dilaudid given x2, reduces pain to a 4/10. Tolerating oral pain medications.

## 2024-01-22 LAB
ATRIAL RATE - MUSE: 95 BPM
DIASTOLIC BLOOD PRESSURE - MUSE: NORMAL MMHG
INTERPRETATION ECG - MUSE: NORMAL
P AXIS - MUSE: 58 DEGREES
PR INTERVAL - MUSE: 132 MS
QRS DURATION - MUSE: 78 MS
QT - MUSE: 340 MS
QTC - MUSE: 427 MS
R AXIS - MUSE: 87 DEGREES
SYSTOLIC BLOOD PRESSURE - MUSE: NORMAL MMHG
T AXIS - MUSE: 69 DEGREES
VENTRICULAR RATE- MUSE: 95 BPM

## 2024-01-23 ENCOUNTER — PATIENT OUTREACH (OUTPATIENT)
Dept: CARE COORDINATION | Facility: CLINIC | Age: 54
End: 2024-01-23
Payer: COMMERCIAL

## 2024-01-23 NOTE — PROGRESS NOTES
[facility]:   [facility] Contact  [facility]/Voicemail     Clinical Data: Post-Discharge Outreach     Outreach attempted x 2.  Left message on patient's voicemail, providing [facility]'s central phone number of [phone] ([phone]) for questions/concerns and/or to schedule an appt with an [facility] provider, if they do not have a PCP.      Plan:  [facility] will do no further outreaches at this time.       Fara Mai  Community Health Worker  [facility], [facility]  [phone]      *Connected Care Resource Team does NOT follow patient ongoing. Referrals are identified based on internal discharge reports and the outreach is to ensure patient has an understanding of their discharge instructions.

## 2024-01-29 NOTE — PROGRESS NOTES
DISCHARGE  Reason for Discharge: No further expectation of progress.  Patient has failed to schedule further appointments.      Discharge Plan: Patient to continue home program.    Referring Provider:  Lopez Ocasio MD       12/15/23 0500   Appointment Info   Signing clinician's name / credentials Moises Shah, PT, DPT, CMTPT/DN   Visits Used 8   Medical Diagnosis Chronic bilateral low back pain with bilateral sciatica   PT Tx Diagnosis Bilateral low back pain with radiating symptoms   Progress Note/Certification   Onset of illness/injury or Date of Surgery 07/25/23   Therapy Frequency 1x/week   Predicted Duration 8 weeks   Progress Note Due Date   (Visit 10)   PT Goal 1   Goal Identifier HEP   Goal Description Kelly will demonstrate mastery of her exercises as evidenced by her ability to perform her HEP with proper form and minimal/no cueing needed 5/7 days per week to facillitate accelerated symptom resolution.   Goal Progress In progress   Target Date 01/09/24   PT Goal 2   Goal Identifier TOLU   Goal Description Kelly will demonstrate improved daily function as evidenced by an improved (decreased) TOLU score of 32% or less to facillitate independent function and safety.   Goal Progress 52% on 12/15/23   Target Date 01/09/24   PT Goal 3   Goal Identifier Standing   Goal Description Kelly will demonstrate improved activity tolerance as evidenced by her ability to stand for 30 minutes or more with self reported pain as 2/10 or less to facillitate independent function   Goal Progress 10 minutes   Target Date 01/09/24   PT Goal 4   Goal Identifier Sitting   Goal Description Kelly will demonstrate improved activity tolerance as evidenced by her ability to sit for 1 hour or more with self reported pain as 2/10 or less to facillitate independent function and improved work day tolerance.   Goal Progress Unable   Target Date 01/09/24   Subjective Report   Subjective Report She still has her moments of pain. She  "was referred to the spine center. She gets usually one or two days of relief from therapy and the exercises, but then the pain comes back while she is at work. This happens especially on days where she is short-handed and she has to do more on her feet all day.   Objective Measure 1   Objective Measure TOLU   Details 46%   Objective Measure 2   Objective Measure Lumbar flexion/extension ROM   Details Flexion: hands to proximal shins. Extension: Mod/max limited and painful   Objective Measure 3   Objective Measure Hip flexor strength   Details 3/5 bilaterally   Objective Measure 4   Objective Measure Hip extensor stregnth   Details Able to bridge with partial ROM (pain limited)   Objective Measure 5   Objective Measure Ankle dorsiflexion strength   Details L: 3+/5. R: 3/5   Treatment Interventions (PT)   Interventions Therapeutic Procedure/Exercise;Manual Therapy   Therapeutic Procedure/Exercise   Therapeutic Procedures: strength, endurance, ROM, flexibillity minutes (67929) 40   Therapeutic Procedures Ther Proc 2;Ther Proc 3;Ther Proc 4;Ther Proc 5;Ther Proc 6   Ther Proc 1 Treadmill:   Ther Proc 1 - Details Seated trunk rotation: x15 B   Ther Proc 2 Seated HS stretch: 2 x30\" B   Ther Proc 2 - Details Bridges: x15, decreased pain   Ther Proc 3 Hooklying dead bugs: x10 B   Ther Proc 3 - Details TA set with leg ext: x10 B   Ther Proc 4 LTR: x2'   Ther Proc 4 - Details Pallof Press: BTB x10   Skilled Intervention Exercises to improve hip and core stability and activation. Cueing and demonstration provided.   Patient Response/Progress Centralization of symptoms   Education   Learner/Method Patient;Pictures/Video;Demonstration   Plan   Home program See PTRx   Comments   Comments Since Kelly is seeing the spine center we focused on reviewing exercises that she can continue to do at home to promote further increases in her core strength. We also did more exercises that she could do while at work in order to promote better " pain management. She had no increase in her pain throughout the session and had a decrease in pain with the trunk rotations and the bridges. She was encouraged to attempt her HEP more frequently to help prolong the relief she gets during therapy. Since she gets some relief with the core strengthening she may benefit from a trial of MedEx therapy.   Total Session Time   Timed Code Treatment Minutes 40   Total Treatment Time (sum of timed and untimed services) 40

## 2024-02-04 ENCOUNTER — HEALTH MAINTENANCE LETTER (OUTPATIENT)
Age: 54
End: 2024-02-04

## 2024-02-05 ENCOUNTER — OFFICE VISIT (OUTPATIENT)
Dept: PHARMACY | Facility: CLINIC | Age: 54
End: 2024-02-05
Payer: COMMERCIAL

## 2024-02-05 ENCOUNTER — OFFICE VISIT (OUTPATIENT)
Dept: FAMILY MEDICINE | Facility: CLINIC | Age: 54
End: 2024-02-05
Payer: COMMERCIAL

## 2024-02-05 VITALS
TEMPERATURE: 98 F | HEIGHT: 63 IN | WEIGHT: 154 LBS | DIASTOLIC BLOOD PRESSURE: 84 MMHG | BODY MASS INDEX: 27.29 KG/M2 | RESPIRATION RATE: 18 BRPM | SYSTOLIC BLOOD PRESSURE: 131 MMHG | OXYGEN SATURATION: 99 % | HEART RATE: 88 BPM

## 2024-02-05 DIAGNOSIS — D57.20 SICKLE-CELL/HB-C DISEASE WITHOUT CRISIS (H): Primary | ICD-10-CM

## 2024-02-05 DIAGNOSIS — Z09 HOSPITAL DISCHARGE FOLLOW-UP: ICD-10-CM

## 2024-02-05 PROCEDURE — 99207 PR NO CHARGE LOS: CPT | Performed by: PHARMACIST

## 2024-02-05 PROCEDURE — 99495 TRANSJ CARE MGMT MOD F2F 14D: CPT | Mod: GC

## 2024-02-05 RX ORDER — TRAMADOL HYDROCHLORIDE 50 MG/1
50 TABLET ORAL EVERY 6 HOURS PRN
Qty: 60 TABLET | Refills: 0 | Status: SHIPPED | OUTPATIENT
Start: 2024-02-05 | End: 2024-04-10

## 2024-02-05 RX ORDER — NAPROXEN 500 MG/1
500 TABLET ORAL 2 TIMES DAILY WITH MEALS
COMMUNITY
End: 2024-02-27 | Stop reason: ALTCHOICE

## 2024-02-05 NOTE — PROGRESS NOTES
Assessment & Plan     Hospital discharge follow-up  Sickle-cell/Hb-C disease without crisis (H)  53-year-old female with history of sickle cell disease with recent hospitalization at [facility] from 1/16/2024 - 1/21/2024 for sickle cell crisis.  Has improved but currently no pain medication other than Tylenol and naproxen.  Exam is unremarkable today.  Have ordered tramadol for current pain as well as to have at home for any new crises.  Also will make sickle cell clinic referral today for patient.  - Adult Oncology/Hematology  Referral; Future  - traMADol (ULTRAM) 50 MG tablet; Take 1 tablet (50 mg) by mouth every 6 hours as needed for severe pain (Patient not taking: Reported on 2/5/2024)  - naloxone (NARCAN) 4 MG/0.1ML nasal spray; Spray 1 spray (4 mg) into one nostril alternating nostrils as needed for opioid reversal every 2-3 minutes until assistance arrives    MED REC REQUIRED  Post Medication Reconciliation Status:       MEDICATIONS:  Continue current medications with new tramadol  CONSULTATION/REFERRAL to sickle cell clinic    Return in about 4 weeks (around 3/4/2024) for Follow up, with me.    Andrew Mendoza is a 53 year old, presenting for the following health issues:  Hospital F/U (SICKLE CELL, STILL HAVE ACHES AND PAIN  LAST NIGHT,AND DON'T FEEL GOOD, TOOK TYLENOL BUT DON'T HELP, AND NOW HAVE HEADACHE A LITTLE )    HPI   Hospital Follow-up Visit:    Hospital/Nursing Home/IP Rehab Facility: [facility]  Date of Admission: 1/16/24  Date of Discharge: 1/21/24  Reason(s) for Admission: Sickle Cell Crisis    Was your hospitalization related to COVID-19? No   Problems taking medications regularly:  None  Medication changes since discharge: Dilaudid, ran out of medication  Problems adhering to non-medication therapy:  None    Summary of hospitalization:  [facility] discharge summary reviewed  Diagnostic Tests/Treatments reviewed.  Follow up needed:  "Sickle Cell Clinic  Other Healthcare Providers Involved in Patient s Care:          TBD with Sickle Cell Clinic  Update since discharge: improved.     Continued intermittent pain and recently ran out of her Dilaudid.  Only on Tylenol and naproxen for pain.  Oxycodone makes her itchy.  Overall has significantly improved.    Plan of care communicated with patient    Review of Systems  Constitutional, HEENT, cardiovascular, pulmonary, gi and gu systems are negative, except as otherwise noted.      Objective    /84 (BP Location: Right arm, Patient Position: Sitting, Cuff Size: Adult Regular)   Pulse 88   Temp 98  F (36.7  C) (Oral)   Resp 18   Ht 1.59 m (5' 2.6\")   Wt 69.9 kg (154 lb)   LMP 04/12/2017   SpO2 99%   BMI 27.63 kg/m    Body mass index is 27.63 kg/m .  Physical Exam   GENERAL: alert and no distress  NECK: no adenopathy, no asymmetry, masses, or scars  RESP: lungs clear to auscultation - no rales, rhonchi or wheezes  CV: regular rate and rhythm, normal S1 S2, no S3 or S4, no murmur, click or rub, no peripheral edema  ABDOMEN: soft, nontender, no hepatosplenomegaly, no masses and bowel sounds normal  MS: no gross musculoskeletal defects noted, no edema    No results found for this or any previous visit (from the past 24 hour(s)).        Signed Electronically by: Heri Gamez DO    "

## 2024-02-05 NOTE — LETTER
[facility] [address] [place] [address]  Phone: [phone]  Fax: [phone]    February 5, 2024        Kelly Harris  [address] [facility] [address]          To whom it may concern:    RE: Kelly Harris    Patient was seen and treated today at our clinic and missed work. Please excuse her from 1/16/24 until 2/9/24.     Please contact me for questions or concerns.      Sincerely,      Heri Gamez DO

## 2024-02-05 NOTE — PROGRESS NOTES
"Preceptor attestation:  Vital signs reviewed: /84 (BP Location: Right arm, Patient Position: Sitting, Cuff Size: Adult Regular)   Pulse 88   Temp 98  F (36.7  C) (Oral)   Resp 18   Ht 1.59 m (5' 2.6\")   Wt 69.9 kg (154 lb)   LMP 04/12/2017   SpO2 99%   BMI 27.63 kg/m      Patient seen, evaluated, and discussed with the resident.  I verified the content of the note, which accurately reflects my assessment of the patient and the plan of care.    Supervising physician: Maci Fritz MD  [facility]  "

## 2024-02-05 NOTE — PROGRESS NOTES
I have verified the content of the note, which accurately reflects my assessment of the patient and the plan of care.   Geri Sanchez, [facility], PharmD

## 2024-02-05 NOTE — PATIENT INSTRUCTIONS
Thank you for discussing your health today!    We discussed the following at this visit:    Hospital discharge follow-up.  I am glad that you are feeling better and hopes that this continues to improve.  I provided a work note for you.  I have also provided you with tramadol to get through this current continued crisis and to use as needed and the followi months.ng if they do not fill this full prescription and only give it for 7 days, please contact my office so we can refill your prescription.    Please make an appointment in 1 month to follow up on sickle cell crisis.    Please call the clinic with any questions or concerns.    Heri Gamez, DO

## 2024-02-05 NOTE — PROGRESS NOTES
Clinical Pharmacy Consult:                                                    Kelly Harris is a 53 year old coming in for a TCM medication reconciliation.  She was discharged from [facility] on 1/21 for Sickle cell disease.  Patient was seen today by Dr. Gamez for transitional care management.       Reason for Consult: TCM medication reconciliation.    Discussion:   The following medications were started in the hospital:   Hydromorphone Take 1 tablet (2 mg) by mouth every 6 hours as needed for severe pain. Not taking because she ran out since hospital discharge.  Senna-S Take 1 tablet by mouth 2 times daily - Patient is not taking this medication because diet changes have been sufficient.   Lidocaine 4 % Patch- Place 2 patches onto the skin every 24 hours To prevent lidocaine toxicity, patient should be patch free for 12 hrs daily. Not taking because she ran out since hospital discharge.     The following medications were stopped in the hospital:   Tramadol on hold while taking Dilaudid - Ran out of tramadol    The following medications were changed in the hospital:   None    This patient's medication list has been updated to reflect their current medications.  Their medications have not been reviewed for indication, effectiveness, safety or convenience. Please see the note today from Dr. Gamez for additional information.    Plan:  As directed by Dr. Gamez    Post Discharge Medication Reconciliation Status: discharge medications reconciled, continue medications without change.    Omar Javier, PD4 PharmD. Student    I was present with the pharmacy student who participated in the service and documentation of this note. I have verified the history, personally performed the medical decision making, and have verified the content of the note, which accurately reflects my assessment of the patient and the plan of care.    Gaby Pop, Pharm.D.  Medication Therapy Management Pharmacy Resident

## 2024-02-13 ENCOUNTER — RADIOLOGY INJECTION OFFICE VISIT (OUTPATIENT)
Dept: PHYSICAL MEDICINE AND REHAB | Facility: CLINIC | Age: 54
End: 2024-02-13
Payer: COMMERCIAL

## 2024-02-13 VITALS
DIASTOLIC BLOOD PRESSURE: 84 MMHG | HEART RATE: 67 BPM | SYSTOLIC BLOOD PRESSURE: 136 MMHG | TEMPERATURE: 97.3 F | OXYGEN SATURATION: 99 % | RESPIRATION RATE: 16 BRPM

## 2024-02-13 DIAGNOSIS — M53.3 PAIN OF BOTH SACROILIAC JOINTS: ICD-10-CM

## 2024-02-13 PROCEDURE — 27096 INJECT SACROILIAC JOINT: CPT | Mod: LT | Performed by: PAIN MEDICINE

## 2024-02-13 RX ORDER — ROPIVACAINE HYDROCHLORIDE 5 MG/ML
INJECTION, SOLUTION EPIDURAL; INFILTRATION; PERINEURAL
Status: SHIPPED | OUTPATIENT
Start: 2024-02-13

## 2024-02-13 RX ORDER — METHYLPREDNISOLONE ACETATE 40 MG/ML
INJECTION, SUSPENSION INTRA-ARTICULAR; INTRALESIONAL; INTRAMUSCULAR; SOFT TISSUE
Status: SHIPPED | OUTPATIENT
Start: 2024-02-13

## 2024-02-13 RX ORDER — LIDOCAINE HYDROCHLORIDE 10 MG/ML
INJECTION, SOLUTION EPIDURAL; INFILTRATION; INTRACAUDAL; PERINEURAL
Status: SHIPPED | OUTPATIENT
Start: 2024-02-13

## 2024-02-13 RX ADMIN — METHYLPREDNISOLONE ACETATE 40 MG: 40 INJECTION, SUSPENSION INTRA-ARTICULAR; INTRALESIONAL; INTRAMUSCULAR; SOFT TISSUE at 09:50

## 2024-02-13 RX ADMIN — LIDOCAINE HYDROCHLORIDE 2 ML: 10 INJECTION, SOLUTION EPIDURAL; INFILTRATION; INTRACAUDAL; PERINEURAL at 09:49

## 2024-02-13 RX ADMIN — ROPIVACAINE HYDROCHLORIDE 5 ML: 5 INJECTION, SOLUTION EPIDURAL; INFILTRATION; PERINEURAL at 09:50

## 2024-02-13 ASSESSMENT — PAIN SCALES - GENERAL
PAINLEVEL: EXTREME PAIN (8)
PAINLEVEL: SEVERE PAIN (6)

## 2024-02-13 NOTE — PATIENT INSTRUCTIONS
DISCHARGE INSTRUCTIONS    During office hours (8:00 a.m.- 4:00 p.m.) questions or concerns may be answered  by calling [facility] Navigation Nurses at  [phone].  Messages received after hours will be returned the following business day.      In the case of an emergency, please dial 911 or seek assistance at the nearest Emergency Room/Urgent Care facility.     All Patients:    You may experience an increase in your symptoms for the first 2 days (It may take anywhere between 2 days- 2 weeks for the steroid to have maximum effect).    You may use ice on the injection site, as frequently as 20 minutes each hour if needed.    You may take your pain medicine.    You may continue taking your regular medication after your injection. If you have had a Medial Branch Block you may resume pain medication once your pain diary is completed.    You may shower. No swimming, tub bath or hot tub for 48 hours.  You may remove your bandaid/bandage as soon as you are home.    You may resume light activities, as tolerated.    Resume your usual diet as tolerated.    It is strongly advised that you do not drive for 1-3 hours post injection.    If you have had oral sedation:  Do not drive for 8 hours post injection.      If you have had IV sedation:  Do not drive for 24 hours post injection.  Do not operate hazardous machinery or make important personal/business decisions for 24 hours.      POSSIBLE STEROID SIDE EFFECTS (If steroid/cortisone was used for your procedure)    -If you experience these symptoms, it should only last for a short period    Swelling of the legs              Skin redness (flushing)     Mouth (oral) irritation   Blood sugar (glucose) levels            Sweats                    Mood changes  Headache  Sleeplessness  Weakened immune system for up to 14 days, which could increase the risk of rolan the COVID-19 virus and/or experiencing more severe symptoms of the disease, if exposed.  Decreased  effectiveness of the flu vaccine if given within 2 weeks of the steroid.         POSSIBLE PROCEDURE SIDE EFFECTS  -Call the Spine Center if you are concerned  Increased Pain           Increased numbness/tingling      Nausea/Vomiting          Bruising/bleeding at site      Redness or swelling                                              Difficulty walking      Weakness           Fever greater than 100.5    *In the event of a severe headache after an epidural steroid injection that is relieved by lying down, please call the [facility] to speak with a clinical staff member*

## 2024-02-22 ENCOUNTER — LAB (OUTPATIENT)
Dept: INFUSION THERAPY | Facility: HOSPITAL | Age: 54
End: 2024-02-22
Attending: INTERNAL MEDICINE
Payer: COMMERCIAL

## 2024-02-22 ENCOUNTER — ONCOLOGY VISIT (OUTPATIENT)
Dept: ONCOLOGY | Facility: HOSPITAL | Age: 54
End: 2024-02-22
Attending: INTERNAL MEDICINE
Payer: COMMERCIAL

## 2024-02-22 ENCOUNTER — PATIENT OUTREACH (OUTPATIENT)
Dept: ONCOLOGY | Facility: HOSPITAL | Age: 54
End: 2024-02-22

## 2024-02-22 VITALS
RESPIRATION RATE: 18 BRPM | OXYGEN SATURATION: 99 % | SYSTOLIC BLOOD PRESSURE: 151 MMHG | HEIGHT: 63 IN | BODY MASS INDEX: 27.48 KG/M2 | WEIGHT: 155.1 LBS | DIASTOLIC BLOOD PRESSURE: 70 MMHG | HEART RATE: 71 BPM | TEMPERATURE: 98.6 F

## 2024-02-22 DIAGNOSIS — D57.20 SICKLE-CELL/HB-C DISEASE WITHOUT CRISIS (H): ICD-10-CM

## 2024-02-22 DIAGNOSIS — D57.20 SICKLE-CELL/HB-C DISEASE WITHOUT CRISIS (H): Primary | ICD-10-CM

## 2024-02-22 LAB
ALBUMIN SERPL BCG-MCNC: 4.4 G/DL (ref 3.5–5.2)
ALP SERPL-CCNC: 123 U/L (ref 40–150)
ALT SERPL W P-5'-P-CCNC: 23 U/L (ref 0–50)
ANION GAP SERPL CALCULATED.3IONS-SCNC: 8 MMOL/L (ref 7–15)
AST SERPL W P-5'-P-CCNC: 19 U/L (ref 0–45)
BASOPHILS # BLD AUTO: ABNORMAL 10*3/UL
BASOPHILS # BLD MANUAL: 0 10E3/UL (ref 0–0.2)
BASOPHILS NFR BLD AUTO: ABNORMAL %
BASOPHILS NFR BLD MANUAL: 0 %
BILIRUB SERPL-MCNC: 0.3 MG/DL
BUN SERPL-MCNC: 12.8 MG/DL (ref 6–20)
CALCIUM SERPL-MCNC: 9.6 MG/DL (ref 8.6–10)
CHLORIDE SERPL-SCNC: 104 MMOL/L (ref 98–107)
CREAT SERPL-MCNC: 0.97 MG/DL (ref 0.51–0.95)
DEPRECATED HCO3 PLAS-SCNC: 31 MMOL/L (ref 22–29)
EGFRCR SERPLBLD CKD-EPI 2021: 69 ML/MIN/1.73M2
EOSINOPHIL # BLD AUTO: ABNORMAL 10*3/UL
EOSINOPHIL # BLD MANUAL: 0.4 10E3/UL (ref 0–0.7)
EOSINOPHIL NFR BLD AUTO: ABNORMAL %
EOSINOPHIL NFR BLD MANUAL: 4 %
ERYTHROCYTE [DISTWIDTH] IN BLOOD BY AUTOMATED COUNT: 15.2 % (ref 10–15)
GLUCOSE SERPL-MCNC: 105 MG/DL (ref 70–99)
HCT VFR BLD AUTO: 35.4 % (ref 35–47)
HGB BLD-MCNC: 12.1 G/DL (ref 11.7–15.7)
IMM GRANULOCYTES # BLD: ABNORMAL 10*3/UL
IMM GRANULOCYTES NFR BLD: ABNORMAL %
LYMPHOCYTES # BLD AUTO: ABNORMAL 10*3/UL
LYMPHOCYTES # BLD MANUAL: 3.8 10E3/UL (ref 0.8–5.3)
LYMPHOCYTES NFR BLD AUTO: ABNORMAL %
LYMPHOCYTES NFR BLD MANUAL: 42 %
MCH RBC QN AUTO: 25.7 PG (ref 26.5–33)
MCHC RBC AUTO-ENTMCNC: 34.2 G/DL (ref 31.5–36.5)
MCV RBC AUTO: 75 FL (ref 78–100)
MONOCYTES # BLD AUTO: ABNORMAL 10*3/UL
MONOCYTES # BLD MANUAL: 0.3 10E3/UL (ref 0–1.3)
MONOCYTES NFR BLD AUTO: ABNORMAL %
MONOCYTES NFR BLD MANUAL: 3 %
NEUTROPHILS # BLD AUTO: ABNORMAL 10*3/UL
NEUTROPHILS # BLD MANUAL: 4.6 10E3/UL (ref 1.6–8.3)
NEUTROPHILS NFR BLD AUTO: ABNORMAL %
NEUTROPHILS NFR BLD MANUAL: 51 %
NRBC # BLD AUTO: 0.5 10E3/UL
NRBC # BLD AUTO: 0.9 10E3/UL
NRBC BLD AUTO-RTO: 10 /100
NRBC BLD MANUAL-RTO: 6 %
PLAT MORPH BLD: ABNORMAL
PLATELET # BLD AUTO: 387 10E3/UL (ref 150–450)
POLYCHROMASIA BLD QL SMEAR: SLIGHT
POTASSIUM SERPL-SCNC: 4.1 MMOL/L (ref 3.4–5.3)
PROT SERPL-MCNC: 7.5 G/DL (ref 6.4–8.3)
RBC # BLD AUTO: 4.7 10E6/UL (ref 3.8–5.2)
RBC MORPH BLD: ABNORMAL
RETICS # AUTO: 0.12 10E6/UL (ref 0.01–0.11)
RETICS/RBC NFR AUTO: 2.6 % (ref 0.8–2.7)
SODIUM SERPL-SCNC: 143 MMOL/L (ref 135–145)
TARGETS BLD QL SMEAR: ABNORMAL
WBC # BLD AUTO: 9 10E3/UL (ref 4–11)

## 2024-02-22 PROCEDURE — 99214 OFFICE O/P EST MOD 30 MIN: CPT | Performed by: INTERNAL MEDICINE

## 2024-02-22 PROCEDURE — 80053 COMPREHEN METABOLIC PANEL: CPT

## 2024-02-22 PROCEDURE — 83020 HEMOGLOBIN ELECTROPHORESIS: CPT

## 2024-02-22 PROCEDURE — 85045 AUTOMATED RETICULOCYTE COUNT: CPT

## 2024-02-22 PROCEDURE — 99213 OFFICE O/P EST LOW 20 MIN: CPT | Performed by: INTERNAL MEDICINE

## 2024-02-22 PROCEDURE — 36415 COLL VENOUS BLD VENIPUNCTURE: CPT

## 2024-02-22 PROCEDURE — 85007 BL SMEAR W/DIFF WBC COUNT: CPT

## 2024-02-22 PROCEDURE — 85027 COMPLETE CBC AUTOMATED: CPT

## 2024-02-22 ASSESSMENT — PAIN SCALES - GENERAL: PAINLEVEL: EXTREME PAIN (8)

## 2024-02-22 NOTE — PROGRESS NOTES
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    Weight as of this encounter: 70.4 kg (155 lb 1.6 oz). Body surface area is 1.77 meters squared.  Extreme Pain (8) Comment: Data Unavailable   Patient's last menstrual period was 04/12/2017.  Allergies reviewed: Yes  Medications reviewed: Yes    Medications: Medication refills not needed today.  Pharmacy name entered into [employer]:    [id number] - [place], [place] - [address] & [facility] [id number] [place] [address] [place]    Frailty Screening:   Is the patient here for a new oncology consult visit in cancer care? 2. 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      Violeta Johnson MA            "

## 2024-02-22 NOTE — LETTER
"    2/22/2024         RE: Kelly Harris  [address] [facility] [address]        Dear Colleague,    Thank you for referring your patient, Kelly Harris, to the [facility] [place]. Please see a copy of my visit note below.    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    Weight as of this encounter: 70.4 kg (155 lb 1.6 oz). Body surface area is 1.77 meters squared.  Extreme Pain (8) Comment: Data Unavailable   Patient's last menstrual period was 04/12/2017.  Allergies reviewed: Yes  Medications reviewed: Yes    Medications: Medication refills not needed today.  Pharmacy name entered into [employer]:    [id number] - [place], [place] - [address] [facility] [id number] [place] [address] [place]    Frailty Screening:   Is the patient here for a new oncology consult visit in cancer care? 2. 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    Discussed and recommend consideration to start hydroxyurea.  Reviewed potential side effects and also benefits in patients with sickle cell.  She would like to review information before starting any medications.    Provided her with that information.  Have check baseline blood work today.  Will see back in a month to discuss initiation of hydroxyurea.  If that is done we may need to see her more frequently and check labs monthly until we get her to optimal dosing.    She continues on folic acid.  She is having at least annual ophthalmologic exams.  She looks to be up-to-date on her vaccinations.    Plan: As above  Follow-up in a month to discuss initiation of hydroxyurea    Measurable disease: CT of the chest, hemoglobin electrophoresis    Current therapy: Eliquis 5 mg p.o. 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  Seen in October.  Hospitalized a month ago for about 4 days with sickle cell pain crisis.  Has continued to use Ultram after that.  Denies any other new symptoms or concerns.  ECOG status is 0.      October  2023:    Kelly returns for follow-up.  Seen 2 months ago.  Overall doing well.  Breathing back to normal.  No lower extremity pain or swelling.  No current issues with sickle cell bone pain.  No other new symptoms or problems.  ECOG status 0.    August 2023:    Kelly is here for follow-up.  Seen in clinic about 2-1/2 weeks ago and was hospitalized.  Documented to have low volume bilateral pulmonary emboli and asthma exacerbation.  Currently on Eliquis 5 mg p.o. twice daily and is overall much improved.  Still with some lower back pain with radiation down the left leg.  Some fatigue.  No other new symptoms or problems.  No family history of thrombosis.    July 25, 2023:  Ms. Kelly Harris is a 52-year-old black female with previous known diagnosis of hemoglobin SC disease and asthma and remote history of ovarian vein thrombosis referred for recent hospitalization with sickle cell pain crisis.  She started to have some joint pain back in April and was treated with Tylenol.    She had worsening pain couple weeks ago involving the right chest wall and back and was hospitalized for management for 1 night.  Since discharge she has continued to have significant pain.  Using oxycodone 5 mg about 3 times a day but has not been able to resume work.  She describes being cold but does not have fever.  Has had some headaches dizziness and lightheadedness.  No real shortness of breath or cough.  No urinary symptoms.  Some constipation likely related to her pain medication.    It has been several years since previous hospitalization or requiring narcotic pain medications to manage pain crisis.  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    No previous problems with root canal, 2 jaw tumor surgeries.     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  Has joint pains and crises every 2 to 3 months but manages with Tylenol and hydration.  No history of stroke.  Has had vision issues and does  "follow with ophthalmology.     No abdominal symptoms.  No change with bowels or urine.  No infectious problems.     She is originally from [facility] [place].  She is single with 2 kids who also have hemoglobin SC disease.  She works as a nutritionist.     Other personal or family history of thrombosis.        Review of systems.  No fever or night sweats.  No loss of weight.  No lumps or bumps anywhere.  No unusual headaches or eyesight issues.  No dizziness.  No bleeding from the nose.  No sores in the mouth. No problems with swallowing.  No chest pain. No shortness of breath. No cough.  No abdominal pain. No nausea or vomiting.  No diarrhea or constipation.  No blood in stool or black colored stools.  No problems passing urine.  No numbness or tingling in hands or feet.  No skin rashes.  A 14 point review of systems is otherwise negative.        Past History    Past Medical History:   Diagnosis Date     Asthma      Sickle cell pain crisis (H) 07/16/2023       Past Surgical History:   Procedure Laterality Date     MOUTH SURGERY  09/07/2019    Removed benign mouth tumor         Physical Exam    BP (!) 151/70 (BP Location: Left arm, Patient Position: Sitting, Cuff Size: Adult Large)   Pulse 71   Temp 98.6  F (37  C) (Oral)   Resp 18   Ht 1.6 m (5' 3\")   Wt 70.4 kg (155 lb 1.6 oz)   LMP 04/12/2017   SpO2 99%   BMI 27.47 kg/m        GENERAL: Alert and oriented to time place and person. Seated comfortably. In no distress.    HEAD: Atraumatic and normocephalic.    EYES: SETH, EOMI.  No pallor.  No icterus.    Oral cavity: no mucosal lesion or tonsillar enlargement.    NECK: supple. JVP normal.  No thyroid enlargement.    LYMPH NODES: No palpable, cervical, axillary or inguinal lymphadenopathy.    CHEST: clear to auscultation bilaterally.  Resonant to percussion throughout bilaterally.  Symmetrical breath movements bilaterally.    CVS: S1 and S2 are heard. Regular rate and rhythm.  No murmur or gallop or rub heard.  No " peripheral edema.    ABDOMEN: Soft. Not tender. Not distended.  No palpable hepatomegaly or splenomegaly.  No other mass palpable.  Bowel sounds heard.    EXTREMITIES: Warm.    SKIN: no rash, or bruising or purpura.  Has a full head of hair.    Lab Results    Recent Results (from the past 168 hour(s))   Comprehensive metabolic panel (BMP + Alb, Alk Phos, ALT, AST, Total. Bili, TP)   Result Value Ref Range    Sodium 143 135 - 145 mmol/L    Potassium 4.1 3.4 - 5.3 mmol/L    Carbon Dioxide (CO2) 31 (H) 22 - 29 mmol/L    Anion Gap 8 7 - 15 mmol/L    Urea Nitrogen 12.8 6.0 - 20.0 mg/dL    Creatinine 0.97 (H) 0.51 - 0.95 mg/dL    GFR Estimate 69 >60 mL/min/1.73m2    Calcium 9.6 8.6 - 10.0 mg/dL    Chloride 104 98 - 107 mmol/L    Glucose 105 (H) 70 - 99 mg/dL    Alkaline Phosphatase 123 40 - 150 U/L    AST 19 0 - 45 U/L    ALT 23 0 - 50 U/L    Protein Total 7.5 6.4 - 8.3 g/dL    Albumin 4.4 3.5 - 5.2 g/dL    Bilirubin Total 0.3 <=1.2 mg/dL   CBC with platelets and differential   Result Value Ref Range    WBC Count 9.0 4.0 - 11.0 10e3/uL    RBC Count 4.70 3.80 - 5.20 10e6/uL    Hemoglobin 12.1 11.7 - 15.7 g/dL    Hematocrit 35.4 35.0 - 47.0 %    MCV 75 (L) 78 - 100 fL    MCH 25.7 (L) 26.5 - 33.0 pg    MCHC 34.2 31.5 - 36.5 g/dL    RDW 15.2 (H) 10.0 - 15.0 %    Platelet Count 387 150 - 450 10e3/uL    % Neutrophils      % Lymphocytes      % Monocytes      % Eosinophils      % Basophils      % Immature Granulocytes      NRBCs per 100 WBC 10 (H) <1 /100    Absolute Neutrophils      Absolute Lymphocytes      Absolute Monocytes      Absolute Eosinophils      Absolute Basophils      Absolute Immature Granulocytes      Absolute NRBCs 0.9 10e3/uL   Manual Differential   Result Value Ref Range    % Neutrophils 51 %    % Lymphocytes 42 %    % Monocytes 3 %    % Eosinophils 4 %    % Basophils 0 %    NRBCs per 100 WBC 6 (H) <=0 %    Absolute Neutrophils 4.6 1.6 - 8.3 10e3/uL    Absolute Lymphocytes 3.8 0.8 - 5.3 10e3/uL    Absolute  "Monocytes 0.3 0.0 - 1.3 10e3/uL    Absolute Eosinophils 0.4 0.0 - 0.7 10e3/uL    Absolute Basophils 0.0 0.0 - 0.2 10e3/uL    Absolute NRBCs 0.5 (H) <=0.0 10e3/uL    RBC Morphology Confirmed RBC Indices     Platelet Assessment  Automated Count Confirmed. Platelet morphology is normal.     Automated Count Confirmed. Platelet morphology is normal.    Polychromasia Slight (A) None Seen    Target Cells Moderate (A) None Seen         Imaging    PAIN Joint Injection Sacroiliac Joint Bilateral    Result Date: 2/13/2024  SACROILIAC JOINT INJECTION WITH FLUOROSCOPIC GUIDANCE Performed on: 2/13/24 Pre-Procedure Diagnosis:  LBP, SI dysfunction Post-Procedure Diagnosis:  Same Procedure Performed:  Sacroiliac Joint Injection with Fluoroscopic Guidance Clinical Scenario:  As per office notes Anesthesia/Fluids:  As per intra-procedure documentation Vital Signs:  As per intra-procedure documentation Side Injection:  Bilateral CC: Kelly Harris is a 53 year-old female who presents today for bilateral SI joint injections as ordered by Dr. Rice.  The patient complains of low back pain localizing to the SI joints.  Imaging is reviewed today.   The procedure of sacroiliac joint injections was discussed in detail along with the attendant risks, including:  nerve injury, infection, bleeding, and worsening of pain.  The patient decided to proceed with a trial of Sacroiliac Joint Injections and signed informed consent. The patient denies feeling ill today or having an active infection (denies taking antibiotics).  The patient does not take any prescription blood thinning medications.  The patient denies any allergies to contrast.    The patient was placed in a prone position on the block stretcher.  A procedural pause was performed to verify patient identify, site location and side for the procedure.  The low back areas were prepped and draped in usual fashion.  After anesthetizing the skin, a 3.5\", 22-gauge needle was introduced at the " right sacroiliac joint, under fluoroscopic guidance. After aspiration was negative for blood, 0.25 ml of Omnipaque-300 was slowly injected to verify placement on each side.  Intra-articular and periarticular flow was seen on the right side. A solution of 20 mg of depomedrol mixed in 2.5 mL of 0.5% ropivacaine was injected into the right SI joint.  The exact same procedure was repeated on the left side.  Again 20 mg of Depo-Medrol mixed with a 2.5 mL of 0.5% ropivacaine was injected into the left SI joint. Pre-procedure pain score:  8/10 Post-procedure pain score: 6/10 The patient tolerated the procedure well.  The patient was asked to call the clinic if any side effects are experienced after the injection.  After a short period of observation, the patient was discharged from the clinic.  Patient will follow-up with Dr. Rice in 2 to 4 weeks.        Signed by: Shari Bartlett MD       Again, thank you for allowing me to participate in the care of your patient.        Sincerely,        Shari Bartlett MD

## 2024-02-22 NOTE — PROGRESS NOTES
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    Discussed and recommend consideration to start hydroxyurea.  Reviewed potential side effects and also benefits in patients with sickle cell.  She would like to review information before starting any medications.    Provided her with that information.  Have check baseline blood work today.  Will see back in a month to discuss initiation of hydroxyurea.  If that is done we may need to see her more frequently and check labs monthly until we get her to optimal dosing.    She continues on folic acid.  She is having at least annual ophthalmologic exams.  She looks to be up-to-date on her vaccinations.    Plan: As above  Follow-up in a month to discuss initiation of hydroxyurea    Measurable disease: CT of the chest, hemoglobin electrophoresis    Current therapy: Eliquis 5 mg p.o. 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  Seen in October.  Hospitalized a month ago for about 4 days with sickle cell pain crisis.  Has continued to use Ultram after that.  Denies any other new symptoms or concerns.  ECOG status is 0.      October 2023:    Kelly returns for follow-up.  Seen 2 months ago.  Overall doing well.  Breathing back to normal.  No lower extremity pain or swelling.  No current issues with sickle cell bone pain.  No other new symptoms or problems.  ECOG status 0.    August 2023:    Kelly is here for follow-up.  Seen in clinic about 2-1/2 weeks ago and was hospitalized.  Documented to have low volume bilateral pulmonary emboli and asthma exacerbation.  Currently on Eliquis 5 mg p.o. twice daily and is overall much improved.  Still with some lower back pain with radiation down the left leg.  Some fatigue.  No other new symptoms or problems.  No family history of thrombosis.    July 25, 2023:  Ms. Kelly Harris is a 52-year-old black female with previous known diagnosis of hemoglobin SC disease and asthma and remote history of ovarian vein thrombosis referred for recent hospitalization with sickle cell pain crisis.  She started to have some joint pain back in April and was treated with Tylenol.    She had worsening pain couple weeks ago involving the right chest wall and back and was hospitalized for management for 1 night.  Since discharge she has continued to have significant pain.  Using oxycodone 5 mg about 3 times a day but has not been able to resume work.  She describes being cold but does not have fever.  Has had some headaches dizziness and lightheadedness.  No real shortness of breath or cough.  No urinary symptoms.  Some constipation likely related to her pain medication.    It has been several years since previous hospitalization or requiring narcotic pain medications to  "manage pain crisis.  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    No previous problems with root canal, 2 jaw tumor surgeries.     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  Has joint pains and crises every 2 to 3 months but manages with Tylenol and hydration.  No history of stroke.  Has had vision issues and does follow with ophthalmology.     No abdominal symptoms.  No change with bowels or urine.  No infectious problems.     She is originally from [facility].  She is single with 2 kids who also have hemoglobin SC disease.  She works as a nutritionist.     Other personal or family history of thrombosis.        Review of systems.  No fever or night sweats.  No loss of weight.  No lumps or bumps anywhere.  No unusual headaches or eyesight issues.  No dizziness.  No bleeding from the nose.  No sores in the mouth. No problems with swallowing.  No chest pain. No shortness of breath. No cough.  No abdominal pain. No nausea or vomiting.  No diarrhea or constipation.  No blood in stool or black colored stools.  No problems passing urine.  No numbness or tingling in hands or feet.  No skin rashes.  A 14 point review of systems is otherwise negative.        Past History    Past Medical History:   Diagnosis Date    Asthma     Sickle cell pain crisis (H) 07/16/2023       Past Surgical History:   Procedure Laterality Date    MOUTH SURGERY  09/07/2019    Removed benign mouth tumor         Physical Exam    BP (!) 151/70 (BP Location: Left arm, Patient Position: Sitting, Cuff Size: Adult Large)   Pulse 71   Temp 98.6  F (37  C) (Oral)   Resp 18   Ht 1.6 m (5' 3\")   Wt 70.4 kg (155 lb 1.6 oz)   LMP 04/12/2017   SpO2 99%   BMI 27.47 kg/m        GENERAL: Alert and oriented to time place and person. Seated comfortably. In no distress.    HEAD: Atraumatic and normocephalic.    EYES: SETH, EOMI.  No pallor.  No icterus.    Oral cavity: no mucosal lesion or tonsillar enlargement.    NECK: " supple. JVP normal.  No thyroid enlargement.    LYMPH NODES: No palpable, cervical, axillary or inguinal lymphadenopathy.    CHEST: clear to auscultation bilaterally.  Resonant to percussion throughout bilaterally.  Symmetrical breath movements bilaterally.    CVS: S1 and S2 are heard. Regular rate and rhythm.  No murmur or gallop or rub heard.  No peripheral edema.    ABDOMEN: Soft. Not tender. Not distended.  No palpable hepatomegaly or splenomegaly.  No other mass palpable.  Bowel sounds heard.    EXTREMITIES: Warm.    SKIN: no rash, or bruising or purpura.  Has a full head of hair.    Lab Results    Recent Results (from the past 168 hour(s))   Comprehensive metabolic panel (BMP + Alb, Alk Phos, ALT, AST, Total. Bili, TP)   Result Value Ref Range    Sodium 143 135 - 145 mmol/L    Potassium 4.1 3.4 - 5.3 mmol/L    Carbon Dioxide (CO2) 31 (H) 22 - 29 mmol/L    Anion Gap 8 7 - 15 mmol/L    Urea Nitrogen 12.8 6.0 - 20.0 mg/dL    Creatinine 0.97 (H) 0.51 - 0.95 mg/dL    GFR Estimate 69 >60 mL/min/1.73m2    Calcium 9.6 8.6 - 10.0 mg/dL    Chloride 104 98 - 107 mmol/L    Glucose 105 (H) 70 - 99 mg/dL    Alkaline Phosphatase 123 40 - 150 U/L    AST 19 0 - 45 U/L    ALT 23 0 - 50 U/L    Protein Total 7.5 6.4 - 8.3 g/dL    Albumin 4.4 3.5 - 5.2 g/dL    Bilirubin Total 0.3 <=1.2 mg/dL   CBC with platelets and differential   Result Value Ref Range    WBC Count 9.0 4.0 - 11.0 10e3/uL    RBC Count 4.70 3.80 - 5.20 10e6/uL    Hemoglobin 12.1 11.7 - 15.7 g/dL    Hematocrit 35.4 35.0 - 47.0 %    MCV 75 (L) 78 - 100 fL    MCH 25.7 (L) 26.5 - 33.0 pg    MCHC 34.2 31.5 - 36.5 g/dL    RDW 15.2 (H) 10.0 - 15.0 %    Platelet Count 387 150 - 450 10e3/uL    % Neutrophils      % Lymphocytes      % Monocytes      % Eosinophils      % Basophils      % Immature Granulocytes      NRBCs per 100 WBC 10 (H) <1 /100    Absolute Neutrophils      Absolute Lymphocytes      Absolute Monocytes      Absolute Eosinophils      Absolute Basophils       Absolute Immature Granulocytes      Absolute NRBCs 0.9 10e3/uL   Manual Differential   Result Value Ref Range    % Neutrophils 51 %    % Lymphocytes 42 %    % Monocytes 3 %    % Eosinophils 4 %    % Basophils 0 %    NRBCs per 100 WBC 6 (H) <=0 %    Absolute Neutrophils 4.6 1.6 - 8.3 10e3/uL    Absolute Lymphocytes 3.8 0.8 - 5.3 10e3/uL    Absolute Monocytes 0.3 0.0 - 1.3 10e3/uL    Absolute Eosinophils 0.4 0.0 - 0.7 10e3/uL    Absolute Basophils 0.0 0.0 - 0.2 10e3/uL    Absolute NRBCs 0.5 (H) <=0.0 10e3/uL    RBC Morphology Confirmed RBC Indices     Platelet Assessment  Automated Count Confirmed. Platelet morphology is normal.     Automated Count Confirmed. Platelet morphology is normal.    Polychromasia Slight (A) None Seen    Target Cells Moderate (A) None Seen         Imaging    PAIN Joint Injection Sacroiliac Joint Bilateral    Result Date: 2/13/2024  SACROILIAC JOINT INJECTION WITH FLUOROSCOPIC GUIDANCE Performed on: 2/13/24 Pre-Procedure Diagnosis:  LBP, SI dysfunction Post-Procedure Diagnosis:  Same Procedure Performed:  Sacroiliac Joint Injection with Fluoroscopic Guidance Clinical Scenario:  As per office notes Anesthesia/Fluids:  As per intra-procedure documentation Vital Signs:  As per intra-procedure documentation Side Injection:  Bilateral CC: Kelly Harris is a 53 year-old female who presents today for bilateral SI joint injections as ordered by Dr. Rice.  The patient complains of low back pain localizing to the SI joints.  Imaging is reviewed today.   The procedure of sacroiliac joint injections was discussed in detail along with the attendant risks, including:  nerve injury, infection, bleeding, and worsening of pain.  The patient decided to proceed with a trial of Sacroiliac Joint Injections and signed informed consent. The patient denies feeling ill today or having an active infection (denies taking antibiotics).  The patient does not take any prescription blood thinning medications.  The  "patient denies any allergies to contrast.    The patient was placed in a prone position on the block stretcher.  A procedural pause was performed to verify patient identify, site location and side for the procedure.  The low back areas were prepped and draped in usual fashion.  After anesthetizing the skin, a 3.5\", 22-gauge needle was introduced at the right sacroiliac joint, under fluoroscopic guidance. After aspiration was negative for blood, 0.25 ml of Omnipaque-300 was slowly injected to verify placement on each side.  Intra-articular and periarticular flow was seen on the right side. A solution of 20 mg of depomedrol mixed in 2.5 mL of 0.5% ropivacaine was injected into the right SI joint.  The exact same procedure was repeated on the left side.  Again 20 mg of Depo-Medrol mixed with a 2.5 mL of 0.5% ropivacaine was injected into the left SI joint. Pre-procedure pain score:  8/10 Post-procedure pain score: 6/10 The patient tolerated the procedure well.  The patient was asked to call the clinic if any side effects are experienced after the injection.  After a short period of observation, the patient was discharged from the clinic.  Patient will follow-up with Dr. Rice in 2 to 4 weeks.        Signed by: Shari Bartlett MD   "

## 2024-02-27 ENCOUNTER — OFFICE VISIT (OUTPATIENT)
Dept: PHYSICAL MEDICINE AND REHAB | Facility: CLINIC | Age: 54
End: 2024-02-27
Payer: COMMERCIAL

## 2024-02-27 VITALS — HEART RATE: 75 BPM | DIASTOLIC BLOOD PRESSURE: 96 MMHG | SYSTOLIC BLOOD PRESSURE: 141 MMHG

## 2024-02-27 DIAGNOSIS — M43.16 SPONDYLOLISTHESIS OF LUMBAR REGION: ICD-10-CM

## 2024-02-27 DIAGNOSIS — M47.816 LUMBAR FACET ARTHROPATHY: ICD-10-CM

## 2024-02-27 DIAGNOSIS — M54.16 LUMBAR RADICULAR PAIN: Primary | ICD-10-CM

## 2024-02-27 DIAGNOSIS — M48.061 FORAMINAL STENOSIS OF LUMBAR REGION: ICD-10-CM

## 2024-02-27 DIAGNOSIS — G47.9 SLEEP DIFFICULTIES: ICD-10-CM

## 2024-02-27 DIAGNOSIS — M53.3 PAIN OF BOTH SACROILIAC JOINTS: ICD-10-CM

## 2024-02-27 PROCEDURE — 99214 OFFICE O/P EST MOD 30 MIN: CPT | Performed by: PHYSICAL MEDICINE & REHABILITATION

## 2024-02-27 RX ORDER — GABAPENTIN 100 MG/1
CAPSULE ORAL
Qty: 90 CAPSULE | Refills: 1 | Status: SHIPPED | OUTPATIENT
Start: 2024-02-27 | End: 2024-04-10

## 2024-02-27 RX ORDER — MELOXICAM 15 MG/1
15 TABLET ORAL DAILY
Qty: 30 TABLET | Refills: 1 | Status: SHIPPED | OUTPATIENT
Start: 2024-02-27 | End: 2024-04-23

## 2024-02-27 ASSESSMENT — PAIN SCALES - GENERAL: PAINLEVEL: MODERATE PAIN (5)

## 2024-02-27 NOTE — LETTER
2/27/2024         RE: Kelly Harris  [address]  [place] [address]        Dear Colleague,    Thank you for referring your patient, Kelly Harris, to the [facility] SP[facility]UR[facility]ERY. Please see a copy of my visit note below.    Assessment/Plan:      Kelly was seen today for back pain.    Diagnoses and all orders for this visit:    Lumbar radicular pain  -     gabapentin (NEURONTIN) 100 MG capsule; 100mg at bedtime x 3 days, then may increase to 200mg x 3 days, then may increase to 300mg at bedtime  -     EMG; Future  -     meloxicam (MOBIC) 15 MG tablet; Take 1 tablet (15 mg) by mouth daily    Foraminal stenosis of lumbar region    Spondylolisthesis of lumbar region    Lumbar facet arthropathy  -     meloxicam (MOBIC) 15 MG tablet; Take 1 tablet (15 mg) by mouth daily    Pain of both sacroiliac joints    Sleep difficulties  -     gabapentin (NEURONTIN) 100 MG capsule; 100mg at bedtime x 3 days, then may increase to 200mg x 3 days, then may increase to 300mg at bedtime         Assessment: Pleasant 53 year old female  with a history of asthma ,pulmonary embolism no longer on Eliquis, sickle cell disease with:     1.  Approximate 8-month history of bilateral lumbar spine and gluteal pain consistent with sacroiliac joint pain with left greater than right pseudo radicular pain.  She has had pain despite physical therapy, Osteopathic manipulative medicine oxycodone and tramadol along with Medrol Dosepak trials.  She has had approximately 8 visits of physical therapy per her report.  She has L4-5 trace spondylolisthesis with severe facet arthropathy and moderate severe facet arthropathy L5-S1 but no high-grade central canal or foraminal stenosis and her pain is consistent with SI pain with positive thigh thrust left greater than right Tere test bilaterally Gaenslen's bilaterally SI compression and AP distraction.  Very good relief for only 1 day and then the pain significantly worsened  following bilateral SI joint injections.  Her pain is now left greater than right low back with left lateral posterior thigh paresthesias worse at night and some radicular pain.  She does, in retrospect have more moderate foraminal stenosis at L5-S1 on the left this could represent L5 radiculopathy.     2.  Poor sleep related to pain       Discussion:    1.  I discussed the diagnosis and treatment options.  We discussed options given the foraminal stenosis of lumbar epidural steroid injection, further diagnostics with EMG and medication changes.  She like to start with further diagnostics initially which is reasonable to confirm potentially a lumbar radiculopathy.    2.   EMG left lower extremity to evaluate.    3.  Trial meloxicam in place of naproxen for pain.    4.  Trial gabapentin at bedtime for neuropathic pain and sleep.  Start with 100 mg at bedtime slowly increasing to 300 mg.    5.  Follow-up after EMG.    It was our pleasure caring for your patient today, if there any questions or concerns please do not hesitate to contact us.      Subjective:   Patient ID: Kelly Harris is a 53 year old female.    History of Present Illness: Patient presents for follow-up of left greater than right PSIS and sacral pain with left lower extremity paresthesias posterior lateral left leg.  Pain is significantly worsened.  Initially after SI joint injections she did very well for 1 day and then had waxing and waning symptoms for a week and then significant worsening.  Now pain worse with standing for prolonged periods of time with a intermittent paresthesias on the left leg worse at night and interferes with her sleep.  Better with laying down in certain positions pain is a 5/10 today.  Takes naproxen which helps about 50% when she takes it.      Imaging: MRI images lumbar spine personally reviewed again today from December 2023 for medical decision-making purposes.  Slight spondylolisthesis L4-5 with significant facet  arthropathy joint effusions.  moderate bilateral L5-S1 foraminal stenosis left appears worse than related to degenerative disc disease and moderate facet arthropathy.  Severe facet arthropathy with joint effusions L4-5.    Plain films of the pelvis showed normal Hip joints.    Review of Systems: Pertinent positives: Complains of headaches.  Pertinent negatives:   No bowel or bladder incontinence.  No urinary retention.  No fevers, unintentional weight loss, balance changes,   frequent falling, difficulty swallowing, or coordination difficulties.  All others reviewed are negative.      Prior interventions:  1.  Bilateral sacroiliac joint injection February 13, 2024.    Past Medical History:   Diagnosis Date     Asthma      Sickle cell pain crisis (H) 07/16/2023       The following portions of the patient's history were reviewed and updated as appropriate: allergies, current medications, past family history, past medical history, past social history, past surgical history and problem list.           Objective:   Physical Exam:    BP (!) 141/96   Pulse 75   LMP 04/12/2017   There is no height or weight on file to calculate BMI.      General: Alert and oriented with normal affect. Attention, knowledge, memory, and language are intact. No acute distress.   Eyes: Sclerae are clear.  Respirations: Unlabored. CV: No lower extremity edema.  Skin: No rashes seen  Over the feet.  Gait:  Nonantalgic.  Tenderness left PSIS.    Sensation is generalized mild decreased light touch left lower extremity  Reflexes are  2+ patellar and Achilles     Manual muscle testing reveals:  Right /Left out of 5     5/5 hip flexors  5/5 knee flexors  5/5 knee extensors  5/5 ankle plantar flexors  5/5 ankle dorsiflexors  5/5  EHL       Again, thank you for allowing me to participate in the care of your patient.        Sincerely,        Orlando Rice, DO

## 2024-02-27 NOTE — PROGRESS NOTES
Assessment/Plan:      Kelly was seen today for back pain.    Diagnoses and all orders for this visit:    Lumbar radicular pain  -     gabapentin (NEURONTIN) 100 MG capsule; 100mg at bedtime x 3 days, then may increase to 200mg x 3 days, then may increase to 300mg at bedtime  -     EMG; Future  -     meloxicam (MOBIC) 15 MG tablet; Take 1 tablet (15 mg) by mouth daily    Foraminal stenosis of lumbar region    Spondylolisthesis of lumbar region    Lumbar facet arthropathy  -     meloxicam (MOBIC) 15 MG tablet; Take 1 tablet (15 mg) by mouth daily    Pain of both sacroiliac joints    Sleep difficulties  -     gabapentin (NEURONTIN) 100 MG capsule; 100mg at bedtime x 3 days, then may increase to 200mg x 3 days, then may increase to 300mg at bedtime         Assessment: Pleasant 53 year old female  with a history of asthma ,pulmonary embolism no longer on Eliquis, sickle cell disease with:     1.  Approximate 8-month history of bilateral lumbar spine and gluteal pain consistent with sacroiliac joint pain with left greater than right pseudo radicular pain.  She has had pain despite physical therapy, Osteopathic manipulative medicine oxycodone and tramadol along with Medrol Dosepak trials.  She has had approximately 8 visits of physical therapy per her report.  She has L4-5 trace spondylolisthesis with severe facet arthropathy and moderate severe facet arthropathy L5-S1 but no high-grade central canal or foraminal stenosis and her pain is consistent with SI pain with positive thigh thrust left greater than right Tere test bilaterally Gaenslen's bilaterally SI compression and AP distraction.  Very good relief for only 1 day and then the pain significantly worsened following bilateral SI joint injections.  Her pain is now left greater than right low back with left lateral posterior thigh paresthesias worse at night and some radicular pain.  She does, in retrospect have more moderate foraminal stenosis at L5-S1 on the left  this could represent L5 radiculopathy.     2.  Poor sleep related to pain       Discussion:    1.  I discussed the diagnosis and treatment options.  We discussed options given the foraminal stenosis of lumbar epidural steroid injection, further diagnostics with EMG and medication changes.  She like to start with further diagnostics initially which is reasonable to confirm potentially a lumbar radiculopathy.    2.   EMG left lower extremity to evaluate.    3.  Trial meloxicam in place of naproxen for pain.    4.  Trial gabapentin at bedtime for neuropathic pain and sleep.  Start with 100 mg at bedtime slowly increasing to 300 mg.    5.  Follow-up after EMG.    It was our pleasure caring for your patient today, if there any questions or concerns please do not hesitate to contact us.      Subjective:   Patient ID: Kelly Harris is a 53 year old female.    History of Present Illness: Patient presents for follow-up of left greater than right PSIS and sacral pain with left lower extremity paresthesias posterior lateral left leg.  Pain is significantly worsened.  Initially after SI joint injections she did very well for 1 day and then had waxing and waning symptoms for a week and then significant worsening.  Now pain worse with standing for prolonged periods of time with a intermittent paresthesias on the left leg worse at night and interferes with her sleep.  Better with laying down in certain positions pain is a 5/10 today.  Takes naproxen which helps about 50% when she takes it.      Imaging: MRI images lumbar spine personally reviewed again today from December 2023 for medical decision-making purposes.  Slight spondylolisthesis L4-5 with significant facet arthropathy joint effusions.  moderate bilateral L5-S1 foraminal stenosis left appears worse than related to degenerative disc disease and moderate facet arthropathy.  Severe facet arthropathy with joint effusions L4-5.    Plain films of the pelvis showed normal Hip  joints.    Review of Systems: Pertinent positives: Complains of headaches.  Pertinent negatives:   No bowel or bladder incontinence.  No urinary retention.  No fevers, unintentional weight loss, balance changes,   frequent falling, difficulty swallowing, or coordination difficulties.  All others reviewed are negative.      Prior interventions:  1.  Bilateral sacroiliac joint injection February 13, 2024.    Past Medical History:   Diagnosis Date    Asthma     Sickle cell pain crisis (H) 07/16/2023       The following portions of the patient's history were reviewed and updated as appropriate: allergies, current medications, past family history, past medical history, past social history, past surgical history and problem list.           Objective:   Physical Exam:    BP (!) 141/96   Pulse 75   LMP 04/12/2017   There is no height or weight on file to calculate BMI.      General: Alert and oriented with normal affect. Attention, knowledge, memory, and language are intact. No acute distress.   Eyes: Sclerae are clear.  Respirations: Unlabored. CV: No lower extremity edema.  Skin: No rashes seen  Over the feet.  Gait:  Nonantalgic.  Tenderness left PSIS.    Sensation is generalized mild decreased light touch left lower extremity  Reflexes are  2+ patellar and Achilles     Manual muscle testing reveals:  Right /Left out of 5     5/5 hip flexors  5/5 knee flexors  5/5 knee extensors  5/5 ankle plantar flexors  5/5 ankle dorsiflexors  5/5  EHL

## 2024-02-27 NOTE — PATIENT INSTRUCTIONS
Schedule an EMG (nerve test) with Dr Rice.   Meloxicam (which is an anti-inflammatory) medication is prescribed today. Take 1 tablet   a day as needed for pain.This medication should be taken with food and water to prevent any stomach upset. Do not take ibuprofen/Advil/Motrin/Aleve  while you take Meloxicam. Please call if you have any side effects.   3. Prescribed Gabapentin today, 100 mg tablets, to be titrated up to 3 tablets at bedtime as tolerated for your nerve pain. Please follow Gabapentin dosing chart below.     Gabapentin 100mg Dosing Chart    DATE  MORNING AFTERNOON BEDTIME    Day 1 0 0 1    Day 2 0 0 1    Day 3 0 0 1    Day 4 0 0 2    Day 5 0 0 2    Day 6 0 0 2    Day 7 0 0 3    Day 8 0 0 3    Day 9 0 0 3                                                                                                                                    Continue medication, taking 3 capsules at bedtime    Please call if you have any questions regarding how to take your medication  Clinic Phone # [phone]

## 2024-02-29 LAB
HEMOGLOBIN A2 QUANTITATION: 4 % (ref 2.2–3.5)
HEMOGLOBIN A: <1 % (ref 94.5–97.8)
HEMOGLOBIN ELECTROPHRESIS: ABNORMAL
HEMOGLOBIN F QUANTITATION: 1.5 % (ref 0–2)

## 2024-03-14 ENCOUNTER — OFFICE VISIT (OUTPATIENT)
Dept: PHYSICAL MEDICINE AND REHAB | Facility: CLINIC | Age: 54
End: 2024-03-14
Attending: PHYSICAL MEDICINE & REHABILITATION
Payer: COMMERCIAL

## 2024-03-14 VITALS — SYSTOLIC BLOOD PRESSURE: 157 MMHG | DIASTOLIC BLOOD PRESSURE: 74 MMHG | HEART RATE: 74 BPM

## 2024-03-14 DIAGNOSIS — M43.16 SPONDYLOLISTHESIS OF LUMBAR REGION: ICD-10-CM

## 2024-03-14 DIAGNOSIS — M48.061 FORAMINAL STENOSIS OF LUMBAR REGION: ICD-10-CM

## 2024-03-14 DIAGNOSIS — M79.18 MYOFASCIAL PAIN: ICD-10-CM

## 2024-03-14 DIAGNOSIS — M47.816 LUMBAR FACET ARTHROPATHY: ICD-10-CM

## 2024-03-14 DIAGNOSIS — M54.16 LUMBAR RADICULAR PAIN: Primary | ICD-10-CM

## 2024-03-14 DIAGNOSIS — G47.9 SLEEP DIFFICULTIES: ICD-10-CM

## 2024-03-14 PROCEDURE — 99214 OFFICE O/P EST MOD 30 MIN: CPT | Mod: 25 | Performed by: PHYSICAL MEDICINE & REHABILITATION

## 2024-03-14 PROCEDURE — 95909 NRV CNDJ TST 5-6 STUDIES: CPT | Performed by: PHYSICAL MEDICINE & REHABILITATION

## 2024-03-14 PROCEDURE — 95886 MUSC TEST DONE W/N TEST COMP: CPT | Mod: LT | Performed by: PHYSICAL MEDICINE & REHABILITATION

## 2024-03-14 ASSESSMENT — PAIN SCALES - GENERAL: PAINLEVEL: SEVERE PAIN (6)

## 2024-03-14 NOTE — LETTER
3/14/2024         RE: Kelly Harris  [address]  [place] [address]        Dear Colleague,    Thank you for referring your patient, Kelly Harris, to the [facility]. Please see a copy of my visit note below.    Assessment/Plan:      Kelly was seen today for emg.    Diagnoses and all orders for this visit:    Lumbar radicular pain  -     EMG  -     MA NEEDLE EMG EA EXTREMTY W/PARASPINAL AREA COMPLETE  -     MA NCS MOTOR W OR W/O F-WAVE, 5 OR 6  -     Pain Transforaminal Vangie Inj Lmbscrl 1 Lvl Lt; Future    Foraminal stenosis of lumbar region  -     Pain Transforaminal Vangie Inj Lmbscrl 1 Lvl Lt; Future    Spondylolisthesis of lumbar region    Lumbar facet arthropathy    Myofascial pain    Sleep difficulties         Assessment: Pleasant 53 year old female with a history of asthma ,pulmonary embolism no longer on Eliquis, sickle cell disease with:     1.  Approximate 9-month history of bilateral lumbar spine and gluteal pain consistent with sacroiliac joint pain with left greater than right pseudo radicular pain.  She has had pain despite physical therapy, Osteopathic manipulative medicine oxycodone and tramadol along with Medrol Dosepak trials.  She has had approximately 8 visits of physical therapy per her report.  She has L4-5 trace spondylolisthesis with severe facet arthropathy and moderate severe facet arthropathy L5-S1 but no high-grade central canal or foraminal stenosis and her pain has potentially been consistent with SI pain with positive thigh thrust left greater than right Tere test bilaterally Gaenslen's bilaterally SI compression and AP distraction.  Very good relief for only 1 day and then the pain significantly worsened following bilateral SI joint injections.  Persistent pain in the left lumbar spine PSIS left gluteal region posterior lateral left leg and calf feels pulling down her leg.   And we reviewed the MRI she has moderate foraminal stenosis at  L5-S1 on the left this could represent L5 radiculopathy.  Normal  EMG of the left lower extremity.  Some improvement in sleep and pain with with gabapentin at bedtime 300 mg.  Also taking meloxicam which is tolerable.  Able to stop tramadol.     2.  Poor sleep related to pain improved with gabapentin 300 mg at bedtime.      Discussion:    1.  We discussed her diagnosis and treatment options.  I discussed the EMG today which was normal.  We discussed her leg pain is equal to the back pain in severity and she has a foraminal stenosis at L5-S1.  We discussed option of medication changes although she is tolerating meloxicam and gabapentin and this allows her to remain off of tramadol and allows her to sleep.  We also discussed further injections.  I want to avoid surgical evaluation at this point and she agrees as we have not exhausted conservative management.     2.  Recommend a left L5-S1 TFESI. She would like this with Dr. Jenkins.    3.  Continue gabapentin 300 mg at bedtime.  I did offer increasing gabapentin in the morning but wants to avoid this due to her side effects of drowsiness.    4.  Continue meloxicam 15 mg daily as needed.    5.  Follow-up with me 2 to 4 weeks after injection.    It was our pleasure caring for your patient today, if there any questions or concerns please do not hesitate to contact us.      Subjective:   Patient ID: Kelly Harris is a 53 year old female.    History of Present Illness: Patient presents for follow-up of lumbar spine pain and left lower extremity pain.  She continues to have significant pain left PSIS posterior lateral left leg to the calf.  Worse with prolonged standing and walking feels a pulling down her leg with standing and her leg is equal to back.  Better with lying down in a comfortable position.  Pain is a 9/10 or 6/10 today.  No real change overall still flared over the past 9 months but is having some improvement in sleep with gabapentin 3 mg at bedtime causes  some drowsiness in the morning.  But no drowsiness during the day.  Also taking meloxicam daily able to stop taking the tramadol with meloxicam but when the meloxicam wears off her pain returns.    EMG: Normal study left lower extremity.  No electrodiagnostic evidence of lumbosacral radiculopathy, lumbosacral plexopathy or focal neuropathy left lower extremity.    Imaging: Flexion-extension x-rays lumbar spine from January 4 were reviewed showing grade 1 spondylolisthesis L4-5 slight shifting from flexion to extension no overt instability.    MRI lumbar spine from 2023 December was reviewed showing slight spondylolisthesis L4-5 moderate facet arthropathy and mild spinal stenosis mild bilateral foraminal stenosis.  Moderate facet arthropathy L5-S1 and moderate bilateral foraminal stenosis.  Facet joint effusions at L4-5.    Review of Systems: Tingling down the left leg and headaches.  Denies weakness, bowel or bladder incontinence, falls, swallowing issues, fevers unintentional weight loss.    Past Medical History:   Diagnosis Date     Asthma      Sickle cell pain crisis (H) 07/16/2023       The following portions of the patient's history were reviewed and updated as appropriate: allergies, current medications, past family history, past medical history, past social history, past surgical history and problem list.           Objective:   Physical Exam:    BP (!) 157/74   Pulse 74   LMP 04/12/2017   There is no height or weight on file to calculate BMI.      General: Alert and oriented with normal affect. Attention, knowledge, memory, and language are intact. No acute distress.   Eyes: Sclerae are clear.  Respirations: Unlabored. CV: No lower extremity edema.  2+   dorsalis pedis pulses bilaterally.  Skin: No rashes seen.    Gait:  Nonantalgic  Positive seated straight leg raise on the left.  Sensation is intact to light touch throughout the upper and lower extremities.  Reflexes are   2+ patellar and Achilles      Manual muscle testing reveals:  Right /Left out of 5     5/5 hip flexors  5/5 knee flexors  5/5 knee extensors  5/5 ankle plantar flexors  5/5 ankle dorsiflexors-some mild giveaway ankle dorsiflexor and EHL.  5/5  EHL      [facility]  [address] [place] [address]  Office: [phone] Fax: [phone]    Electromyography and Nerve Conduction Study Report        Indication: Patient presents at the request Dr. Orlando Rice for a left lower extremity EMG.  She has low back pain left PSIS pain down the posterior lateral left leg with paresthesias and pain.  On exam she has normal sensation to light touch to the lower extremities, 2+ patellar and Achilles reflexes bilaterally, normal muscle strength throughout the major muscle groups of the bilateral lower extremities although some giveaway of the left lower extremity.  Positive seated straight leg raise on the left.          Pt Exam Discussion (Communication Barriers):  Electromyography and nerve conduction testing, including associated discomfort, risks, benefits, and alternatives was discussed with the patient prior to the procedure.  No learning/ communication barriers; patient verbalized understanding of procedure.  Informed consent was obtained.           Pt Assessment:  Testing was successfully completed; patient tolerated testing well.       Blood Thinners: None Skin Temperature: Warmed 31.8                   EMG/NCS  results:     Nerve Conduction Studies  Motor Sites      Segment Distal Latency Neg. Amp CV F-Latency F-Estimate Comment   Site  (ms) (mV) (m/s) (ms) (ms)    Left Fibular (EDB) Motor   Ankle Ankle-EDB 3.0 6.4       Knee Knee-Ankle 10.1 5.7 53      Left Tibial (AH) Motor   Ankle Ankle-AH 2.8 21.4       Knee Knee-Ankle 10.2 15.9 54        Sensory Sites      Onset Lat Peak Lat Amp CV Comment   Site (ms) (ms) ( V) (m/s)    Left Sural Sensory   B-Ankle 2.9 3.4 22 -      H-Reflex Sites      M-Lat  H Lat H Neg Amp   Site (ms) (ms) (mV)   Left Tibial (Soleus) H-Reflex   Pop Fossa 6.1 28.2 5.6   Right Tibial (Soleus) H-Reflex   Pop Fossa 5.7 29.0 4.4     H-Reflex Sites      Lt. H Lat Rt. H Lat L-R H Lat L-R H Neg Amp   Site (ms) (ms) (ms) Norm (mV)   Tibial (Soleus) H-Reflex   Pop Fossa 28.2 29.0 0.80  < 3.0 1.20       NCS Waveforms:    Motor         Sensory       H-Reflex           Electromyography     Side Muscle Nerve Root Ins Act Fibs Psw Fasc Recrt Dur Amp Poly Comment   Left AntTibialis Dp Br Fibular L4-5 Nml Nml Nml Nml Nml Nml Nml 0    Left Gastroc Tibial S1-2 Nml Nml Nml Nml Nml Nml Nml 0    Left Fibularis Long Sup Br Fibular L5-S1 Nml Nml Nml Nml Nml Nml Nml 0    Left VastusLat Femoral L2-4 Nml Nml Nml Nml Nml Nml Nml 0    Left TensorFascLat SupGluteal L4-5, S1 Nml Nml Nml Nml Nml Nml Nml 0          Comment NCS: Normal study  1.  Normal nerve conduction studies left lower extremity.  This includes normal left sural SNAP, peroneal and tibial CMAP's, and symmetric tibial H reflexes    Comment EMG: Normal study  1.  Normal needle EMG left lower extremity.    Interpretation: Normal study    1. There is no electrodiagnostic evidence of lumbosacral radiculopathy, lumbosacral plexopathy, or focal neuropathy in the left lower extremity.  Specifically, there is no electrodiagnostic evidence of a left L5 radiculopathy.    The testing was completed in its entirety by the physician.      It was our pleasure caring for your patient today, if there any questions or concerns please do not hesitate to contact us.        Again, thank you for allowing me to participate in the care of your patient.        Sincerely,        Orlando Rice DO

## 2024-03-14 NOTE — PATIENT INSTRUCTIONS
A Lumbar epidural injection has been ordered today. Please schedule this injection at least  2 weeks from now to allow time for insurance prior authorization. On the day of your injection, you cannot be sick or taking antibiotics. If you become sick and are prescribed, please call the clinic so your injection can be rescheduled for once you have completed your antibiotics. You will need to bring a  with you for your injection. If you have any questions or concerns prior to your injection, please do not hesitate to call the nurse navigation line at [phone].   Continue with gabapentin 300mg at bedtime  Continue with meloxicam daily as needed for pain

## 2024-03-14 NOTE — PROGRESS NOTES
[facility]  [address] [place] [address] [address]  [place] [address]  Office: [phone] Fax: [phone]    Electromyography and Nerve Conduction Study Report        Indication: Patient presents at the request Dr. Orlando Rice for a left lower extremity EMG.  She has low back pain left PSIS pain down the posterior lateral left leg with paresthesias and pain.  On exam she has normal sensation to light touch to the lower extremities, 2+ patellar and Achilles reflexes bilaterally, normal muscle strength throughout the major muscle groups of the bilateral lower extremities although some giveaway of the left lower extremity.  Positive seated straight leg raise on the left.          Pt Exam Discussion (Communication Barriers):  Electromyography and nerve conduction testing, including associated discomfort, risks, benefits, and alternatives was discussed with the patient prior to the procedure.  No learning/ communication barriers; patient verbalized understanding of procedure.  Informed consent was obtained.           Pt Assessment:  Testing was successfully completed; patient tolerated testing well.       Blood Thinners: None Skin Temperature: Warmed 31.8                   EMG/NCS  results:     Nerve Conduction Studies  Motor Sites      Segment Distal Latency Neg. Amp CV F-Latency F-Estimate Comment   Site  (ms) (mV) (m/s) (ms) (ms)    Left Fibular (EDB) Motor   Ankle Ankle-EDB 3.0 6.4       Knee Knee-Ankle 10.1 5.7 53      Left Tibial (AH) Motor   Ankle Ankle-AH 2.8 21.4       Knee Knee-Ankle 10.2 15.9 54        Sensory Sites      Onset Lat Peak Lat Amp CV Comment   Site (ms) (ms) ( V) (m/s)    Left Sural Sensory   B-Ankle 2.9 3.4 22 -      H-Reflex Sites      M-Lat H Lat H Neg Amp   Site (ms) (ms) (mV)   Left Tibial (Soleus) H-Reflex   Pop Fossa 6.1 28.2 5.6   Right Tibial (Soleus) H-Reflex   Pop Fossa 5.7 29.0 4.4     H-Reflex Sites      Lt. H Lat Rt. H Lat L-R H Lat L-R H Neg Amp   Site  (ms) (ms) (ms) Norm (mV)   Tibial (Soleus) H-Reflex   Pop Fossa 28.2 29.0 0.80  < 3.0 1.20       NCS Waveforms:    Motor         Sensory       H-Reflex           Electromyography     Side Muscle Nerve Root Ins Act Fibs Psw Fasc Recrt Dur Amp Poly Comment   Left AntTibialis Dp Br Fibular L4-5 Nml Nml Nml Nml Nml Nml Nml 0    Left Gastroc Tibial S1-2 Nml Nml Nml Nml Nml Nml Nml 0    Left Fibularis Long Sup Br Fibular L5-S1 Nml Nml Nml Nml Nml Nml Nml 0    Left VastusLat Femoral L2-4 Nml Nml Nml Nml Nml Nml Nml 0    Left TensorFascLat SupGluteal L4-5, S1 Nml Nml Nml Nml Nml Nml Nml 0          Comment NCS: Normal study  1.  Normal nerve conduction studies left lower extremity.  This includes normal left sural SNAP, peroneal and tibial CMAP's, and symmetric tibial H reflexes    Comment EMG: Normal study  1.  Normal needle EMG left lower extremity.    Interpretation: Normal study    1. There is no electrodiagnostic evidence of lumbosacral radiculopathy, lumbosacral plexopathy, or focal neuropathy in the left lower extremity.  Specifically, there is no electrodiagnostic evidence of a left L5 radiculopathy.    The testing was completed in its entirety by the physician.      It was our pleasure caring for your patient today, if there any questions or concerns please do not hesitate to contact us.

## 2024-03-14 NOTE — PROGRESS NOTES
Assessment/Plan:      Kelly was seen today for emg.    Diagnoses and all orders for this visit:    Lumbar radicular pain  -     EMG  -     GA NEEDLE EMG EA EXTREMTY W/PARASPINAL AREA COMPLETE  -     GA NCS MOTOR W OR W/O F-WAVE, 5 OR 6  -     Pain Transforaminal Vangie Inj Lmbscrl 1 Lvl Lt; Future    Foraminal stenosis of lumbar region  -     Pain Transforaminal Vangie Inj Lmbscrl 1 Lvl Lt; Future    Spondylolisthesis of lumbar region    Lumbar facet arthropathy    Myofascial pain    Sleep difficulties         Assessment: Pleasant 53 year old female with a history of asthma ,pulmonary embolism no longer on Eliquis, sickle cell disease with:     1.  Approximate 9-month history of bilateral lumbar spine and gluteal pain consistent with sacroiliac joint pain with left greater than right pseudo radicular pain.  She has had pain despite physical therapy, Osteopathic manipulative medicine oxycodone and tramadol along with Medrol Dosepak trials.  She has had approximately 8 visits of physical therapy per her report.  She has L4-5 trace spondylolisthesis with severe facet arthropathy and moderate severe facet arthropathy L5-S1 but no high-grade central canal or foraminal stenosis and her pain has potentially been consistent with SI pain with positive thigh thrust left greater than right Tere test bilaterally Gaenslen's bilaterally SI compression and AP distraction.  Very good relief for only 1 day and then the pain significantly worsened following bilateral SI joint injections.  Persistent pain in the left lumbar spine PSIS left gluteal region posterior lateral left leg and calf feels pulling down her leg.   And we reviewed the MRI she has moderate foraminal stenosis at L5-S1 on the left this could represent L5 radiculopathy.  Normal  EMG of the left lower extremity.  Some improvement in sleep and pain with with gabapentin at bedtime 300 mg.  Also taking meloxicam which is tolerable.  Able to stop tramadol.     2.  Poor sleep  related to pain improved with gabapentin 300 mg at bedtime.      Discussion:    1.  We discussed her diagnosis and treatment options.  I discussed the EMG today which was normal.  We discussed her leg pain is equal to the back pain in severity and she has a foraminal stenosis at L5-S1.  We discussed option of medication changes although she is tolerating meloxicam and gabapentin and this allows her to remain off of tramadol and allows her to sleep.  We also discussed further injections.  I want to avoid surgical evaluation at this point and she agrees as we have not exhausted conservative management.     2.  Recommend a left L5-S1 TFESI. She would like this with Dr. Jenkins.    3.  Continue gabapentin 300 mg at bedtime.  I did offer increasing gabapentin in the morning but wants to avoid this due to her side effects of drowsiness.    4.  Continue meloxicam 15 mg daily as needed.    5.  Follow-up with me 2 to 4 weeks after injection.    It was our pleasure caring for your patient today, if there any questions or concerns please do not hesitate to contact us.      Subjective:   Patient ID: Kelly Harris is a 53 year old female.    History of Present Illness: Patient presents for follow-up of lumbar spine pain and left lower extremity pain.  She continues to have significant pain left PSIS posterior lateral left leg to the calf.  Worse with prolonged standing and walking feels a pulling down her leg with standing and her leg is equal to back.  Better with lying down in a comfortable position.  Pain is a 9/10 or 6/10 today.  No real change overall still flared over the past 9 months but is having some improvement in sleep with gabapentin 3 mg at bedtime causes some drowsiness in the morning.  But no drowsiness during the day.  Also taking meloxicam daily able to stop taking the tramadol with meloxicam but when the meloxicam wears off her pain returns.    EMG: Normal study left lower extremity.  No electrodiagnostic  evidence of lumbosacral radiculopathy, lumbosacral plexopathy or focal neuropathy left lower extremity.    Imaging: Flexion-extension x-rays lumbar spine from January 4 were reviewed showing grade 1 spondylolisthesis L4-5 slight shifting from flexion to extension no overt instability.    MRI lumbar spine from 2023 December was reviewed showing slight spondylolisthesis L4-5 moderate facet arthropathy and mild spinal stenosis mild bilateral foraminal stenosis.  Moderate facet arthropathy L5-S1 and moderate bilateral foraminal stenosis.  Facet joint effusions at L4-5.    Review of Systems: Tingling down the left leg and headaches.  Denies weakness, bowel or bladder incontinence, falls, swallowing issues, fevers unintentional weight loss.    Past Medical History:   Diagnosis Date    Asthma     Sickle cell pain crisis (H) 07/16/2023       The following portions of the patient's history were reviewed and updated as appropriate: allergies, current medications, past family history, past medical history, past social history, past surgical history and problem list.           Objective:   Physical Exam:    BP (!) 157/74   Pulse 74   LMP 04/12/2017   There is no height or weight on file to calculate BMI.      General: Alert and oriented with normal affect. Attention, knowledge, memory, and language are intact. No acute distress.   Eyes: Sclerae are clear.  Respirations: Unlabored. CV: No lower extremity edema.  2+   dorsalis pedis pulses bilaterally.  Skin: No rashes seen.    Gait:  Nonantalgic  Positive seated straight leg raise on the left.  Sensation is intact to light touch throughout the upper and lower extremities.  Reflexes are   2+ patellar and Achilles     Manual muscle testing reveals:  Right /Left out of 5     5/5 hip flexors  5/5 knee flexors  5/5 knee extensors  5/5 ankle plantar flexors  5/5 ankle dorsiflexors-some mild giveaway ankle dorsiflexor and EHL.  5/5  EHL

## 2024-03-15 ENCOUNTER — RADIOLOGY INJECTION OFFICE VISIT (OUTPATIENT)
Dept: PHYSICAL MEDICINE AND REHAB | Facility: CLINIC | Age: 54
End: 2024-03-15
Attending: PHYSICAL MEDICINE & REHABILITATION
Payer: COMMERCIAL

## 2024-03-15 VITALS
HEART RATE: 60 BPM | OXYGEN SATURATION: 100 % | RESPIRATION RATE: 16 BRPM | SYSTOLIC BLOOD PRESSURE: 108 MMHG | TEMPERATURE: 99.7 F | DIASTOLIC BLOOD PRESSURE: 68 MMHG

## 2024-03-15 DIAGNOSIS — M54.16 LUMBAR RADICULAR PAIN: ICD-10-CM

## 2024-03-15 DIAGNOSIS — M48.061 FORAMINAL STENOSIS OF LUMBAR REGION: ICD-10-CM

## 2024-03-15 PROCEDURE — 64483 NJX AA&/STRD TFRM EPI L/S 1: CPT | Mod: LT | Performed by: PAIN MEDICINE

## 2024-03-15 RX ORDER — LIDOCAINE HYDROCHLORIDE 10 MG/ML
INJECTION, SOLUTION EPIDURAL; INFILTRATION; INTRACAUDAL; PERINEURAL
Status: COMPLETED | OUTPATIENT
Start: 2024-03-15 | End: 2024-03-15

## 2024-03-15 RX ORDER — DEXAMETHASONE SODIUM PHOSPHATE 10 MG/ML
INJECTION, SOLUTION INTRAMUSCULAR; INTRAVENOUS
Status: COMPLETED | OUTPATIENT
Start: 2024-03-15 | End: 2024-03-15

## 2024-03-15 RX ADMIN — LIDOCAINE HYDROCHLORIDE 2 ML: 10 INJECTION, SOLUTION EPIDURAL; INFILTRATION; INTRACAUDAL; PERINEURAL at 08:07

## 2024-03-15 RX ADMIN — DEXAMETHASONE SODIUM PHOSPHATE 10 MG: 10 INJECTION, SOLUTION INTRAMUSCULAR; INTRAVENOUS at 08:07

## 2024-03-15 ASSESSMENT — PAIN SCALES - GENERAL
PAINLEVEL: SEVERE PAIN (6)
PAINLEVEL: MODERATE PAIN (4)

## 2024-03-15 NOTE — LETTER
[facility]  [address] [place] [address]  Phone: [phone]  Fax: [phone]    March 15, 2024        Kelly Harris  [address] [facility] [place] [address]          To whom it may concern:    RE: Kelly Harris    Patient was seen and treated today at our clinic and missed work.  Patient may return to work 3/16/2024 with the following:  No working or lifting restrictions    Please contact me for questions or concerns.          Sincerely,       Dr. Jenkins

## 2024-03-15 NOTE — PATIENT INSTRUCTIONS
DISCHARGE INSTRUCTIONS    During office hours (8:00 a.m.- 4:00 p.m.) questions or concerns may be answered  by calling Spine Center Navigation Nurses at  [phone].  Messages received after hours will be returned the following business day.      In the case of an emergency, please dial 911 or seek assistance at the nearest Emergency Room/Urgent Care facility.     All Patients:    You may experience an increase in your symptoms for the first 2 days (It may take anywhere between 2 days- 2 weeks for the steroid to have maximum effect).    You may use ice on the injection site, as frequently as 20 minutes each hour if needed.    You may take your pain medicine.    You may continue taking your regular medication after your injection. If you have had a Medial Branch Block you may resume pain medication once your pain diary is completed.    You may shower. No swimming, tub bath or hot tub for 48 hours.  You may remove your bandaid/bandage as soon as you are home.    You may resume light activities, as tolerated.    Resume your usual diet as tolerated.    It is strongly advised that you do not drive for 1-3 hours post injection.    If you have had oral sedation:  Do not drive for 8 hours post injection.      If you have had IV sedation:  Do not drive for 24 hours post injection.  Do not operate hazardous machinery or make important personal/business decisions for 24 hours.      POSSIBLE STEROID SIDE EFFECTS (If steroid/cortisone was used for your procedure)    -If you experience these symptoms, it should only last for a short period    Swelling of the legs              Skin redness (flushing)     Mouth (oral) irritation   Blood sugar (glucose) levels            Sweats                    Mood changes  Headache  Sleeplessness  Weakened immune system for up to 14 days, which could increase the risk of rolan the COVID-19 virus and/or experiencing more severe symptoms of the disease, if exposed.  Decreased  effectiveness of the flu vaccine if given within 2 weeks of the steroid.         POSSIBLE PROCEDURE SIDE EFFECTS  -Call the Spine Center if you are concerned  Increased Pain           Increased numbness/tingling      Nausea/Vomiting          Bruising/bleeding at site      Redness or swelling                                              Difficulty walking      Weakness           Fever greater than 100.5    *In the event of a severe headache after an epidural steroid injection that is relieved by lying down, please call the [facility] to speak with a clinical staff member*

## 2024-03-18 ENCOUNTER — OFFICE VISIT (OUTPATIENT)
Dept: FAMILY MEDICINE | Facility: CLINIC | Age: 54
End: 2024-03-18
Payer: COMMERCIAL

## 2024-03-18 VITALS
TEMPERATURE: 98.7 F | HEART RATE: 79 BPM | DIASTOLIC BLOOD PRESSURE: 85 MMHG | SYSTOLIC BLOOD PRESSURE: 147 MMHG | HEIGHT: 63 IN | BODY MASS INDEX: 27.61 KG/M2 | RESPIRATION RATE: 18 BRPM | OXYGEN SATURATION: 99 % | WEIGHT: 155.8 LBS

## 2024-03-18 DIAGNOSIS — M54.41 CHRONIC BILATERAL LOW BACK PAIN WITH BILATERAL SCIATICA: ICD-10-CM

## 2024-03-18 DIAGNOSIS — M54.42 CHRONIC BILATERAL LOW BACK PAIN WITH BILATERAL SCIATICA: ICD-10-CM

## 2024-03-18 DIAGNOSIS — D57.20 SICKLE-CELL/HB-C DISEASE WITHOUT CRISIS (H): ICD-10-CM

## 2024-03-18 DIAGNOSIS — Z12.31 VISIT FOR SCREENING MAMMOGRAM: Primary | ICD-10-CM

## 2024-03-18 DIAGNOSIS — R03.0 ELEVATED BLOOD PRESSURE READING WITHOUT DIAGNOSIS OF HYPERTENSION: ICD-10-CM

## 2024-03-18 DIAGNOSIS — G89.29 CHRONIC BILATERAL LOW BACK PAIN WITH BILATERAL SCIATICA: ICD-10-CM

## 2024-03-18 PROCEDURE — 99214 OFFICE O/P EST MOD 30 MIN: CPT | Mod: GC

## 2024-03-18 ASSESSMENT — ASTHMA QUESTIONNAIRES
QUESTION_4 LAST FOUR WEEKS HOW OFTEN HAVE YOU USED YOUR RESCUE INHALER OR NEBULIZER MEDICATION (SUCH AS ALBUTEROL): ONE OR TWO TIMES PER DAY
ACT_TOTALSCORE: 14
QUESTION_5 LAST FOUR WEEKS HOW WOULD YOU RATE YOUR ASTHMA CONTROL: SOMEWHAT CONTROLLED
QUESTION_2 LAST FOUR WEEKS HOW OFTEN HAVE YOU HAD SHORTNESS OF BREATH: ONCE OR TWICE A WEEK
QUESTION_3 LAST FOUR WEEKS HOW OFTEN DID YOUR ASTHMA SYMPTOMS (WHEEZING, COUGHING, SHORTNESS OF BREATH, CHEST TIGHTNESS OR PAIN) WAKE YOU UP AT NIGHT OR EARLIER THAN USUAL IN THE MORNING: TWO OR THREE NIGHTS A WEEK
ACT_TOTALSCORE: 14
EMERGENCY_ROOM_LAST_YEAR_TOTAL: ONE
QUESTION_1 LAST FOUR WEEKS HOW MUCH OF THE TIME DID YOUR ASTHMA KEEP YOU FROM GETTING AS MUCH DONE AT WORK, SCHOOL OR AT HOME: SOME OF THE TIME

## 2024-03-18 ASSESSMENT — PAIN SCALES - GENERAL: PAINLEVEL: SEVERE PAIN (6)

## 2024-03-18 NOTE — LETTER
[facility] [address] [place] [address]  Phone: [phone]  Fax: [phone]    March 18, 2024        Kelly Harris  [address] [facility] [address]          To whom it may concern:    RE: Kelly Harris    Patient was seen and treated today at our clinic. She may return to work today without restrictions for 6 hours a day due to chronic conditions.     Please contact me for questions or concerns.      Sincerely,      Heri Gamez DO

## 2024-03-18 NOTE — PROGRESS NOTES
"Preceptor attestation:  Vital signs reviewed: BP (!) 147/85 (BP Location: Right arm, Patient Position: Sitting, Cuff Size: Adult Regular)   Pulse 79   Temp 98.7  F (37.1  C)   Resp 18   Ht 1.6 m (5' 3\")   Wt 70.7 kg (155 lb 12.8 oz)   LMP 04/12/2017   SpO2 99%   BMI 27.60 kg/m      Patient seen, evaluated, and discussed with the resident.  I verified the content of the note, which accurately reflects my assessment of the patient and the plan of care.    Supervising physician: Maci Fritz MD  [facility]  "

## 2024-03-18 NOTE — PROGRESS NOTES
Assessment & Plan     Sickle-cell/Hb-C disease without crisis (H)  53-year-old female in for follow-up of sickle cell disease.  Following with hematology and is still considering whether or not she would like to take hydroxyurea.  Pain control with meloxicam, gabapentin, tramadol to continue.  Patient has follow-up with hematology.    Visit for screening mammogram  - MA SCREENING DIGITAL BILAT - Future  (s+30); Future    Elevated blood pressure reading without diagnosis of hypertension  Mildly elevated today and has been intermittently in the past.  She takes her blood pressure at home which is normal when pain is well-controlled.  Will continue to monitor.    Chronic bilateral low back pain with bilateral sciatica  Improving with injection.  No changes today.    Patient to return in 2 months for repeat Pap smear.    Return in about 2 months (around 5/18/2024) for Follow up, with me.    Andrew Mendoza is a 53 year old, presenting for the following health issues:  Follow Up (Pain still here, sometimes ok, sometimes not, had an injection once last month for back pain), Recheck Medication (Meloxicam and tramadol ?), and Immunization (Up date shots )      3/18/2024     8:40 AM   Additional Questions   Roomed by Ml   Accompanied by self         3/18/2024    Information    services provided? No     HPI   Follow-up for chronic conditions including sickle cell disease and low back pain.  As far as her sickle cell disease she is followed up with hematology who is considering hydroxyurea but patient is still thinking about it.  Her pain has been moderately well-controlled although patient was told not to take meloxicam with tramadol.  She is taking her gabapentin and the meloxicam.  She is in mild pain all over.  She is also had follow-ups for her low back pain with multiple injections.  Last injection was Friday which she has had significant relief from and is doing well today.      Objective   "  BP (!) 147/85 (BP Location: Right arm, Patient Position: Sitting, Cuff Size: Adult Regular)   Pulse 79   Temp 98.7  F (37.1  C)   Resp 18   Ht 1.6 m (5' 3\")   Wt 70.7 kg (155 lb 12.8 oz)   LMP 04/12/2017   SpO2 99%   BMI 27.60 kg/m    Body mass index is 27.6 kg/m .  Physical Exam   GENERAL: alert and no distress, reporting pain all over her body   NECK: no adenopathy, no asymmetry, masses, or scars  RESP: lungs clear to auscultation - no rales, rhonchi or wheezes  CV: regular rate and rhythm, normal S1 S2, no S3 or S4, no murmur, click or rub, no peripheral edema  ABDOMEN: soft, nontender, no hepatosplenomegaly, no masses and bowel sounds normal  MS: no gross musculoskeletal defects noted, no edema    No results found for this or any previous visit (from the past 24 hour(s)).        Signed Electronically by: Heri Gamez DO    "

## 2024-03-27 ASSESSMENT — ASTHMA QUESTIONNAIRES
ACT_TOTALSCORE: 17
QUESTION_2 LAST FOUR WEEKS HOW OFTEN HAVE YOU HAD SHORTNESS OF BREATH: ONCE OR TWICE A WEEK
QUESTION_3 LAST FOUR WEEKS HOW OFTEN DID YOUR ASTHMA SYMPTOMS (WHEEZING, COUGHING, SHORTNESS OF BREATH, CHEST TIGHTNESS OR PAIN) WAKE YOU UP AT NIGHT OR EARLIER THAN USUAL IN THE MORNING: ONCE OR TWICE
QUESTION_5 LAST FOUR WEEKS HOW WOULD YOU RATE YOUR ASTHMA CONTROL: SOMEWHAT CONTROLLED
QUESTION_1 LAST FOUR WEEKS HOW MUCH OF THE TIME DID YOUR ASTHMA KEEP YOU FROM GETTING AS MUCH DONE AT WORK, SCHOOL OR AT HOME: SOME OF THE TIME
ACT_TOTALSCORE: 17
QUESTION_4 LAST FOUR WEEKS HOW OFTEN HAVE YOU USED YOUR RESCUE INHALER OR NEBULIZER MEDICATION (SUCH AS ALBUTEROL): TWO OR THREE TIMES PER WEEK

## 2024-03-28 ENCOUNTER — LAB (OUTPATIENT)
Dept: LAB | Facility: HOSPITAL | Age: 54
End: 2024-03-28
Payer: COMMERCIAL

## 2024-03-28 ENCOUNTER — OFFICE VISIT (OUTPATIENT)
Dept: PULMONOLOGY | Facility: CLINIC | Age: 54
End: 2024-03-28
Attending: INTERNAL MEDICINE
Payer: COMMERCIAL

## 2024-03-28 VITALS
BODY MASS INDEX: 27.46 KG/M2 | SYSTOLIC BLOOD PRESSURE: 118 MMHG | WEIGHT: 155 LBS | DIASTOLIC BLOOD PRESSURE: 64 MMHG | HEART RATE: 88 BPM | OXYGEN SATURATION: 99 %

## 2024-03-28 DIAGNOSIS — J45.909 MODERATE ASTHMA WITHOUT COMPLICATION, UNSPECIFIED WHETHER PERSISTENT: ICD-10-CM

## 2024-03-28 DIAGNOSIS — J45.909 MODERATE ASTHMA WITHOUT COMPLICATION, UNSPECIFIED WHETHER PERSISTENT: Primary | ICD-10-CM

## 2024-03-28 PROCEDURE — 99213 OFFICE O/P EST LOW 20 MIN: CPT | Performed by: INTERNAL MEDICINE

## 2024-03-28 PROCEDURE — 36415 COLL VENOUS BLD VENIPUNCTURE: CPT

## 2024-03-28 PROCEDURE — 82785 ASSAY OF IGE: CPT

## 2024-03-28 RX ORDER — FLUTICASONE FUROATE AND VILANTEROL 200; 25 UG/1; UG/1
1 POWDER RESPIRATORY (INHALATION) DAILY
Qty: 90 EACH | Refills: 3 | Status: SHIPPED | OUTPATIENT
Start: 2024-03-28 | End: 2024-07-02

## 2024-03-28 NOTE — PROGRESS NOTES
Pulmonary Clinic follow up Note  Date of Service: 03/28/2024    Reason for follow up: asthma in pt with sickle cell    History:     HPI: Patient is a pleasant 52-year-old female with past medical history significant for sickle cell disease and asthma who was referred here for evaluation of her asthma.    Patient was diagnosed with asthma during childhood.  She notes that she is currently on Symbicort 160-4.52 puffs twice daily, Singulair 10 mg nightly, Spiriva Respimat 2 puffs daily.  Additionally, patient is on Zyrtec 10 mg daily.  She notes that she usually gets 1-2 exacerbations per year where she is given steroids.  Her last course of steroids was bout 1-2 weeks ago and pt was given 5 days of prednisone. She has never been intubated for asthma.  Triggers include: chemicals, dust, smoke, weather changes, pollen, cats. She has an albuterol inhaler but has not used it much. Patient has occasional nocturnal symptoms. When she has asthma exacerbation, patient usually coughs and wheezes.    Interval History: Patient is here for her scheduled follow-up visit.  Denies any exacerbations for her asthma however she was hospitalized in January for sickle cell crisis.  She is using Singulair, Spiriva Respimat, and Dulera.  Denies any shortness of breath.  No functional limitations.  She describes however loss of her voice several times a week that has been ongoing for many years.  She attributes the loss of her voice due to allergies.    PMHx/PSHx:  Sickle cell disease  Asthma - childhood.  PE - diagnosed on 10/2023.    Social Hx:   Tobacco: lifelong nonsmoker.  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  Travel: no   Pets: no  Lives in a house    Review of Systems - 10 point review of system negative except for what is mentioned in the HPI.    Exam/Data:   /64 (BP Location: Right arm, Patient Position: Chair, Cuff Size: Adult Regular)   Pulse 88   Wt 70.3 kg (155 lb)   LMP 04/12/2017   SpO2  99%   BMI 27.46 kg/m      EXAM:  GEN: comfortable, NAD  HEENT: NCAT, EMOI, mmm, no LAD   CVS: S1S2, RRR  Lung: good air entry bilaterally, no wheezing  Abd: soft, nt, + BS. No masses  Ext: no c/c/e  Neuro: nonfocal      DATA    Labs: reviewed in EMR and outside records where pertinent.     CTA done on 9/2022:  IMPRESSION:  1.  No evidence for pulmonary embolism.    Spirometry done on 7/25/2023:  Decreased FEV1 and FVC to 66 and 63% respectively.  FEV1 FVC 84.  Bronchodilator, pleth, DLCO, were deferred as pt had difficulty performing test.    Repeat PFT's were able to be compeleted this time as pt is not in pain. Her FEV1 and FVC are decreased. Lung volumes okay. DLCO normal.     PFTs':11/2023  Pulmonary Functions Testing Results:   FEV1/FVC  is normal   FEV1 is reduced at 50% predited   FVC is reduced at 53% predicted   There was 31% improvement in spirometry after a single inhaled dose of bronchodilator   TLC is normal   DLCO is normal when it is corrected for hemoglobin.   Impression:  Non-obstructive lung function with bronchodilator response     Assessment/Plan:   Kelly Harris is a 52 year old female, lifelong non-smoker, with h/o asthma since childhood and SSD.  With respect to her asthma, patient gets 1-2 exacerbations per year.  She is on S Dulera, Spiriva Respimat, montelukast, Flonase, and Zyrtec.  IgE, eosinophil count, Aspergillus antibodies and antigen were negative.    Recommendations:  Stop Dulera and start Breo Ellipta  Has a spacer that she uses with her inhalers  Continue Spiriva Respimat and Singulair  Check [place] allergen panel - REORDERED  Referral to ENT for dysphonia  May need to send to allergy and immunology if not controlled    Follow up in 3 months    Najma Phillips MD  Pulmonary and Critical Care Medicine    Family History   Problem Relation Age of Onset    Diabetes Father     Coronary Artery Disease Father     Hypertension Father     Asthma Daughter     Sickle Cell Trait Daughter          S/C    Asthma Daughter     Sickle Cell Trait Daughter         S/C    Heart Disease Daughter         Heart valve regurgitation, abnormal vein with shunt    Hypertension Mother     Lung Cancer Paternal Cousin     Cancer No family hx of      Allergies   Allergen Reactions    Oxycodone Itching    Iodine Rash       Medications:     Current Outpatient Medications   Medication Sig Dispense Refill    acetaminophen (TYLENOL) 325 MG tablet Take 3 tablets (975 mg) by mouth every 8 hours 120 tablet 1    albuterol (PROAIR HFA/PROVENTIL HFA/VENTOLIN HFA) 108 (90 Base) MCG/ACT inhaler Inhale 2 puffs into the lungs every 6 hours as needed for shortness of breath, wheezing or cough 18 g 4    cetirizine (ZYRTEC) 10 MG tablet TAKE 1 TABLET BY MOUTH EVERY DAY 90 tablet 3    fluticasone (FLONASE) 50 MCG/ACT nasal spray INSTILL 1 SPRAY INTO BOTH NOSTRILS DAILY 32 mL 4    FOLIC ACID PO Take 1 tablet by mouth daily      gabapentin (NEURONTIN) 100 MG capsule 100mg at bedtime x 3 days, then may increase to 200mg x 3 days, then may increase to 300mg at bedtime 90 capsule 1    meloxicam (MOBIC) 15 MG tablet Take 1 tablet (15 mg) by mouth daily 30 tablet 1    mometasone-formoterol (DULERA) 200-5 MCG/ACT inhaler Inhale 2 puffs into the lungs 2 times daily 13 g 4    montelukast (SINGULAIR) 10 MG tablet TAKE 1 TABLET BY MOUTH EVERYDAY AT BEDTIME 90 tablet 1    tiotropium (SPIRIVA RESPIMAT) 2.5 MCG/ACT inhaler INHALE 2 PUFFS BY MOUTH INTO THE LUNGS DAILY 4 g 4    traMADol (ULTRAM) 50 MG tablet Take 1 tablet (50 mg) by mouth every 6 hours as needed for severe pain 60 tablet 0    naloxone (NARCAN) 4 MG/0.1ML nasal spray Spray 1 spray (4 mg) into one nostril alternating nostrils as needed for opioid reversal every 2-3 minutes until assistance arrives (Patient not taking: Reported on 3/28/2024) 0.2 mL 3       Much or all of the text in this note was generated through the use of the Dragon Dictate voice-to-text software. Errors in spelling or  words which seem out of context are unintentional. Sound alike errors, in particular, may have escaped editing.

## 2024-04-01 ENCOUNTER — TELEPHONE (OUTPATIENT)
Dept: ONCOLOGY | Facility: HOSPITAL | Age: 54
End: 2024-04-01
Payer: COMMERCIAL

## 2024-04-01 LAB

## 2024-04-04 ENCOUNTER — OFFICE VISIT (OUTPATIENT)
Dept: PHYSICAL MEDICINE AND REHAB | Facility: CLINIC | Age: 54
End: 2024-04-04
Payer: COMMERCIAL

## 2024-04-04 VITALS — DIASTOLIC BLOOD PRESSURE: 67 MMHG | HEART RATE: 95 BPM | SYSTOLIC BLOOD PRESSURE: 125 MMHG

## 2024-04-04 DIAGNOSIS — M48.061 FORAMINAL STENOSIS OF LUMBAR REGION: ICD-10-CM

## 2024-04-04 DIAGNOSIS — M54.16 LUMBAR RADICULAR PAIN: Primary | ICD-10-CM

## 2024-04-04 DIAGNOSIS — M47.816 LUMBAR FACET ARTHROPATHY: ICD-10-CM

## 2024-04-04 DIAGNOSIS — G47.9 SLEEP DIFFICULTIES: ICD-10-CM

## 2024-04-04 DIAGNOSIS — M43.16 SPONDYLOLISTHESIS OF LUMBAR REGION: ICD-10-CM

## 2024-04-04 DIAGNOSIS — M79.18 MYOFASCIAL PAIN: ICD-10-CM

## 2024-04-04 PROCEDURE — 99213 OFFICE O/P EST LOW 20 MIN: CPT | Performed by: PHYSICAL MEDICINE & REHABILITATION

## 2024-04-04 ASSESSMENT — PAIN SCALES - GENERAL: PAINLEVEL: MILD PAIN (3)

## 2024-04-04 NOTE — PROGRESS NOTES
Assessment/Plan:      Kelly was seen today for back pain.    Diagnoses and all orders for this visit:    Lumbar radicular pain    Foraminal stenosis of lumbar region    Spondylolisthesis of lumbar region    Lumbar facet arthropathy    Myofascial pain    Sleep difficulties         Assessment: Pleasant 53 year old female with a history of asthma ,pulmonary embolism no longer on Eliquis, sickle cell disease with:     1.  Approximate 10-month history of bilateral lumbar spine and gluteal pain consistent with lumbar radicular pain related to foraminal stenosis on the left at L5-S1 versus sacroiliac joint pain pseudo radicular pain or facet arthropathy related to moderate to severe facet arthropathy L4-5 and spondylolisthesis .  She has had pain despite physical therapy, Osteopathic manipulative medicine oxycodone and tramadol along with Medrol Dosepak trials.  She has had approximately 8 visits of physical therapy per her report.  She has L4-5 trace spondylolisthesis with severe facet arthropathy and moderate severe facet arthropathy L5-S1 but no high-grade central canal or foraminal stenosis and her pain has potentially been consistent with SI pain with positive thigh thrust left greater than right Tere test bilaterally Gaenslen's bilaterally SI compression and AP distraction.  Very good relief for only 1 day and then the pain significantly worsened following bilateral SI joint injections.  Persistent pain in the left lumbar spine PSIS left gluteal region posterior lateral left leg and calf feels pulling down her leg. The MRI she reveals moderate foraminal stenosis at L5-S1 on the left this could represent L5 radiculopathy.  Normal  EMG of the left lower extremity.  Continues to have improvement in sleep and pain with with gabapentin at bedtime 300 mg not taking every night.  Also taking meloxicam  as needed most days.  Able to stop tramadol.  At least 50% improvement   Following left L5-S1 TFESI.   had reproduction  of symptoms during local anesthetic doing quite well at this time.     2.  Poor sleep related to pain improved with gabapentin 300 mg at bedtime.         Discussion:    1.  I discussed the diagnosis and treatment options.  Of the lumbar epidural provided reproduction of symptoms during the injection and at least 50% improvement subsequently.  She had some headaches right after the injection but overall is doing well.  She is quite pleased with her progress but still is taking meloxicam and gabapentin.  We discussed options of further injections, surgical evaluation monitoring symptoms for now.  She would like to continue to monitor symptoms for the next month or 2 and see how she does.  She will consider surgical referral if pain worsens and she will monitor for weakness and contact us if she begins having any weakness down the leg.  Until then she can continue with gabapentin and meloxicam.     2.    Follow-up 2 to 3 months.    It was our pleasure caring for your patient today, if there any questions or concerns please do not hesitate to contact us.      Subjective:   Patient ID: Kelly Harris is a 53 year old female.    History of Present Illness: Patient presents for follow-up of lumbar spine pain left lower extremity radicular pain after lumbar epidural steroid injection left L5-S1.  Overall her symptoms are much improved per her report.  At least 50% better and she is quite pleased.  Still having 3/10 pain today down the left leg more than the back.  Buttock and lateral leg.  Better with standing.  She still takes gabapentin 300 mg at bedtime every other day and meloxicam most days due to the pain.  During the local anesthetic injection she did have reproduction of symptoms and improvement.  She did have some side effects of headaches for a couple of days and then her pain improved.  She is again pleased no signs of weakness down the leg or paresthesias.      Imaging: MRI lumbar spine reviewed again today  revealing T2 signal loss L4-5 L5-S1 facet arthropathy L4-5 at least moderate with trace spondylolisthesis and mild foraminal stenosis bilaterally.  Moderate left foraminal stenosis L5-S1.    Lumbar flexion-extension x-rays reveal grade 1 spondylolisthesis L4-5 minimal slippage from flexion to extension.    Review of Systems: Pertinent positives:   Headaches .  Pertinent negatives: No numbness, tingling or weakness.  No bowel or bladder incontinence.  No urinary retention.  No fevers, unintentional weight loss, balance changes,   frequent falling, difficulty swallowing, or coordination difficulties.  All others reviewed are negative.       Past Medical History:   Diagnosis Date    Asthma     Sickle cell pain crisis (H) 07/16/2023       The following portions of the patient's history were reviewed and updated as appropriate: allergies, current medications, past family history, past medical history, past social history, past surgical history and problem list.           Objective:   Physical Exam:    /67   Pulse 95   LMP 04/12/2017   There is no height or weight on file to calculate BMI.      General: Alert and oriented with normal affect. Attention, knowledge, memory, and language are intact. No acute distress.   Eyes: Sclerae are clear.  Respirations: Unlabored. CV: No lower extremity edema.     Gait:  Nonantalgic  Positive seated straight leg raise on left and cross seated straight leg raise.  Sensation is intact to light touch throughout the  lower extremities.  Reflexes are  2+ patellar and Achilles      Manual muscle testing reveals:  Right /Left out of 5     5/5 hip flexors  5/5 knee flexors  5/5 knee extensors  5/5 ankle plantar flexors  5/5 ankle dorsiflexors  5/5   ankle evertors

## 2024-04-04 NOTE — LETTER
4/4/2024         RE: Kelly Harris  [address]  [place] [facility] [address]        Dear Colleague,    Thank you for referring your patient, Kelly Harris, to the [facility]. Please see a copy of my visit note below.    Assessment/Plan:      Kelly was seen today for back pain.    Diagnoses and all orders for this visit:    Lumbar radicular pain    Foraminal stenosis of lumbar region    Spondylolisthesis of lumbar region    Lumbar facet arthropathy    Myofascial pain    Sleep difficulties         Assessment: Pleasant 53 year old female with a history of asthma ,pulmonary embolism no longer on Eliquis, sickle cell disease with:     1.  Approximate 10-month history of bilateral lumbar spine and gluteal pain consistent with lumbar radicular pain related to foraminal stenosis on the left at L5-S1 versus sacroiliac joint pain pseudo radicular pain or facet arthropathy related to moderate to severe facet arthropathy L4-5 and spondylolisthesis .  She has had pain despite physical therapy, Osteopathic manipulative medicine oxycodone and tramadol along with Medrol Dosepak trials.  She has had approximately 8 visits of physical therapy per her report.  She has L4-5 trace spondylolisthesis with severe facet arthropathy and moderate severe facet arthropathy L5-S1 but no high-grade central canal or foraminal stenosis and her pain has potentially been consistent with SI pain with positive thigh thrust left greater than right Tere test bilaterally Gaenslen's bilaterally SI compression and AP distraction.  Very good relief for only 1 day and then the pain significantly worsened following bilateral SI joint injections.  Persistent pain in the left lumbar spine PSIS left gluteal region posterior lateral left leg and calf feels pulling down her leg. The MRI she reveals moderate foraminal stenosis at L5-S1 on the left this could represent L5 radiculopathy.  Normal  EMG of the left lower  extremity.  Continues to have improvement in sleep and pain with with gabapentin at bedtime 300 mg not taking every night.  Also taking meloxicam  as needed most days.  Able to stop tramadol.  At least 50% improvement   Following left L5-S1 TFESI.   had reproduction of symptoms during local anesthetic doing quite well at this time.     2.  Poor sleep related to pain improved with gabapentin 300 mg at bedtime.         Discussion:    1.  I discussed the diagnosis and treatment options.  Of the lumbar epidural provided reproduction of symptoms during the injection and at least 50% improvement subsequently.  She had some headaches right after the injection but overall is doing well.  She is quite pleased with her progress but still is taking meloxicam and gabapentin.  We discussed options of further injections, surgical evaluation monitoring symptoms for now.  She would like to continue to monitor symptoms for the next month or 2 and see how she does.  She will consider surgical referral if pain worsens and she will monitor for weakness and contact us if she begins having any weakness down the leg.  Until then she can continue with gabapentin and meloxicam.     2.    Follow-up 2 to 3 months.    It was our pleasure caring for your patient today, if there any questions or concerns please do not hesitate to contact us.      Subjective:   Patient ID: Kelly Harris is a 53 year old female.    History of Present Illness: Patient presents for follow-up of lumbar spine pain left lower extremity radicular pain after lumbar epidural steroid injection left L5-S1.  Overall her symptoms are much improved per her report.  At least 50% better and she is quite pleased.  Still having 3/10 pain today down the left leg more than the back.  Buttock and lateral leg.  Better with standing.  She still takes gabapentin 300 mg at bedtime every other day and meloxicam most days due to the pain.  During the local anesthetic injection she did  have reproduction of symptoms and improvement.  She did have some side effects of headaches for a couple of days and then her pain improved.  She is again pleased no signs of weakness down the leg or paresthesias.      Imaging: MRI lumbar spine reviewed again today revealing T2 signal loss L4-5 L5-S1 facet arthropathy L4-5 at least moderate with trace spondylolisthesis and mild foraminal stenosis bilaterally.  Moderate left foraminal stenosis L5-S1.    Lumbar flexion-extension x-rays reveal grade 1 spondylolisthesis L4-5 minimal slippage from flexion to extension.    Review of Systems: Pertinent positives:   Headaches .  Pertinent negatives: No numbness, tingling or weakness.  No bowel or bladder incontinence.  No urinary retention.  No fevers, unintentional weight loss, balance changes,   frequent falling, difficulty swallowing, or coordination difficulties.  All others reviewed are negative.       Past Medical History:   Diagnosis Date     Asthma      Sickle cell pain crisis (H) 07/16/2023       The following portions of the patient's history were reviewed and updated as appropriate: allergies, current medications, past family history, past medical history, past social history, past surgical history and problem list.           Objective:   Physical Exam:    /67   Pulse 95   LMP 04/12/2017   There is no height or weight on file to calculate BMI.      General: Alert and oriented with normal affect. Attention, knowledge, memory, and language are intact. No acute distress.   Eyes: Sclerae are clear.  Respirations: Unlabored. CV: No lower extremity edema.     Gait:  Nonantalgic  Positive seated straight leg raise on left and cross seated straight leg raise.  Sensation is intact to light touch throughout the  lower extremities.  Reflexes are  2+ patellar and Achilles      Manual muscle testing reveals:  Right /Left out of 5     5/5 hip flexors  5/5 knee flexors  5/5 knee extensors  5/5 ankle plantar flexors  5/5  ankle dorsiflexors  5/5   ankle evertors      Again, thank you for allowing me to participate in the care of your patient.        Sincerely,        Orlando Rice, DO

## 2024-04-08 ENCOUNTER — ANCILLARY PROCEDURE (OUTPATIENT)
Dept: MAMMOGRAPHY | Facility: CLINIC | Age: 54
End: 2024-04-08
Attending: FAMILY MEDICINE
Payer: COMMERCIAL

## 2024-04-08 DIAGNOSIS — Z12.31 VISIT FOR SCREENING MAMMOGRAM: ICD-10-CM

## 2024-04-08 PROCEDURE — 77067 SCR MAMMO BI INCL CAD: CPT | Mod: TC | Performed by: RADIOLOGY

## 2024-04-10 ENCOUNTER — OFFICE VISIT (OUTPATIENT)
Dept: FAMILY MEDICINE | Facility: CLINIC | Age: 54
End: 2024-04-10
Payer: COMMERCIAL

## 2024-04-10 VITALS
BODY MASS INDEX: 27.46 KG/M2 | OXYGEN SATURATION: 97 % | HEIGHT: 63 IN | SYSTOLIC BLOOD PRESSURE: 152 MMHG | HEART RATE: 92 BPM | DIASTOLIC BLOOD PRESSURE: 88 MMHG | TEMPERATURE: 98 F | RESPIRATION RATE: 18 BRPM | WEIGHT: 155 LBS

## 2024-04-10 DIAGNOSIS — M54.16 LUMBAR RADICULAR PAIN: ICD-10-CM

## 2024-04-10 DIAGNOSIS — G47.9 SLEEP DIFFICULTIES: ICD-10-CM

## 2024-04-10 DIAGNOSIS — D57.20 SICKLE-CELL/HB-C DISEASE WITHOUT CRISIS (H): Primary | ICD-10-CM

## 2024-04-10 DIAGNOSIS — R03.0 ELEVATED BLOOD PRESSURE READING WITHOUT DIAGNOSIS OF HYPERTENSION: ICD-10-CM

## 2024-04-10 PROCEDURE — 90471 IMMUNIZATION ADMIN: CPT

## 2024-04-10 PROCEDURE — 90619 MENACWY-TT VACCINE IM: CPT

## 2024-04-10 PROCEDURE — 99214 OFFICE O/P EST MOD 30 MIN: CPT | Mod: 25

## 2024-04-10 RX ORDER — TRAMADOL HYDROCHLORIDE 50 MG/1
50 TABLET ORAL EVERY 6 HOURS PRN
Qty: 60 TABLET | Refills: 0 | Status: SHIPPED | OUTPATIENT
Start: 2024-04-10 | End: 2024-09-11

## 2024-04-10 RX ORDER — GABAPENTIN 300 MG/1
300 CAPSULE ORAL AT BEDTIME
Qty: 90 CAPSULE | Refills: 3 | Status: SHIPPED | OUTPATIENT
Start: 2024-04-10 | End: 2024-06-27

## 2024-04-10 NOTE — PROGRESS NOTES
Prior to immunization administration, verified patients identity using patient s name and date of birth. Please see Immunization Activity for additional information.     Screening Questionnaire for Adult Immunization    Are you sick today?   No   Do you have allergies to medications, food, a vaccine component or latex?   Yes   Have you ever had a serious reaction after receiving a vaccination?   No   Do you have a long-term health problem with heart, lung, kidney, or metabolic disease (e.g., diabetes), asthma, a blood disorder, no spleen, complement component deficiency, a cochlear implant, or a spinal fluid leak?  Are you on long-term aspirin therapy?   No   Do you have cancer, leukemia, HIV/AIDS, or any other immune system problem?   No   Do you have a parent, brother, or sister with an immune system problem?   No   In the past 3 months, have you taken medications that affect  your immune system, such as prednisone, other steroids, or anticancer drugs; drugs for the treatment of rheumatoid arthritis, Crohn s disease, or psoriasis; or have you had radiation treatments?   No   Have you had a seizure, or a brain or other nervous system problem?   No   During the past year, have you received a transfusion of blood or blood    products, or been given immune (gamma) globulin or antiviral drug?   No   For women: Are you pregnant or is there a chance you could become       pregnant during the next month?   No   Have you received any vaccinations in the past 4 weeks?   No     Immunization questionnaire was positive for at least one answer.  Notified .      Patient instructed to remain in clinic for 15 minutes afterwards, and to report any adverse reactions.     Screening performed by Dai Figueroa on 4/10/2024 at 8:28 AM.

## 2024-04-10 NOTE — PROGRESS NOTES
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  Follow-up with hematology next week.  - traMADol (ULTRAM) 50 MG tablet; Take 1 tablet (50 mg) by mouth every 6 hours as needed for severe pain    Lumbar radicular pain  Improving, following with PMR with the possible repeat injection.  - gabapentin (NEURONTIN) 300 MG capsule; Take 1 capsule (300 mg) by mouth at bedtime 300 mg at bedtime    Sleep difficulties  Stable.  - gabapentin (NEURONTIN) 300 MG capsule; Take 1 capsule (300 mg) by mouth at bedtime 300 mg at bedtime    Elevated blood pressure reading without diagnosis of hypertension  Elevated again today.  Next visit will bring in home blood pressure monitor and confirm whether or not it is consistent.  If so we will diagnosed with whitecoat hypertension otherwise we will start antihypertensive medication.    Return in about 7 weeks (around 5/29/2024) for Follow up, with me.    Andrew Mendoza is a 53 year old, presenting for the following health issues:  Follow Up (Last visit ) and Gyn Exam (PAP test)        4/10/2024     8:06 AM   Additional Questions   Roomed by ML   Accompanied by self         4/10/2024    Information    services provided? No     HPI   In for follow-up of multiple things including back pain today.  Reports that the back pain is about 50% better and gabapentin is improving it.  Will follow-up for possible another injection in 2 to 3 months.  Reporting taking 300 mg of gabapentin without complications.    Also has follow-up with hematology on 4/18/2024.  Will be discussing taking hydroxychloroquine with me hematologist.  Overall has been doing well with pain well-controlled.  Does need refill of tramadol.    Finally is in for follow-up of her blood pressure.  Remains elevated today but patient is asymptomatic.  Her blood pressure at home has been less than 130 systolic consistently.    Review of Systems  Constitutional, HEENT,  "cardiovascular, pulmonary, gi and gu systems are negative, except as otherwise noted.      Objective    BP (!) 152/88 (BP Location: Left arm, Patient Position: Sitting, Cuff Size: Adult Regular)   Pulse 92   Temp 98  F (36.7  C) (Oral)   Resp 18   Ht 1.6 m (5' 3\")   Wt 70.3 kg (155 lb)   LMP 04/12/2017   SpO2 97%   BMI 27.46 kg/m    Body mass index is 27.46 kg/m .  Physical Exam   GENERAL: alert and no distress  NECK: no adenopathy, no asymmetry, masses, or scars  RESP: lungs clear to auscultation - no rales, rhonchi or wheezes  CV: regular rate and rhythm, normal S1 S2, no S3 or S4, no murmur, click or rub, no peripheral edema  ABDOMEN: soft, nontender, no hepatosplenomegaly, no masses and bowel sounds normal  MS: no gross musculoskeletal defects noted, no edema    No results found for this or any previous visit (from the past 24 hour(s)).        Signed Electronically by: Heri Gamez DO    "

## 2024-04-10 NOTE — PROGRESS NOTES
Preceptor Attestation:    I discussed the patient with the resident and evaluated the patient in person. I have verified the content of the note, which accurately reflects my assessment of the patient and the plan of care.   Supervising Physician:  Hui Mcdaniels MD

## 2024-04-18 ENCOUNTER — PATIENT OUTREACH (OUTPATIENT)
Dept: ONCOLOGY | Facility: HOSPITAL | Age: 54
End: 2024-04-18
Payer: COMMERCIAL

## 2024-04-18 ENCOUNTER — ONCOLOGY VISIT (OUTPATIENT)
Dept: ONCOLOGY | Facility: HOSPITAL | Age: 54
End: 2024-04-18
Attending: INTERNAL MEDICINE
Payer: COMMERCIAL

## 2024-04-18 VITALS
BODY MASS INDEX: 27.29 KG/M2 | RESPIRATION RATE: 18 BRPM | DIASTOLIC BLOOD PRESSURE: 84 MMHG | WEIGHT: 154 LBS | HEART RATE: 78 BPM | OXYGEN SATURATION: 98 % | HEIGHT: 63 IN | SYSTOLIC BLOOD PRESSURE: 154 MMHG | TEMPERATURE: 97.9 F

## 2024-04-18 DIAGNOSIS — D57.20 SICKLE-CELL/HB-C DISEASE WITHOUT CRISIS (H): Primary | ICD-10-CM

## 2024-04-18 PROCEDURE — 99215 OFFICE O/P EST HI 40 MIN: CPT | Performed by: INTERNAL MEDICINE

## 2024-04-18 PROCEDURE — 99214 OFFICE O/P EST MOD 30 MIN: CPT | Performed by: INTERNAL MEDICINE

## 2024-04-18 RX ORDER — PROCHLORPERAZINE MALEATE 10 MG
10 TABLET ORAL EVERY 6 HOURS PRN
Qty: 30 TABLET | Refills: 1 | Status: SHIPPED | OUTPATIENT
Start: 2024-04-18

## 2024-04-18 RX ORDER — HYDROXYUREA 500 MG/1
15 CAPSULE ORAL DAILY
Qty: 60 CAPSULE | Refills: 2 | Status: SHIPPED | OUTPATIENT
Start: 2024-04-18 | End: 2024-07-16

## 2024-04-18 NOTE — PROGRESS NOTES
[facility] Hematology and Oncology Progress Note    Patient: Kelly Harris  MRN: [id number]  Date of Service: Apr 18, 2024         Reason for Visit    Chief Complaint   Patient presents with    Oncology Clinic Visit     Returning patient consult: Sickle-cell/Hb-C disease without crisis. 5 weeks follow up.       Assessment and Plan    Bilateral pulmonary embolism, diagnosed July 2023  Sickle cell pain crisis  Scattered wheezing  History of hemoglobin SC disease  Bilateral low back pain with left-sided sciatica    Patient having some increased pain symptoms today.  She has reviewed information on hydroxyurea and is willing to consider this.    Baseline lab work is reviewed.  Will start hydroxyurea at 1000 mg daily and I reviewed potential side effects.  Also gave her prescription for Compazine as needed.    Will have her return in 2 and 4 weeks to check labs including CBC and reticulocyte count and also recheck this with follow-up visit in about 8 weeks.  Check CMP in 4 weeks and with follow-up visit.    Based on white count, ANC, platelet count and reticulocyte count, a.m. to titrate up the dose of hydroxyurea to the maximum tolerated dose which may be 2500 mg daily.  Will plan to increase by 500 mg every couple months if tolerated.    She will call with any side effects or problems.    She did follow-up with primary care and has been receiving additional vaccinations.  She will continue folic acid.    Questions answered.    Plan: Start hydroxyurea 1000 mg daily  Compazine as needed  Check labs as above  Follow-up with Dr. Palafox in about 8 weeks      Measurable disease: CT of the chest, hemoglobin electrophoresis    Current therapy: Eliquis 5 mg p.o. 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 Bili, TP)    Check Out Appointment Request    CBC with platelets and differential          CC: Arcenio Alcantara MD   ______________________________________________________________________________    History of Present Illness      Patient returns for follow-up.  Seen a couple of months ago.  Is having some more of her sickle cell bone pain today.  Is trying to stay hydrated and taking Ultram for the pain.  Did see primary care and is getting updated on all vaccinations.  She has reviewed information on hydroxyurea and is considering this now.      October 2023:    Kelly returns for follow-up.  Seen 2 months ago.  Overall doing well.  Breathing back to normal.  No lower extremity pain or swelling.  No current issues with sickle cell bone pain.  No other new symptoms or problems.  ECOG status 0.    August 2023:    Kelly is here for follow-up.  Seen in clinic about 2-1/2 weeks ago and was hospitalized.  Documented to have low volume bilateral pulmonary emboli and asthma exacerbation.  Currently on Eliquis 5 mg p.o. twice daily and is overall much improved.  Still with some lower back pain with radiation down the left leg.  Some fatigue.  No other new symptoms or problems.  No family history of thrombosis.    July 25, 2023:  Ms. Kelly Harris is a 52-year-old black female with previous known diagnosis of hemoglobin SC disease and asthma and remote history of ovarian vein thrombosis referred for recent hospitalization with sickle cell pain crisis.  She started to have some joint pain back in April and was treated with Tylenol.    She had worsening pain  couple weeks ago involving the right chest wall and back and was hospitalized for management for 1 night.  Since discharge she has continued to have significant pain.  Using oxycodone 5 mg about 3 times a day but has not been able to resume work.  She describes being cold but does not have fever.  Has had some headaches dizziness and lightheadedness.  No real shortness of breath or cough.  No urinary symptoms.  Some constipation likely related to her pain medication.    It has been several years since previous hospitalization or requiring narcotic pain medications to manage pain crisis.  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    No previous problems with root canal, 2 jaw tumor surgeries.     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  Has joint pains and crises every 2 to 3 months but manages with Tylenol and hydration.  No history of stroke.  Has had vision issues and does follow with ophthalmology.     No abdominal symptoms.  No change with bowels or urine.  No infectious problems.     She is originally from [facility].  She is single with 2 kids who also have hemoglobin SC disease.  She works as a nutritionist.     Other personal or family history of thrombosis.        Review of systems.  No fever or night sweats.  No loss of weight.  No lumps or bumps anywhere.  No unusual headaches or eyesight issues.  No dizziness.  No bleeding from the nose.  No sores in the mouth. No problems with swallowing.  No chest pain. No shortness of breath. No cough.  No abdominal pain. No nausea or vomiting.  No diarrhea or constipation.  No blood in stool or black colored stools.  No problems passing urine.  No numbness or tingling in hands or feet.  No skin rashes.  A 14 point review of systems is otherwise negative.        Past History    Past Medical History:   Diagnosis Date    Asthma     Sickle cell pain crisis (H) 07/16/2023       Past Surgical History:   Procedure Laterality Date     "MOUTH SURGERY  09/07/2019    Removed benign mouth tumor         Physical Exam    BP (!) 154/84   Pulse 78   Temp 97.9  F (36.6  C) (Tympanic)   Resp 18   Ht 1.6 m (5' 3\")   Wt 69.9 kg (154 lb)   LMP 04/12/2017   SpO2 98%   BMI 27.28 kg/m        GENERAL: Alert and oriented to time place and person. Seated comfortably. In no distress.    HEAD: Atraumatic and normocephalic.    EYES: SETH, EOMI.  No pallor.  No icterus.    Oral cavity: no mucosal lesion or tonsillar enlargement.    NECK: supple. JVP normal.  No thyroid enlargement.    LYMPH NODES: No palpable, cervical, axillary or inguinal lymphadenopathy.    CHEST: clear to auscultation bilaterally.  Resonant to percussion throughout bilaterally.  Symmetrical breath movements bilaterally.    CVS: S1 and S2 are heard. Regular rate and rhythm.  No murmur or gallop or rub heard.  No peripheral edema.    ABDOMEN: Soft. Not tender. Not distended.  No palpable hepatomegaly or splenomegaly.  No other mass palpable.  Bowel sounds heard.    EXTREMITIES: Warm.    SKIN: no rash, or bruising or purpura.  Has a full head of hair.    Lab Results    No results found for this or any previous visit (from the past 168 hour(s)).        Imaging    MA SCREENING DIGITAL BILAT - Future  (s+30)    Result Date: 4/8/2024  BILATERAL FULL FIELD DIGITAL SCREENING MAMMOGRAM Performed on: 4/8/24 Compared to: 04/20/2023, 04/08/2022, and 04/06/2021 Technique: This study was evaluated with the assistance of Computer-Aided Detection. Findings: The breasts are heterogeneously dense, which may obscure small masses.  There is no radiographic evidence of malignancy.     IMPRESSION: ACR BI-RADS Category 1: Negative BREAST CANCER SCREENING RECOMMENDATION: Routine yearly mammography beginning at age 40 or as discussed with your provider. The results and recommendations of this examination will be communicated to the patient. Herlinda Hernandez MD        Signed by: Shari Bartlett MD   "

## 2024-04-18 NOTE — TELEPHONE ENCOUNTER
"FUTURE VISIT INFORMATION      FUTURE VISIT INFORMATION:  Date: 7/22/24  Time: 1pm  Location: [facility]  REFERRAL INFORMATION:  Referring provider:  Najma Phillips MD   Referring providers clinic:  [facility]   Reason for visit/diagnosis  consist voice loss per patient confirmed [facility]     RECORDS REQUESTED FROM:       Clinic name Comments Records Status Imaging Status   [facility]  3/28/24- OV  Najma Phillips MD     11/14/23- PFT   7/25/23- PFT  Epic     Imaging  1/16/24- CT Chest [facility] PACS   [place]  1/22/18- ov Ananth Noel MD   Epic            \"Please notify/message CSS if patient completed outside imaging prior to scheduled appointment and/or any outside records that might have been missed at pre visit -Thank you\"  "

## 2024-04-18 NOTE — LETTER
"    4/18/2024         RE: Kelly Harris  [address] [facility] [address]        Dear Colleague,    Thank you for referring your patient, Kelly Harris, to the [facility] [place]. Please see a copy of my visit note below.    Oncology Rooming Note    April 18, 2024 9:20 AM   Kelly Harris is a 53 year old female who presents for:    Chief Complaint   Patient presents with     Oncology Clinic Visit     Returning patient consult: Sickle-cell/Hb-C disease without crisis. 5 weeks follow up.     Initial Vitals: BP (!) 154/84   Pulse 78   Temp 97.9  F (36.6  C) (Tympanic)   Resp 18   Ht 1.6 m (5' 3\")   Wt 69.9 kg (154 lb)   LMP 04/12/2017   SpO2 98%   BMI 27.28 kg/m   Estimated body mass index is 27.28 kg/m  as calculated from the following:    Height as of this encounter: 1.6 m (5' 3\").    Weight as of this encounter: 69.9 kg (154 lb). Body surface area is 1.76 meters squared.  Data Unavailable Comment: Data Unavailable   Patient's last menstrual period was 04/12/2017.  Allergies reviewed: Yes  Medications reviewed: Yes    Medications: MEDICATION REFILLS NEEDED TODAY. Provider was notified.  Pharmacy name entered into Jumpstarter:    [id number] - [place], [place] - [address] [facility] [address] [place] [address] [place]    Frailty Screening:   Is the patient here for a new oncology consult visit in cancer care? 2. No      Clinical concerns:  RETURN [facility] - 5 week follow up, meds refills.      Renetat Pace MA              [facility] Hematology and Oncology Progress Note    Patient: Kelly Harris  MRN: [id number]  Date of Service: Apr 18, 2024         Reason for Visit    Chief Complaint   Patient presents with     Oncology Clinic Visit     Returning patient consult: Sickle-cell/Hb-C disease without crisis. 5 weeks follow up.       Assessment and Plan    Bilateral pulmonary embolism, " diagnosed July 2023  Sickle cell pain crisis  Scattered wheezing  History of hemoglobin SC disease  Bilateral low back pain with left-sided sciatica    Patient having some increased pain symptoms today.  She has reviewed information on hydroxyurea and is willing to consider this.    Baseline lab work is reviewed.  Will start hydroxyurea at 1000 mg daily and I reviewed potential side effects.  Also gave her prescription for Compazine as needed.    Will have her return in 2 and 4 weeks to check labs including CBC and reticulocyte count and also recheck this with follow-up visit in about 8 weeks.  Check CMP in 4 weeks and with follow-up visit.    Based on white count, ANC, platelet count and reticulocyte count, a.m. to titrate up the dose of hydroxyurea to the maximum tolerated dose which may be 2500 mg daily.  Will plan to increase by 500 mg every couple months if tolerated.    She will call with any side effects or problems.    She did follow-up with primary care and has been receiving additional vaccinations.  She will continue folic acid.    Questions answered.    Plan: Start hydroxyurea 1000 mg daily  Compazine as needed  Check labs as above  Follow-up with Dr. Palafox in about 8 weeks      Measurable disease: CT of the chest, hemoglobin electrophoresis    Current therapy: Eliquis 5 mg p.o. 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  CHARMAINE Pereira)    Check Out Appointment Request    CBC with platelets and differential          CC: Arcenio Alcantara MD   ______________________________________________________________________________    History of Present Illness      Patient returns for follow-up.  Seen a couple of months ago.  Is having some more of her sickle cell bone pain today.  Is trying to stay hydrated and taking Ultram for the pain.  Did see primary care and is getting updated on all vaccinations.  She has reviewed information on hydroxyurea and is considering this now.      October 2023:    Kelly returns for follow-up.  Seen 2 months ago.  Overall doing well.  Breathing back to normal.  No lower extremity pain or swelling.  No current issues with sickle cell bone pain.  No other new symptoms or problems.  ECOG status 0.    August 2023:    Kelly is here for follow-up.  Seen in clinic about 2-1/2 weeks ago and was hospitalized.  Documented to have low volume bilateral pulmonary emboli and asthma exacerbation.  Currently on Eliquis 5 mg p.o. twice daily and is overall much improved.  Still with some lower back pain with radiation down the left leg.  Some fatigue.  No other new symptoms or problems.  No family history of thrombosis.    July 25, 2023:  Ms. Kelly Harris is a 52-year-old black female with previous known diagnosis of hemoglobin SC disease and asthma and remote history of ovarian vein thrombosis referred for recent hospitalization with sickle cell pain crisis.  She started to have some joint pain back in April and was treated with Tylenol.    She had worsening pain couple weeks ago involving the right chest wall and back and was hospitalized for management for 1 night.  Since discharge she has continued to have significant pain.  Using oxycodone 5 mg about 3 times a day but has not been able to resume work.  She describes being cold but does not have fever.  Has had some headaches dizziness and lightheadedness.  No real shortness  "of breath or cough.  No urinary symptoms.  Some constipation likely related to her pain medication.    It has been several years since previous hospitalization or requiring narcotic pain medications to manage pain crisis.  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    No previous problems with root canal, 2 jaw tumor surgeries.     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  Has joint pains and crises every 2 to 3 months but manages with Tylenol and hydration.  No history of stroke.  Has had vision issues and does follow with ophthalmology.     No abdominal symptoms.  No change with bowels or urine.  No infectious problems.     She is originally from [facility].  She is single with 2 kids who also have hemoglobin SC disease.  She works as a nutritionist.     Other personal or family history of thrombosis.        Review of systems.  No fever or night sweats.  No loss of weight.  No lumps or bumps anywhere.  No unusual headaches or eyesight issues.  No dizziness.  No bleeding from the nose.  No sores in the mouth. No problems with swallowing.  No chest pain. No shortness of breath. No cough.  No abdominal pain. No nausea or vomiting.  No diarrhea or constipation.  No blood in stool or black colored stools.  No problems passing urine.  No numbness or tingling in hands or feet.  No skin rashes.  A 14 point review of systems is otherwise negative.        Past History    Past Medical History:   Diagnosis Date     Asthma      Sickle cell pain crisis (H) 07/16/2023       Past Surgical History:   Procedure Laterality Date     MOUTH SURGERY  09/07/2019    Removed benign mouth tumor         Physical Exam    BP (!) 154/84   Pulse 78   Temp 97.9  F (36.6  C) (Tympanic)   Resp 18   Ht 1.6 m (5' 3\")   Wt 69.9 kg (154 lb)   LMP 04/12/2017   SpO2 98%   BMI 27.28 kg/m        GENERAL: Alert and oriented to time place and person. Seated comfortably. In no distress.    HEAD: Atraumatic and " normocephalic.    EYES: SETH, EOMI.  No pallor.  No icterus.    Oral cavity: no mucosal lesion or tonsillar enlargement.    NECK: supple. JVP normal.  No thyroid enlargement.    LYMPH NODES: No palpable, cervical, axillary or inguinal lymphadenopathy.    CHEST: clear to auscultation bilaterally.  Resonant to percussion throughout bilaterally.  Symmetrical breath movements bilaterally.    CVS: S1 and S2 are heard. Regular rate and rhythm.  No murmur or gallop or rub heard.  No peripheral edema.    ABDOMEN: Soft. Not tender. Not distended.  No palpable hepatomegaly or splenomegaly.  No other mass palpable.  Bowel sounds heard.    EXTREMITIES: Warm.    SKIN: no rash, or bruising or purpura.  Has a full head of hair.    Lab Results    No results found for this or any previous visit (from the past 168 hour(s)).        Imaging    MA SCREENING DIGITAL BILAT - Future  (s+30)    Result Date: 4/8/2024  BILATERAL FULL FIELD DIGITAL SCREENING MAMMOGRAM Performed on: 4/8/24 Compared to: 04/20/2023, 04/08/2022, and 04/06/2021 Technique: This study was evaluated with the assistance of Computer-Aided Detection. Findings: The breasts are heterogeneously dense, which may obscure small masses.  There is no radiographic evidence of malignancy.     IMPRESSION: ACR BI-RADS Category 1: Negative BREAST CANCER SCREENING RECOMMENDATION: Routine yearly mammography beginning at age 40 or as discussed with your provider. The results and recommendations of this examination will be communicated to the patient. Herlinda Hernandez MD        Signed by: Shari Bartlett MD       Again, thank you for allowing me to participate in the care of your patient.        Sincerely,        Shari Bartlett MD

## 2024-04-18 NOTE — PROGRESS NOTES
[facility]: Cancer Care                                                                                          Situation: Chart reviewed by RN Care Coordinator.    Background: Kelly was in clinic today for follow-up regarding sickle cell anemia.    Assessment: She is having increased pain today.  She is willing to consider hydroxyurea.    Plan/Recommendations: She will start hydroxyurea at 1000 mg daily.  She will return to clinic at 2 + 4 weeks for labs and follow-up with provider at 8 weeks with labs.    She will establish care with Dr. Palafox as a result of Dr. Bartlett's residential.      Signature:  Gretchen Downey RN Care Coordinator  [facility]  Cancer Care [facility]

## 2024-04-18 NOTE — PROGRESS NOTES
"Oncology Rooming Note    April 18, 2024 9:20 AM   Kelly Harris is a 53 year old female who presents for:    Chief Complaint   Patient presents with    Oncology Clinic Visit     Returning patient consult: Sickle-cell/Hb-C disease without crisis. 5 weeks follow up.     Initial Vitals: BP (!) 154/84   Pulse 78   Temp 97.9  F (36.6  C) (Tympanic)   Resp 18   Ht 1.6 m (5' 3\")   Wt 69.9 kg (154 lb)   LMP 04/12/2017   SpO2 98%   BMI 27.28 kg/m   Estimated body mass index is 27.28 kg/m  as calculated from the following:    Height as of this encounter: 1.6 m (5' 3\").    Weight as of this encounter: 69.9 kg (154 lb). Body surface area is 1.76 meters squared.  Data Unavailable Comment: Data Unavailable   Patient's last menstrual period was 04/12/2017.  Allergies reviewed: Yes  Medications reviewed: Yes    Medications: MEDICATION REFILLS NEEDED TODAY. Provider was notified.  Pharmacy name entered into Bringg:    [id number] - [place], [place] - [address] [facility] [id number] [place] [address] [place]    Frailty Screening:   Is the patient here for a new oncology consult visit in cancer care? 2. No      Clinical concerns:  RETURN [facility] - 5 week follow up, meds refills.      Renetta Pace MA            "

## 2024-04-23 DIAGNOSIS — M54.16 LUMBAR RADICULAR PAIN: ICD-10-CM

## 2024-04-23 DIAGNOSIS — M47.816 LUMBAR FACET ARTHROPATHY: ICD-10-CM

## 2024-04-23 RX ORDER — MELOXICAM 15 MG/1
15 TABLET ORAL DAILY
Qty: 30 TABLET | Refills: 1 | Status: SHIPPED | OUTPATIENT
Start: 2024-04-23 | End: 2024-06-07

## 2024-05-03 ENCOUNTER — PATIENT OUTREACH (OUTPATIENT)
Dept: ONCOLOGY | Facility: HOSPITAL | Age: 54
End: 2024-05-03

## 2024-05-03 ENCOUNTER — LAB (OUTPATIENT)
Dept: INFUSION THERAPY | Facility: HOSPITAL | Age: 54
End: 2024-05-03
Attending: INTERNAL MEDICINE
Payer: COMMERCIAL

## 2024-05-03 DIAGNOSIS — D57.20 SICKLE-CELL/HB-C DISEASE WITHOUT CRISIS (H): ICD-10-CM

## 2024-05-03 LAB
ALBUMIN SERPL BCG-MCNC: 4.3 G/DL (ref 3.5–5.2)
ALP SERPL-CCNC: 97 U/L (ref 40–150)
ALT SERPL W P-5'-P-CCNC: 20 U/L (ref 0–50)
ANION GAP SERPL CALCULATED.3IONS-SCNC: 9 MMOL/L (ref 7–15)
AST SERPL W P-5'-P-CCNC: 22 U/L (ref 0–45)
BASOPHILS # BLD AUTO: ABNORMAL 10*3/UL
BASOPHILS # BLD MANUAL: 0.1 10E3/UL (ref 0–0.2)
BASOPHILS NFR BLD AUTO: ABNORMAL %
BASOPHILS NFR BLD MANUAL: 2 %
BILIRUB SERPL-MCNC: 0.6 MG/DL
BUN SERPL-MCNC: 8.7 MG/DL (ref 6–20)
CALCIUM SERPL-MCNC: 9.3 MG/DL (ref 8.6–10)
CHLORIDE SERPL-SCNC: 105 MMOL/L (ref 98–107)
CREAT SERPL-MCNC: 0.96 MG/DL (ref 0.51–0.95)
DEPRECATED HCO3 PLAS-SCNC: 27 MMOL/L (ref 22–29)
EGFRCR SERPLBLD CKD-EPI 2021: 70 ML/MIN/1.73M2
EOSINOPHIL # BLD AUTO: ABNORMAL 10*3/UL
EOSINOPHIL # BLD MANUAL: 0.1 10E3/UL (ref 0–0.7)
EOSINOPHIL NFR BLD AUTO: ABNORMAL %
EOSINOPHIL NFR BLD MANUAL: 1 %
ERYTHROCYTE [DISTWIDTH] IN BLOOD BY AUTOMATED COUNT: 16.7 % (ref 10–15)
GLUCOSE SERPL-MCNC: 102 MG/DL (ref 70–99)
HCT VFR BLD AUTO: 33.2 % (ref 35–47)
HGB BLD-MCNC: 11.9 G/DL (ref 11.7–15.7)
IMM GRANULOCYTES # BLD: ABNORMAL 10*3/UL
IMM GRANULOCYTES NFR BLD: ABNORMAL %
LYMPHOCYTES # BLD AUTO: ABNORMAL 10*3/UL
LYMPHOCYTES # BLD MANUAL: 2.9 10E3/UL (ref 0.8–5.3)
LYMPHOCYTES NFR BLD AUTO: ABNORMAL %
LYMPHOCYTES NFR BLD MANUAL: 42 %
MCH RBC QN AUTO: 28 PG (ref 26.5–33)
MCHC RBC AUTO-ENTMCNC: 35.8 G/DL (ref 31.5–36.5)
MCV RBC AUTO: 78 FL (ref 78–100)
MONOCYTES # BLD AUTO: ABNORMAL 10*3/UL
MONOCYTES # BLD MANUAL: 0.4 10E3/UL (ref 0–1.3)
MONOCYTES NFR BLD AUTO: ABNORMAL %
MONOCYTES NFR BLD MANUAL: 6 %
NEUTROPHILS # BLD AUTO: ABNORMAL 10*3/UL
NEUTROPHILS # BLD MANUAL: 3.4 10E3/UL (ref 1.6–8.3)
NEUTROPHILS NFR BLD AUTO: ABNORMAL %
NEUTROPHILS NFR BLD MANUAL: 49 %
NRBC # BLD AUTO: 0.3 10E3/UL
NRBC # BLD AUTO: 0.8 10E3/UL
NRBC BLD AUTO-RTO: 12 /100
NRBC BLD MANUAL-RTO: 5 %
PLAT MORPH BLD: ABNORMAL
PLATELET # BLD AUTO: 322 10E3/UL (ref 150–450)
POLYCHROMASIA BLD QL SMEAR: SLIGHT
POTASSIUM SERPL-SCNC: 4.1 MMOL/L (ref 3.4–5.3)
PROT SERPL-MCNC: 7 G/DL (ref 6.4–8.3)
RBC # BLD AUTO: 4.25 10E6/UL (ref 3.8–5.2)
RBC MORPH BLD: ABNORMAL
RETICS # AUTO: 0.11 10E6/UL (ref 0.03–0.1)
RETICS/RBC NFR AUTO: 2.5 % (ref 0.5–2)
SODIUM SERPL-SCNC: 141 MMOL/L (ref 135–145)
TARGETS BLD QL SMEAR: ABNORMAL
WBC # BLD AUTO: 6.9 10E3/UL (ref 4–11)

## 2024-05-03 PROCEDURE — 85045 AUTOMATED RETICULOCYTE COUNT: CPT

## 2024-05-03 PROCEDURE — 80053 COMPREHEN METABOLIC PANEL: CPT

## 2024-05-03 PROCEDURE — 85007 BL SMEAR W/DIFF WBC COUNT: CPT

## 2024-05-03 PROCEDURE — 36415 COLL VENOUS BLD VENIPUNCTURE: CPT

## 2024-05-03 PROCEDURE — 85041 AUTOMATED RBC COUNT: CPT

## 2024-05-03 NOTE — PROGRESS NOTES
[facility]: Cancer Care                                                                                          I called Kelly to let her know that her labs from today were reviewed by Dr. Bartlett.  He indicates that they look good.  Nothing new or different to be done.  Patient is to return in 2 weeks for another lab check.  I was unable to get a hold of Kelly, I left her a detailed message indicating the intention of my call.  I encouraged her to call me back should she have any questions or concerns to be addressed.    Signature:  Gretchen Downey  RN Care Coordinator  [facility]  Cancer [facility]

## 2024-05-06 NOTE — PROGRESS NOTES
SUBJECTIVE     Chief Complaint   Patient presents with     Generalized Body Aches     Pt is here for generalized body aches.  She denies any fever.  She states her asthma meds were recently changed and she has been coughing up a lot of mucous.  She states it is not a cough like you'd get with a cold.  She also states she feels very congested in her chest.      Medication Reconciliation     Complete.        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    Pt had sudden onset of overall malaise, diffuse muscle aches, sweats, shaking chills 4 days ago. No nasal congestion. No ear symptoms. +increased cough with sputum production and increased work of breathing especially with minimal exertion. Has been using albuterol inhaler 3-4x a day with relief. Has taken temperature at home but was normal. Has a 12 year old and 17 year old at home, no sick contacts. 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 No one else with immunosuppression or young kids at home. Hasn't had PO steroids in over a year, same for ED visits. +loss of appetite, no vomiting or diarrhea. +abdominal pain with coughing.     Was seen this month for asthma, increased inhaled corticosteroid dose, patient does think this was helping until the last 4 days.       PMH/PSH, FamHx, Meds, Allergies reviewed and updated as needed.    Social History     Socioeconomic History     Marital status:      Spouse name: None     Number of children: None     Years of education: None     Highest education level: None   Social Needs     Financial resource strain: None     Food insecurity - worry: None     Food insecurity - inability: None     Transportation needs - medical: None     Transportation  Hypothyroidism needs - non-medical: None   Occupational History     None   Tobacco Use     Smoking status: Never Smoker     Smokeless tobacco: Never Used   Substance and Sexual Activity     Alcohol use: No     Drug use: No     Sexual activity: Yes     Partners: Male   Other Topics Concern     None   Social History Narrative     None           OBJECTIVE     /76 (BP Location: Left arm, Patient Position: Sitting, Cuff Size: Adult Large)   Pulse 106   Temp 99.7  F (37.6  C) (Oral)   Resp 20   SpO2 97%   There is no height or weight on file to calculate BMI.    Physical exam:  Gen: Appears tired and ill  Eyes: No conjunctival injection  Ears: L serous effusion but no erythema or bulging of TMs bilaterally  Throat: No oral lesions, no tonsillar swelling, no pharyngeal erythema  Neck: +L anterior cervical lymph node approx 1.5 cm, no overlying erythema. Thyroid normal  CV: Tachycardic, regular rhythm, no murmurs/rubs/gallops  Resp: Inspiratory and expiratory wheezes throughout with prolonged expiratory phase. No crackles or focal findings. No increased work of breathing.  ABD: soft, mild tenderness to palpation of lower quadrants bilaterally.   MSK: gross motor and sensation intact, gait normal, tone normal    Results for orders placed or performed in visit on 01/30/19 (from the past 24 hour(s))   Influenza A/B Antigen (P )   Result Value Ref Range    Influenza A POSITIVE (A) Negative    Influenza B NEGATIVE Negative         ASSESSMENT AND PLAN     Kelly Harris is a 48 year old female here for malaise/myalgia    1. Influenza A  +influenza swab. 4 days of symptoms but high risk 2/2 persistent asthma so recommended treatment. Provided work letter. Discussed side effects of medication. Also recommended she call her 12 year old child's PCP (not a pt of our clinic) and discuss if prophylaxis is indicated especially given heart condition. Discussed expected time course and indications to return.  - Influenza A/B Antigen ([facility]  FM)  - oseltamivir (TAMIFLU) 75 MG capsule; Take 1 capsule (75 mg) by mouth 2 times daily  Dispense: 10 capsule; Refill: 0    2. Wheezing  Most likely viral induced wheezing from influenza, however per chart review also has had possible vocal cord dysfunction as well. Given significant dyspnea will treat with short course prednisone, discussed use of albuterol at home and indications to seek further medical treatment.  - predniSONE (DELTASONE) 20 MG tablet; Take 40 mg by mouth daily for 5 days.  Dispense: 10 tablet; Refill: 0    Anca Mirza MD, PGY-3  I precepted today with Lopez Ocasio MD.

## 2024-05-17 ENCOUNTER — LAB (OUTPATIENT)
Dept: INFUSION THERAPY | Facility: HOSPITAL | Age: 54
End: 2024-05-17
Attending: INTERNAL MEDICINE
Payer: COMMERCIAL

## 2024-05-17 DIAGNOSIS — D57.20 SICKLE-CELL/HB-C DISEASE WITHOUT CRISIS (H): ICD-10-CM

## 2024-05-17 LAB
BASOPHILS # BLD AUTO: ABNORMAL 10*3/UL
BASOPHILS # BLD MANUAL: 0 10E3/UL (ref 0–0.2)
BASOPHILS NFR BLD AUTO: ABNORMAL %
BASOPHILS NFR BLD MANUAL: 0 %
EOSINOPHIL # BLD AUTO: ABNORMAL 10*3/UL
EOSINOPHIL # BLD MANUAL: 0 10E3/UL (ref 0–0.7)
EOSINOPHIL NFR BLD AUTO: ABNORMAL %
EOSINOPHIL NFR BLD MANUAL: 0 %
ERYTHROCYTE [DISTWIDTH] IN BLOOD BY AUTOMATED COUNT: 18.9 % (ref 10–15)
HCT VFR BLD AUTO: 34.3 % (ref 35–47)
HGB BLD-MCNC: 12.1 G/DL (ref 11.7–15.7)
HOWELL-JOLLY BOD BLD QL SMEAR: PRESENT
IMM GRANULOCYTES # BLD: ABNORMAL 10*3/UL
IMM GRANULOCYTES NFR BLD: ABNORMAL %
LYMPHOCYTES # BLD AUTO: ABNORMAL 10*3/UL
LYMPHOCYTES # BLD MANUAL: 4.4 10E3/UL (ref 0.8–5.3)
LYMPHOCYTES NFR BLD AUTO: ABNORMAL %
LYMPHOCYTES NFR BLD MANUAL: 58 %
MCH RBC QN AUTO: 28.4 PG (ref 26.5–33)
MCHC RBC AUTO-ENTMCNC: 35.3 G/DL (ref 31.5–36.5)
MCV RBC AUTO: 81 FL (ref 78–100)
MONOCYTES # BLD AUTO: ABNORMAL 10*3/UL
MONOCYTES # BLD MANUAL: 0.3 10E3/UL (ref 0–1.3)
MONOCYTES NFR BLD AUTO: ABNORMAL %
MONOCYTES NFR BLD MANUAL: 4 %
NEUTROPHILS # BLD AUTO: ABNORMAL 10*3/UL
NEUTROPHILS # BLD MANUAL: 2.9 10E3/UL (ref 1.6–8.3)
NEUTROPHILS NFR BLD AUTO: ABNORMAL %
NEUTROPHILS NFR BLD MANUAL: 38 %
NRBC # BLD AUTO: 0.6 10E3/UL
NRBC # BLD AUTO: 0.7 10E3/UL
NRBC BLD AUTO-RTO: 9 /100
NRBC BLD MANUAL-RTO: 8 %
PLAT MORPH BLD: ABNORMAL
PLATELET # BLD AUTO: 298 10E3/UL (ref 150–450)
POLYCHROMASIA BLD QL SMEAR: SLIGHT
RBC # BLD AUTO: 4.26 10E6/UL (ref 3.8–5.2)
RBC MORPH BLD: ABNORMAL
RETICS # AUTO: 0.1 10E6/UL (ref 0.03–0.1)
RETICS/RBC NFR AUTO: 2.4 % (ref 0.5–2)
TARGETS BLD QL SMEAR: ABNORMAL
WBC # BLD AUTO: 7.5 10E3/UL (ref 4–11)

## 2024-05-17 PROCEDURE — 36415 COLL VENOUS BLD VENIPUNCTURE: CPT

## 2024-05-17 PROCEDURE — 85007 BL SMEAR W/DIFF WBC COUNT: CPT

## 2024-05-17 PROCEDURE — 85045 AUTOMATED RETICULOCYTE COUNT: CPT

## 2024-05-17 PROCEDURE — 85027 COMPLETE CBC AUTOMATED: CPT

## 2024-05-21 ENCOUNTER — PATIENT OUTREACH (OUTPATIENT)
Dept: ONCOLOGY | Facility: HOSPITAL | Age: 54
End: 2024-05-21
Payer: COMMERCIAL

## 2024-05-21 NOTE — PROGRESS NOTES
[facility]: Cancer Care                                                                                          Patient had labs drawn a couple days ago.  Dr. Bartlett reviewed results.  He indicates that they look fine, stable.  Patient is to continue on Hydrea as is.    I called Kelly to report this.  She verbalized understanding and appreciation of our conversation.  Patient will be back in clinic on Weds, 6/19 to see Dr. Palafox.  She will establish care with him due to Dr. Bartlett's [facility].    Signature:  Gretchen Downey RN Care Coordinator  [facility]  Cancer [facility]

## 2024-05-30 ENCOUNTER — TRANSFERRED RECORDS (OUTPATIENT)
Dept: HEALTH INFORMATION MANAGEMENT | Facility: CLINIC | Age: 54
End: 2024-05-30
Payer: COMMERCIAL

## 2024-06-03 ENCOUNTER — OFFICE VISIT (OUTPATIENT)
Dept: FAMILY MEDICINE | Facility: CLINIC | Age: 54
End: 2024-06-03
Payer: COMMERCIAL

## 2024-06-03 VITALS
HEART RATE: 92 BPM | RESPIRATION RATE: 16 BRPM | DIASTOLIC BLOOD PRESSURE: 85 MMHG | WEIGHT: 155.6 LBS | HEIGHT: 63 IN | OXYGEN SATURATION: 99 % | TEMPERATURE: 98 F | BODY MASS INDEX: 27.57 KG/M2 | SYSTOLIC BLOOD PRESSURE: 138 MMHG

## 2024-06-03 DIAGNOSIS — L60.8 MELANONYCHIA: ICD-10-CM

## 2024-06-03 DIAGNOSIS — R03.0 ELEVATED BLOOD PRESSURE READING WITHOUT DIAGNOSIS OF HYPERTENSION: ICD-10-CM

## 2024-06-03 DIAGNOSIS — R49.0 HOARSENESS: ICD-10-CM

## 2024-06-03 DIAGNOSIS — Z12.4 CERVICAL CANCER SCREENING: Primary | ICD-10-CM

## 2024-06-03 PROCEDURE — G0145 SCR C/V CYTO,THINLAYER,RESCR: HCPCS

## 2024-06-03 PROCEDURE — 87624 HPV HI-RISK TYP POOLED RSLT: CPT

## 2024-06-03 PROCEDURE — 99213 OFFICE O/P EST LOW 20 MIN: CPT | Mod: GC

## 2024-06-03 ASSESSMENT — ASTHMA QUESTIONNAIRES
QUESTION_4 LAST FOUR WEEKS HOW OFTEN HAVE YOU USED YOUR RESCUE INHALER OR NEBULIZER MEDICATION (SUCH AS ALBUTEROL): TWO OR THREE TIMES PER WEEK
QUESTION_2 LAST FOUR WEEKS HOW OFTEN HAVE YOU HAD SHORTNESS OF BREATH: ONCE OR TWICE A WEEK
QUESTION_5 LAST FOUR WEEKS HOW WOULD YOU RATE YOUR ASTHMA CONTROL: SOMEWHAT CONTROLLED
ACT_TOTALSCORE: 15
QUESTION_3 LAST FOUR WEEKS HOW OFTEN DID YOUR ASTHMA SYMPTOMS (WHEEZING, COUGHING, SHORTNESS OF BREATH, CHEST TIGHTNESS OR PAIN) WAKE YOU UP AT NIGHT OR EARLIER THAN USUAL IN THE MORNING: ONCE A WEEK
ACT_TOTALSCORE: 15
QUESTION_1 LAST FOUR WEEKS HOW MUCH OF THE TIME DID YOUR ASTHMA KEEP YOU FROM GETTING AS MUCH DONE AT WORK, SCHOOL OR AT HOME: MOST OF THE TIME

## 2024-06-03 NOTE — PROGRESS NOTES
"  Assessment & Plan     Cervical cancer screening  History of abnormal Pap last year this is follow-up.  Tolerated well.  - Pap Screen with HPV - Recommended Age 30 - 65 Years    Elevated blood pressure reading without diagnosis of hypertension  Continues to be mildly elevated however patient not on any antihypertensives.  Again discussed monitoring at home and patient will bring in home blood pressure monitoring.    Melanonychia  New finding after starting hydroxyurea which appears to be a side effect.  Will follow-up with hematologist next week to discuss more.    Hoarseness  May be secondary to allergies versus inhaler use versus other etiologies.  Following up with pulmonology as well as ENT.    Andrew Mendoza is a 53 year old, presenting for the following health issues:  Follow Up and Repeat Pap Smear    HPI   Hoarseness has been going on for several weeks no associated viral illness.  Has had an asthma flareup and has been years being Zyrtec and Flonase as well as Breo.  Has follow-up with pulmonology and ENT in July.    Also reporting change in fingernail character ever since starting hydroxyurea.  Does not have any pain but endorses that she has been having pigmentation the entire nailbed.    Hypertension Follow-up    Do you check your blood pressure regularly outside of the clinic? Yes   Are you following a low salt diet? Yes  Are your blood pressures ever more than 140 on the top number (systolic) OR more   than 90 on the bottom number (diastolic), for example 140/90? Yes  127/64 this AM. Normally 130 systolic.     Review of Systems  Constitutional, HEENT, cardiovascular, pulmonary, gi and gu systems are negative, except as otherwise noted.      Objective    /85 (BP Location: Right arm, Patient Position: Sitting, Cuff Size: Adult Regular)   Pulse 92   Temp 98  F (36.7  C) (Oral)   Resp 16   Ht 1.6 m (5' 3\")   Wt 70.6 kg (155 lb 9.6 oz)   LMP 04/12/2017   SpO2 99%   BMI 27.56 kg/m  "   Body mass index is 27.56 kg/m .  Physical Exam   GENERAL: alert and no distress  NECK: no adenopathy, no asymmetry, masses, or scars  RESP: lungs clear to auscultation - no rales, rhonchi or wheezes  CV: regular rate and rhythm, normal S1 S2, no S3 or S4, no murmur, click or rub, no peripheral edema  ABDOMEN: soft, nontender, no hepatosplenomegaly, no masses and bowel sounds normal  MS: no gross musculoskeletal defects noted, no edema  : Normal appearing vagina with normal discharge. Cervix pink, with normal discharge, no tenderness.     No results found for this or any previous visit (from the past 24 hour(s)).        Signed Electronically by: Heri Gamez DO

## 2024-06-03 NOTE — LETTER
[facility] [address] [place] [address]  Phone: [phone]  Fax: [phone]    Kath 3, 2024        Kelly Harris  [address] [address]          To whom it may concern:    RE: Kelly Harris    She may continue to work for a maximum of 6 hours per day for 4 days a week.     Please contact me for questions or concerns.      Sincerely,      Heri Gamez DO

## 2024-06-03 NOTE — PROGRESS NOTES
"Preceptor attestation:  Vital signs reviewed: /85 (BP Location: Right arm, Patient Position: Sitting, Cuff Size: Adult Regular)   Pulse 92   Temp 98  F (36.7  C) (Oral)   Resp 16   Ht 1.6 m (5' 3\")   Wt 70.6 kg (155 lb 9.6 oz)   LMP 04/12/2017   SpO2 99%   BMI 27.56 kg/m      Patient seen, evaluated, and discussed with the resident.  I verified the content of the note, which accurately reflects my assessment of the patient and the plan of care.    Supervising physician: Maci Fritz MD  [facility]  "

## 2024-06-04 LAB
HPV HR 12 DNA CVX QL NAA+PROBE: NEGATIVE
HPV16 DNA CVX QL NAA+PROBE: NEGATIVE
HPV18 DNA CVX QL NAA+PROBE: NEGATIVE
HUMAN PAPILLOMA VIRUS FINAL DIAGNOSIS: NORMAL

## 2024-06-06 DIAGNOSIS — D57.20 SICKLE-CELL/HB-C DISEASE WITHOUT CRISIS (H): ICD-10-CM

## 2024-06-06 LAB
BKR LAB AP GYN ADEQUACY: NORMAL
BKR LAB AP GYN INTERPRETATION: NORMAL
BKR LAB AP PREVIOUS ABNL DX: NORMAL
BKR LAB AP PREVIOUS ABNORMAL: NORMAL
PATH REPORT.COMMENTS IMP SPEC: NORMAL
PATH REPORT.COMMENTS IMP SPEC: NORMAL
PATH REPORT.RELEVANT HX SPEC: NORMAL

## 2024-06-07 DIAGNOSIS — M54.16 LUMBAR RADICULAR PAIN: ICD-10-CM

## 2024-06-07 DIAGNOSIS — M47.816 LUMBAR FACET ARTHROPATHY: ICD-10-CM

## 2024-06-07 RX ORDER — MELOXICAM 15 MG/1
15 TABLET ORAL DAILY
Qty: 30 TABLET | Refills: 1 | Status: SHIPPED | OUTPATIENT
Start: 2024-06-07 | End: 2024-08-08

## 2024-06-07 NOTE — TELEPHONE ENCOUNTER
Last appointment:4/4/24  Next appointment:6/27/24    Notes/Comments:last ordered 4/23/24 #30 with one refill      Rx request(s) has been reviewed.

## 2024-06-17 DIAGNOSIS — J30.2 CHRONIC SEASONAL ALLERGIC RHINITIS: ICD-10-CM

## 2024-06-17 DIAGNOSIS — J30.89 SEASONAL ALLERGIC RHINITIS DUE TO OTHER ALLERGIC TRIGGER: ICD-10-CM

## 2024-06-17 RX ORDER — FLUTICASONE PROPIONATE 50 MCG
1 SPRAY, SUSPENSION (ML) NASAL DAILY
Qty: 32 ML | Refills: 4 | Status: SHIPPED | OUTPATIENT
Start: 2024-06-17

## 2024-06-19 ENCOUNTER — LAB (OUTPATIENT)
Dept: INFUSION THERAPY | Facility: HOSPITAL | Age: 54
End: 2024-06-19
Attending: INTERNAL MEDICINE
Payer: COMMERCIAL

## 2024-06-19 ENCOUNTER — ONCOLOGY VISIT (OUTPATIENT)
Dept: ONCOLOGY | Facility: HOSPITAL | Age: 54
End: 2024-06-19
Attending: INTERNAL MEDICINE
Payer: COMMERCIAL

## 2024-06-19 VITALS
HEART RATE: 86 BPM | HEIGHT: 63 IN | SYSTOLIC BLOOD PRESSURE: 142 MMHG | DIASTOLIC BLOOD PRESSURE: 89 MMHG | BODY MASS INDEX: 27.11 KG/M2 | WEIGHT: 153 LBS | OXYGEN SATURATION: 96 % | RESPIRATION RATE: 16 BRPM | TEMPERATURE: 98.5 F

## 2024-06-19 DIAGNOSIS — D57.20 SICKLE-CELL/HB-C DISEASE WITHOUT CRISIS (H): ICD-10-CM

## 2024-06-19 DIAGNOSIS — Z86.718 HISTORY OF VENOUS THROMBOEMBOLISM: Primary | ICD-10-CM

## 2024-06-19 PROBLEM — D57.00 SICKLE CELL DISEASE WITH CRISIS (H): Status: RESOLVED | Noted: 2023-07-25 | Resolved: 2024-06-19

## 2024-06-19 LAB
ALBUMIN SERPL BCG-MCNC: 4.3 G/DL (ref 3.5–5.2)
ALP SERPL-CCNC: 100 U/L (ref 40–150)
ALT SERPL W P-5'-P-CCNC: 24 U/L (ref 0–50)
ANION GAP SERPL CALCULATED.3IONS-SCNC: 11 MMOL/L (ref 7–15)
AST SERPL W P-5'-P-CCNC: 27 U/L (ref 0–45)
BASOPHILS # BLD MANUAL: 0 10E3/UL (ref 0–0.2)
BASOPHILS NFR BLD MANUAL: 0 %
BILIRUB SERPL-MCNC: 0.6 MG/DL
BUN SERPL-MCNC: 14.1 MG/DL (ref 6–20)
CALCIUM SERPL-MCNC: 9.3 MG/DL (ref 8.6–10)
CHLORIDE SERPL-SCNC: 105 MMOL/L (ref 98–107)
CREAT SERPL-MCNC: 1 MG/DL (ref 0.51–0.95)
DEPRECATED HCO3 PLAS-SCNC: 26 MMOL/L (ref 22–29)
EGFRCR SERPLBLD CKD-EPI 2021: 67 ML/MIN/1.73M2
EOSINOPHIL # BLD MANUAL: 0 10E3/UL (ref 0–0.7)
EOSINOPHIL NFR BLD MANUAL: 0 %
ERYTHROCYTE [DISTWIDTH] IN BLOOD BY AUTOMATED COUNT: 17.5 % (ref 10–15)
GLUCOSE SERPL-MCNC: 111 MG/DL (ref 70–99)
HCT VFR BLD AUTO: 32.9 % (ref 35–47)
HGB BLD-MCNC: 11.8 G/DL (ref 11.7–15.7)
HOWELL-JOLLY BOD BLD QL SMEAR: PRESENT
LYMPHOCYTES # BLD MANUAL: 3.3 10E3/UL (ref 0.8–5.3)
LYMPHOCYTES NFR BLD MANUAL: 51 %
MCH RBC QN AUTO: 31.3 PG (ref 26.5–33)
MCHC RBC AUTO-ENTMCNC: 35.9 G/DL (ref 31.5–36.5)
MCV RBC AUTO: 87 FL (ref 78–100)
MONOCYTES # BLD MANUAL: 0.5 10E3/UL (ref 0–1.3)
MONOCYTES NFR BLD MANUAL: 7 %
NEUTROPHILS # BLD MANUAL: 2.7 10E3/UL (ref 1.6–8.3)
NEUTROPHILS NFR BLD MANUAL: 42 %
NRBC # BLD AUTO: 0.3 10E3/UL
NRBC # BLD AUTO: 0.7 10E3/UL
NRBC BLD AUTO-RTO: 11 /100
NRBC BLD MANUAL-RTO: 5 %
PLAT MORPH BLD: ABNORMAL
PLATELET # BLD AUTO: 287 10E3/UL (ref 150–450)
POLYCHROMASIA BLD QL SMEAR: SLIGHT
POTASSIUM SERPL-SCNC: 4 MMOL/L (ref 3.4–5.3)
PROT SERPL-MCNC: 7.3 G/DL (ref 6.4–8.3)
RBC # BLD AUTO: 3.77 10E6/UL (ref 3.8–5.2)
RBC MORPH BLD: ABNORMAL
SODIUM SERPL-SCNC: 142 MMOL/L (ref 135–145)
TARGETS BLD QL SMEAR: ABNORMAL
WBC # BLD AUTO: 6.5 10E3/UL (ref 4–11)

## 2024-06-19 PROCEDURE — G2211 COMPLEX E/M VISIT ADD ON: HCPCS | Performed by: INTERNAL MEDICINE

## 2024-06-19 PROCEDURE — 99215 OFFICE O/P EST HI 40 MIN: CPT | Performed by: INTERNAL MEDICINE

## 2024-06-19 PROCEDURE — 85007 BL SMEAR W/DIFF WBC COUNT: CPT

## 2024-06-19 PROCEDURE — 85025 COMPLETE CBC W/AUTO DIFF WBC: CPT

## 2024-06-19 PROCEDURE — 99214 OFFICE O/P EST MOD 30 MIN: CPT | Performed by: INTERNAL MEDICINE

## 2024-06-19 PROCEDURE — 36415 COLL VENOUS BLD VENIPUNCTURE: CPT

## 2024-06-19 PROCEDURE — 80053 COMPREHEN METABOLIC PANEL: CPT

## 2024-06-19 ASSESSMENT — PAIN SCALES - GENERAL: PAINLEVEL: EXTREME PAIN (8)

## 2024-06-19 NOTE — PROGRESS NOTES
[facility] Hematology and Oncology Progress Note    Patient: Kelly Harris  MRN: [id number]  6/19/24        Reason for Visit    Chief Complaint   Patient presents with    Hematology     Sickle cell/HB-C disease without crisis          Problem List Items Addressed This Visit          Hematologic    Sickle-cell/Hb-C disease without crisis (H)    Relevant Orders    CBC with platelets and differential     Other Visit Diagnoses       History of venous thromboembolism    -  Primary              Assessment and Plan  Hemoglobin SC disease  History of ovarian vein thrombosis  History of bilateral PE    I have reviewed her chart extensively.  Diagnosed with hemoglobin SC disease since childhood.  She is originally from [place].  Also has 2 kids with the same problem.  Has had a mild clinical course as expected with her phenotype.  Pain episodes mostly managed by hydration and Tylenol.  But more recently has had some pain episodes requiring narcotic pain medications.  Currently on Hydrea.  This was started in April 2024.  Initial dose of 1000 mg daily.  Hemoglobin has been pretty stable.  It was around 11.9 in May and went up to 12.1 recently.  On folic acid supplementation.  She was put on Eliquis for about 3 months after her diagnosis of mild bilateral pulmonary artery embolism.  Currently not on any anticoagulation.    She has noticed some improvement in the pain episodes after starting Hydrea but still has on and off leg cramps.  No acute chest pain syndrome.  Has not had any hospitalization for over a year now.  Does have some occasional headaches.  Some nausea associated with Hydrea.  Mild hair loss.  Also has some nail color changes.  Otherwise tolerating Hydrea pretty well.    Reviewed further management in this setting.  Using hydroxyurea to decrease vaso-occlusive events is safe and can be done in patients with hemoglobin SC disease.  But I did remind her that most patients with hemoglobin SC disease have a  milder course and there is also at risk for developing hyperviscosity syndrome due to improvement in the hemoglobin levels after starting Hydrea and may require therapeutic phlebotomy.    Labs today show hemoglobin of 11.8.  Serum chemistry stable except for mildly elevated creatinine.  She is tolerating Hydrea 1000 mg daily fairly okay.  I will keep the same dose for now.  Will check labs on a monthly basis to look at her hemoglobin levels.  I will see her back in 3 months.  Continue Reyes catheter supplementation.  If her pain episodes do not improve with the current dose of Hydrea then we will plan to go up on the dose slowly.  She is agreeable to the plan.    With respect to her history of VTE, she is currently not on any anticoagulation.  She had mild bilateral PE which was documented last year.  Was put on Eliquis for a few months.  Denies any issues today.  She also has a remote history of possible ovarian vein thrombosis although it is not clear.  Will hold off on resuming anticoagulation at this point in time since she has been off of it for quite some time.  But if she were to have any recurrent clots then she will qualify for lifelong anticoagulation.     Cancer Staging   No matching staging information was found for the patient.      ECOG Performance    1 - Can't do physically strenuous work, but fully ambyulatory and can do light sedentary work         Problem List    Patient Active Problem List   Diagnosis    Sickle-cell/Hb-C disease without crisis (H)    Chronic bilateral low back pain with bilateral sciatica    Thrombosis of ovarian vein    Moderate persistent asthma without complication    Vocal cord dysfunction    Seasonal allergic rhinitis due to other allergic trigger    Retinal hemorrhage of both eyes    Laceration of left ankle    Abnormal Pap smear of cervix    Sickle cell pain crisis (H)    Dyspnea, unspecified type    Left-sided chest pain        Hematologic history  She was diagnosed with  sickle cell disease as a child and subsequently was noted to have hemoglobin SC disease.  She is originally from [place].  Has 2 kids who also have hemoglobin SC disease.  She has a history of retinal hemorrhages, asthma, ovarian vein thrombosis and recurrent pain episodes.  Looks like her ovarian vein thrombosis was diagnosed in 2008.  No history of strokes.     Heterozygous for hemoglobin S and C.  Mild anemia which is typical for this phenotype.    She has had multiple pain episodes but mostly managed with hydration and Tylenol.  She was hospitalized in July 2023 with right chest wall and back pain.  Treated with narcotic pain medications.  CT chest angio at the time showed mild bilateral pulmonary emboli.  Currently on Eliquis.      More recently in April 2024 she was started on hydroxyurea to prevent recurrent pain episodes.  Initial dose was 1000 mg daily.  With plans to uptitrate the dose depending upon response.    Interval History   Kelly Harris is a 53 year old female with hemoglobin SC disease with recurrent mild pain episodes who is currently on Hydrea and folic acid here for follow-up.    This is my first time meeting her.  She was being cared for by my colleague who has since retired.  Please see hematologic history for further information regarding her hemoglobin SC disease.    She was started on Hydrea in April 2024 to prevent recurrent pain episodes.  She has had a milder clinical course as expected with hemoglobin SC disease.  In July last year she was hospitalized with chest pain and back pain.  Required narcotic pain medications.  No further hospitalizations since then.      Review of Systems  A comprehensive review of systems was negative except for what is noted in the interval history    Current Outpatient Medications   Medication Sig Dispense Refill    acetaminophen (TYLENOL) 325 MG tablet Take 3 tablets (975 mg) by mouth every 8 hours 120 tablet 1    albuterol (PROAIR HFA/PROVENTIL  HFA/VENTOLIN HFA) 108 (90 Base) MCG/ACT inhaler Inhale 2 puffs into the lungs every 6 hours as needed for shortness of breath, wheezing or cough 18 g 4    cetirizine (ZYRTEC) 10 MG tablet TAKE 1 TABLET BY MOUTH EVERY DAY 90 tablet 3    fluticasone (FLONASE) 50 MCG/ACT nasal spray Spray 1 spray into both nostrils daily 32 mL 4    fluticasone-vilanterol (BREO ELLIPTA) 200-25 MCG/ACT inhaler Inhale 1 puff into the lungs daily 90 each 3    FOLIC ACID PO Take 1 tablet by mouth daily      gabapentin (NEURONTIN) 300 MG capsule Take 1 capsule (300 mg) by mouth at bedtime 300 mg at bedtime 90 capsule 3    hydroxyurea (HYDREA) 500 MG capsule Take 2 capsules (1,000 mg) by mouth daily 60 capsule 2    meloxicam (MOBIC) 15 MG tablet TAKE 1 TABLET BY MOUTH EVERY DAY 30 tablet 1    montelukast (SINGULAIR) 10 MG tablet TAKE 1 TABLET BY MOUTH EVERYDAY AT BEDTIME 90 tablet 1    naloxone (NARCAN) 4 MG/0.1ML nasal spray Spray 1 spray (4 mg) into one nostril alternating nostrils as needed for opioid reversal every 2-3 minutes until assistance arrives 0.2 mL 3    prochlorperazine (COMPAZINE) 10 MG tablet Take 1 tablet (10 mg) by mouth every 6 hours as needed for nausea or vomiting 30 tablet 1    tiotropium (SPIRIVA RESPIMAT) 2.5 MCG/ACT inhaler INHALE 2 PUFFS BY MOUTH INTO THE LUNGS DAILY 4 g 4    traMADol (ULTRAM) 50 MG tablet Take 1 tablet (50 mg) by mouth every 6 hours as needed for severe pain 60 tablet 0     No current facility-administered medications for this visit.     Facility-Administered Medications Ordered in Other Visits   Medication Dose Route Frequency Provider Last Rate Last Admin    iohexol (OMNIPAQUE) 300 mg/mL injection    Once PRN Ananth Jenkins, DO   0.5 mL at 02/13/24 0950    lidocaine (PF) (XYLOCAINE) 1 % injection    Once PRN Ananth Jenkins, DO   2 mL at 02/13/24 0949    methylPREDNISolone (DEPO-Medrol) injection    Once PRN Ananth Jenkins, DO   40 mg at 02/13/24 0950    ROPivacaine (NAROPIN) 0.5%  injection    Once PRN Ananth Jenkins DO   5 mL at 02/13/24 0950        Physical Exam    Failed to redirect to the Timeline version of the REVFS SmartLink.    General: alert and cooperative  HEENT: Head: Normal, normocephalic, atraumatic.  Eye: Normal external eye, conjunctiva, lids cornea, CANDIDO.  Chest: Clear to auscultation bilaterally  Cardiac: S1, S2 normal, regular rate and rhythm  Abdomen: abdomen is soft   Extremities: atraumatic, no peripheral edema  CNS: Alert and oriented x3, neurologic exam grossly normal.      Lab Results    Recent Results (from the past 168 hour(s))   Comprehensive metabolic panel (BMP + Alb, Alk Phos, ALT, AST, Total. Bili, TP)   Result Value Ref Range    Sodium 142 135 - 145 mmol/L    Potassium 4.0 3.4 - 5.3 mmol/L    Carbon Dioxide (CO2) 26 22 - 29 mmol/L    Anion Gap 11 7 - 15 mmol/L    Urea Nitrogen 14.1 6.0 - 20.0 mg/dL    Creatinine 1.00 (H) 0.51 - 0.95 mg/dL    GFR Estimate 67 >60 mL/min/1.73m2    Calcium 9.3 8.6 - 10.0 mg/dL    Chloride 105 98 - 107 mmol/L    Glucose 111 (H) 70 - 99 mg/dL    Alkaline Phosphatase 100 40 - 150 U/L    AST 27 0 - 45 U/L    ALT 24 0 - 50 U/L    Protein Total 7.3 6.4 - 8.3 g/dL    Albumin 4.3 3.5 - 5.2 g/dL    Bilirubin Total 0.6 <=1.2 mg/dL   CBC with platelets and differential   Result Value Ref Range    WBC Count 6.5 4.0 - 11.0 10e3/uL    RBC Count 3.77 (L) 3.80 - 5.20 10e6/uL    Hemoglobin 11.8 11.7 - 15.7 g/dL    Hematocrit 32.9 (L) 35.0 - 47.0 %    MCV 87 78 - 100 fL    MCH 31.3 26.5 - 33.0 pg    MCHC 35.9 31.5 - 36.5 g/dL    RDW 17.5 (H) 10.0 - 15.0 %    Platelet Count 287 150 - 450 10e3/uL    NRBCs per 100 WBC 11 (H) <1 /100    Absolute NRBCs 0.7 10e3/uL   Manual Differential   Result Value Ref Range    % Neutrophils 42 %    % Lymphocytes 51 %    % Monocytes 7 %    % Eosinophils 0 %    % Basophils 0 %    Absolute Neutrophils 2.7 1.6 - 8.3 10e3/uL    Absolute Lymphocytes 3.3 0.8 - 5.3 10e3/uL    Absolute Monocytes 0.5 0.0 - 1.3 10e3/uL     Absolute Eosinophils 0.0 0.0 - 0.7 10e3/uL    Absolute Basophils 0.0 0.0 - 0.2 10e3/uL    RBC Morphology Confirmed RBC Indices     Platelet Assessment  Automated Count Confirmed. Platelet morphology is normal.     Automated Count Confirmed. Platelet morphology is normal.    [place] Bodies Present (A) None Seen    Polychromasia Slight (A) None Seen    Target Cells Moderate (A) None Seen    NRBCs per 100 WBC 5 %    Absolute NRBCs 0.3 10e3/uL       Imaging    No results found.    A total of 45 min was spent today on this visit which included face to face conversation with the patient, EMR review, counseling and co-ordination of care and medical documentation.    The longitudinal plan of care for the diagnosis(es)/condition(s) as documented were addressed during this visit. Due to the added complexity in care, I will continue to support Kelly in the subsequent management and with ongoing continuity of care.        Signed by: Yo Palafox MD

## 2024-06-19 NOTE — PROGRESS NOTES
"Oncology Rooming Note    June 19, 2024 8:36 AM   Kelly Harris is a 53 year old female who presents for:    Chief Complaint   Patient presents with    Hematology     Sickle cell/HB-C disease without crisis      Initial Vitals: BP (!) 142/89   Pulse 86   Temp 98.5  F (36.9  C)   Resp 16   Ht 1.6 m (5' 3\")   Wt 69.4 kg (153 lb)   LMP 04/12/2017   SpO2 96%   BMI 27.10 kg/m   Estimated body mass index is 27.1 kg/m  as calculated from the following:    Height as of this encounter: 1.6 m (5' 3\").    Weight as of this encounter: 69.4 kg (153 lb). Body surface area is 1.76 meters squared.  Extreme Pain (8) Comment: Data Unavailable   Patient's last menstrual period was 04/12/2017.  Allergies reviewed: Yes  Medications reviewed: Yes    Medications: Medication refills not needed today.  Pharmacy name entered into [employer]:    [id number] [place], [place] - [address] [facility] [address] [place] [address] [place]    Frailty Screening:   Is the patient here for a new oncology consult visit in cancer care? 2. No      Clinical concerns: Cramping in L leg. Nail beds getting darker. Feeling nauseas sometimes.       Giovanna Álvarez LPN             "

## 2024-06-19 NOTE — LETTER
6/19/2024      Kelly Harris  [address]  [place] [address]      Dear Colleague,    Thank you for referring your patient, Kelly Harris, to the [facility] [place]. Please see a copy of my visit note below.    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  Diagnosed with hemoglobin SC disease since childhood.  She is originally from [place].  Also has 2 kids with the same problem.  Has had a mild clinical course as expected with her phenotype.  Pain episodes mostly managed by hydration and Tylenol.  But more recently has had some pain episodes requiring narcotic pain medications.  Currently on Hydrea.  This was started in April 2024.  Initial dose of 1000 mg daily.  Hemoglobin has been pretty stable.  It was around 11.9 in May and went up to 12.1 recently.  On folic acid supplementation.  She was put on Eliquis for about 3 months after her diagnosis of mild bilateral pulmonary artery embolism.  Currently not on any anticoagulation.    She has noticed some improvement in the pain episodes after starting Hydrea but still has on and off leg cramps.  No acute chest pain syndrome.  Has not had any hospitalization for over a year now.  Does have some occasional headaches.  Some nausea associated with Hydrea.  Mild hair loss.  Also has some nail color changes.  Otherwise tolerating Hydrea pretty  well.    Reviewed further management in this setting.  Using hydroxyurea to decrease vaso-occlusive events is safe and can be done in patients with hemoglobin SC disease.  But I did remind her that most patients with hemoglobin SC disease have a milder course and there is also at risk for developing hyperviscosity syndrome due to improvement in the hemoglobin levels after starting Hydrea and may require therapeutic phlebotomy.    Labs today show hemoglobin of 11.8.  Serum chemistry stable except for mildly elevated creatinine.  She is tolerating Hydrea 1000 mg daily fairly okay.  I will keep the same dose for now.  Will check labs on a monthly basis to look at her hemoglobin levels.  I will see her back in 3 months.  Continue Reyes catheter supplementation.  If her pain episodes do not improve with the current dose of Hydrea then we will plan to go up on the dose slowly.  She is agreeable to the plan.    With respect to her history of VTE, she is currently not on any anticoagulation.  She had mild bilateral PE which was documented last year.  Was put on Eliquis for a few months.  Denies any issues today.  She also has a remote history of possible ovarian vein thrombosis although it is not clear.  Will hold off on resuming anticoagulation at this point in time since she has been off of it for quite some time.  But if she were to have any recurrent clots then she will qualify for lifelong anticoagulation.     Cancer Staging   No matching staging information was found for the patient.      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  She is originally from [place].  Has 2 kids who also have hemoglobin SC disease.  She has a history of retinal hemorrhages, asthma, ovarian vein thrombosis and recurrent pain episodes.  Looks like her ovarian vein thrombosis was diagnosed in 2008.  No history of strokes.     Heterozygous for hemoglobin S and C.  Mild anemia which is typical for this phenotype.    She has had multiple pain episodes but mostly managed with hydration and Tylenol.  She was hospitalized in July 2023 with right chest wall and back pain.  Treated with narcotic pain medications.  CT chest angio at the time showed mild bilateral pulmonary emboli.  Currently on Eliquis.      More recently in April 2024 she was started on hydroxyurea to prevent recurrent pain episodes.  Initial dose was 1000 mg daily.  With plans to uptitrate the dose depending upon response.    Interval History   Kelly Harris is a 53 year old female with hemoglobin SC disease with recurrent mild pain episodes who is currently on Hydrea and folic acid here for follow-up.    This is my first time meeting her.  She was being cared for by my colleague who has since retired.  Please see hematologic history for further information regarding her hemoglobin SC disease.    She was started on Hydrea in April 2024 to prevent recurrent pain episodes.  She has had a milder clinical course as expected with hemoglobin SC disease.  In July last year she was hospitalized with chest pain and back pain.  Required narcotic pain medications.  No further hospitalizations since then.      Review of Systems  A comprehensive review of systems was negative  except for what is noted in the interval history    Current Outpatient Medications   Medication Sig Dispense Refill     acetaminophen (TYLENOL) 325 MG tablet Take 3 tablets (975 mg) by mouth every 8 hours 120 tablet 1     albuterol (PROAIR HFA/PROVENTIL HFA/VENTOLIN HFA) 108 (90 Base) MCG/ACT inhaler Inhale 2 puffs into the lungs every 6 hours as needed for shortness of breath, wheezing or cough 18 g 4     cetirizine (ZYRTEC) 10 MG tablet TAKE 1 TABLET BY MOUTH EVERY DAY 90 tablet 3     fluticasone (FLONASE) 50 MCG/ACT nasal spray Spray 1 spray into both nostrils daily 32 mL 4     fluticasone-vilanterol (BREO ELLIPTA) 200-25 MCG/ACT inhaler Inhale 1 puff into the lungs daily 90 each 3     FOLIC ACID PO Take 1 tablet by mouth daily       gabapentin (NEURONTIN) 300 MG capsule Take 1 capsule (300 mg) by mouth at bedtime 300 mg at bedtime 90 capsule 3     hydroxyurea (HYDREA) 500 MG capsule Take 2 capsules (1,000 mg) by mouth daily 60 capsule 2     meloxicam (MOBIC) 15 MG tablet TAKE 1 TABLET BY MOUTH EVERY DAY 30 tablet 1     montelukast (SINGULAIR) 10 MG tablet TAKE 1 TABLET BY MOUTH EVERYDAY AT BEDTIME 90 tablet 1     naloxone (NARCAN) 4 MG/0.1ML nasal spray Spray 1 spray (4 mg) into one nostril alternating nostrils as needed for opioid reversal every 2-3 minutes until assistance arrives 0.2 mL 3     prochlorperazine (COMPAZINE) 10 MG tablet Take 1 tablet (10 mg) by mouth every 6 hours as needed for nausea or vomiting 30 tablet 1     tiotropium (SPIRIVA RESPIMAT) 2.5 MCG/ACT inhaler INHALE 2 PUFFS BY MOUTH INTO THE LUNGS DAILY 4 g 4     traMADol (ULTRAM) 50 MG tablet Take 1 tablet (50 mg) by mouth every 6 hours as needed for severe pain 60 tablet 0     No current facility-administered medications for this visit.     Facility-Administered Medications Ordered in Other Visits   Medication Dose Route Frequency Provider Last Rate Last Admin     iohexol (OMNIPAQUE) 300 mg/mL injection    Once PRN Ananth Jenkins DO    0.5 mL at 02/13/24 0950     lidocaine (PF) (XYLOCAINE) 1 % injection    Once PRN Ananth Jenkins, DO   2 mL at 02/13/24 0949     methylPREDNISolone (DEPO-Medrol) injection    Once PRN Ananth Jenkins, DO   40 mg at 02/13/24 0950     ROPivacaine (NAROPIN) 0.5% injection    Once PRN Gregory, Ananth, DO   5 mL at 02/13/24 0950        Physical Exam    Failed to redirect to the Timeline version of the [facility] SmartLink.    General: alert and cooperative  HEENT: Head: Normal, normocephalic, atraumatic.  Eye: Normal external eye, conjunctiva, lids cornea, CANDIDO.  Chest: Clear to auscultation bilaterally  Cardiac: S1, S2 normal, regular rate and rhythm  Abdomen: abdomen is soft   Extremities: atraumatic, no peripheral edema  CNS: Alert and oriented x3, neurologic exam grossly normal.      Lab Results    Recent Results (from the past 168 hour(s))   Comprehensive metabolic panel (BMP + Alb, Alk Phos, ALT, AST, Total. Bili, TP)   Result Value Ref Range    Sodium 142 135 - 145 mmol/L    Potassium 4.0 3.4 - 5.3 mmol/L    Carbon Dioxide (CO2) 26 22 - 29 mmol/L    Anion Gap 11 7 - 15 mmol/L    Urea Nitrogen 14.1 6.0 - 20.0 mg/dL    Creatinine 1.00 (H) 0.51 - 0.95 mg/dL    GFR Estimate 67 >60 mL/min/1.73m2    Calcium 9.3 8.6 - 10.0 mg/dL    Chloride 105 98 - 107 mmol/L    Glucose 111 (H) 70 - 99 mg/dL    Alkaline Phosphatase 100 40 - 150 U/L    AST 27 0 - 45 U/L    ALT 24 0 - 50 U/L    Protein Total 7.3 6.4 - 8.3 g/dL    Albumin 4.3 3.5 - 5.2 g/dL    Bilirubin Total 0.6 <=1.2 mg/dL   CBC with platelets and differential   Result Value Ref Range    WBC Count 6.5 4.0 - 11.0 10e3/uL    RBC Count 3.77 (L) 3.80 - 5.20 10e6/uL    Hemoglobin 11.8 11.7 - 15.7 g/dL    Hematocrit 32.9 (L) 35.0 - 47.0 %    MCV 87 78 - 100 fL    MCH 31.3 26.5 - 33.0 pg    MCHC 35.9 31.5 - 36.5 g/dL    RDW 17.5 (H) 10.0 - 15.0 %    Platelet Count 287 150 - 450 10e3/uL    NRBCs per 100 WBC 11 (H) <1 /100    Absolute NRBCs 0.7 10e3/uL   Manual  "Differential   Result Value Ref Range    % Neutrophils 42 %    % Lymphocytes 51 %    % Monocytes 7 %    % Eosinophils 0 %    % Basophils 0 %    Absolute Neutrophils 2.7 1.6 - 8.3 10e3/uL    Absolute Lymphocytes 3.3 0.8 - 5.3 10e3/uL    Absolute Monocytes 0.5 0.0 - 1.3 10e3/uL    Absolute Eosinophils 0.0 0.0 - 0.7 10e3/uL    Absolute Basophils 0.0 0.0 - 0.2 10e3/uL    RBC Morphology Confirmed RBC Indices     Platelet Assessment  Automated Count Confirmed. Platelet morphology is normal.     Automated Count Confirmed. Platelet morphology is normal.    [place] Bodies Present (A) None Seen    Polychromasia Slight (A) None Seen    Target Cells Moderate (A) None Seen    NRBCs per 100 WBC 5 %    Absolute NRBCs 0.3 10e3/uL       Imaging    No results found.    A total of 45 min was spent today on this visit which included face to face conversation with the patient, EMR review, counseling and co-ordination of care and medical documentation.    The longitudinal plan of care for the diagnosis(es)/condition(s) as documented were addressed during this visit. Due to the added complexity in care, I will continue to support Kelly in the subsequent management and with ongoing continuity of care.        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    Weight as of this encounter: 69.4 kg (153 lb). Body surface area is 1.76 meters squared.  Extreme Pain (8) Comment: Data Unavailable   Patient's last menstrual period was " 04/12/2017.  Allergies reviewed: Yes  Medications reviewed: Yes    Medications: Medication refills not needed today.  Pharmacy name entered into [employer]:    [id number] - [place], [place] - [address] [address] [place], [place] - [address] [address]    Frailty Screening:   Is the patient here for a new oncology consult visit in cancer care? 2. No      Clinical concerns: Cramping in L leg. Nail beds getting darker. Feeling nauseas sometimes.       Giovanna Álvarez LPN                 Again, thank you for allowing me to participate in the care of your patient.        Sincerely,        Yo Palafox MD

## 2024-06-20 ENCOUNTER — PATIENT OUTREACH (OUTPATIENT)
Dept: ONCOLOGY | Facility: HOSPITAL | Age: 54
End: 2024-06-20
Payer: COMMERCIAL

## 2024-06-20 NOTE — PROGRESS NOTES
[facility]: Cancer Care                                                                                          Situation: Chart reviewed by RN Care Coordinator.    Background: Patient was seen in clinic yesterday for follow-up regarding sickle cell disease.    Assessment: On Hydrea.  1000 mg daily tolerating this well.  Labs stable.    Plan/Recommendations: Continue medication as currently taking.  Patient is to have monthly labs.  Patient is to be seen in 3 months.    ENDY Chowdary will oversee patient's care moving forward as RNCC.      Signature:  Gretchen Downey RN Care Coordinator  [facility]

## 2024-06-22 DIAGNOSIS — J45.40 MODERATE PERSISTENT ASTHMA WITHOUT COMPLICATION: ICD-10-CM

## 2024-06-24 RX ORDER — MONTELUKAST SODIUM 10 MG/1
1 TABLET ORAL AT BEDTIME
Qty: 90 TABLET | Refills: 1 | Status: SHIPPED | OUTPATIENT
Start: 2024-06-24

## 2024-06-24 NOTE — TELEPHONE ENCOUNTER
Name from pharmacy: MONTELUKAST SOD 10 MG TABLET         Will file in chart as: montelukast (SINGULAIR) 10 MG tablet    Sig: TAKE 1 TABLET BY MOUTH EVERYDAY AT BEDTIME    Disp: 90 tablet    Refills: 1    Start: 6/22/2024    Class: E-Prescribe    For: Moderate persistent asthma without complication    Last ordered: 6 months ago (12/22/2023) by Morteza Marie MD    Last refill: 3/24/2024    Rx #: [id number]    Leukotriene Inhibitors Protocol [id number]/22/2024 07:22 AM   Protocol Details Asthma control assessment score within normal limits in last 6 months    Patient is age 12 or older    Medication is active on med list    Recent (6 mo) or future (90 days) visit within the authorizing provider's specialty    Medication indicated for associated diagnosis      To be filled at: [facility] [id number] - [place], [place] - [address]

## 2024-06-27 ENCOUNTER — OFFICE VISIT (OUTPATIENT)
Dept: PHYSICAL MEDICINE AND REHAB | Facility: CLINIC | Age: 54
End: 2024-06-27
Payer: COMMERCIAL

## 2024-06-27 VITALS
HEART RATE: 77 BPM | DIASTOLIC BLOOD PRESSURE: 62 MMHG | SYSTOLIC BLOOD PRESSURE: 141 MMHG | HEIGHT: 63 IN | WEIGHT: 155.6 LBS | BODY MASS INDEX: 27.57 KG/M2

## 2024-06-27 DIAGNOSIS — M79.18 MYOFASCIAL PAIN: ICD-10-CM

## 2024-06-27 DIAGNOSIS — M48.061 FORAMINAL STENOSIS OF LUMBAR REGION: ICD-10-CM

## 2024-06-27 DIAGNOSIS — M47.816 LUMBAR FACET ARTHROPATHY: Primary | ICD-10-CM

## 2024-06-27 DIAGNOSIS — M54.16 LUMBAR RADICULAR PAIN: ICD-10-CM

## 2024-06-27 DIAGNOSIS — M43.16 SPONDYLOLISTHESIS OF LUMBAR REGION: ICD-10-CM

## 2024-06-27 DIAGNOSIS — G47.9 SLEEP DIFFICULTIES: ICD-10-CM

## 2024-06-27 PROCEDURE — 99214 OFFICE O/P EST MOD 30 MIN: CPT | Performed by: PHYSICAL MEDICINE & REHABILITATION

## 2024-06-27 RX ORDER — GABAPENTIN 300 MG/1
300 CAPSULE ORAL 3 TIMES DAILY
Qty: 90 CAPSULE | Refills: 3 | Status: SHIPPED | OUTPATIENT
Start: 2024-06-27

## 2024-06-27 ASSESSMENT — PAIN SCALES - GENERAL: PAINLEVEL: SEVERE PAIN (7)

## 2024-06-27 NOTE — PATIENT INSTRUCTIONS
A Lumbar  medial branch block has been ordered today. Please schedule this injection at least  2 weeks from now to allow time for insurance prior authorization. On the day of your injection, you cannot be sick or taking antibiotics. If you become sick and are prescribed, please call the clinic so your injection can be rescheduled for once you have completed your antibiotics. You will need to bring a  with you for your injection. If you have any questions or concerns prior to your injection, please do not hesitate to call the nurse navigation line at [phone].   Increase gabapentin to 300mg three times daily

## 2024-06-27 NOTE — PROGRESS NOTES
Assessment/Plan:      Kelly was seen today for back pain.    Diagnoses and all orders for this visit:    Lumbar facet arthropathy  -     PAIN Medial Branch Block Lumbar Two Levels Left; Future    Spondylolisthesis of lumbar region  -     PAIN Medial Branch Block Lumbar Two Levels Left; Future    Foraminal stenosis of lumbar region    Myofascial pain    Sleep difficulties  -     gabapentin (NEURONTIN) 300 MG capsule; Take 1 capsule (300 mg) by mouth 3 times daily    Lumbar radicular pain  -     gabapentin (NEURONTIN) 300 MG capsule; Take 1 capsule (300 mg) by mouth 3 times daily         Assessment: Pleasant 53 year old female  with a history of asthma ,pulmonary embolism no longer on Eliquis, sickle cell disease with:     1.  Approximate 1 year history of left much worse than right lumbar spine and gluteal pain.  Most of her pain is at the lumbosacral junction left gluteal region consistent with facet arthropathy with severe facet arthritis L4-5 with spondylolisthesis and severe facet arthropathy L5-S1.  She has had some lumbar radicular pain likely related to foraminal stenosis on the left at L5-S1.  She has had pain despite physical therapy, Osteopathic manipulative medicine oxycodone and tramadol along with Medrol Dosepak trials.  She has had approximately 8 visits of physical therapy per her report.  She has L4-5 trace spondylolisthesis with severe facet arthropathy and moderate severe facet arthropathy L5-S1  Very good relief for only 1 day and then the pain significantly worsened following bilateral SI joint injections.  Persistent pain in the left lumbar spine PSIS left gluteal region posterior lateral left leg and calf feels pulling down her leg. The MRI she reveals moderate foraminal stenosis at L5-S1 on the left this could represent L5 radiculopathy.  Normal  EMG of the left lower extremity.  A month and a half of good improvement with lumbar epidural steroid injection L5-S1 TFESI however pain is returned  and more of her pain is in the back of the leg.   Gabapentin is the most helpful taking 300 mg in the morning and at bedtime occasional only 600 mg at bedtime.  Meloxicam minimally helpful because slight increase in blood pressure has stopped.          2.  Poor sleep related to pain improved with gabapentin 300 mg at bedtime.      Discussion:    1.  I discussed the diagnosis and treatment options.  Discussed options of surgical evaluation versus further injections and medication changes.  She wants to avoid surgery if possible.    2.  Recommend a left L3, 4, 5 medial branch blocks to assess the component of facet mediated pain.  Progress to RFA if indicated.    3.  If medial branch blocks are not helpful would recommend neurosurgical evaluation given mild movement of the L4-5 spondylolisthesis flexion to extension and persistent pain.    4.  Increase gabapentin to 300 mg 3 times daily.  May cause drowsiness.    5.  Can consider duloxetine trial as well.    6.  Follow-up at her earliest convenience for injections.      It was our pleasure caring for your patient today, if there any questions or concerns please do not hesitate to contact us.      Subjective:   Patient ID: Kelly Harris is a 53 year old female.    History of Present Illness: Patient presents for follow-up of lumbar spine pain left extremity pain and gluteal pain.  Left is much greater than right with low back pain.  Pain is significantly worsened since last visit.  Had a month and a half of very good relief after lumbar epidural steroid injection but still felt that it was only 50% improved.  Has been able to decrease medications but the pain worsened again quite severe mostly on the left most of the pain is in the back occasionally down the leg pulling to the calf.  Taking gabapentin 300 mg in the morning 300 mg at bedtime occasionally takes 600 mg at bedtime as this is quite helpful for her.  Meloxicam caused a bump in blood pressure and was stopped  "and was not that helpful for pain.  Pain with any prolonged standing walking or bending.  Pain is a 10/10 at worst 7/10 today 5/10 at best.      Imaging: MRI lumbar spine personally reviewed again today for medical decision-making purposes.  Normal disc height T12-L1 through L3-4 no high-grade central canal or foraminal stenosis at those levels with mild facet arthropathy.  L4-5 moderate facet arthropathy with effusions ligamentum flavum thickening with minimal stenosis.  L5-S1 moderate facet arthropathy moderate bilateral foraminal stenosis.  Slight spondylolisthesis L4-L5    I reviewed the flexion-extension x-rays as well from January showing slight shift of the L4 on 5 spondylolisthesis from flexion to extension.    Review of Systems: Pertinent positives: Poor balance.  Pertinent negatives: No numbness, tingling or weakness.  No bowel or bladder incontinence.  No urinary retention.  No fevers, unintentional weight loss,   headaches, frequent falling, difficulty swallowing, or coordination difficulties.  All others reviewed are negative.      Past Medical History:   Diagnosis Date    Asthma     Sickle cell pain crisis (H) 07/16/2023       The following portions of the patient's history were reviewed and updated as appropriate: allergies, current medications, past family history, past medical history, past social history, past surgical history and problem list.           Objective:   Physical Exam:    BP (!) 141/62 (BP Location: Right arm, Patient Position: Sitting, Cuff Size: Adult Regular)   Pulse 77   Ht 5' 3\" (1.6 m)   Wt 155 lb 9.6 oz (70.6 kg)   LMP 04/12/2017   BMI 27.56 kg/m    Body mass index is 27.56 kg/m .      General: Alert and oriented with normal affect. Attention, knowledge, memory, and language are intact. No acute distress.   Eyes: Sclerae are clear.  Respirations: Unlabored. CV: No lower extremity edema.    Gait:  Nonantalgic  Tenderness left lumbar paraspinals.  Positive pain with lumbar " extension.  Sensation is intact to light touch throughout the  lower extremities.  Reflexes are  2+ patellar and Achilles     Manual muscle testing reveals:  Right /Left out of 5     5/5 hip flexors  5/5 knee flexors  5/5 knee extensors  5/5 ankle plantar flexors  5/5 ankle dorsiflexors  5/5  EHL

## 2024-06-29 ASSESSMENT — ASTHMA QUESTIONNAIRES
QUESTION_4 LAST FOUR WEEKS HOW OFTEN HAVE YOU USED YOUR RESCUE INHALER OR NEBULIZER MEDICATION (SUCH AS ALBUTEROL): TWO OR THREE TIMES PER WEEK
ACT_TOTALSCORE: 12
QUESTION_2 LAST FOUR WEEKS HOW OFTEN HAVE YOU HAD SHORTNESS OF BREATH: MORE THAN ONCE A DAY
QUESTION_5 LAST FOUR WEEKS HOW WOULD YOU RATE YOUR ASTHMA CONTROL: SOMEWHAT CONTROLLED
ACT_TOTALSCORE: 12
QUESTION_3 LAST FOUR WEEKS HOW OFTEN DID YOUR ASTHMA SYMPTOMS (WHEEZING, COUGHING, SHORTNESS OF BREATH, CHEST TIGHTNESS OR PAIN) WAKE YOU UP AT NIGHT OR EARLIER THAN USUAL IN THE MORNING: TWO OR THREE NIGHTS A WEEK
QUESTION_1 LAST FOUR WEEKS HOW MUCH OF THE TIME DID YOUR ASTHMA KEEP YOU FROM GETTING AS MUCH DONE AT WORK, SCHOOL OR AT HOME: SOME OF THE TIME

## 2024-07-02 ENCOUNTER — OFFICE VISIT (OUTPATIENT)
Dept: PULMONOLOGY | Facility: CLINIC | Age: 54
End: 2024-07-02
Attending: INTERNAL MEDICINE
Payer: COMMERCIAL

## 2024-07-02 VITALS
HEART RATE: 88 BPM | BODY MASS INDEX: 27.24 KG/M2 | SYSTOLIC BLOOD PRESSURE: 121 MMHG | DIASTOLIC BLOOD PRESSURE: 80 MMHG | WEIGHT: 153.8 LBS | OXYGEN SATURATION: 98 %

## 2024-07-02 DIAGNOSIS — J45.40 MODERATE PERSISTENT ASTHMA WITHOUT COMPLICATION: ICD-10-CM

## 2024-07-02 DIAGNOSIS — J45.909 MODERATE ASTHMA WITHOUT COMPLICATION, UNSPECIFIED WHETHER PERSISTENT: ICD-10-CM

## 2024-07-02 DIAGNOSIS — J45.51 SEVERE PERSISTENT ASTHMA WITH EXACERBATION (H): ICD-10-CM

## 2024-07-02 DIAGNOSIS — J20.9 ACUTE BRONCHITIS, UNSPECIFIED ORGANISM: ICD-10-CM

## 2024-07-02 DIAGNOSIS — J45.41 MODERATE PERSISTENT ASTHMA WITH ACUTE EXACERBATION: Primary | ICD-10-CM

## 2024-07-02 PROCEDURE — G2211 COMPLEX E/M VISIT ADD ON: HCPCS | Performed by: INTERNAL MEDICINE

## 2024-07-02 PROCEDURE — 99214 OFFICE O/P EST MOD 30 MIN: CPT | Performed by: INTERNAL MEDICINE

## 2024-07-02 RX ORDER — ALBUTEROL SULFATE 90 UG/1
2 AEROSOL, METERED RESPIRATORY (INHALATION) EVERY 6 HOURS PRN
Qty: 18 G | Refills: 12 | Status: SHIPPED | OUTPATIENT
Start: 2024-07-02

## 2024-07-02 RX ORDER — PREDNISONE 10 MG/1
TABLET ORAL
Qty: 30 TABLET | Refills: 0 | Status: SHIPPED | OUTPATIENT
Start: 2024-07-02 | End: 2024-08-08

## 2024-07-02 RX ORDER — AZITHROMYCIN 250 MG/1
TABLET, FILM COATED ORAL
Qty: 6 TABLET | Refills: 0 | Status: SHIPPED | OUTPATIENT
Start: 2024-07-02 | End: 2024-07-07

## 2024-07-02 RX ORDER — TIOTROPIUM BROMIDE INHALATION SPRAY 3.12 UG/1
SPRAY, METERED RESPIRATORY (INHALATION)
Qty: 4 G | Refills: 12 | Status: SHIPPED | OUTPATIENT
Start: 2024-07-02

## 2024-07-02 RX ORDER — FLUTICASONE FUROATE AND VILANTEROL 200; 25 UG/1; UG/1
1 POWDER RESPIRATORY (INHALATION) DAILY
Qty: 60 EACH | Refills: 12 | Status: SHIPPED | OUTPATIENT
Start: 2024-07-02 | End: 2024-07-26

## 2024-07-02 NOTE — PATIENT INSTRUCTIONS
Prednisone taper  Zpack  Continue BREO 200/25 one puff daily , rinse your mouth with water after each use  Continue SPIRIVA one puff daily   Albuterol HFA two puffs every 4 hours as needed  Avoid eating close to bedtime  Raise the head to the bed  Follow up 5 months

## 2024-07-12 NOTE — PROGRESS NOTES
PULMONARY OUTPATIENT FOLLOW UP NOTE        Assessment:     Moderate persistent asthma with acute exacerbation  Hx asthma as a child. No tobacco use.  Normal lung function test.  Systemic steroids and taper  Continue ICS/LABA/LAMA   Acute bronchitis   Zpack  Hx pulmonary embolism 7/2023  S/p six months anticoagulation treatment.   Sickle cell disease      Plan:      Prednisone taper  Zpack  Continue BREO 200/25 one puff daily , rinse your mouth with water after each use  Continue SPIRIVA one puff daily   Albuterol HFA two puffs every 4 hours as needed  Avoid eating close to bedtime  Raise the head to the bed  Follow up 5 months         Jay Rueda  Pulmonary / Critical Care  July 2, 2024        CC:     Chief Complaint   Patient presents with    Follow Up     3 month follow up: Former patient of Dr. Phillips  Moderate asthma without complication, unspecified whether persistent  LABS done 3/28/24         HPI:      Kelly Harris is a 53 year old female who presents for follow up appointment.  Patient has history of Sickle cell disease, bilateral pulmonary embolism 7/2023, asthma.  Patient was previously seen by Dr. Phillips.   Reports exertional dyspnea, weather changes have trigger her asthma symptoms.   Reports chest tightness and productive cough of white secretions.   Wheezes on and off.   Denies fever, chills or night sweats.   Denies postnasal drip,  headaches, or sinus tenderness.   Uses LABA/ICS, LAMA and albuterol INH daily.   Denies chest pain, orthopnea, PND, or swelling of LEs  Feels refresh in AM.  No tobacco use in the past.         Past Medical History :     Past Medical History:   Diagnosis Date    Asthma     Sickle cell pain crisis (H) 07/16/2023        Medications:     Current Outpatient Medications   Medication Sig Dispense Refill    acetaminophen (TYLENOL) 325 MG tablet Take 3 tablets (975 mg) by mouth every 8 hours 120 tablet 1    albuterol (PROAIR HFA/PROVENTIL HFA/VENTOLIN HFA) 108  (90 Base) MCG/ACT inhaler Inhale 2 puffs into the lungs every 6 hours as needed for shortness of breath, wheezing or cough 18 g 12    cetirizine (ZYRTEC) 10 MG tablet TAKE 1 TABLET BY MOUTH EVERY DAY 90 tablet 3    fluticasone (FLONASE) 50 MCG/ACT nasal spray Spray 1 spray into both nostrils daily 32 mL 4    fluticasone-vilanterol (BREO ELLIPTA) 200-25 MCG/ACT inhaler Inhale 1 puff into the lungs daily 60 each 12    FOLIC ACID PO Take 1 tablet by mouth daily      gabapentin (NEURONTIN) 300 MG capsule Take 1 capsule (300 mg) by mouth 3 times daily 90 capsule 3    hydroxyurea (HYDREA) 500 MG capsule Take 2 capsules (1,000 mg) by mouth daily 60 capsule 2    meloxicam (MOBIC) 15 MG tablet TAKE 1 TABLET BY MOUTH EVERY DAY 30 tablet 1    montelukast (SINGULAIR) 10 MG tablet TAKE 1 TABLET BY MOUTH EVERYDAY AT BEDTIME 90 tablet 1    naloxone (NARCAN) 4 MG/0.1ML nasal spray Spray 1 spray (4 mg) into one nostril alternating nostrils as needed for opioid reversal every 2-3 minutes until assistance arrives 0.2 mL 3    predniSONE (DELTASONE) 10 MG tablet 40 mg (4 tabs) for 3 days then 30 mg (3 tabs)  for 3 days then 20 mg (2 tabs) daily for 3 days then 10 mg daily 30 tablet 0    prochlorperazine (COMPAZINE) 10 MG tablet Take 1 tablet (10 mg) by mouth every 6 hours as needed for nausea or vomiting 30 tablet 1    tiotropium (SPIRIVA RESPIMAT) 2.5 MCG/ACT inhaler INHALE 2 PUFFS BY MOUTH INTO THE LUNGS DAILY 4 g 12    traMADol (ULTRAM) 50 MG tablet Take 1 tablet (50 mg) by mouth every 6 hours as needed for severe pain 60 tablet 0     No current facility-administered medications for this visit.     Facility-Administered Medications Ordered in Other Visits   Medication Dose Route Frequency Provider Last Rate Last Admin    iohexol (OMNIPAQUE) 300 mg/mL injection    Once PRN Ananth Jenkins, DO   0.5 mL at 02/13/24 0950    lidocaine (PF) (XYLOCAINE) 1 % injection    Once PRN Ananth Jenkins, DO   2 mL at 02/13/24 0949     methylPREDNISolone (DEPO-Medrol) injection    Once PRN Ananth Jenkins DO   40 mg at 02/13/24 0950    ROPivacaine (NAROPIN) 0.5% injection    Once PRN Ananth Jeknins DO   5 mL at 02/13/24 0950          Social History :     Social History     Socioeconomic History    Marital status: Single     Spouse name: Not on file    Number of children: Not on file    Years of education: Not on file    Highest education level: Not on file   Occupational History    Not on file   Tobacco Use    Smoking status: Never     Passive exposure: Never    Smokeless tobacco: Never   Vaping Use    Vaping status: Never Used   Substance and Sexual Activity    Alcohol use: No    Drug use: No    Sexual activity: Yes     Partners: Male   Other Topics Concern    Not on file   Social History Narrative    Not on file     Social Determinants of Health     Financial Resource Strain: Low Risk  (1/16/2024)    Financial Resource Strain     Within the past 12 months, have you or your family members you live with been unable to get utilities (heat, electricity) when it was really needed?: No   Food Insecurity: Low Risk  (1/16/2024)    Food Insecurity     Within the past 12 months, did you worry that your food would run out before you got money to buy more?: No     Within the past 12 months, did the food you bought just not last and you didn t have money to get more?: No   Transportation Needs: Low Risk  (1/16/2024)    Transportation Needs     Within the past 12 months, has lack of transportation kept you from medical appointments, getting your medicines, non-medical meetings or appointments, work, or from getting things that you need?: No   Physical Activity: Not on file   Stress: Not on file   Social Connections: Not on file   Interpersonal Safety: Low Risk  (4/10/2024)    Interpersonal Safety     Do you feel physically and emotionally safe where you currently live?: Yes     Within the past 12 months, have you been hit, slapped, kicked or  otherwise physically hurt by someone?: No     Within the past 12 months, have you been humiliated or emotionally abused in other ways by your partner or ex-partner?: No   Housing Stability: Low Risk  (1/16/2024)    Housing Stability     Do you have housing? : Yes     Are you worried about losing your housing?: No          Family History :     Family History   Problem Relation Age of Onset    Diabetes Father     Coronary Artery Disease Father     Hypertension Father     Asthma Daughter     Sickle Cell Trait Daughter         S/C    Asthma Daughter     Sickle Cell Trait Daughter         S/C    Heart Disease Daughter         Heart valve regurgitation, abnormal vein with shunt    Hypertension Mother     Lung Cancer Paternal Cousin     Cancer No family hx of        Review of Systems  A 12 point comprehensive review of systems was negative except as noted.        Objective:     /80 (BP Location: Left arm, Patient Position: Sitting, Cuff Size: Adult Regular)   Pulse 88   Wt 69.8 kg (153 lb 12.8 oz)   LMP 04/12/2017   SpO2 98%   BMI 27.24 kg/m        Gen: awake, alert, no distress  HEENT: pink conjunctiva, moist mucosa, Mallampati II/IV  Neck: no thyromegaly, masses or JVD  Lungs: wheezes both HT  CV: regular, no murmurs or gallops appreciated  Abdomen: soft, NT, BS wnl  Ext: no edema  Neuro: CN II-XII intact, non focal      Diagnostic tests:         PFTs: (11/14/2023)     FVC 1.60 L (53%)  FEV1 1.22 L (50%)  FEV1/FVC 76  Significant post bronchodilator response  TLC 4.22 L (88%)  RV 1.84 L (104%)  DLCO 96%  Normal flow volume loop    IMAGES:     CT CHEST PULMONARY EMBOLISM W CONTRAST  LOCATION: [facility]  DATE: 1/16/2024     INDICATION: chest pain, dyspnea; hx of PE  COMPARISON: 10/30/2023  TECHNIQUE: CT chest pulmonary angiogram during arterial phase injection of IV contrast. Multiplanar reformats and MIP reconstructions were performed. Dose reduction techniques were used.   CONTRAST:  isovue 370 90ml     FINDINGS:  ANGIOGRAM CHEST: Pulmonary arteries are normal caliber and negative for pulmonary emboli. Thoracic aorta is negative for dissection. No CT evidence of right heart strain.     LUNGS AND PLEURA: Biapical pleural parenchymal scarring. Dependent groundglass opacities bilaterally, likely atelectasis. No lobar consolidation. Trace left pleural effusion.      MEDIASTINUM/AXILLAE: No significant mediastinal or hilar adenopathy.     CORONARY ARTERY CALCIFICATION: None.     UPPER ABDOMEN: No acute abnormalities     MUSCULOSKELETAL: No acute bony abnormalities.                                                                      IMPRESSION:  1.  No evidence of pulmonary embolus.  2.  Trace left pleural effusion and bibasilar likely atelectasis.

## 2024-07-14 SDOH — HEALTH STABILITY: PHYSICAL HEALTH: ON AVERAGE, HOW MANY MINUTES DO YOU ENGAGE IN EXERCISE AT THIS LEVEL?: 30 MIN

## 2024-07-14 SDOH — HEALTH STABILITY: PHYSICAL HEALTH: ON AVERAGE, HOW MANY DAYS PER WEEK DO YOU ENGAGE IN MODERATE TO STRENUOUS EXERCISE (LIKE A BRISK WALK)?: 3 DAYS

## 2024-07-14 ASSESSMENT — SOCIAL DETERMINANTS OF HEALTH (SDOH): HOW OFTEN DO YOU GET TOGETHER WITH FRIENDS OR RELATIVES?: ONCE A WEEK

## 2024-07-15 ENCOUNTER — OFFICE VISIT (OUTPATIENT)
Dept: FAMILY MEDICINE | Facility: CLINIC | Age: 54
End: 2024-07-15
Payer: COMMERCIAL

## 2024-07-15 VITALS
WEIGHT: 154 LBS | TEMPERATURE: 98.4 F | RESPIRATION RATE: 20 BRPM | HEART RATE: 91 BPM | HEIGHT: 64 IN | SYSTOLIC BLOOD PRESSURE: 154 MMHG | OXYGEN SATURATION: 96 % | DIASTOLIC BLOOD PRESSURE: 95 MMHG | BODY MASS INDEX: 26.29 KG/M2

## 2024-07-15 DIAGNOSIS — D57.20 SICKLE-CELL/HB-C DISEASE WITHOUT CRISIS (H): ICD-10-CM

## 2024-07-15 DIAGNOSIS — I10 BENIGN ESSENTIAL HYPERTENSION: ICD-10-CM

## 2024-07-15 DIAGNOSIS — J30.89 SEASONAL ALLERGIC RHINITIS DUE TO OTHER ALLERGIC TRIGGER: ICD-10-CM

## 2024-07-15 DIAGNOSIS — Z00.00 ENCOUNTER FOR PREVENTATIVE ADULT HEALTH CARE EXAMINATION: Primary | ICD-10-CM

## 2024-07-15 DIAGNOSIS — J45.40 MODERATE PERSISTENT ASTHMA WITHOUT COMPLICATION: ICD-10-CM

## 2024-07-15 PROCEDURE — 90471 IMMUNIZATION ADMIN: CPT | Performed by: FAMILY MEDICINE

## 2024-07-15 PROCEDURE — 99396 PREV VISIT EST AGE 40-64: CPT | Mod: 25 | Performed by: FAMILY MEDICINE

## 2024-07-15 PROCEDURE — 90746 HEPB VACCINE 3 DOSE ADULT IM: CPT | Performed by: FAMILY MEDICINE

## 2024-07-15 RX ORDER — AMLODIPINE BESYLATE 5 MG/1
5 TABLET ORAL DAILY
Qty: 90 TABLET | Refills: 1 | Status: SHIPPED | OUTPATIENT
Start: 2024-07-15

## 2024-07-15 ASSESSMENT — ASTHMA QUESTIONNAIRES: ACT_TOTALSCORE: 11

## 2024-07-15 NOTE — PROGRESS NOTES
Preventive Care Visit  [facility]  Onur Abebe MD, Family Medicine  Jul 15, 2024      Diagnoses and associated orders for this visit:  Encounter for preventative adult health care examination    Benign essential hypertension  -     amLODIPine (NORVASC) 5 MG tablet; Take 1 tablet (5 mg) by mouth daily    Moderate persistent asthma without complication    Seasonal allergic rhinitis due to other allergic trigger    Sickle-cell/Hb-C disease without crisis (H)    Other orders  -     HEPATITIS B, ADULT 20+ (ENGERIX-B/RECOMBIVAX HB)    BP is elevated and has been so over 3 times in last 6 months = dx benign essential HTN.  Already doing healthy behaviors, some work-related stress.  Discussed and ultimately agreed to start a med.  Goal BP <140/90 at least.    ACT score low - feels she  is getting better.    Follows with Hem-Onc.  Repeat PAP next year.  Declines some shots but agrees to HBV.    Andrew Mendoza is a 53 year old, presenting for the following:  Physical (Yearly physical)        7/15/2024     8:09 AM   Additional Questions   Roomed by shoua   Accompanied by alone         7/15/2024    Information    services provided? No           Health Care Directive  Patient does not have a Health Care Directive or Living Will: Not discussed    HPI  Wants physical - no special concerns.    Was started on hydroxyurea for sickle cell and notices side effects of discoloration of finger nails.      Just recovered from asthma attack - accounts for ACT scores - feels like she is improving daily and is OK on daily inhalers.  Symbicort did not work well for her.    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 Good   Feel stress (tense, anxious, or unable to sleep) Only a little      (!) STRESS  CONCERN      7/14/2024   Nutrition   Three or more servings of calcium each day? Yes   Diet: Regular (no restrictions)   How many servings of fruit and vegetables per day? (!) 2-3   How many sweetened beverages each day? 0-1            7/14/2024   Exercise   Days per week of moderate/strenous exercise 3 days   Average minutes spent exercising at this level 30 min            7/14/2024   Social Factors   Frequency of gathering with friends or relatives Once a week   Worry food won't last until get money to buy more No   Food not last or not have enough money for food? No   Do you have housing? (Housing is defined as stable permanent housing and does not include staying ouside in a car, in a tent, in an abandoned building, in an overnight shelter, or couch-surfing.) Yes   Are you worried about losing your housing? No   Lack of transportation? No   Unable to get utilities (heat,electricity)? No            7/14/2024   Fall Risk   Fallen 2 or more times in the past year? No   Trouble with walking or balance? No             7/14/2024   Dental   Dentist two times every year? Yes             Today's PHQ-2 Score:       4/10/2024     8:08 AM   PHQ-2 ( 1999 [employer])   Q1: Little interest or pleasure in doing things 0   Q2: Feeling down, depressed or hopeless 0   PHQ-2 Score 0         7/14/2024   Substance Use   Alcohol more than 3/day or more than 7/wk Not Applicable   Do you use any other substances recreationally? No        Social History     Tobacco Use    Smoking status: Never     Passive exposure: Never    Smokeless tobacco: Never   Vaping Use    Vaping status: Never Used   Substance Use Topics    Alcohol use: No    Drug use: No         4/8/2024   LAST FHS-7 RESULTS   1st degree relative breast or ovarian cancer No   Any relative bilateral breast cancer No   Any male have breast cancer No   Any ONE woman have BOTH breast AND ovarian cancer No   Any woman with breast cancer before 50yrs No   2 or more relatives with breast  AND/OR ovarian cancer No   2 or more relatives with breast AND/OR bowel cancer No      Mammogram Screening - Mammogram every 1-2 years updated in Health Maintenance based on mutual decision making        7/14/2024   STI Screening   New sexual partner(s) since last STI/HIV test? No        History of abnormal Pap smear: YES - age 30- 64 PAP with HPV every 1 year recommended        Latest Ref Rng & Units 6/3/2024     8:20 AM 5/25/2023     8:19 AM   PAP / HPV   PAP  Negative for Intraepithelial Lesion or Malignancy (NILM)  Low-grade squamous intraepithelial lesion (LSIL) encompassing HPV/mild dysplasia/CIN1    HPV 16 DNA Negative Negative  Negative    HPV 18 DNA Negative Negative  Negative    Other HR HPV Negative Negative  Positive      ASCVD Risk   The 10-year ASCVD risk score (Emelia LORENZANA, et al., 2019) is: 6.1%    Values used to calculate the score:      Age: 53 years      Sex: Female      Is Non- : Yes      Diabetic: No      Tobacco smoker: No      Systolic Blood Pressure: 154 mmHg      Is BP treated: Yes      HDL Cholesterol: 67 mg/dL      Total Cholesterol: 168 mg/dL        6/3/2024     8:53 AM 6/29/2024    10:55 AM 7/15/2024     9:08 AM   ACT Total Scores   ACT TOTAL SCORE (Goal Greater than or Equal to 20) 15 12 11   In the past 12 months, how many times did you visit the emergency room for your asthma without being admitted to the hospital? 0 0 0   In the past 12 months, how many times were you hospitalized overnight because of your asthma? 0 0 0       Reviewed and updated as needed this visit by Provider   Tobacco  Allergies  Meds  Problems  Med Hx  Surg Hx  Fam Hx              Review of Systems  Constitutional, neuro, ENT, endocrine, pulmonary, cardiac, gastrointestinal, genitourinary, musculoskeletal, integument and psychiatric systems are negative, except as otherwise noted.     Objective    Exam  BP (!) 154/95   Pulse 91   Temp 98.4  F (36.9  C) (Oral)   Resp 20    "Ht 1.613 m (5' 3.5\")   Wt 69.9 kg (154 lb)   LMP 04/12/2017   SpO2 96%   BMI 26.85 kg/m     Estimated body mass index is 26.85 kg/m  as calculated from the following:    Height as of this encounter: 1.613 m (5' 3.5\").    Weight as of this encounter: 69.9 kg (154 lb).    Physical Exam  GENERAL: alert and no distress  EYES: Eyes grossly normal to inspection, PERRL and conjunctivae and sclerae normal  HENT: ear canals and TM's normal, nose and mouth without ulcers or lesions  NECK: no adenopathy, no asymmetry, masses, or scars  RESP: lungs clear to auscultation - no rales, rhonchi or wheezes  BREAST: normal without masses, tenderness or nipple discharge and no palpable axillary masses or adenopathy  CV: regular rate and rhythm, normal S1 S2, no S3 or S4, no murmur, click or rub, no peripheral edema  ABDOMEN: soft, nontender, no hepatosplenomegaly, no masses and bowel sounds normal  MS: no gross musculoskeletal defects noted, no edema  SKIN: no suspicious lesions or rashes  NEURO: Normal strength and tone, mentation intact and speech normal  PSYCH: mentation appears normal, affect normal/bright        Signed Electronically by: Onur Abebe MD    "

## 2024-07-15 NOTE — PROGRESS NOTES
Prior to immunization administration, verified patients identity using patient s name and date of birth. Please see Immunization Activity for additional information.     Screening Questionnaire for Adult Immunization    Are you sick today?   No   Do you have allergies to medications, food, a vaccine component or latex?   Yes   Have you ever had a serious reaction after receiving a vaccination?   No   Do you have a long-term health problem with heart, lung, kidney, or metabolic disease (e.g., diabetes), asthma, a blood disorder, no spleen, complement component deficiency, a cochlear implant, or a spinal fluid leak?  Are you on long-term aspirin therapy?   Yes   Do you have cancer, leukemia, HIV/AIDS, or any other immune system problem?   No   Do you have a parent, brother, or sister with an immune system problem?   No   In the past 3 months, have you taken medications that affect  your immune system, such as prednisone, other steroids, or anticancer drugs; drugs for the treatment of rheumatoid arthritis, Crohn s disease, or psoriasis; or have you had radiation treatments?   Yes   Have you had a seizure, or a brain or other nervous system problem?   No   During the past year, have you received a transfusion of blood or blood    products, or been given immune (gamma) globulin or antiviral drug?   Yes   For women: Are you pregnant or is there a chance you could become       pregnant during the next month?   No   Have you received any vaccinations in the past 4 weeks?   No     Immunization questionnaire was positive for at least one answer.  Notified Dr. Abebe.      Patient instructed to remain in clinic for 15 minutes afterwards, and to report any adverse reactions.     Screening performed by Terrence Russ CMA on 7/15/2024 at 9:04 AM.

## 2024-07-16 DIAGNOSIS — D57.20 SICKLE-CELL/HB-C DISEASE WITHOUT CRISIS (H): ICD-10-CM

## 2024-07-16 RX ORDER — HYDROXYUREA 500 MG/1
15 CAPSULE ORAL DAILY
Qty: 60 CAPSULE | Refills: 2 | Status: SHIPPED | OUTPATIENT
Start: 2024-07-16 | End: 2024-09-11

## 2024-07-16 RX ORDER — HYDROXYUREA 500 MG/1
1000 CAPSULE ORAL DAILY
Refills: 1 | OUTPATIENT
Start: 2024-07-16

## 2024-07-16 NOTE — TELEPHONE ENCOUNTER
Patient called requesting a refill of hydrea. Last refilled: 4/18/24, #60, 2 refills. Will send to Dr. Palafox to review and refill.     Dae Rollins RN on 7/16/2024 at 9:03 AM

## 2024-07-17 ENCOUNTER — LAB (OUTPATIENT)
Dept: INFUSION THERAPY | Facility: HOSPITAL | Age: 54
End: 2024-07-17
Attending: INTERNAL MEDICINE
Payer: COMMERCIAL

## 2024-07-17 DIAGNOSIS — D57.20 SICKLE-CELL/HB-C DISEASE WITHOUT CRISIS (H): ICD-10-CM

## 2024-07-17 LAB
BASOPHILS # BLD MANUAL: 0.1 10E3/UL (ref 0–0.2)
BASOPHILS NFR BLD MANUAL: 1 %
EOSINOPHIL # BLD MANUAL: 0.1 10E3/UL (ref 0–0.7)
EOSINOPHIL NFR BLD MANUAL: 1 %
ERYTHROCYTE [DISTWIDTH] IN BLOOD BY AUTOMATED COUNT: 16.8 % (ref 10–15)
HCT VFR BLD AUTO: 31.5 % (ref 35–47)
HGB BLD-MCNC: 11.6 G/DL (ref 11.7–15.7)
LYMPHOCYTES # BLD MANUAL: 2.9 10E3/UL (ref 0.8–5.3)
LYMPHOCYTES NFR BLD MANUAL: 43 %
MCH RBC QN AUTO: 33.7 PG (ref 26.5–33)
MCHC RBC AUTO-ENTMCNC: 36.8 G/DL (ref 31.5–36.5)
MCV RBC AUTO: 92 FL (ref 78–100)
MONOCYTES # BLD MANUAL: 0.3 10E3/UL (ref 0–1.3)
MONOCYTES NFR BLD MANUAL: 5 %
NEUTROPHILS # BLD MANUAL: 3.4 10E3/UL (ref 1.6–8.3)
NEUTROPHILS NFR BLD MANUAL: 50 %
NRBC # BLD AUTO: 1.9 10E3/UL
NRBC # BLD AUTO: 2.4 10E3/UL
NRBC BLD AUTO-RTO: 28 /100
NRBC BLD MANUAL-RTO: 35 %
PLAT MORPH BLD: ABNORMAL
PLATELET # BLD AUTO: 266 10E3/UL (ref 150–450)
POLYCHROMASIA BLD QL SMEAR: SLIGHT
RBC # BLD AUTO: 3.44 10E6/UL (ref 3.8–5.2)
RBC MORPH BLD: ABNORMAL
TARGETS BLD QL SMEAR: ABNORMAL
WBC # BLD AUTO: 6.8 10E3/UL (ref 4–11)

## 2024-07-17 PROCEDURE — 85007 BL SMEAR W/DIFF WBC COUNT: CPT

## 2024-07-17 PROCEDURE — 85027 COMPLETE CBC AUTOMATED: CPT

## 2024-07-17 PROCEDURE — 36415 COLL VENOUS BLD VENIPUNCTURE: CPT

## 2024-07-22 ENCOUNTER — OFFICE VISIT (OUTPATIENT)
Dept: OTOLARYNGOLOGY | Facility: CLINIC | Age: 54
End: 2024-07-22
Payer: COMMERCIAL

## 2024-07-22 ENCOUNTER — PRE VISIT (OUTPATIENT)
Dept: OTOLARYNGOLOGY | Facility: CLINIC | Age: 54
End: 2024-07-22

## 2024-07-22 VITALS
BODY MASS INDEX: 26.46 KG/M2 | HEIGHT: 64 IN | OXYGEN SATURATION: 98 % | HEART RATE: 103 BPM | DIASTOLIC BLOOD PRESSURE: 83 MMHG | SYSTOLIC BLOOD PRESSURE: 136 MMHG | WEIGHT: 155 LBS

## 2024-07-22 DIAGNOSIS — J45.909 MODERATE ASTHMA WITHOUT COMPLICATION, UNSPECIFIED WHETHER PERSISTENT: ICD-10-CM

## 2024-07-22 DIAGNOSIS — D57.20 SICKLE-CELL/HB-C DISEASE WITHOUT CRISIS (H): ICD-10-CM

## 2024-07-22 DIAGNOSIS — J38.7 LARYNGEAL HYPERFUNCTION: ICD-10-CM

## 2024-07-22 DIAGNOSIS — R49.0 DYSPHONIA: Primary | ICD-10-CM

## 2024-07-22 PROCEDURE — 99243 OFF/OP CNSLTJ NEW/EST LOW 30: CPT | Mod: 25 | Performed by: OTOLARYNGOLOGY

## 2024-07-22 PROCEDURE — 92524 BEHAVRAL QUALIT ANALYS VOICE: CPT | Mod: GN | Performed by: SPEECH-LANGUAGE PATHOLOGIST

## 2024-07-22 PROCEDURE — 31579 LARYNGOSCOPY TELESCOPIC: CPT | Performed by: OTOLARYNGOLOGY

## 2024-07-22 NOTE — PROGRESS NOTES
[facility] [place] [facility]  Clinical Voice and Upper Airway Evaluation Report    Patient's Name: Kelly Harris  Date of Evaluation: 7/22/2024  Providing SLP: Emily Loaiza MS CCC-SLP  Seen in Conjunction With: Lyubov Ruelas MD MPH  Referring Provider and Facility: Najma Phillips MD - Pulmonology  Insurance Coverage: [employer]  Chief Complaint: Dysphonia  Evaluation Location: [facility]  Time of Evaluation: 20 minutes      Patient History: Kelly is a 53-year-old female who presents today for evaluation of dysphonia.     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    Dyspnea:   Respiratory conditions: asthma  Inhaled treatments:   Singulair  Spiriva daily  Zyrtec  Breo helpful so far  Albuterol  Most recent PFTs: 7/25/23 FEV1 and FVC decreased with bronchodilator response    Dysphagia: denies - prefers liquids over solids, as they are less effort  GERD symptoms: rare - no medications    Cough / Throat Clearing:   Concerns  Frequent coughing/throat clearing  Some coughing fits  Triggers:   Carpeted environments  Strong odors  Outdoors    Throat Pain: denies    Medical / Surgical History:   Sickle cell  Asthma      Quality of Life Questionnaires:    Patient Supplied Answers To Last 2 VHI Questionnaires      7/22/2024    10:38 AM 8/6/2024     8:14 AM   Voice Handicap Index (VHI-10)   My voice makes it  "difficult for people to hear me 2 2   People have difficulty understanding me in a noisy room 2 2   My voice difficulties restrict my personal and social life.  0 2   I feel left out of conversations because of my voice 0 0   My voice problem causes me to lose income 0 1   I feel as though I have to strain to produce voice 2 2   The clarity of my voice is unpredictable 2 2   My voice problem upsets me 0 0   My voice makes me feel handicapped 2 2   People ask, \"What's wrong with your voice?\" 2 2   VHI-10 12 15     Patient Supplied Answers To Last 2 CSI Questionnaires      8/6/2024     8:14 AM   Cough Severity Index (CSI)   My cough is worse when I lie down 2   My coughing problem causes me to restrict my personal and social life 2   I tend to avoid places because of my cough problem 2   I feel embarrassed because of my coughing problem 0   People ask, ''What's wrong?'' because I cough a lot 2   I run out of air when I cough 2   My coughing problem affects my voice 3   My coughing problem limits my physical activity 2   My coughing problem upsets me 0   People ask me if I am sick because I cough a lot 2   CSI Score 17       Perceptual Analysis (78914): Evaluation of Voice / Speech / Non-Communicative Laryngeal Behaviors    The GRBAS is a perceptual rating of voice change. 0 indicates no impairment, 3 indicates a severe impairment. \"C\" and \"I\" may be used to signify if these feature was observed consistently or intermittently respectively. This is a rating based on clinical judgement of disordered voice quality.  G ( 2-3 - I ) General Dysphonia     R ( 1 - I ) Roughness     B ( 2-3 - I ) Breathiness     A ( 2-3 - I ) Asthenia     S ( 2-3 - I ) Strain  Additional information: moments of normal voicing during the exam    Stimuli for differential diagnosis of spasmodic dysphonia and vocal tremor: both voiced-loaded stimuli (e.g. \"We were away a year ago,\" counting from 80-90) and voiceless-loaded stimuli (e.g. \"How hard " "did he hit him?,\" counting from 50-60) resulted in similar strain and breathiness during moments where her balance of airflow and phonation was particularly discoordinated    *Validity of this measure may be slightly reduced when performed via acoustic signal from virtual visit*    Laryngeal palpation:   Thyrohyoid space: mild tenderness with narrowing    Additional observations:   Cough/ Throat Clear: not observed today    Breathing patterns: appropriate at rest   Overt tension: \" \"   Habitual pitch: WNL compared to age- and gender-matched peers   Pitch range: \" \"   Resonance: back-focused  Loudness: appropriate but generally underengaged      Laryngoscopy with stroboscopy:    Provider performing exam: Lyubov Ruelas MD MPH     Informed consent: Informed verbal consent was obtained, which includes potential side-effects, risks, and benefits of the procedure.     Anesthetic: Topical anesthesia with 3% lidocaine and 0.25% phenylephrine was applied the nostrils bilaterally.     Scope type: A distal chip flexible laryngoscope was passed through the nare with halogen and xenon light source(s).    The laryngeal and pharyngeal structures were evaluated for gross appearance, mobility, function, and focal lesions / abnormalities of the associated mucosa.  Velar Function: not assessed today  General Appearance: post-cricoid edema and interarytenoid pachydermia  Secretions: WNL   Subglottis Appearance: visible portion is patent   R Arytenoid Abduction / Adduction: symmetrical and timely ab/adduction with appropriate range of motion   L Arytenoid Abduction / Adduction: \" \"   Mediolateral Compression: WNL and increased to mild with increased perceptual vocal strain  Anteroposterior Compression: \" \"   Vocal Fold Elongation: appropriate  Left (L) Vocal Fold Edge and Mucosa: white with smooth and straight appearance   Right (R) Vocal Fold Edge and Mucosa: \" \"   Narrow Band Imaging: demonstrates similar findings as halogen light " "  Glottic Closure: complete   Phase Closure: predominantly open phase   Vertical Level of Approximation: true vocal fold appear to adduct at the same height   L Amplitude: reduced due to underengaged voicing  R Amplitude: \" \"  L Mucosal Wave: \" \"  R Mucosal Wave: \" \"  Phase Symmetry: symmetrical  Periodicity: periodic with moments of aperiodicity  Inhalation Phonation: similar findings to exhalation phonation   Therapeutic Probes:   Increased volume resulted in significant mediolateral and moderate anteroposterior compression compared to other voice tasks. Voice then became breathy and high pitch after this with high effort  Flow/high airflow stimuli resulted in continued mediolateral compression, similar to increased airflow  Humming resulted in \" \" then improved with more reflexive task; however, transitioning into a vowel lead to reverting back to increased effort and hyperfunction  Throat clear with intermittently without voicing      Impressions and Plan:     Kelly is a 53-year-old female presenting today with dysphonia R49.0 secondary to irritable larynx syndrome J38.7 and laryngeal hyperfunction \" \". Perceptually, her voice could present normally at times with times where significant breathiness/roughness and strain were noted. Laryngoscopy today demonstrated 4-way supraglottic compression with decreased vocal fold entrainment when inappropriate voice quality was observed due to inappropriate balance and coordination of respiration and phonation. Therefore, a course of voice therapy has been recommended, as this was previously successful for her in 2017. Kelly is in agreement with this plan of care.     Additionally, this patient appears to be a candidate for participation in our current research studies within the department. A warm introduction was not provided today.      Goals:    Coordinate phonatory and respiratory subsystems, demonstrating adequate independence for activities of daily communication "   Decrease extrinsic laryngeal muscle activity during communication events   Improve voice quality in all activities of daily living using, as measured by a minimum of a 50% point reduction on the Voice Handicap Index-10 or Singing VHI  Demonstrate appropriate vocal hygiene including, but not limited to, adequate hydration, avoiding vocal abuse, integrating behavioral and dietary changes for GERD into their daily life?.   Incorporate behaviors to reduce irritation and promote laryngeal healing and prevent recurrence.?   Implement behavioral strategies to reduce laryngopharyngeal reflux. ?   Independently maintain a forward focus voice placement 90% of the time during conversational speech.  Independently perform the Vocal Function Exercises, rebalancing respiration, phonation, and resonance 90% of the time  Perform High Level Phonation and Pitch Range Navigation Exercises independently 90% of the time  Patient will express satisfaction with their voice quality and function and their ability to participate in social, personal, and work/school functions without limitation      Billed Procedures:    Perceptual voice assessment 40939  Assessment and treatment time: 20 minutes  Chart review, interpretation of testing, documentation preparation, etc: 24 minutes      Thank you for allowing me to participate in this patient's care,    Emily Loaiza MS CCC-SLP  Speech-Language Pathologist  [facility]  Department of Otolaryngology - Head and Neck Surgery  [facility]  [email or web address]  Direct: [phone]  [phone]    *This note may have been completed using [id number] dictation software, so errors may exist. Please contact me for clarification if needed*

## 2024-07-22 NOTE — PROGRESS NOTES
Dear Dr. Phillips:    I had the pleasure of meeting Kelly Harris in consultation at the [facility] of the [facility] at your request, for evaluation of dysphonia. The note from our visit follows. Speech recognition software may have been used in the documentation below; input is reviewed before signature to the best of my ability. I appreciate the opportunity to participate in the care of this pleasant patient.    Please feel free to contact me with any questions.    Sincerely yours,    Lyubov Ruelas M.D., M.P.H.  , Laryngology  Director, [facility]  Otolaryngology- Head & Neck Surgery  [phone]          =====  HISTORY OF PRESENT ILLNESS:   Kelly Harris is a pleasant 53 year old female with PMH including sickle cell disease and asthma who presents with a several year history of throat concerns. Symptoms include total loss of voice, frequent throat-clearing, shortness of breath, and poor voice quality.    Voice  -Voice problem began several years ago  -Did voice therapy at [facility] with MARISA Avilez, MS, CCC-SLP in 2017  -After that, her voice was better for a while    -Voice fluctuates a lot; unpredictable  -Voice changes do not seem correlated to sickle flares although she notes she had multiple hospitalizations last year due to sickle cell crises  -Last year had complete voice loss for one month; did not recall/apply the therapy techniques at that time  -Allergies seem to contribute  -Extraordinary vocal demand, including working and teaching  -Works as supervisor in Nutrition, 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   -Prefers fluids over solids because of less swallow effort.    Cough/Throat-clearing  -Has  frequent throat-clearing/cough, especially depending on environment  -Carpeted surfaces, strong smells, outdoors seem like triggers   -Sometimes has coughing fits    Breathing  -Also has some trouble with breathing  -Sees Dr Phillips in Pulmonary   -Breo Ellipta started recently, seems to be helping  -Also doing albuterol and Spiriva    Throat discomfort  -has discomfort with voice changes    Reflux-type symptoms  She experiences heartburn/indigestion rarely. She is not taking reflux medications.      Prior EPIC/ outside records were reviewed for this visit, including:  Najma Phillips MD 3/28/24  Onur Abebe MD, Jul 15, 2024    MEDICATIONS:     Current Outpatient Medications   Medication Sig Dispense Refill    acetaminophen (TYLENOL) 325 MG tablet Take 3 tablets (975 mg) by mouth every 8 hours 120 tablet 1    albuterol (PROAIR HFA/PROVENTIL HFA/VENTOLIN HFA) 108 (90 Base) MCG/ACT inhaler Inhale 2 puffs into the lungs every 6 hours as needed for shortness of breath, wheezing or cough 18 g 12    amLODIPine (NORVASC) 5 MG tablet Take 1 tablet (5 mg) by mouth daily 90 tablet 1    cetirizine (ZYRTEC) 10 MG tablet TAKE 1 TABLET BY MOUTH EVERY DAY 90 tablet 3    fluticasone (FLONASE) 50 MCG/ACT nasal spray Spray 1 spray into both nostrils daily 32 mL 4    fluticasone-vilanterol (BREO ELLIPTA) 200-25 MCG/ACT inhaler Inhale 1 puff into the lungs daily 60 each 12    FOLIC ACID PO Take 1 tablet by mouth daily      gabapentin (NEURONTIN) 300 MG capsule Take 1 capsule (300 mg) by mouth 3 times daily 90 capsule 3    hydroxyurea (HYDREA) 500 MG capsule Take 2 capsules (1,000 mg) by mouth daily 60 capsule 2    meloxicam (MOBIC) 15 MG tablet TAKE 1 TABLET BY MOUTH EVERY DAY 30 tablet 1    montelukast (SINGULAIR) 10 MG tablet TAKE 1 TABLET BY MOUTH EVERYDAY AT BEDTIME 90 tablet 1    naloxone (NARCAN) 4 MG/0.1ML nasal spray Spray 1 spray (4 mg) into one nostril alternating nostrils as needed for opioid reversal every 2-3 minutes  until assistance arrives 0.2 mL 3    prochlorperazine (COMPAZINE) 10 MG tablet Take 1 tablet (10 mg) by mouth every 6 hours as needed for nausea or vomiting 30 tablet 1    tiotropium (SPIRIVA RESPIMAT) 2.5 MCG/ACT inhaler INHALE 2 PUFFS BY MOUTH INTO THE LUNGS DAILY 4 g 12    traMADol (ULTRAM) 50 MG tablet Take 1 tablet (50 mg) by mouth every 6 hours as needed for severe pain 60 tablet 0    predniSONE (DELTASONE) 10 MG tablet 40 mg (4 tabs) for 3 days then 30 mg (3 tabs)  for 3 days then 20 mg (2 tabs) daily for 3 days then 10 mg daily (Patient not taking: Reported on 7/15/2024) 30 tablet 0       ALLERGIES:    Allergies   Allergen Reactions    Oxycodone Itching    Iodine Rash       PAST MEDICAL HISTORY:   Past Medical History:   Diagnosis Date    Asthma     Benign essential hypertension 7/15/2024    Sickle cell pain crisis (H) 07/16/2023        PAST SURGICAL HISTORY:   Past Surgical History:   Procedure Laterality Date    MOUTH SURGERY  09/07/2019    Removed benign mouth tumor       HABITS/SOCIAL HISTORY:    Social History     Tobacco Use    Smoking status: Never     Passive exposure: Never    Smokeless tobacco: Never   Substance Use Topics    Alcohol use: No         FAMILY HISTORY:    Family History   Problem Relation Age of Onset    Diabetes Father     Coronary Artery Disease Father     Hypertension Father     Asthma Daughter     Sickle Cell Trait Daughter         S/C    Asthma Daughter     Sickle Cell Trait Daughter         S/C    Heart Disease Daughter         Heart valve regurgitation, abnormal vein with shunt    Hypertension Mother     Lung Cancer Paternal Cousin     Cancer No family hx of        REVIEW OF SYSTEMS:  Comprehensive 11 point review of systems was reviewed. Positives are as noted below; pertinent findings are as noted in the HPI.     Patient Supplied Answers to Review of Systems      7/22/2024    10:27 AM   [facility] ROS   Constitutional Weight gain   Neurology Headache   Cardiopulmonary Breathing  problems    Wheezing   Musculoskeletal Back pain   Allergy/Immunology Allergies or hay fever       PHYSICAL EXAMINATION:  General: The patient was alert and conversant, and in no acute distress.    Eyes: PERRL, conjunctiva and lids normal, sclera nonicteric.  Nose: Anterior rhinoscopy: no gross abnormalities. no  bleeding; no  mucopurulence; septum grossly normal, mild mucoid drainage and/or crusting.  Oral cavity/oropharynx: No masses or lesions. Dentition in good condition. Floor of mouth and oral tongue soft to palpation. Tongue mobility and palate elevation intact and symmetric.  Ears: Normal auricles, external auditory canals bilaterally. Visualized portions of tympanic membranes normal bilaterally.   Neck: No palpable cervical lymphadenopathy. There was mild tenderness to palpation of the thyrohyoid space, which was narrow. No obvious thyroid abnormality. Landmarks palpable.  Resp: Breathing comfortably, no stridor or stertor.  Neuro: Symmetric facial function. Other cranial nerves as documented above.  Psych: Normal affect, pleasant and cooperative.  Voice/speech: Highly variable voice quality ranging from normal to severe dysphonia characterized by breathiness, roughness, and strain.  Extremities: No cyanosis, clubbing, or edema of the upper extremities.       PROCEDURE:   Flexible fiberoptic laryngoscopy and laryngovideostroboscopy  Indications: This procedure was warranted to evaluate the patient's laryngeal anatomy and function. Risks, benefits, and alternatives were discussed.  Description: After written informed consent was obtained, a time-out was performed to confirm patient identity, procedure, and procedure site. Topical 2% lidocaine and oxymetazoline were applied to the nasal cavities. I performed the endoscopy and no complications were apparent. Continuous and stroboscopic light were utilized to assess routine phonation and variable frequency phonation.  Performed by: Lyubov Ruelas MD  MPH  SLP: Emily Loaiza MS CCC-SLP   Findings: Normal nasopharynx. Normal base of tongue, valleculae, and epiglottis. Vocal fold mobility: right: normal; left: normal. Medial edges of the true vocal folds: smooth and straight. No focal mucosal lesions were observed on the true vocal folds. Glissade produced appropriate elongation. There was moderate supraglottic recruitment with connected speech. Mucosa of false vocal folds, aryepiglottic folds, piriform sinuses, and posterior glottis unremarkable. Airway was patent. Response to the therapy probes was good.     The addition of stroboscopy allowed evaluation of the mucosal wave.   Amplitude: right: normal; left: normal. Symmetry: intermittent symmetry. Closure pattern: complete. Closure plane: at glottic level. Phase distribution: normal.                              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    Plan:  1) I recommended speech therapy as initial primary management, with goals including reducing laryngeal irritability, improving laryngeal efficiency, improving respiratory/phonatory coordination, and improving phonatory mechanics.    2) RTC to me as needed    I appreciate the opportunity to participate in the care of this patient.     I spent a total of 29 minutes on 7/22/2024 in chart review, review of tests, patient visit, documentation, care coordination, and/or discussion with other providers about the issues documented above, separate from any documented procedure(s).    Research Note: patient is pen to being contacted for applicable research through this clinic.

## 2024-07-22 NOTE — LETTER
7/22/2024       RE: Kelly Harris  [address]  [place] [address]     Dear Colleague,    Thank you for referring your patient, Kelly Harris, to the [facility] [place] at [facility]. Please see a copy of my visit note below.    [facility]  Clinical Voice and Upper Airway Evaluation Report    Patient's Name: Kelly Harris  Date of Evaluation: 7/22/2024  Providing SLP: Emily Loaiza MS CCC-SLP  Seen in Conjunction With: Lyubov Ruelas MD MPH  Referring Provider and Facility: Najma Phillips MD - Pulmonology  Insurance Coverage: [employer]  Chief Complaint: Dysphonia  Evaluation Location: [facility]  Time of Evaluation: 20 minutes      Patient History: Kelly is a 53-year-old female who presents today for evaluation of dysphonia.     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    Dyspnea:   Respiratory conditions: asthma  Inhaled treatments:   Singulair  Spiriva daily  Zyrtec  Breo helpful so far  Albuterol  Most recent PFTs: 7/25/23 FEV1 and FVC decreased with bronchodilator response    Dysphagia: denies - prefers liquids over solids, as they are less effort  GERD symptoms: rare - no medications    Cough / Throat Clearing:   Concerns  Frequent coughing/throat clearing  Some coughing fits  Triggers:  "  Carpeted environments  Strong odors  Outdoors    Throat Pain: denies    Medical / Surgical History:   Sickle cell  Asthma      Quality of Life Questionnaires:    Patient Supplied Answers To Last 2 VHI Questionnaires      7/22/2024    10:38 AM 8/6/2024     8:14 AM   Voice Handicap Index (VHI-10)   My voice makes it difficult for people to hear me 2 2   People have difficulty understanding me in a noisy room 2 2   My voice difficulties restrict my personal and social life.  0 2   I feel left out of conversations because of my voice 0 0   My voice problem causes me to lose income 0 1   I feel as though I have to strain to produce voice 2 2   The clarity of my voice is unpredictable 2 2   My voice problem upsets me 0 0   My voice makes me feel handicapped 2 2   People ask, \"What's wrong with your voice?\" 2 2   VHI-10 12 15     Patient Supplied Answers To Last 2 CSI Questionnaires      8/6/2024     8:14 AM   Cough Severity Index (CSI)   My cough is worse when I lie down 2   My coughing problem causes me to restrict my personal and social life 2   I tend to avoid places because of my cough problem 2   I feel embarrassed because of my coughing problem 0   People ask, ''What's wrong?'' because I cough a lot 2   I run out of air when I cough 2   My coughing problem affects my voice 3   My coughing problem limits my physical activity 2   My coughing problem upsets me 0   People ask me if I am sick because I cough a lot 2   CSI Score 17       Perceptual Analysis (63992): Evaluation of Voice / Speech / Non-Communicative Laryngeal Behaviors    The GRBAS is a perceptual rating of voice change. 0 indicates no impairment, 3 indicates a severe impairment. \"C\" and \"I\" may be used to signify if these feature was observed consistently or intermittently respectively. This is a rating based on clinical judgement of disordered voice quality.  G ( 2-3 - I ) General Dysphonia     R ( 1 - I ) Roughness     B ( 2-3 - I ) Breathiness     A ( " "2-3 - I ) Asthenia     S ( 2-3 - I ) Strain  Additional information: moments of normal voicing during the exam    Stimuli for differential diagnosis of spasmodic dysphonia and vocal tremor: both voiced-loaded stimuli (e.g. \"We were away a year ago,\" counting from 80-90) and voiceless-loaded stimuli (e.g. \"How hard did he hit him?,\" counting from 50-60) resulted in similar strain and breathiness during moments where her balance of airflow and phonation was particularly discoordinated    *Validity of this measure may be slightly reduced when performed via acoustic signal from virtual visit*    Laryngeal palpation:   Thyrohyoid space: mild tenderness with narrowing    Additional observations:   Cough/ Throat Clear: not observed today    Breathing patterns: appropriate at rest   Overt tension: \" \"   Habitual pitch: WNL compared to age- and gender-matched peers   Pitch range: \" \"   Resonance: back-focused  Loudness: appropriate but generally underengaged      Laryngoscopy with stroboscopy:    Provider performing exam: Lyubov Ruelas MD MPH     Informed consent: Informed verbal consent was obtained, which includes potential side-effects, risks, and benefits of the procedure.     Anesthetic: Topical anesthesia with 3% lidocaine and 0.25% phenylephrine was applied the nostrils bilaterally.     Scope type: A distal chip flexible laryngoscope was passed through the nare with halogen and xenon light source(s).    The laryngeal and pharyngeal structures were evaluated for gross appearance, mobility, function, and focal lesions / abnormalities of the associated mucosa.  Velar Function: not assessed today  General Appearance: post-cricoid edema and interarytenoid pachydermia  Secretions: WNL   Subglottis Appearance: visible portion is patent   R Arytenoid Abduction / Adduction: symmetrical and timely ab/adduction with appropriate range of motion   L Arytenoid Abduction / Adduction: \" \"   Mediolateral Compression: WNL and " "increased to mild with increased perceptual vocal strain  Anteroposterior Compression: \" \"   Vocal Fold Elongation: appropriate  Left (L) Vocal Fold Edge and Mucosa: white with smooth and straight appearance   Right (R) Vocal Fold Edge and Mucosa: \" \"   Narrow Band Imaging: demonstrates similar findings as halogen light   Glottic Closure: complete   Phase Closure: predominantly open phase   Vertical Level of Approximation: true vocal fold appear to adduct at the same height   L Amplitude: reduced due to underengaged voicing  R Amplitude: \" \"  L Mucosal Wave: \" \"  R Mucosal Wave: \" \"  Phase Symmetry: symmetrical  Periodicity: periodic with moments of aperiodicity  Inhalation Phonation: similar findings to exhalation phonation   Therapeutic Probes:   Increased volume resulted in significant mediolateral and moderate anteroposterior compression compared to other voice tasks. Voice then became breathy and high pitch after this with high effort  Flow/high airflow stimuli resulted in continued mediolateral compression, similar to increased airflow  Humming resulted in \" \" then improved with more reflexive task; however, transitioning into a vowel lead to reverting back to increased effort and hyperfunction  Throat clear with intermittently without voicing      Impressions and Plan:     Kelly is a 53-year-old female presenting today with dysphonia R49.0 secondary to irritable larynx syndrome J38.7 and laryngeal hyperfunction \" \". Perceptually, her voice could present normally at times with times where significant breathiness/roughness and strain were noted. Laryngoscopy today demonstrated 4-way supraglottic compression with decreased vocal fold entrainment when inappropriate voice quality was observed due to inappropriate balance and coordination of respiration and phonation. Therefore, a course of voice therapy has been recommended, as this was previously successful for her in 2017. Kelly is in agreement with this " plan of care.     Additionally, this patient appears to be a candidate for participation in our current research studies within the department. A warm introduction was not provided today.      Goals:    Coordinate phonatory and respiratory subsystems, demonstrating adequate independence for activities of daily communication   Decrease extrinsic laryngeal muscle activity during communication events   Improve voice quality in all activities of daily living using, as measured by a minimum of a 50% point reduction on the Voice Handicap Index-10 or Singing VHI  Demonstrate appropriate vocal hygiene including, but not limited to, adequate hydration, avoiding vocal abuse, integrating behavioral and dietary changes for GERD into their daily life?.   Incorporate behaviors to reduce irritation and promote laryngeal healing and prevent recurrence.?   Implement behavioral strategies to reduce laryngopharyngeal reflux. ?   Independently maintain a forward focus voice placement 90% of the time during conversational speech.  Independently perform the Vocal Function Exercises, rebalancing respiration, phonation, and resonance 90% of the time  Perform High Level Phonation and Pitch Range Navigation Exercises independently 90% of the time  Patient will express satisfaction with their voice quality and function and their ability to participate in social, personal, and work/school functions without limitation      Billed Procedures:    Perceptual voice assessment 84758  Assessment and treatment time: 20 minutes  Chart review, interpretation of testing, documentation preparation, etc: 24 minutes      Thank you for allowing me to participate in this patient's care,    Emily Loaiza MS CCC-SLP  Speech-Language Pathologist  [facility]  Department of Otolaryngology - Head and Neck Surgery  [facility]  [email or web address]  Direct: [phone]  [phone]    *This note may have  been completed using [id number] dictation software, so errors may exist. Please contact me for clarification if needed*      Again, thank you for allowing me to participate in the care of your patient.      Sincerely,    Emily Loaiza, SLP

## 2024-07-22 NOTE — PATIENT INSTRUCTIONS
1.  You were seen in the ENT Clinic today by . If you have any questions or concerns after your appointment, please call [phone]. Press option #1 for scheduling related needs. Press option #3 for Nurse advice.    2.   has recommended the following:   - speech therapy    3.  Plan is to return to clinic as needed      Nancy Fernandez LPN  [phone]  [facility] - Otolaryngology

## 2024-07-22 NOTE — LETTER
7/22/2024      RE: Kelly Harris  [address]  [place] [address]       Dear Dr. Phillips:    I had the pleasure of meeting Kelly Harris in consultation at the [facility] of the [facility] at your request, for evaluation of dysphonia. The note from our visit follows. Speech recognition software may have been used in the documentation below; input is reviewed before signature to the best of my ability. I appreciate the opportunity to participate in the care of this pleasant patient.    Please feel free to contact me with any questions.    Sincerely yours,    Lyubov Ruelas M.D., M.P.H.  , Laryngology  Director, [facility]  Otolaryngology- Head & Neck Surgery  [phone]          =====  HISTORY OF PRESENT ILLNESS:   Kelly Harris is a pleasant 53 year old female with PMH including sickle cell disease and asthma who presents with a several year history of throat concerns. Symptoms include total loss of voice, frequent throat-clearing, shortness of breath, and poor voice quality.    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   -Prefers  fluids over solids because of less swallow effort.    Cough/Throat-clearing  -Has frequent throat-clearing/cough, especially depending on environment  -Carpeted surfaces, strong smells, outdoors seem like triggers   -Sometimes has coughing fits    Breathing  -Also has some trouble with breathing  -Sees Dr Phillips in Pulmonary   -Breo Ellipta started recently, seems to be helping  -Also doing albuterol and Spiriva    Throat discomfort  -has discomfort with voice changes    Reflux-type symptoms  She experiences heartburn/indigestion rarely. She is not taking reflux medications.      Prior EPIC/ outside records were reviewed for this visit, including:  Najma Phillips MD 3/28/24  Onur Abebe MD, Jul 15, 2024    MEDICATIONS:     Current Outpatient Medications   Medication Sig Dispense Refill     acetaminophen (TYLENOL) 325 MG tablet Take 3 tablets (975 mg) by mouth every 8 hours 120 tablet 1     albuterol (PROAIR HFA/PROVENTIL HFA/VENTOLIN HFA) 108 (90 Base) MCG/ACT inhaler Inhale 2 puffs into the lungs every 6 hours as needed for shortness of breath, wheezing or cough 18 g 12     amLODIPine (NORVASC) 5 MG tablet Take 1 tablet (5 mg) by mouth daily 90 tablet 1     cetirizine (ZYRTEC) 10 MG tablet TAKE 1 TABLET BY MOUTH EVERY DAY 90 tablet 3     fluticasone (FLONASE) 50 MCG/ACT nasal spray Spray 1 spray into both nostrils daily 32 mL 4     fluticasone-vilanterol (BREO ELLIPTA) 200-25 MCG/ACT inhaler Inhale 1 puff into the lungs daily 60 each 12     FOLIC ACID PO Take 1 tablet by mouth daily       gabapentin (NEURONTIN) 300 MG capsule Take 1 capsule (300 mg) by mouth 3 times daily 90 capsule 3     hydroxyurea (HYDREA) 500 MG capsule Take 2 capsules (1,000 mg) by mouth daily 60 capsule 2     meloxicam (MOBIC) 15 MG tablet TAKE 1 TABLET BY MOUTH EVERY DAY 30 tablet 1     montelukast (SINGULAIR) 10 MG tablet TAKE 1 TABLET BY MOUTH EVERYDAY AT BEDTIME 90 tablet 1     naloxone (NARCAN) 4 MG/0.1ML nasal spray Spray 1 spray (4  mg) into one nostril alternating nostrils as needed for opioid reversal every 2-3 minutes until assistance arrives 0.2 mL 3     prochlorperazine (COMPAZINE) 10 MG tablet Take 1 tablet (10 mg) by mouth every 6 hours as needed for nausea or vomiting 30 tablet 1     tiotropium (SPIRIVA RESPIMAT) 2.5 MCG/ACT inhaler INHALE 2 PUFFS BY MOUTH INTO THE LUNGS DAILY 4 g 12     traMADol (ULTRAM) 50 MG tablet Take 1 tablet (50 mg) by mouth every 6 hours as needed for severe pain 60 tablet 0     predniSONE (DELTASONE) 10 MG tablet 40 mg (4 tabs) for 3 days then 30 mg (3 tabs)  for 3 days then 20 mg (2 tabs) daily for 3 days then 10 mg daily (Patient not taking: Reported on 7/15/2024) 30 tablet 0       ALLERGIES:    Allergies   Allergen Reactions     Oxycodone Itching     Iodine Rash       PAST MEDICAL HISTORY:   Past Medical History:   Diagnosis Date     Asthma      Benign essential hypertension 7/15/2024     Sickle cell pain crisis (H) 07/16/2023        PAST SURGICAL HISTORY:   Past Surgical History:   Procedure Laterality Date     MOUTH SURGERY  09/07/2019    Removed benign mouth tumor       HABITS/SOCIAL HISTORY:    Social History     Tobacco Use     Smoking status: Never     Passive exposure: Never     Smokeless tobacco: Never   Substance Use Topics     Alcohol use: No         FAMILY HISTORY:    Family History   Problem Relation Age of Onset     Diabetes Father      Coronary Artery Disease Father      Hypertension Father      Asthma Daughter      Sickle Cell Trait Daughter         S/C     Asthma Daughter      Sickle Cell Trait Daughter         S/C     Heart Disease Daughter         Heart valve regurgitation, abnormal vein with shunt     Hypertension Mother      Lung Cancer Paternal Cousin      Cancer No family hx of        REVIEW OF SYSTEMS:  Comprehensive 11 point review of systems was reviewed. Positives are as noted below; pertinent findings are as noted in the HPI.     Patient Supplied Answers to Review of Systems       7/22/2024    10:27 AM    ENT ROS   Constitutional Weight gain   Neurology Headache   Cardiopulmonary Breathing problems    Wheezing   Musculoskeletal Back pain   Allergy/Immunology Allergies or hay fever       PHYSICAL EXAMINATION:  General: The patient was alert and conversant, and in no acute distress.    Eyes: PERRL, conjunctiva and lids normal, sclera nonicteric.  Nose: Anterior rhinoscopy: no gross abnormalities. no  bleeding; no  mucopurulence; septum grossly normal, mild mucoid drainage and/or crusting.  Oral cavity/oropharynx: No masses or lesions. Dentition in good condition. Floor of mouth and oral tongue soft to palpation. Tongue mobility and palate elevation intact and symmetric.  Ears: Normal auricles, external auditory canals bilaterally. Visualized portions of tympanic membranes normal bilaterally.   Neck: No palpable cervical lymphadenopathy. There was mild tenderness to palpation of the thyrohyoid space, which was narrow. No obvious thyroid abnormality. Landmarks palpable.  Resp: Breathing comfortably, no stridor or stertor.  Neuro: Symmetric facial function. Other cranial nerves as documented above.  Psych: Normal affect, pleasant and cooperative.  Voice/speech: Highly variable voice quality ranging from normal to severe dysphonia characterized by breathiness, roughness, and strain.  Extremities: No cyanosis, clubbing, or edema of the upper extremities.       PROCEDURE:   Flexible fiberoptic laryngoscopy and laryngovideostroboscopy  Indications: This procedure was warranted to evaluate the patient's laryngeal anatomy and function. Risks, benefits, and alternatives were discussed.  Description: After written informed consent was obtained, a time-out was performed to confirm patient identity, procedure, and procedure site. Topical 2% lidocaine and oxymetazoline were applied to the nasal cavities. I performed the endoscopy and no complications were apparent. Continuous and stroboscopic light were  utilized to assess routine phonation and variable frequency phonation.  Performed by: Lyubov Ruelas MD MPH  SLP: Emily Loaiza MS CCC-SLP   Findings: Normal nasopharynx. Normal base of tongue, valleculae, and epiglottis. Vocal fold mobility: right: normal; left: normal. Medial edges of the true vocal folds: smooth and straight. No focal mucosal lesions were observed on the true vocal folds. Glissade produced appropriate elongation. There was moderate supraglottic recruitment with connected speech. Mucosa of false vocal folds, aryepiglottic folds, piriform sinuses, and posterior glottis unremarkable. Airway was patent. Response to the therapy probes was good.     The addition of stroboscopy allowed evaluation of the mucosal wave.   Amplitude: right: normal; left: normal. Symmetry: intermittent symmetry. Closure pattern: complete. Closure plane: at glottic level. Phase distribution: normal.                              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    Plan:  1) I recommended speech therapy as initial primary management, with goals including reducing laryngeal irritability, improving laryngeal efficiency, improving respiratory/phonatory coordination, and improving phonatory mechanics.    2) RTC to me as needed    I appreciate the opportunity to participate in the care of this patient.     I spent a total of 29 minutes on 7/22/2024 in chart review, review of tests, patient visit, documentation, care coordination, and/or discussion with other providers about the issues documented above, separate from any documented procedure(s).    Research Note: patient is pen to being contacted for applicable research through this clinic.      Lyubov Ruelas MD

## 2024-07-25 ENCOUNTER — RADIOLOGY INJECTION OFFICE VISIT (OUTPATIENT)
Dept: PHYSICAL MEDICINE AND REHAB | Facility: CLINIC | Age: 54
End: 2024-07-25
Attending: PHYSICAL MEDICINE & REHABILITATION
Payer: COMMERCIAL

## 2024-07-25 ENCOUNTER — TELEPHONE (OUTPATIENT)
Dept: PULMONOLOGY | Facility: CLINIC | Age: 54
End: 2024-07-25

## 2024-07-25 VITALS
TEMPERATURE: 99 F | DIASTOLIC BLOOD PRESSURE: 74 MMHG | RESPIRATION RATE: 18 BRPM | SYSTOLIC BLOOD PRESSURE: 116 MMHG | OXYGEN SATURATION: 98 % | HEART RATE: 71 BPM

## 2024-07-25 DIAGNOSIS — M43.16 SPONDYLOLISTHESIS OF LUMBAR REGION: ICD-10-CM

## 2024-07-25 DIAGNOSIS — M47.816 LUMBAR FACET ARTHROPATHY: ICD-10-CM

## 2024-07-25 DIAGNOSIS — J45.909 MODERATE ASTHMA WITHOUT COMPLICATION, UNSPECIFIED WHETHER PERSISTENT: ICD-10-CM

## 2024-07-25 PROCEDURE — 64494 INJ PARAVERT F JNT L/S 2 LEV: CPT | Mod: LT | Performed by: PAIN MEDICINE

## 2024-07-25 PROCEDURE — 64493 INJ PARAVERT F JNT L/S 1 LEV: CPT | Mod: LT | Performed by: PAIN MEDICINE

## 2024-07-25 RX ORDER — LIDOCAINE HYDROCHLORIDE 10 MG/ML
INJECTION, SOLUTION EPIDURAL; INFILTRATION; INTRACAUDAL; PERINEURAL
Status: COMPLETED | OUTPATIENT
Start: 2024-07-25 | End: 2024-07-25

## 2024-07-25 RX ORDER — BUPIVACAINE HYDROCHLORIDE 5 MG/ML
INJECTION, SOLUTION EPIDURAL; INTRACAUDAL
Status: COMPLETED | OUTPATIENT
Start: 2024-07-25 | End: 2024-07-25

## 2024-07-25 RX ADMIN — LIDOCAINE HYDROCHLORIDE 3 ML: 10 INJECTION, SOLUTION EPIDURAL; INFILTRATION; INTRACAUDAL; PERINEURAL at 15:29

## 2024-07-25 RX ADMIN — BUPIVACAINE HYDROCHLORIDE 1.5 ML: 5 INJECTION, SOLUTION EPIDURAL; INTRACAUDAL at 15:29

## 2024-07-25 ASSESSMENT — PAIN SCALES - GENERAL
PAINLEVEL: NO PAIN (1)
PAINLEVEL: SEVERE PAIN (6)

## 2024-07-25 NOTE — PATIENT INSTRUCTIONS

## 2024-07-25 NOTE — TELEPHONE ENCOUNTER
Prior Authorization Retail Medication Request    Medication/Dose: Breo ellipta 200-25mcg inhaler    Diagnosis and ICD code (if different than what is on RX):  J45.41  New/renewal/insurance change PA/secondary ins. PA:  Previously Tried and Failed:  Symbiocort, Dulera  Rationale:  not effective.  Has been taking Breo with good results since March 2024    Insurance   Primary: [employer]   Insurance  :  [id number]     Secondary (if applicable):    [facility] [employer]     Insurance ID:  [id number]     Pharmacy Information (if different than what is on RX)  Name:  [facility] [employer], [place]  Phone:  [phone]  Fax:[phone]

## 2024-07-26 RX ORDER — FLUTICASONE FUROATE AND VILANTEROL TRIFENATATE 200; 25 UG/1; UG/1
1 POWDER RESPIRATORY (INHALATION) DAILY
Qty: 60 EACH | Refills: 12 | Status: SHIPPED | OUTPATIENT
Start: 2024-07-26

## 2024-07-29 NOTE — TELEPHONE ENCOUNTER
PRIOR AUTHORIZATION DENIED    Medication: RAUL ELLIPTA 200-25 MCG/ACT IN AEPB  Insurance Company: [employer] Non-Specialty PA's - Phone [phone] Fax [phone]  Denial Date: 7/29/2024  Denial Reason(s):     Appeal Information:     Patient Notified: The clinic should notify the patient of the denial.     Your opinion matters!  Thank you for choosing the [facility].  You might receive a survey about your experience today to evaluate our clinic.  If you do receive one, please take a few minutes to complete it.    Have questions? Our preferred method for contact is a telephone call.  Please note that the number that shows up on caller ID will be [phone]. Telephone calls have a turn around time of 1 business day. Please be aware that messages left via the Patient Portal have a turn around of time of 3-5 business days.    Prescription Refills?  Please call your preferred pharmacy.    Appointment changes? If you need to cancel, change, or schedule an appointment, please call the appropriate clinic  listed below.     [place]       [phone]  [place]   [phone]  [place]            [phone]  [place]            [phone]    It was a pleasure to care for you today!    NOCTURIA INSTRUCTIONS:  Nocturia is getting up to urinate at night after falling asleep more times than you would like. It is often normal as we age to get up 1-2 times per night to urinate, as the hormones that we produce change through our life. There are many things that can cause this aside from the bladder being overactive. Sleep disorders or difficulty sleeping for other reasons are just a few. Other reasons include heart disorders, inappropriate accumulation of fluid throughout your body during the day, drinking too much in the evening, certain medications like diuretics (water pills), alcohol consumption, as well as other issues.    Some strategies to help improve symptoms of nocturia (urination at night) are:  1) decreasing/stopping drinking fluids after dinner (around 6pm-7pm)  2) raising your legs to the level of your heart with pillows prior to going to sleep to help mobilize fluids in your lower body  3) wearing compression stockings to help decrease fluid accumulation in the  lower limbs during the day - please contact your primary care doctor for a prescription for these as with many insurance companies these can sometimes be covered. This will, of course, depend on your insurance and cannot be guaranteed.  4) If you have sleep apnea or snore at night, this is likely affecting your bladder, please make sure to speak to your primary care doctor or pulmonologist about this.       Constipation:  The goal is to have a well-formed bowel movement everyday. This means that your stool should be the consistency of firm toothpaste.  Please start taking some additional things to help with constipation:  1. Konsyl or Metamucil or generic psyllium husk fiber (this can be purchased at any health food store).  (If you have diabetes or pre-diabetes, make sure to get a version that is SUGAR-FREE.) Take as directed on the bottle, but add into your diet slowly.  The goal is to get a total of 25-35g of fiber total in a day (this includes fiber that you eat in your diet.) Start with one day on the first week, 2 days on the second week, 3 days on the 3rd week and so on until you increase to every day.  2. Try eating TWO whole kiwis EVERYDAY. Don't eat the skin! (You must commit to doing this everyday if this treatment is going to be effective.)  3. Miralax.  Start with one scoop once a day. You may mix it into any beverage that you choose. If you start to have diarrhea, you should decrease the amount of Miralax that you are taking. If you are not having a daily bowel movement that is well-formed (meaning the consistency of firm-toothpaste) after 3-days, then increase the amount of Miralax by an additional scoop. You may continue to increase the dose of Miralax by one scoop every 3-days until you reach the desired stool frequency and consistency. If you have diarrhea, take less.      VAGINAL ESTROGEN CREAM  Please  the prescription for vaginal estrogen cream that you have been prescribed.  Please start  using it in the following way:  Place 0.5g inside the vagina using the applicator. Place the cream INSIDE the vagina first and use wipe any excess between the large lips (labia majora) of the vulva.     Please use your vaginal estrogen cream 3 nights per week and do this right before bed. Do not use it more often than this unless instructed to do so by your doctor.    If the prescription costs any more than $40, DO NOT purchase it from the pharmacy.  Call the office at [phone] and let us know.  We may have a cheaper option available for you.      Please start taking D-mannose. You may buy this online or at a [employer] food store. It comes in capsule or powder form. The best formulation is NOW brand in the powder form. You should be taking 2 grams everyday. If you are using the powder, this is generally two heaping teaspoonfuls. If you are taking the capsules, this is usually 4 capsules per day. You may either take 1 gram in the morning and 1 gram in the evening or 2 grams all at once, whichever you prefer. This helps prevent some types of urinary tract infections.

## 2024-07-29 NOTE — TELEPHONE ENCOUNTER
PA Initiation    Medication: BREO ELLIPTA 200-25 MCG/ACT IN AEPB  Insurance Company: [employer] Non-Specialty PA's - Phone [phone] Fax [phone]  Pharmacy Filling the Rx: [id number] [place], [place] - [address]  Filling Pharmacy Phone: [phone]  Filling Pharmacy Fax:    Start Date: 7/29/2024  Retail Pharmacy Prior Authorization Team   Phone: [phone]

## 2024-07-31 ENCOUNTER — TELEPHONE (OUTPATIENT)
Dept: PHYSICAL MEDICINE AND REHAB | Facility: CLINIC | Age: 54
End: 2024-07-31
Payer: COMMERCIAL

## 2024-07-31 DIAGNOSIS — M47.817 SPONDYLOSIS OF LUMBOSACRAL REGION WITHOUT MYELOPATHY OR RADICULOPATHY: Primary | ICD-10-CM

## 2024-07-31 NOTE — TELEPHONE ENCOUNTER
----- Message from Orlando Rice sent at 7/31/2024 12:41 PM CDT -----  Regarding: RE: MBB/RFA PROCESS  I reviewed the patient's pain diary.  Very good results from initial L3, 4, 5 left sided medial branch blocks.  Recommend confirmatory blocks.  ----- Message -----  From: Breyer, Nathan J, RN  Sent: 7/31/2024  11:08 AM CDT  To: Orlando Rice DO; #  Subject: FW: MBB/RFA PROCESS                              Pain diary received and scanned into chart.  Please review and forward recommendation to Procedure Support Pool in-basket.  Thanks!  ----- Message -----  From: Molly Galarza RN  Sent: 7/25/2024   3:30 PM CDT  To: [facility] Procedure Support Pool  Subject: MBB/RFA PROCESS                                  Patient here today, 7/25/2024 for initial left L3, L4, L5 MBBs with Dr. Jenkins as ordered by Dr. Rice with eye to RFA. Awaiting pain diary at this time.

## 2024-08-06 ENCOUNTER — TELEPHONE (OUTPATIENT)
Dept: OTOLARYNGOLOGY | Facility: CLINIC | Age: 54
End: 2024-08-06

## 2024-08-08 ENCOUNTER — OFFICE VISIT (OUTPATIENT)
Dept: FAMILY MEDICINE | Facility: CLINIC | Age: 54
End: 2024-08-08
Payer: COMMERCIAL

## 2024-08-08 VITALS
RESPIRATION RATE: 16 BRPM | TEMPERATURE: 97.9 F | HEIGHT: 63 IN | OXYGEN SATURATION: 100 % | WEIGHT: 155 LBS | DIASTOLIC BLOOD PRESSURE: 78 MMHG | SYSTOLIC BLOOD PRESSURE: 126 MMHG | HEART RATE: 79 BPM | BODY MASS INDEX: 27.46 KG/M2

## 2024-08-08 DIAGNOSIS — R25.2 MUSCLE CRAMPS: Primary | ICD-10-CM

## 2024-08-08 DIAGNOSIS — D57.20 SICKLE-CELL/HB-C DISEASE WITHOUT CRISIS (H): ICD-10-CM

## 2024-08-08 LAB
ANION GAP SERPL CALCULATED.3IONS-SCNC: 11 MMOL/L (ref 7–15)
BASOPHILS # BLD MANUAL: 0 10E3/UL (ref 0–0.2)
BASOPHILS NFR BLD MANUAL: 0 %
BUN SERPL-MCNC: 12.3 MG/DL (ref 6–20)
CALCIUM SERPL-MCNC: 9.5 MG/DL (ref 8.8–10.4)
CHLORIDE SERPL-SCNC: 104 MMOL/L (ref 98–107)
CK SERPL-CCNC: 92 U/L (ref 26–192)
CREAT SERPL-MCNC: 0.9 MG/DL (ref 0.51–0.95)
EGFRCR SERPLBLD CKD-EPI 2021: 76 ML/MIN/1.73M2
ELLIPTOCYTES BLD QL SMEAR: SLIGHT
EOSINOPHIL # BLD MANUAL: 0 10E3/UL (ref 0–0.7)
EOSINOPHIL NFR BLD MANUAL: 0 %
ERYTHROCYTE [DISTWIDTH] IN BLOOD BY AUTOMATED COUNT: 15 % (ref 10–15)
FRAGMENTS BLD QL SMEAR: ABNORMAL
GLUCOSE SERPL-MCNC: 113 MG/DL (ref 70–99)
HCO3 SERPL-SCNC: 25 MMOL/L (ref 22–29)
HCT VFR BLD AUTO: 31.7 % (ref 35–47)
HGB BLD-MCNC: 11.7 G/DL (ref 11.7–15.7)
LYMPHOCYTES # BLD MANUAL: 5 10E3/UL (ref 0.8–5.3)
LYMPHOCYTES NFR BLD MANUAL: 60 %
MAGNESIUM SERPL-MCNC: 2.3 MG/DL (ref 1.7–2.3)
MCH RBC QN AUTO: 34.5 PG (ref 26.5–33)
MCHC RBC AUTO-ENTMCNC: 36.9 G/DL (ref 31.5–36.5)
MCV RBC AUTO: 94 FL (ref 78–100)
MONOCYTES # BLD MANUAL: 0.2 10E3/UL (ref 0–1.3)
MONOCYTES NFR BLD MANUAL: 3 %
NEUTROPHILS # BLD MANUAL: 3.1 10E3/UL (ref 1.6–8.3)
NEUTROPHILS NFR BLD MANUAL: 37 %
NRBC # BLD AUTO: 0.9 10E3/UL
NRBC # BLD AUTO: 1.7 10E3/UL
NRBC BLD AUTO-RTO: 11 /100
NRBC BLD MANUAL-RTO: 20 %
PLAT MORPH BLD: ABNORMAL
PLATELET # BLD AUTO: 347 10E3/UL (ref 150–450)
POLYCHROMASIA BLD QL SMEAR: SLIGHT
POTASSIUM SERPL-SCNC: 3.9 MMOL/L (ref 3.4–5.3)
RBC # BLD AUTO: 3.39 10E6/UL (ref 3.8–5.2)
RBC MORPH BLD: ABNORMAL
SICKLE CELLS BLD QL SMEAR: SLIGHT
SMUDGE CELLS BLD QL SMEAR: PRESENT
SODIUM SERPL-SCNC: 140 MMOL/L (ref 135–145)
TARGETS BLD QL SMEAR: ABNORMAL
VARIANT LYMPHS BLD QL SMEAR: PRESENT
WBC # BLD AUTO: 8.3 10E3/UL (ref 4–11)

## 2024-08-08 PROCEDURE — 82550 ASSAY OF CK (CPK): CPT | Performed by: FAMILY MEDICINE

## 2024-08-08 PROCEDURE — 85007 BL SMEAR W/DIFF WBC COUNT: CPT | Performed by: FAMILY MEDICINE

## 2024-08-08 PROCEDURE — 99214 OFFICE O/P EST MOD 30 MIN: CPT | Performed by: FAMILY MEDICINE

## 2024-08-08 PROCEDURE — G2211 COMPLEX E/M VISIT ADD ON: HCPCS | Performed by: FAMILY MEDICINE

## 2024-08-08 PROCEDURE — 85027 COMPLETE CBC AUTOMATED: CPT | Performed by: FAMILY MEDICINE

## 2024-08-08 PROCEDURE — 36415 COLL VENOUS BLD VENIPUNCTURE: CPT | Performed by: FAMILY MEDICINE

## 2024-08-08 PROCEDURE — 83735 ASSAY OF MAGNESIUM: CPT | Performed by: FAMILY MEDICINE

## 2024-08-08 PROCEDURE — 80048 BASIC METABOLIC PNL TOTAL CA: CPT | Performed by: FAMILY MEDICINE

## 2024-08-08 RX ORDER — BACLOFEN 10 MG/1
5-10 TABLET ORAL DAILY PRN
Qty: 30 TABLET | Refills: 1 | Status: SHIPPED | OUTPATIENT
Start: 2024-08-08 | End: 2024-09-04

## 2024-08-08 NOTE — LETTER
August 12, 2024      Kelly Steven  [address]  [place] [address]        Dear ,    We are writing to inform you of your test results.    The electrolytes that can cause muscle cramps when they are low, are all in the normal range. So that does not easily explain your cramps.  I recommend 1. Drinking plenty of water; 2. staying active, 3. stretching your calves every evening before you go to bed and 4. try taking the muscle relaxant at night.  OK?  Let me know if this does or does not work - take care!     Resulted Orders   CK total   Result Value Ref Range    CK 92 26 - 192 U/L   Magnesium   Result Value Ref Range    Magnesium 2.3 1.7 - 2.3 mg/dL   Basic metabolic panel   Result Value Ref Range    Sodium 140 135 - 145 mmol/L    Potassium 3.9 3.4 - 5.3 mmol/L    Chloride 104 98 - 107 mmol/L    Carbon Dioxide (CO2) 25 22 - 29 mmol/L    Anion Gap 11 7 - 15 mmol/L    Urea Nitrogen 12.3 6.0 - 20.0 mg/dL    Creatinine 0.90 0.51 - 0.95 mg/dL    GFR Estimate 76 >60 mL/min/1.73m2      Comment:      eGFR calculated using 2021 CKD-EPI equation.    Calcium 9.5 8.8 - 10.4 mg/dL      Comment:      Reference intervals for this test were updated on 7/16/2024 to reflect our healthy population more accurately. There may be differences in the flagging of prior results with similar values performed with this method. Those prior results can be interpreted in the context of the updated reference intervals.    Glucose 113 (H) 70 - 99 mg/dL       If you have any questions or concerns, please call the clinic at the number listed above.       Sincerely,      Onur Abebe MD

## 2024-08-09 ENCOUNTER — VIRTUAL VISIT (OUTPATIENT)
Dept: OTOLARYNGOLOGY | Facility: CLINIC | Age: 54
End: 2024-08-09
Payer: COMMERCIAL

## 2024-08-09 DIAGNOSIS — J38.7 LARYNGEAL HYPERFUNCTION: ICD-10-CM

## 2024-08-09 DIAGNOSIS — R49.0 DYSPHONIA: Primary | ICD-10-CM

## 2024-08-09 PROCEDURE — 92507 TX SP LANG VOICE COMM INDIV: CPT | Mod: GN | Performed by: SPEECH-LANGUAGE PATHOLOGIST

## 2024-08-09 NOTE — PROGRESS NOTES
"[facility]ICE [facility]IN[facility]  VOICE / UPPER AIRWAY TREATMENT NOTE (CPT 57947)      Patient's name: Kelly Harris  Date of Session: 8/9/2024  Providing SLP: Emily Loaiza MS CCC-SLP  Referring Provider: Lyubov Ruelas MD MPH  Insurance Coverage: [employer]  Total # of SLP Visits: 2  # of SLP Therapy Sessions: 1  Session Location: Kelly was seen via telehealth today.     The patient has been notified and verbally consented to the following statements:   This video visit will be conducted between you and your provider.  Patient has opted to conduct today's video visit vs an in-person appointment, and is not able to attend due to possible exposure to COVID-19.    If during the course of the call the provider feels a video visit is not appropriate, you will not be charged for this service.     Provider has received verbal consent for billable virtual visit from the patient? Yes  Preferred method for receiving information: U*tique  Call initiated at: 11:00 AM  Call ended at: 11:36 PM  Platform used to conduct today's virtual appointment: AM Well Video  Location of provider: Residence   Location of patient: Residence       Impressions from most recent evaluation on 7/22/24 by Emily Loaiza MS CCC-SLP:   Kelly is a 53-year-old female presenting today with dysphonia R49.0 secondary to irritable larynx syndrome J38.7 and laryngeal hyperfunction \" \". Perceptually, her voice could present normally at times with times where significant breathiness/roughness and strain were noted. Laryngoscopy today demonstrated 4-way supraglottic compression with decreased vocal fold entrainment when inappropriate voice quality was observed due to inappropriate balance and coordination of respiration and phonation. Therefore, a course of voice therapy has been recommended, as this was previously successful for her in 2017. Kelly is in agreement with this plan of care.     Additionally, this patient appears to be a candidate for " "participation in our current research studies within the department. A warm introduction was not provided today.       SUBJECTIVE:  Kelly endorses stable voice since her initial evaluation      OBJECTIVE/ASSESSMENT:    Patient Supplied Answers To Last 2 VHI Questionnaires      7/22/2024    10:38 AM 8/6/2024     8:14 AM   Voice Handicap Index (VHI-10)   My voice makes it difficult for people to hear me 2 2   People have difficulty understanding me in a noisy room 2 2   My voice difficulties restrict my personal and social life.  0 2   I feel left out of conversations because of my voice 0 0   My voice problem causes me to lose income 0 1   I feel as though I have to strain to produce voice 2 2   The clarity of my voice is unpredictable 2 2   My voice problem upsets me 0 0   My voice makes me feel handicapped 2 2   People ask, \"What's wrong with your voice?\" 2 2   VHI-10 12 15     Patient Supplied Answers To Last 2 CSI Questionnaires      8/6/2024     8:14 AM   Cough Severity Index (CSI)   My cough is worse when I lie down 2   My coughing problem causes me to restrict my personal and social life 2   I tend to avoid places because of my cough problem 2   I feel embarrassed because of my coughing problem 0   People ask, ''What's wrong?'' because I cough a lot 2   I run out of air when I cough 2   My coughing problem affects my voice 3   My coughing problem limits my physical activity 2   My coughing problem upsets me 0   People ask me if I am sick because I cough a lot 2   CSI Score 17       THERAPEUTIC ACTIVITIES:  Semi-Occluded Vocal Tract Exercises (SOVTs) are a series of exercises aimed to recoordinate respiration, phonation, and resonance to produce an effortless, clear voice. Such exercises include straw phonation with or without water resistance, lip trills, humming/Basic Training Gesture for Resonant Voice Therapy, and transoral lip buzz/Basic Training Gesture for Vocal Function Exercises at comfortable " speaking pitches and glissando tasks. These exercises were instructed today, and Kelly performed them with 65% accuracy with maximal clinician cueing and modeling. Adjustments were made to avoid nasalization with straw phonation with water resistance. With straw phonation tasks and BTG for RVT, Kelly recognized improvements in her voice quality, as well as increased comfort and consistency with phonation      IMPRESSIONS:   Dysphonia R49.0 secondary to laryngeal hyperfunction J38.7    Kelly had a successful first session of voice therapy and remembered some of the therapeutic approaches from when she worked with my colleague, Rosaura Rubi, in 2017. She endorsed improvements in her throat comfort with SOVT tasks and recognized improved consistency in her tone.       PLAN:  Kelly will perform SOVT exercises 5 times daily for 2-3 minutes between sessions  Written instructions were provided to aid home programming via [employer]  Kelly continues to demonstrate adequate progress toward long- and short-term goals.  Continued voice therapy services are recommended. Kelly will follow-up for additional sessions on 24. Current goals will continue to be addressed.  Kelly is in agreement with this plan of care.      SLP PLAN:  Voice SLP presence at next appointment with laryngologist (e.g. Dr. Padgett, Dr. Ruelas, Dr. Snowden) is unknown, as she is just beginning her course of voice therapy. Next scheduled MD appointment is N/A.      BILLING SUMMARY:  Total treatment time: 36 minutes (including 5 minutes of chart review and preparation, interpretation of testing and therapeutic maneuvers, and document writing)  Speech Pathology Treatment (50011)      Emily Loaiza MS CCC-SLP  Speech-Language Pathologist  [facility]  Department of Otolaryngology - Head and Neck Surgery  [facility]  [email or web address]  Direct: [phone]  [phone]    *This note may have  been completed using [id number] dictation software, so errors may exist. Please contact me for clarification if needed*

## 2024-08-09 NOTE — LETTER
"8/9/2024       RE: Kelly Harris  [address]  [place] [address]     Dear Colleague,    Thank you for referring your patient, Kelly Harris, to the [facility]ICE [facility] [place] [facility]. Please see a copy of my visit note below.    [facility] VOICE [facility]  VOICE / UPPER AIRWAY TREATMENT NOTE (CPT 65670)      Patient's name: Kelly Harris  Date of Session: 8/9/2024  Providing SLP: Emily Loaiza MS CCC-SLP  Referring Provider: Lyubov Ruelas MD MPH  Insurance Coverage: [employer]  Total # of SLP Visits: 2  # of SLP Therapy Sessions: 1  Session Location: Kelly was seen via telehealth today.     The patient has been notified and verbally consented to the following statements:   This video visit will be conducted between you and your provider.  Patient has opted to conduct today's video visit vs an in-person appointment, and is not able to attend due to possible exposure to COVID-19.    If during the course of the call the provider feels a video visit is not appropriate, you will not be charged for this service.     Provider has received verbal consent for billable virtual visit from the patient? Yes  Preferred method for receiving information: [employer]  Call initiated at: 11:00 AM  Call ended at: 11:36 PM  Platform used to conduct today's virtual appointment: AM Well Video  Location of provider: Residence   Location of patient: Residence       Impressions from most recent evaluation on 7/22/24 by Emily Loaiza MS CCC-SLP:   Kelly is a 53-year-old female presenting today with dysphonia R49.0 secondary to irritable larynx syndrome J38.7 and laryngeal hyperfunction \" \". Perceptually, her voice could present normally at times with times where significant breathiness/roughness and strain were noted. Laryngoscopy today demonstrated 4-way supraglottic compression with decreased vocal fold entrainment when inappropriate voice quality was " "observed due to inappropriate balance and coordination of respiration and phonation. Therefore, a course of voice therapy has been recommended, as this was previously successful for her in 2017. Kelly is in agreement with this plan of care.     Additionally, this patient appears to be a candidate for participation in our current research studies within the department. A warm introduction was not provided today.       SUBJECTIVE:  Kelly endorses stable voice since her initial evaluation      OBJECTIVE/ASSESSMENT:    Patient Supplied Answers To Last 2 VHI Questionnaires      7/22/2024    10:38 AM 8/6/2024     8:14 AM   Voice Handicap Index (VHI-10)   My voice makes it difficult for people to hear me 2 2   People have difficulty understanding me in a noisy room 2 2   My voice difficulties restrict my personal and social life.  0 2   I feel left out of conversations because of my voice 0 0   My voice problem causes me to lose income 0 1   I feel as though I have to strain to produce voice 2 2   The clarity of my voice is unpredictable 2 2   My voice problem upsets me 0 0   My voice makes me feel handicapped 2 2   People ask, \"What's wrong with your voice?\" 2 2   VHI-10 12 15     Patient Supplied Answers To Last 2 CSI Questionnaires      8/6/2024     8:14 AM   Cough Severity Index (CSI)   My cough is worse when I lie down 2   My coughing problem causes me to restrict my personal and social life 2   I tend to avoid places because of my cough problem 2   I feel embarrassed because of my coughing problem 0   People ask, ''What's wrong?'' because I cough a lot 2   I run out of air when I cough 2   My coughing problem affects my voice 3   My coughing problem limits my physical activity 2   My coughing problem upsets me 0   People ask me if I am sick because I cough a lot 2   CSI Score 17       THERAPEUTIC ACTIVITIES:  Semi-Occluded Vocal Tract Exercises (SOVTs) are a series of exercises aimed to recoordinate respiration, " phonation, and resonance to produce an effortless, clear voice. Such exercises include straw phonation with or without water resistance, lip trills, humming/Basic Training Gesture for Resonant Voice Therapy, and transoral lip buzz/Basic Training Gesture for Vocal Function Exercises at comfortable speaking pitches and glissando tasks. These exercises were instructed today, and Kelly performed them with 65% accuracy with maximal clinician cueing and modeling. Adjustments were made to avoid nasalization with straw phonation with water resistance. With straw phonation tasks and BTG for RVT, Kelly recognized improvements in her voice quality, as well as increased comfort and consistency with phonation      IMPRESSIONS:   Dysphonia R49.0 secondary to laryngeal hyperfunction J38.7    Kelly had a successful first session of voice therapy and remembered some of the therapeutic approaches from when she worked with my colleague, Rosaura Rubi, in 2017. She endorsed improvements in her throat comfort with SOVT tasks and recognized improved consistency in her tone.       PLAN:  Kelly will perform SOVT exercises 5 times daily for 2-3 minutes between sessions  Written instructions were provided to aid home programming via [employer]  Kelly continues to demonstrate adequate progress toward long- and short-term goals.  Continued voice therapy services are recommended. Kelly will follow-up for additional sessions on 8/23/24. Current goals will continue to be addressed.  Kelly is in agreement with this plan of care.      SLP PLAN:  Voice SLP presence at next appointment with laryngologist (e.g. Dr. Padgett, Dr. Ruelas, Dr. Snowden) is unknown, as she is just beginning her course of voice therapy. Next scheduled MD appointment is N/A.      BILLING SUMMARY:  Total treatment time: 36 minutes (including 5 minutes of chart review and preparation, interpretation of testing and therapeutic maneuvers, and document writing)  Speech  Pathology Treatment (38178)      Emily Loaiza MS CCC-SLP  Speech-Language Pathologist  [facility]  Department of Otolaryngology - Head and Neck Surgery  [facility]  [email or web address]  Direct: [phone]  [phone]    *This note may have been completed using [id number] dictation software, so errors may exist. Please contact me for clarification if needed*      Again, thank you for allowing me to participate in the care of your patient.      Sincerely,    Emily Loaiza, SLP

## 2024-08-09 NOTE — RESULT ENCOUNTER NOTE
Kelly, the electrolytes that can cause muscle cramps when they are low, are all in the normal range.  So that does not easily explain your cramps.  I recommend 1. Drinking plenty of water; 2. staying active, 3. stretching your calves every evening before you go to bed and 4. try taking the muscle relaxant at night.  OK?  Let me know if this does or does not work - take care, Dr Onur Abebe

## 2024-08-09 NOTE — PATIENT INSTRUCTIONS
"Semi-Occluded Vocal Tract Exercises         Sustain for 5-7 seconds each, total of 2-3 minutes, 5-6  mini  practice sessions per day    (You can do these as often as 1 minute per hour)         Straw Phonation with Water Resistance / Cup Bubbles  (1 inch of water)  Airflow  Goal: consistent, small bubbles  Voice   Air stays on, add voice using  Oh   with round lips and forward placement  Goal: consistent, small bubbles  Pitch glides  Goal: No increase of bubbles when going up or down. Remember the  buzzy sound  - keep the voice forward  Humming / Resonant Voice Therapy  Hummmm: feel vibration on the lips without throat straining  Dddemu-io-lo-mo (or whatever vowels you'd like): keep the vibrations forward and use the \"m\" as check-ins  Buzzy \"Oh\" with straw at comfortable speaking pitch  Feel vibrations with tight lips and no straining      "

## 2024-08-12 NOTE — PROGRESS NOTES
ASSESSMENT/PLAN:  Kelly was seen today for cramps, forms and medication reconciliation.    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  At the time of dictation none of her electrolytes have come back abnormal and therefore cannot explain her cramps.  I suggested that she more actively stretch her calves and lower legs before she goes to bed and also that she could use baclofen before going to bedtime to see if this might prevent the spasms occurring.  I have asked her to follow-up if the problem does not resolve.    Total visit time with patient was 25 mins, all of which was face to face MD time, and over 50% of this time was spent in counseling and coordination of care.  Including post-encounter documentation and orders on the date of service, total encounter time was 32 mins.    The longitudinal plan of care for the diagnosis(es)/condition(s) as documented were addressed during this visit. Due to the added complexity in care, I will continue to support Kelly in the subsequent management and with ongoing continuity of care.      Subjective   Kelly is a 53 year old, presenting for the following health issues:  cramps (In legs, feels like muscle pulling, state taking multiple meds unsure if symptoms related to any of the med), Forms (FMLA ), and Medication Reconciliation (Did not review med, provider to review)    This is a 53-year-old well-known to me who presents primarily with  "disabling cramps she experiences in her calves and lower extremities especially at night.  The muscle cramps into a tight position that is painful and she has to physically stretch the leg to release the cramp.  She is wondering what is causing this.  She does not deliberately stretch her legs before bed.  She does drink additional water.    We discussed her sickle cell and she reports that it generally going well and that her last severe crisis was 1 year ago.  She is taking hydroxyurea and is aware that there are side effects with this medication.  She is prescribed some tramadol by one of her other providers.    Review of Systems   Occupation: She brings with her [facility] paperwork asking me to complete it.  She finds that if she works a full 8-hour day this actually extends into 9 to 10 hours and so she is requesting that I restrict her to a 6-hour day, 30-hour workweek which ends up being more like a typical 40-hour workweek since she can never get out of her employment on time.  She is also asking for lifting restrictions since sometimes she may have to lift very heavy materials.        Objective    Physical Exam   /78 (BP Location: Left arm, Patient Position: Sitting, Cuff Size: Adult Regular)   Pulse 79   Temp 97.9  F (36.6  C) (Oral)   Resp 16   Ht 1.6 m (5' 3\")   Wt 70.3 kg (155 lb)   LMP 04/12/2017   SpO2 100%   BMI 27.46 kg/m       Vitals stable  Well nourished and in no distress  Neck supple without lymphadenopathy, no goiter  Heart sounds normal without murmur    Lungs clear to auscultation, no wheezes/rales/rhonchi, good air entry   Abdomen soft, nontender, no masses, no rebound, bowel sounds throughout  No lower extremity edema; both legs are examined and there is no evidence of calf swelling or tenderness nor of actual deformity at today's visit    I spent 15 minutes completing [facility] paperwork with the patient to her satisfaction.      Results for orders placed or performed in visit on " 08/08/24   CK total     Status: Normal   Result Value Ref Range    CK 92 26 - 192 U/L   Magnesium     Status: Normal   Result Value Ref Range    Magnesium 2.3 1.7 - 2.3 mg/dL   Basic metabolic panel     Status: Abnormal   Result Value Ref Range    Sodium 140 135 - 145 mmol/L    Potassium 3.9 3.4 - 5.3 mmol/L    Chloride 104 98 - 107 mmol/L    Carbon Dioxide (CO2) 25 22 - 29 mmol/L    Anion Gap 11 7 - 15 mmol/L    Urea Nitrogen 12.3 6.0 - 20.0 mg/dL    Creatinine 0.90 0.51 - 0.95 mg/dL    GFR Estimate 76 >60 mL/min/1.73m2    Calcium 9.5 8.8 - 10.4 mg/dL    Glucose 113 (H) 70 - 99 mg/dL   CBC with platelets and differential     Status: Abnormal   Result Value Ref Range    WBC Count 8.3 4.0 - 11.0 10e3/uL    RBC Count 3.39 (L) 3.80 - 5.20 10e6/uL    Hemoglobin 11.7 11.7 - 15.7 g/dL    Hematocrit 31.7 (L) 35.0 - 47.0 %    MCV 94 78 - 100 fL    MCH 34.5 (H) 26.5 - 33.0 pg    MCHC 36.9 (H) 31.5 - 36.5 g/dL    RDW 15.0 10.0 - 15.0 %    Platelet Count 347 150 - 450 10e3/uL    NRBCs per 100 WBC 11 (H) <1 /100    Absolute NRBCs 0.9 10e3/uL   Manual Differential     Status: Abnormal   Result Value Ref Range    % Neutrophils 37 %    % Lymphocytes 60 %    % Monocytes 3 %    % Eosinophils 0 %    % Basophils 0 %    Absolute Neutrophils 3.1 1.6 - 8.3 10e3/uL    Absolute Lymphocytes 5.0 0.8 - 5.3 10e3/uL    Absolute Monocytes 0.2 0.0 - 1.3 10e3/uL    Absolute Eosinophils 0.0 0.0 - 0.7 10e3/uL    Absolute Basophils 0.0 0.0 - 0.2 10e3/uL    RBC Morphology Confirmed RBC Indices     Platelet Assessment  Automated Count Confirmed. Platelet morphology is normal.     Automated Count Confirmed. Platelet morphology is normal.    Elliptocytes Slight (A) None Seen    RBC Fragments Rare (A) None Seen    Polychromasia Slight (A) None Seen    Reactive Lymphocytes Present (A) None Seen    Sickle Cells Slight (A) None Seen    Smudge Cells Present (A) None Seen    Target Cells Moderate (A) None Seen    NRBCs per 100 WBC 20 %    Absolute NRBCs 1.7  10e3/uL   CBC with platelets and differential     Status: Abnormal    Narrative    The following orders were created for panel order CBC with platelets and differential.  Procedure                               Abnormality         Status                     ---------                               -----------         ------                     CBC with platelets and d...[[id number]]  Abnormal            Final result               Manual Differential[[id number]]          Abnormal            Final result                 Please view results for these tests on the individual orders.

## 2024-08-13 ENCOUNTER — RADIOLOGY INJECTION OFFICE VISIT (OUTPATIENT)
Dept: PHYSICAL MEDICINE AND REHAB | Facility: CLINIC | Age: 54
End: 2024-08-13
Attending: PHYSICAL MEDICINE & REHABILITATION
Payer: COMMERCIAL

## 2024-08-13 VITALS
SYSTOLIC BLOOD PRESSURE: 116 MMHG | HEART RATE: 71 BPM | TEMPERATURE: 98.9 F | RESPIRATION RATE: 16 BRPM | OXYGEN SATURATION: 100 % | DIASTOLIC BLOOD PRESSURE: 72 MMHG

## 2024-08-13 DIAGNOSIS — M47.817 SPONDYLOSIS OF LUMBOSACRAL REGION WITHOUT MYELOPATHY OR RADICULOPATHY: ICD-10-CM

## 2024-08-13 PROCEDURE — 64494 INJ PARAVERT F JNT L/S 2 LEV: CPT | Mod: LT | Performed by: PAIN MEDICINE

## 2024-08-13 PROCEDURE — 64493 INJ PARAVERT F JNT L/S 1 LEV: CPT | Mod: LT | Performed by: PAIN MEDICINE

## 2024-08-13 RX ORDER — LIDOCAINE HYDROCHLORIDE 10 MG/ML
INJECTION, SOLUTION EPIDURAL; INFILTRATION; INTRACAUDAL; PERINEURAL
Status: COMPLETED | OUTPATIENT
Start: 2024-08-13 | End: 2024-08-13

## 2024-08-13 RX ADMIN — LIDOCAINE HYDROCHLORIDE 3 ML: 10 INJECTION, SOLUTION EPIDURAL; INFILTRATION; INTRACAUDAL; PERINEURAL at 09:01

## 2024-08-13 ASSESSMENT — PAIN SCALES - GENERAL
PAINLEVEL: SEVERE PAIN (6)
PAINLEVEL: NO PAIN (0)

## 2024-08-13 NOTE — PATIENT INSTRUCTIONS
DISCHARGE INSTRUCTIONS    During office hours (8:00 a.m.- 4:00 p.m.) questions or concerns may be answered  by calling [facility] Navigation Nurses at  [phone].  Messages received after hours will be returned the following business day.      In the case of an emergency, please dial 911 or seek assistance at the nearest Emergency Room/Urgent Care facility.     All Patients:  You may experience an increase in your symptoms for the first 2 days, once the numbing medication wears off.    You may resume your regular medication, no pain medication until you have completed your diary    You may shower. No swimming, tub bath or hot tub for 24 hours    Continue your activities that can cause you pain to test the blocks.                         You should not drive for the next 1 to 3 hours (have someone drive you)           POSSIBLE PROCEDURE SIDE EFFECTS  -Call [facility] Center if concerned-    Increased Pain  Increased numbness/tingling     Nausea/Vomiting  Bruising/bleeding at site (hematoma)             Swelling at site (edema) Headache  Difficulty walking  Infection        Fever greater than 100.5      Please complete your pain diary and return the diary to the [facility] at your earliest convenience.  The [facility] will contact you once your pain diary has been received and reviewed by your provider.  Thank you.

## 2024-08-15 ENCOUNTER — TELEPHONE (OUTPATIENT)
Dept: PHYSICAL MEDICINE AND REHAB | Facility: CLINIC | Age: 54
End: 2024-08-15
Payer: COMMERCIAL

## 2024-08-15 DIAGNOSIS — M47.817 SPONDYLOSIS OF LUMBOSACRAL REGION WITHOUT MYELOPATHY OR RADICULOPATHY: Primary | ICD-10-CM

## 2024-08-15 NOTE — TELEPHONE ENCOUNTER
Patient contacted to inform her that Dr. Rice had reviewed the pain diary she submitted after her recent confirmatory lumbar medial branch blocks and was recommending that she move forward with a left L3, L4, L5 medial branch radiofrequency ablation.  The patient was in agreement and wished to proceed.    She was informed that she would be contacted to schedule the procedure once the prior authorization had been received from her insurance carrier.    The patient was encouraged to contact the [facility] if she had any questions or concerns in the meantime.     Repeat lactate was drawn.

## 2024-08-21 NOTE — TELEPHONE ENCOUNTER
Prior Authorization Not Needed per Insurance   Sent again 08/21/2024.    Medication: BREO ELLIPTA 200-25 MCG/ACT IN AEPB  Insurance Company: [employer]-Specialty [employer]'s - Phone [phone] Fax [phone]  Expected CoPay: $    Pharmacy Filling the Rx: [id number] [place] [address] [place]  Pharmacy Notified: Yes, pharmacy now has a paid claim.  Patient Notified: Yes the pharmacy did notify the patient.

## 2024-08-22 NOTE — PROGRESS NOTES
"[facility]ICE [facility]IN[facility]  VOICE / UPPER AIRWAY TREATMENT NOTE (CPT 59298)      Patient's name: Kelly Harris  Date of Session: 8/23/2024  Providing SLP: Emily Loaiza MS CCC-SLP  Referring Provider: Lyubov Ruelas MD MPH  Insurance Coverage: [employer]  Total # of SLP Visits: 3  # of SLP Therapy Sessions: 2  Session Location: Kelly was seen via telehealth today.     The patient has been notified and verbally consented to the following statements:   This video visit will be conducted between you and your provider.  Patient has opted to conduct today's video visit vs an in-person appointment, and is not able to attend due to possible exposure to COVID-19.    If during the course of the call the provider feels a video visit is not appropriate, you will not be charged for this service.     Provider has received verbal consent for billable virtual visit from the patient? Yes  Preferred method for receiving information: [employer]  Call initiated at: 11:02 AM  Call ended at: 11:46 AM  Platform used to conduct today's virtual appointment: [employer] Video  Location of provider: Residence   Location of patient: Residence       Impressions from most recent evaluation on 7/22/24 by Emily Loaiza MS CCC-SLP:   Kelly is a 53-year-old female presenting today with dysphonia R49.0 secondary to irritable larynx syndrome J38.7 and laryngeal hyperfunction \" \". Perceptually, her voice could present normally at times with times where significant breathiness/roughness and strain were noted. Laryngoscopy today demonstrated 4-way supraglottic compression with decreased vocal fold entrainment when inappropriate voice quality was observed due to inappropriate balance and coordination of respiration and phonation. Therefore, a course of voice therapy has been recommended, as this was previously successful for her in 2017. Kelly is in agreement with this plan of care.     Additionally, this patient appears to be a candidate for " "participation in our current research studies within the department. A warm introduction was not provided today.       SUBJECTIVE:  Voice doing slightly better  Liked cup bubbles most  Able to do them every day      OBJECTIVE/ASSESSMENT:    Patient Supplied Answers To Last 2 VHI Questionnaires      8/6/2024     8:14 AM 8/23/2024    10:46 AM   Voice Handicap Index (VHI-10)   My voice makes it difficult for people to hear me 2 2   People have difficulty understanding me in a noisy room 2 2   My voice difficulties restrict my personal and social life.  2 2   I feel left out of conversations because of my voice 0 1   My voice problem causes me to lose income 1 0   I feel as though I have to strain to produce voice 2 2   The clarity of my voice is unpredictable 2 3   My voice problem upsets me 0 2   My voice makes me feel handicapped 2 0   People ask, \"What's wrong with your voice?\" 2 2   VHI-10 15 16     Patient Supplied Answers To Last 2 CSI Questionnaires      8/6/2024     8:14 AM   Cough Severity Index (CSI)   My cough is worse when I lie down 2   My coughing problem causes me to restrict my personal and social life 2   I tend to avoid places because of my cough problem 2   I feel embarrassed because of my coughing problem 0   People ask, ''What's wrong?'' because I cough a lot 2   I run out of air when I cough 2   My coughing problem affects my voice 3   My coughing problem limits my physical activity 2   My coughing problem upsets me 0   People ask me if I am sick because I cough a lot 2   CSI Score 17       THERAPEUTIC ACTIVITIES:  Semi-Occluded Vocal Tract Exercises (SOVTs) are a series of exercises aimed to recoordinate respiration, phonation, and resonance to produce an effortless, clear voice. Such exercises include straw phonation with or without water resistance, lip trills, humming/Basic Training Gesture for Resonant Voice Therapy, and transoral lip buzz/Basic Training Gesture for Vocal Function Exercises " at comfortable speaking pitches and glissando tasks. Straw phonation without water exercises were reviewed today, and Kelly performed them with 65% accuracy with maximal clinician cueing and modeling. Adjustments were made to maintain consistent airflow across the repetition, as well as ensure that she has fully replenished her breath before the next repetition. These exercises were reviewed similar to VFEs, so her maximum phonation times at modal pitch and loudness ranged from 8-14 seconds in 5 repetitions    Circumlaryngeal massage and reposturing are manual techniques to assess and treat the degree, nature, and location of focal laryngeal tenderness or discomfort. Kelly endorsed mild tenderness without narrowing in the thyrohyoid space bilaterally and the suprahyoid musculature. Gentle self-massaging in these areas of discomfort were instructed, and Kelly performed them following clinician modeling and cueing. She reported improved comfort while performing these techniques. She is to perform these techniques both with and without light phonation to assess and treat specific muscle activation with phonation vs at rest.      IMPRESSIONS:   Dysphonia R49.0 secondary to laryngeal hyperfunction J38.7    SOVT exercises were reviewed today, and Kelly performed them with appropriate accuracy after cueing. Circumlaryngeal massage techniques were also instructed to provide additional ways to reduce laryngeal tension       PLAN:  Kelly will perform straw phonation without water resistance exercises twice daily for 10 repetitions of her maximum phonation time at modal pitch and loudness  Kelly will perform circumlaryngeal massage techniques as needed in the thyrohyoid space and suprahyoid musculature between sessions  Written instructions were provided to aid home programming via [employer]  Kelly continues to demonstrate adequate progress toward long- and short-term goals.  Continued voice therapy services are  recommended. Kelly will follow-up for additional sessions on 9/3/24. Current goals will continue to be addressed.  Kelly is in agreement with this plan of care.      SLP PLAN:  Voice SLP presence at next appointment with laryngologist (e.g. Dr. Padgett, Dr. Ruelas, Dr. Snowden) is unknown, as she is just beginning her course of voice therapy. Next scheduled MD appointment is N/A.      BILLING SUMMARY:  Total treatment time: 44 minutes (including 5 minutes of chart review and preparation, interpretation of testing and therapeutic maneuvers, and document writing)  Speech Pathology Treatment ([id number])      Emily Loaiza MS CCC-SLP  Speech-Language Pathologist  [facility]  Department of Otolaryngology - Head and Neck Surgery  [facility]  [email or web address]  Direct: [phone]  [phone]    *This note may have been completed using [id number] dictation software, so errors may exist. Please contact me for clarification if needed*

## 2024-08-23 ENCOUNTER — VIRTUAL VISIT (OUTPATIENT)
Dept: OTOLARYNGOLOGY | Facility: CLINIC | Age: 54
End: 2024-08-23
Payer: COMMERCIAL

## 2024-08-23 DIAGNOSIS — J38.7 LARYNGEAL HYPERFUNCTION: ICD-10-CM

## 2024-08-23 DIAGNOSIS — R49.0 DYSPHONIA: Primary | ICD-10-CM

## 2024-08-23 PROCEDURE — 92507 TX SP LANG VOICE COMM INDIV: CPT | Mod: GN | Performed by: SPEECH-LANGUAGE PATHOLOGIST

## 2024-08-23 NOTE — PATIENT INSTRUCTIONS
"Lip vibrations  Throat feels comfortable, open, relaxed, not straining  Voice sounds clear, easy, good  Don't forget to breathe :)    VOICE EXERCISES     Buzzy No with straw: say \"noooooo\" and round your lips tight to create strong vibrations around your lips and nose. Keep it sounding the same from beginning to end. Do this 10 times and time how long you can go. Do the exercises 2 times each day.        MORNING TIMES AFTERNOON/EVENING TIMES   DAY 1                                           DAY 2                                           DAY 3                                           DAY 4                                           DAY 5                                           DAY 6                                           DAY 7                                           DAY 8                                                                                 Laryngeal Massage    Tongue Base Massage               X per day  Using the knuckle of your index finger or thumb, begin massaging in small, gentle circles right under the chin.  You can also hold your chin with your index finger and use your thumb.  Work along the sides, middle, and tip of the bottom of the chin with a gentle pressure.    It is important to keep the chin level or down to avoid putting strain on the neck muscles.    Laryngeal Massage   Using the index finger, find the Jt's / Regina's apple.  From there, slide the index finger and thumb along the side of the larynx until you find the thyrohyoid space. These are little pockets on each side of the larynx.  With a gentle pressure, massage in circles around that area.  After a few circles, with your fingers still in the thyrohyoid space, rock your thumb and index finger up and down alternately; much like a see-saw motion.    Next use your thumb and index finger to lift gently and hold this position for a moment, and then gently pull down and hold this position for a moment.  You may also move the main " bulk of the larynx side to side.  You may experience a slight  crunchy  (a.k.a. crepitus) quality to this movement.  This should not be a cause for concern, and unless it is causing discomfort, you should be able to continue this massage.  Once things feel loosened up, give a slight squeeze in the thyrohyoid space, and pull down and forward in a V shape towards the front of the trachea.

## 2024-08-23 NOTE — LETTER
"8/23/2024       RE: Kelly Harris  [address]  [place] [address]     Dear Colleague,    Thank you for referring your patient, Kelly Harris, to the [facility] [place] [facility]. Please see a copy of my visit note below.    [facility] VOICE [facility]  VOICE / UPPER AIRWAY TREATMENT NOTE (CPT [id number])      Patient's name: Kelly Harris  Date of Session: 8/23/2024  Providing SLP: Emily Loaiza MS CCC-SLP  Referring Provider: Lyubov Ruelas MD MPH  Insurance Coverage: [employer]  Total # of SLP Visits: 3  # of SLP Therapy Sessions: 2  Session Location: Kelly was seen via telehealth today.     The patient has been notified and verbally consented to the following statements:   This video visit will be conducted between you and your provider.  Patient has opted to conduct today's video visit vs an in-person appointment, and is not able to attend due to possible exposure to COVID-19.    If during the course of the call the provider feels a video visit is not appropriate, you will not be charged for this service.     Provider has received verbal consent for billable virtual visit from the patient? Yes  Preferred method for receiving information: Aqua Access  Call initiated at: 11:02 AM  Call ended at: 11:46 AM  Platform used to conduct today's virtual appointment: AM Well Video  Location of provider: Residence   Location of patient: Residence       Impressions from most recent evaluation on 7/22/24 by Emily Loaiza MS CCC-SLP:   Kelly is a 53-year-old female presenting today with dysphonia R49.0 secondary to irritable larynx syndrome J38.7 and laryngeal hyperfunction \" \". Perceptually, her voice could present normally at times with times where significant breathiness/roughness and strain were noted. Laryngoscopy today demonstrated 4-way supraglottic compression with decreased vocal fold entrainment when inappropriate voice quality was " "observed due to inappropriate balance and coordination of respiration and phonation. Therefore, a course of voice therapy has been recommended, as this was previously successful for her in 2017. Kelly is in agreement with this plan of care.     Additionally, this patient appears to be a candidate for participation in our current research studies within the department. A warm introduction was not provided today.       SUBJECTIVE:  Voice doing slightly better  Liked cup bubbles most  Able to do them every day      OBJECTIVE/ASSESSMENT:    Patient Supplied Answers To Last 2 VHI Questionnaires      8/6/2024     8:14 AM 8/23/2024    10:46 AM   Voice Handicap Index (VHI-10)   My voice makes it difficult for people to hear me 2 2   People have difficulty understanding me in a noisy room 2 2   My voice difficulties restrict my personal and social life.  2 2   I feel left out of conversations because of my voice 0 1   My voice problem causes me to lose income 1 0   I feel as though I have to strain to produce voice 2 2   The clarity of my voice is unpredictable 2 3   My voice problem upsets me 0 2   My voice makes me feel handicapped 2 0   People ask, \"What's wrong with your voice?\" 2 2   VHI-10 15 16     Patient Supplied Answers To Last 2 CSI Questionnaires      8/6/2024     8:14 AM   Cough Severity Index (CSI)   My cough is worse when I lie down 2   My coughing problem causes me to restrict my personal and social life 2   I tend to avoid places because of my cough problem 2   I feel embarrassed because of my coughing problem 0   People ask, ''What's wrong?'' because I cough a lot 2   I run out of air when I cough 2   My coughing problem affects my voice 3   My coughing problem limits my physical activity 2   My coughing problem upsets me 0   People ask me if I am sick because I cough a lot 2   CSI Score 17       THERAPEUTIC ACTIVITIES:  Semi-Occluded Vocal Tract Exercises (SOVTs) are a series of exercises aimed to " recoordinate respiration, phonation, and resonance to produce an effortless, clear voice. Such exercises include straw phonation with or without water resistance, lip trills, humming/Basic Training Gesture for Resonant Voice Therapy, and transoral lip buzz/Basic Training Gesture for Vocal Function Exercises at comfortable speaking pitches and glissando tasks. Straw phonation without water exercises were reviewed today, and Kelly performed them with 65% accuracy with maximal clinician cueing and modeling. Adjustments were made to maintain consistent airflow across the repetition, as well as ensure that she has fully replenished her breath before the next repetition. These exercises were reviewed similar to VFEs, so her maximum phonation times at modal pitch and loudness ranged from 8-14 seconds in 5 repetitions    Circumlaryngeal massage and reposturing are manual techniques to assess and treat the degree, nature, and location of focal laryngeal tenderness or discomfort. Kelly endorsed mild tenderness without narrowing in the thyrohyoid space bilaterally and the suprahyoid musculature. Gentle self-massaging in these areas of discomfort were instructed, and Kelly performed them following clinician modeling and cueing. She reported improved comfort while performing these techniques. She is to perform these techniques both with and without light phonation to assess and treat specific muscle activation with phonation vs at rest.      IMPRESSIONS:   Dysphonia R49.0 secondary to laryngeal hyperfunction J38.7    SOVT exercises were reviewed today, and Kelly performed them with appropriate accuracy after cueing. Circumlaryngeal massage techniques were also instructed to provide additional ways to reduce laryngeal tension       PLAN:  Kelly will perform straw phonation without water resistance exercises twice daily for 10 repetitions of her maximum phonation time at modal pitch and loudness  Kelly will perform  circumlaryngeal massage techniques as needed in the thyrohyoid space and suprahyoid musculature between sessions  Written instructions were provided to aid home programming via [employer]  Kelly continues to demonstrate adequate progress toward long- and short-term goals.  Continued voice therapy services are recommended. Kelly will follow-up for additional sessions on 9/3/24. Current goals will continue to be addressed.  Kelly is in agreement with this plan of care.      SLP PLAN:  Voice SLP presence at next appointment with laryngologist (e.g. Dr. Padgett, Dr. Ruelas, Dr. Snowden) is unknown, as she is just beginning her course of voice therapy. Next scheduled MD appointment is N/A.      BILLING SUMMARY:  Total treatment time: 44 minutes (including 5 minutes of chart review and preparation, interpretation of testing and therapeutic maneuvers, and document writing)  Speech Pathology Treatment ([id number])      Emily Loaiza MS CCC-SLP  Speech-Language Pathologist  [facility]  Department of Otolaryngology - Head and Neck Surgery  [facility]  [email or web address]  Direct: [phone]  [phone]    *This note may have been completed using topicy-gi-fxcu dictation software, so errors may exist. Please contact me for clarification if needed*      Again, thank you for allowing me to participate in the care of your patient.      Sincerely,    Emily Loaiza, SLP

## 2024-08-30 DIAGNOSIS — R25.2 MUSCLE CRAMPS: ICD-10-CM

## 2024-09-03 ENCOUNTER — VIRTUAL VISIT (OUTPATIENT)
Dept: OTOLARYNGOLOGY | Facility: CLINIC | Age: 54
End: 2024-09-03
Payer: COMMERCIAL

## 2024-09-03 DIAGNOSIS — R49.0 DYSPHONIA: Primary | ICD-10-CM

## 2024-09-03 DIAGNOSIS — J38.7 LARYNGEAL HYPERFUNCTION: ICD-10-CM

## 2024-09-03 PROCEDURE — 92507 TX SP LANG VOICE COMM INDIV: CPT | Mod: GN | Performed by: SPEECH-LANGUAGE PATHOLOGIST

## 2024-09-03 NOTE — PATIENT INSTRUCTIONS
"BREATHING TECHNIQUES FOR VCD AND CHRONIC COUGH    Rescue breathing:  Inhalation options:  3 quick sniffs in through the nose  Through rounded lips ( noodle slurp\")  Feel cold air in the back of your throat  Shoulders and upper body should stay relaxed and should not rise  Exhalation on  sh  or  s   Needs to be longer than the inhalation to prevent hyperventilation  Should be fairly loud with high pressure and airflow at the front of the mouth  Try to get your lungs as empty as possible  Perform 3 - 5 cycles at the immediate onset of breathing troubles / coughing attack  Continue until your breathing is normalized / coughing episode is over  If you have an inhaler, do rescue breathing before you use your inhaler - you might not need it!  Try both inhalation methods - some people like one way over another  Practice them several times throughout the day, even when you're not having trouble breathing, so that you're able to do them easily and automatically when you need to       Bharath Snowden Jr., M.CARMEN Ruelas M.D.            Mitali West, Ph.D.  Ciera Garcia M.M., M.A.                 Jermaine Wolfe M.M., M.A.                    [phone]       Laryngeal Massage    Laryngeal Massage   Using the index finger, find the Jt's / Regina's apple.  From there, slide the index finger and thumb along the side of the larynx until you find the thyrohyoid space. These are little pockets on each side of the larynx.  With a gentle pressure, massage in circles around that area.  After a few circles, with your fingers still in the thyrohyoid space, rock your thumb and index finger up and down alternately; much like a see-saw motion.    Next use your thumb and index finger to lift gently and hold this position for a moment, and then gently pull down and hold this position for a moment.  You may also move the main bulk of the larynx side to side.  You may experience a slight  crunchy  (a.k.a. " "crepitus) quality to this movement.  This should not be a cause for concern, and unless it is causing discomfort, you should be able to continue this massage.  Once things feel loosened up, give a slight squeeze in the thyrohyoid space, and pull down and forward in a V shape towards the front of the trachea.      VOICE EXERCISES       Buzzy \"Ooooo\" with or without straw: say \"oooooo\" and round your lips tight to create strong vibrations around your lips and nose. Use the straw \"half and half\" if the lip buzzing isn't strong. Keep it sounding the same from beginning to end. Do this 10 times and time how long you can go. Do the exercises 2 times each day.        MORNING TIMES AFTERNOON/EVENING TIMES   DAY 1                                           DAY 2                                           DAY 3                                           DAY 4                                           DAY 5                                           DAY 6                                           DAY 7                                           DAY 8                                                                                                                               "

## 2024-09-03 NOTE — PROGRESS NOTES
"[facility] VOICE CLINIC  VOICE / UPPER AIRWAY TREATMENT NOTE (CPT [id number])      Patient's name: Kelly Harris  Date of Session: 9/3/2024  Providing SLP: Emily Loaiza MS CCC-SLP  Referring Provider: Lyubov Ruelas MD MPH  Insurance Coverage: [employer]  Total # of SLP Visits: 4  # of SLP Therapy Sessions: 3  Session Location: Kelly was seen via telehealth today.     The patient has been notified and verbally consented to the following statements:   This video visit will be conducted between you and your provider.  Patient has opted to conduct today's video visit vs an in-person appointment, and is not able to attend due to possible exposure to COVID-19.    If during the course of the call the provider feels a video visit is not appropriate, you will not be charged for this service.     Provider has received verbal consent for billable virtual visit from the patient? Yes  Preferred method for receiving information: LOAG  Call initiated at: 3:00 PM  Call ended at: 3:46 PM  Platform used to conduct today's virtual appointment: AM Well Video  Location of provider: Residence   Location of patient: Residence       Impressions from most recent evaluation on 7/22/24 by Emily Loaiza MS CCC-SLP:   Kelly is a 53-year-old female presenting today with dysphonia R49.0 secondary to irritable larynx syndrome J38.7 and laryngeal hyperfunction \" \". Perceptually, her voice could present normally at times with times where significant breathiness/roughness and strain were noted. Laryngoscopy today demonstrated 4-way supraglottic compression with decreased vocal fold entrainment when inappropriate voice quality was observed due to inappropriate balance and coordination of respiration and phonation. Therefore, a course of voice therapy has been recommended, as this was previously successful for her in 2017. Kelly is in agreement with this plan of care.     Additionally, this patient appears to be a candidate for " "participation in our current research studies within the department. A warm introduction was not provided today.       SUBJECTIVE:  Voice doing okay  Voice goes in and out  Really bad when she wakes up   Gets better as the day goes on   Tries to incorporate the techniques from therapy to get voice back  Hasn't had an instance where she's lost her voice for an extended length of time since we started working together  Instance of throat closure  Sometimes stridor  Dysphonia, dyspnea, coughing  Triggers: scents, carpet, chemical  Likes straw exercises      OBJECTIVE/ASSESSMENT:    Patient Supplied Answers To Last 2 VHI Questionnaires      8/23/2024    10:46 AM 9/1/2024    12:12 PM   Voice Handicap Index (VHI-10)   My voice makes it difficult for people to hear me 2 2   People have difficulty understanding me in a noisy room 2 2   My voice difficulties restrict my personal and social life.  2 0   I feel left out of conversations because of my voice 1 0   My voice problem causes me to lose income 0 0   I feel as though I have to strain to produce voice 2 2   The clarity of my voice is unpredictable 3 2   My voice problem upsets me 2 0   My voice makes me feel handicapped 0 0   People ask, \"What's wrong with your voice?\" 2 2   VHI-10 16 10     Patient Supplied Answers To Last 2 CSI Questionnaires      8/6/2024     8:14 AM   Cough Severity Index (CSI)   My cough is worse when I lie down 2   My coughing problem causes me to restrict my personal and social life 2   I tend to avoid places because of my cough problem 2   I feel embarrassed because of my coughing problem 0   People ask, ''What's wrong?'' because I cough a lot 2   I run out of air when I cough 2   My coughing problem affects my voice 3   My coughing problem limits my physical activity 2   My coughing problem upsets me 0   People ask me if I am sick because I cough a lot 2   CSI Score 17       THERAPEUTIC ACTIVITIES:  Semi-Occluded Vocal Tract Exercises (SOVTs) " "are a series of exercises aimed to recoordinate respiration, phonation, and resonance to produce an effortless, clear voice. Such exercises include straw phonation with or without water resistance, lip trills, humming/Basic Training Gesture for Resonant Voice Therapy, and transoral lip buzz/Basic Training Gesture for Vocal Function Exercises at comfortable speaking pitches and glissando tasks. Straw phonation without water exercises were reviewed today, and Kelly performed them with 70% accuracy with minimal to moderate clinician cueing and modeling. Adjustments were made to utilize easy onset of phonation to achieve appropriate airflow at the beginning of each repetitions, leading to increased comfort and relaxation in the larynx for the duration of the exercise.  In multiple repetitions of straw phonation exercises, her maximum phonation time is ranged from 14 to 20 seconds at modal pitch.    Rescue breathing techniques help achieve maximal glottic opening during inspiration and maintenance of the airway during expiration during episodes of dyspnea secondary to VCD/PVFM. Inhalation with pursed lips or three gentle sniffs is trained, and prolonged exhalation is performed with a high pressure, voiceless fricative such as \"sh.\" These exercises were instructed today, and Kelly performed them with appropriate accuracy following maximal clinician cueing and modeling.  She endorsed no preference between pursed lip versus nasal breathing patterns.  She will practice these techniques twice daily when asymptomatic to aid with automatic response when the exercise is needed, as well as incorporate them at the immediate onset of dyspnea prior to initiating her inhaled treatments      IMPRESSIONS:   Dysphonia R49.0 secondary to laryngeal hyperfunction J38.7    Kelly's voice has been doing well since initiating this course of voice therapy.  She continues to perform straw phonation exercises with high accuracy.  She " endorsed instances of paradoxical vocal fold motion today, so rescue breathing strategies were instructed.    PLAN:  Kelly will perform straw phonation without water resistance exercises twice daily for 10 repetitions of her maximum phonation time at modal pitch and loudness  Kelly will perform circumlaryngeal massage techniques as needed in the thyrohyoid space and suprahyoid musculature between sessions  Kelly will perform [place] breathing techniques twice daily while asymptomatic, as well as at the immediate onset of dyspnea until it has resolved  Written instructions were provided to aid home programming via [employer]  Kelly continues to demonstrate adequate progress toward long- and short-term goals.  Continued voice therapy services are recommended. Kelly will follow-up for additional sessions on 10/15/24. Current goals will continue to be addressed.  Kelly is in agreement with this plan of care.      SLP PLAN:  Voice SLP presence at next appointment with laryngologist (e.g. Dr. Padgett, Dr. Ruelas, Dr. Snowden) is unknown, as she is just beginning her course of voice therapy. Next scheduled MD appointment is N/A.      BILLING SUMMARY:  Total treatment time: 46 minutes (including 5 minutes of chart review and preparation, interpretation of testing and therapeutic maneuvers, and document writing)  Speech Pathology Treatment ([id number])      Emily Loaiza MS CCC-SLP  Speech-Language Pathologist  [facility]  Department of Otolaryngology - Head and Neck Surgery  [facility]  [email or web address]  Direct: [phone]  [phone]    *This note may have been completed using [id number] dictation software, so errors may exist. Please contact me for clarification if needed*

## 2024-09-03 NOTE — LETTER
"9/3/2024       RE: Kelly Harris  [address]  [place] [address]     Dear Colleague,    Thank you for referring your patient, Kelly Harris, to the [facility]ICE [facility]IN[facility] [place] at [facility]. Please see a copy of my visit note below.    [facility] VOICE [facility]  VOICE / UPPER AIRWAY TREATMENT NOTE (CPT 24796)      Patient's name: Kelly Harris  Date of Session: 9/3/2024  Providing SLP: Emily Loaiza MS CCC-SLP  Referring Provider: Lyubov Ruelas MD MPH  Insurance Coverage: [employer]  Total # of SLP Visits: 4  # of SLP Therapy Sessions: 3  Session Location: Kelly was seen via telehealth today.     The patient has been notified and verbally consented to the following statements:   This video visit will be conducted between you and your provider.  Patient has opted to conduct today's video visit vs an in-person appointment, and is not able to attend due to possible exposure to COVID-19.    If during the course of the call the provider feels a video visit is not appropriate, you will not be charged for this service.     Provider has received verbal consent for billable virtual visit from the patient? Yes  Preferred method for receiving information: HÃ¶vding  Call initiated at: 3:00 PM  Call ended at: 3:46 PM  Platform used to conduct today's virtual appointment: AM Maninder Video  Location of provider: Residence   Location of patient: Residence       Impressions from most recent evaluation on 7/22/24 by Emily Loaiza MS CCC-SLP:   Kelly is a 53-year-old female presenting today with dysphonia R49.0 secondary to irritable larynx syndrome J38.7 and laryngeal hyperfunction \" \". Perceptually, her voice could present normally at times with times where significant breathiness/roughness and strain were noted. Laryngoscopy today demonstrated 4-way supraglottic compression with decreased vocal fold entrainment when inappropriate voice quality was " "observed due to inappropriate balance and coordination of respiration and phonation. Therefore, a course of voice therapy has been recommended, as this was previously successful for her in 2017. Kelly is in agreement with this plan of care.     Additionally, this patient appears to be a candidate for participation in our current research studies within the department. A warm introduction was not provided today.       SUBJECTIVE:  Voice doing okay  Voice goes in and out  Really bad when she wakes up   Gets better as the day goes on   Tries to incorporate the techniques from therapy to get voice back  Hasn't had an instance where she's lost her voice for an extended length of time since we started working together  Instance of throat closure  Sometimes stridor  Dysphonia, dyspnea, coughing  Triggers: scents, carpet, chemical  Likes straw exercises      OBJECTIVE/ASSESSMENT:    Patient Supplied Answers To Last 2 VHI Questionnaires      8/23/2024    10:46 AM 9/1/2024    12:12 PM   Voice Handicap Index (VHI-10)   My voice makes it difficult for people to hear me 2 2   People have difficulty understanding me in a noisy room 2 2   My voice difficulties restrict my personal and social life.  2 0   I feel left out of conversations because of my voice 1 0   My voice problem causes me to lose income 0 0   I feel as though I have to strain to produce voice 2 2   The clarity of my voice is unpredictable 3 2   My voice problem upsets me 2 0   My voice makes me feel handicapped 0 0   People ask, \"What's wrong with your voice?\" 2 2   VHI-10 16 10     Patient Supplied Answers To Last 2 CSI Questionnaires      8/6/2024     8:14 AM   Cough Severity Index (CSI)   My cough is worse when I lie down 2   My coughing problem causes me to restrict my personal and social life 2   I tend to avoid places because of my cough problem 2   I feel embarrassed because of my coughing problem 0   People ask, ''What's wrong?'' because I cough a lot 2 " "  I run out of air when I cough 2   My coughing problem affects my voice 3   My coughing problem limits my physical activity 2   My coughing problem upsets me 0   People ask me if I am sick because I cough a lot 2   CSI Score 17       THERAPEUTIC ACTIVITIES:  Semi-Occluded Vocal Tract Exercises (SOVTs) are a series of exercises aimed to recoordinate respiration, phonation, and resonance to produce an effortless, clear voice. Such exercises include straw phonation with or without water resistance, lip trills, humming/Basic Training Gesture for Resonant Voice Therapy, and transoral lip buzz/Basic Training Gesture for Vocal Function Exercises at comfortable speaking pitches and glissando tasks. Straw phonation without water exercises were reviewed today, and Kelly performed them with 70% accuracy with minimal to moderate clinician cueing and modeling. Adjustments were made to utilize easy onset of phonation to achieve appropriate airflow at the beginning of each repetitions, leading to increased comfort and relaxation in the larynx for the duration of the exercise.  In multiple repetitions of straw phonation exercises, her maximum phonation time is ranged from 14 to 20 seconds at modal pitch.    Rescue breathing techniques help achieve maximal glottic opening during inspiration and maintenance of the airway during expiration during episodes of dyspnea secondary to VCD/PVFM. Inhalation with pursed lips or three gentle sniffs is trained, and prolonged exhalation is performed with a high pressure, voiceless fricative such as \"sh.\" These exercises were instructed today, and Kelly performed them with appropriate accuracy following maximal clinician cueing and modeling.  She endorsed no preference between pursed lip versus nasal breathing patterns.  She will practice these techniques twice daily when asymptomatic to aid with automatic response when the exercise is needed, as well as incorporate them at the immediate " onset of dyspnea prior to initiating her inhaled treatments      IMPRESSIONS:   Dysphonia R49.0 secondary to laryngeal hyperfunction J38.7    Kelly's voice has been doing well since initiating this course of voice therapy.  She continues to perform straw phonation exercises with high accuracy.  She endorsed instances of paradoxical vocal fold motion today, so rescue breathing strategies were instructed.    PLAN:  Kelly will perform straw phonation without water resistance exercises twice daily for 10 repetitions of her maximum phonation time at modal pitch and loudness  Kelly will perform circumlaryngeal massage techniques as needed in the thyrohyoid space and suprahyoid musculature between sessions  Kelly will perform [place] breathing techniques twice daily while asymptomatic, as well as at the immediate onset of dyspnea until it has resolved  Written instructions were provided to aid home programming via [employer]  Kelly continues to demonstrate adequate progress toward long- and short-term goals.  Continued voice therapy services are recommended. Kelly will follow-up for additional sessions on 10/15/24. Current goals will continue to be addressed.  Kelly is in agreement with this plan of care.      SLP PLAN:  Voice SLP presence at next appointment with laryngologist (e.g. Dr. Padgett, Dr. Ruelas, Dr. Snowden) is unknown, as she is just beginning her course of voice therapy. Next scheduled MD appointment is N/A.      BILLING SUMMARY:  Total treatment time: 46 minutes (including 5 minutes of chart review and preparation, interpretation of testing and therapeutic maneuvers, and document writing)  Speech Pathology Treatment (87752)      Emily Loaiza MS CCC-SLP  Speech-Language Pathologist  [facility]  Department of Otolaryngology - Head and Neck Surgery  [facility]  [email or web address]  Direct: [phone]  [phone]    *This note may have been  completed using [id number] dictation software, so errors may exist. Please contact me for clarification if needed*      Again, thank you for allowing me to participate in the care of your patient.      Sincerely,    Emily Loaiza, SLP

## 2024-09-04 ENCOUNTER — TELEPHONE (OUTPATIENT)
Dept: OTOLARYNGOLOGY | Facility: CLINIC | Age: 54
End: 2024-09-04
Payer: COMMERCIAL

## 2024-09-04 RX ORDER — BACLOFEN 10 MG/1
TABLET ORAL
Qty: 90 TABLET | Refills: 1 | Status: SHIPPED | OUTPATIENT
Start: 2024-09-04

## 2024-09-06 ENCOUNTER — RADIOLOGY INJECTION OFFICE VISIT (OUTPATIENT)
Dept: PHYSICAL MEDICINE AND REHAB | Facility: CLINIC | Age: 54
End: 2024-09-06
Attending: PHYSICAL MEDICINE & REHABILITATION
Payer: COMMERCIAL

## 2024-09-06 VITALS
DIASTOLIC BLOOD PRESSURE: 72 MMHG | RESPIRATION RATE: 18 BRPM | TEMPERATURE: 97 F | OXYGEN SATURATION: 98 % | SYSTOLIC BLOOD PRESSURE: 128 MMHG | HEART RATE: 69 BPM

## 2024-09-06 DIAGNOSIS — M47.817 SPONDYLOSIS OF LUMBOSACRAL REGION WITHOUT MYELOPATHY OR RADICULOPATHY: ICD-10-CM

## 2024-09-06 PROCEDURE — 64636 DESTROY L/S FACET JNT ADDL: CPT | Mod: LT | Performed by: PAIN MEDICINE

## 2024-09-06 PROCEDURE — 64635 DESTROY LUMB/SAC FACET JNT: CPT | Mod: LT | Performed by: PAIN MEDICINE

## 2024-09-06 RX ORDER — LIDOCAINE HYDROCHLORIDE 10 MG/ML
INJECTION, SOLUTION EPIDURAL; INFILTRATION; INTRACAUDAL; PERINEURAL
Status: COMPLETED | OUTPATIENT
Start: 2024-09-06 | End: 2024-09-06

## 2024-09-06 RX ORDER — BUPIVACAINE HYDROCHLORIDE 2.5 MG/ML
INJECTION, SOLUTION EPIDURAL; INFILTRATION; INTRACAUDAL
Status: COMPLETED | OUTPATIENT
Start: 2024-09-06 | End: 2024-09-06

## 2024-09-06 RX ADMIN — BUPIVACAINE HYDROCHLORIDE 3 ML: 2.5 INJECTION, SOLUTION EPIDURAL; INFILTRATION; INTRACAUDAL at 11:28

## 2024-09-06 RX ADMIN — LIDOCAINE HYDROCHLORIDE 3 ML: 10 INJECTION, SOLUTION EPIDURAL; INFILTRATION; INTRACAUDAL; PERINEURAL at 11:27

## 2024-09-06 ASSESSMENT — PAIN SCALES - GENERAL
PAINLEVEL: MODERATE PAIN (5)
PAINLEVEL: NO PAIN (0)

## 2024-09-06 NOTE — PATIENT INSTRUCTIONS
DISCHARGE INSTRUCTIONS    During office hours (8:00 a.m.-4:00 p.m.) questions or concerns may be answered  by calling Sp[facility] Center Navigation Nurses at  [phone].  Messages received after hours will be returned the following business day.      In the case of an emergency,please dial 911 or seek assistance at the nearest Emergency Room/Urgent Care facility.     All Patients:  You may experience an increase in your symptoms for the first 5-7 days. Soreness may persist during the first few weeks as it takes 4-6 weeks for the nerves to fully heal.    You may use ice on the injection site,as frequently as 20 minutes each hour if needed. It is recommended NOT to apply heat during the first 24 hours.    You may take your pain medicine.    You may continue taking your regular medication.    You may shower. No swimming, tub bath or hot tub for 48hours. This also includes no lotions, ointments or patches to the needle sites as well. You may remove your bandaid/bandage as soon as you are home.    You mayresume light activities, as tolerated.    Resume your usual diet as tolerated.    It is strongly advised that you do not drive for 1-3 hours post injection.    If you have had oral sedation:  Do not drive for 8 hours post injection.             POSSIBLE PROCEDURE SIDE EFFECTS    -Call the Spine Center if you are concerned    IncreasedPain           Increased numbness/tingling      Nausea/Vomiting          Bruising/bleeding at site      Redness or swelling  Difficulty walking      Weakness          Fever greater than 100.5

## 2024-09-11 ENCOUNTER — LAB (OUTPATIENT)
Dept: INFUSION THERAPY | Facility: HOSPITAL | Age: 54
End: 2024-09-11
Attending: INTERNAL MEDICINE
Payer: COMMERCIAL

## 2024-09-11 ENCOUNTER — ONCOLOGY VISIT (OUTPATIENT)
Dept: ONCOLOGY | Facility: HOSPITAL | Age: 54
End: 2024-09-11
Attending: INTERNAL MEDICINE
Payer: COMMERCIAL

## 2024-09-11 VITALS
OXYGEN SATURATION: 100 % | WEIGHT: 155 LBS | HEART RATE: 70 BPM | HEIGHT: 63 IN | SYSTOLIC BLOOD PRESSURE: 140 MMHG | RESPIRATION RATE: 20 BRPM | BODY MASS INDEX: 27.46 KG/M2 | TEMPERATURE: 97.8 F | DIASTOLIC BLOOD PRESSURE: 81 MMHG

## 2024-09-11 DIAGNOSIS — D57.20 SICKLE-CELL/HB-C DISEASE WITHOUT CRISIS (H): ICD-10-CM

## 2024-09-11 LAB
BASOPHILS # BLD MANUAL: 0 10E3/UL (ref 0–0.2)
BASOPHILS NFR BLD MANUAL: 0 %
EOSINOPHIL # BLD MANUAL: 0.1 10E3/UL (ref 0–0.7)
EOSINOPHIL NFR BLD MANUAL: 1 %
ERYTHROCYTE [DISTWIDTH] IN BLOOD BY AUTOMATED COUNT: 13.4 % (ref 10–15)
HCT VFR BLD AUTO: 31.6 % (ref 35–47)
HGB BLD-MCNC: 11.6 G/DL (ref 11.7–15.7)
HOWELL-JOLLY BOD BLD QL SMEAR: PRESENT
LYMPHOCYTES # BLD MANUAL: 3.6 10E3/UL (ref 0.8–5.3)
LYMPHOCYTES NFR BLD MANUAL: 55 %
MCH RBC QN AUTO: 34.7 PG (ref 26.5–33)
MCHC RBC AUTO-ENTMCNC: 36.7 G/DL (ref 31.5–36.5)
MCV RBC AUTO: 95 FL (ref 78–100)
MONOCYTES # BLD MANUAL: 0.5 10E3/UL (ref 0–1.3)
MONOCYTES NFR BLD MANUAL: 8 %
NEUTROPHILS # BLD MANUAL: 2.3 10E3/UL (ref 1.6–8.3)
NEUTROPHILS NFR BLD MANUAL: 36 %
NRBC # BLD AUTO: 0.7 10E3/UL
NRBC # BLD AUTO: 0.7 10E3/UL
NRBC BLD AUTO-RTO: 11 /100
NRBC BLD MANUAL-RTO: 11 %
PATH REV: ABNORMAL
PLAT MORPH BLD: ABNORMAL
PLATELET # BLD AUTO: 293 10E3/UL (ref 150–450)
POLYCHROMASIA BLD QL SMEAR: SLIGHT
RBC # BLD AUTO: 3.34 10E6/UL (ref 3.8–5.2)
RBC MORPH BLD: ABNORMAL
TARGETS BLD QL SMEAR: ABNORMAL
VARIANT LYMPHS BLD QL SMEAR: PRESENT
WBC # BLD AUTO: 6.5 10E3/UL (ref 4–11)

## 2024-09-11 PROCEDURE — 99213 OFFICE O/P EST LOW 20 MIN: CPT | Performed by: NURSE PRACTITIONER

## 2024-09-11 PROCEDURE — 36415 COLL VENOUS BLD VENIPUNCTURE: CPT

## 2024-09-11 PROCEDURE — 85007 BL SMEAR W/DIFF WBC COUNT: CPT

## 2024-09-11 PROCEDURE — G2211 COMPLEX E/M VISIT ADD ON: HCPCS | Performed by: NURSE PRACTITIONER

## 2024-09-11 PROCEDURE — 85027 COMPLETE CBC AUTOMATED: CPT

## 2024-09-11 PROCEDURE — 99214 OFFICE O/P EST MOD 30 MIN: CPT | Performed by: NURSE PRACTITIONER

## 2024-09-11 RX ORDER — TRAMADOL HYDROCHLORIDE 50 MG/1
50 TABLET ORAL EVERY 6 HOURS PRN
Qty: 60 TABLET | Refills: 0 | Status: SHIPPED | OUTPATIENT
Start: 2024-09-11

## 2024-09-11 RX ORDER — HYDROXYUREA 500 MG/1
15 CAPSULE ORAL DAILY
Qty: 60 CAPSULE | Refills: 2 | Status: SHIPPED | OUTPATIENT
Start: 2024-09-11

## 2024-09-11 ASSESSMENT — PAIN SCALES - GENERAL: PAINLEVEL: SEVERE PAIN (7)

## 2024-09-11 NOTE — PROGRESS NOTES
"Oncology Rooming Note    September 11, 2024 8:23 AM   Kelly Harris is a 53 year old female who presents for:    Chief Complaint   Patient presents with    Oncology Clinic Visit     3 month follow up with labs     Initial Vitals: BP (!) 140/81 (BP Location: Left arm, Patient Position: Sitting, Cuff Size: Adult Regular)   Pulse 70   Temp 97.8  F (36.6  C) (Tympanic)   Resp 20   Ht 1.6 m (5' 3\")   Wt 70.3 kg (155 lb)   LMP 04/12/2017   SpO2 100%   BMI 27.46 kg/m   Estimated body mass index is 27.46 kg/m  as calculated from the following:    Height as of this encounter: 1.6 m (5' 3\").    Weight as of this encounter: 70.3 kg (155 lb). Body surface area is 1.77 meters squared.  Severe Pain (7) Comment: Data Unavailable   Patient's last menstrual period was 04/12/2017.  Allergies reviewed: Yes  Medications reviewed: Yes    Medications: Medication refills not needed today.  Pharmacy name entered into [facility] Thames:    [id number] - [place], [place] - [address] [place] [id number] [place] [address] [place]    Frailty Screening:   Is the patient here for a new oncology consult visit in cancer care? 2. No      Clinical concerns:       SUZETTE NEWSOME CMA            "

## 2024-09-11 NOTE — PROGRESS NOTES
[facility] Hematology and Oncology Progress Note    Patient: Kelly Harris  MRN: [id number]  Date of Service: Sep 11, 2024          Reason for Visit    Chief Complaint   Patient presents with    Oncology Clinic Visit     3 month follow up with labs       Assessment and Plan     Cancer Staging   No matching staging information was found for the patient.    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  Does have pain flareups but generally increases hydration and uses Tylenol.  She has been having them more recently and needing more narcotic pain medication to control her pain(tramadol).  She was started on Hydrea in April 2024.  Initial dose was 1000 mg daily.  Taking folic acid daily.  Her hemoglobin has been running roughly in the 11-12 range.  Some risk with her hemoglobin improving once she is on Hydrea and it might get with risk of hyperviscosity syndrome.  Dr. Palafox would like to keep a close eye on her hemoglobin and make sure she does not require therapeutic phlebotomies if it starts getting elevated.  Hemoglobin is stable at this point.  She overall is tolerating the Hydrea and she does feel like her pain has lessened since being on it so we will continue the plan for now.  We will see her back in clinic with labs in 3 to 4 months.  Refill tramadol today.  Encouraged her to only take that when she is having severe pain.    History of ovarian vein thrombosis and bilateral PEs: Patient did take Eliquis after her mild bilateral PEs.  The ovarian vein thrombosis was quite remote in a little unclear so at this time she is off anticoagulation.    Mild nausea and rash with Hydrea: This is well-managed.  She does take antiemetics intermittently.  No change in her weight.  Continue to monitor and use antiemetics and moisturizers as needed.    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 Palafox.  Previous patient of Dr. Bartlett    Diagnosis: Hemoglobin SC disease.  Bilateral pulmonary embolism, diagnosed July 2023.  Remote history of ovarian vein thrombosis    Treatment: Observation initially  - April 2024 when she started Hydrea 1000 mg a day.    Interim history:  Patient is here today for 3-month follow-up visit.  She states she is doing okay.  She said that she does have some side effects from the Hydrea but overall it is managed.  She says she does feel like her pain does flareup a little bit here and there but overall she feels like that happens less often since she is been taking the Hydrea.  She uses Tylenol for the most part but does use tramadol very intermittently.  Patient requesting a refill of that today.  She denies any new issues but states when she has the pain is pretty much in her joints and kind of fluctuates between which ones but mainly ankles and knees.    Review of Systems    Pertinent items are noted in HPI.    Past History    Past Medical History:   Diagnosis Date    Asthma     Benign essential hypertension 7/15/2024    Sickle cell pain crisis (H) 07/16/2023       PHYSICAL EXAM  BP (!) 140/81 (BP Location: Left arm, Patient Position: Sitting, Cuff Size:  "Adult Regular)   Pulse 70   Temp 97.8  F (36.6  C) (Tympanic)   Resp 20   Ht 1.6 m (5' 3\")   Wt 70.3 kg (155 lb)   LMP 04/12/2017   SpO2 100%   BMI 27.46 kg/m      GENERAL: no acute distress. Cooperative in conversation. Alone in clinic  RESP: Regular respiratory rate. No expiratory wheezes   NEURO: non focal. Alert and oriented x3.   PSYCH: within normal limits. No depression or anxiety.  SKIN: exposed skin is dry intact.     Lab Results    Recent Results (from the past 168 hour(s))   CBC with platelets and differential   Result Value Ref Range    WBC Count 6.5 4.0 - 11.0 10e3/uL    RBC Count 3.34 (L) 3.80 - 5.20 10e6/uL    Hemoglobin 11.6 (L) 11.7 - 15.7 g/dL    Hematocrit 31.6 (L) 35.0 - 47.0 %    MCV 95 78 - 100 fL    MCH 34.7 (H) 26.5 - 33.0 pg    MCHC 36.7 (H) 31.5 - 36.5 g/dL    RDW 13.4 10.0 - 15.0 %    Platelet Count 293 150 - 450 10e3/uL    NRBCs per 100 WBC 11 (H) <1 /100    Absolute NRBCs 0.7 10e3/uL       Imaging    PAIN Radiofreq Ablation Lumbar/Sacral Facet Joint Nerve Two Levels Left    Result Date: 9/6/2024  LUMBAR RADIOFREQUENCY ABLATION/NEUROTOMY PROCEDURE Performed on: 9/6/24 Pre Procedure Diagnosis: Lumbar spondylosis, Low back pain, Lumbar facet syndrome Post Procedure Diagnosis:  Same Procedure Performed:  Lumbar Radiofrequency ablation/neurotomy procedure Clinical Scenario:  As per office notes Anesthesia/Fluids:  As per intra-procedure documentation Vital Signs:  As per intra-procedure documentation Level Injected: L3, L4, L5 medial branch and dorsal rami Side Injected: Left CC: Kelly Harris  is a 53 year-old female who presents today for a left L3, L4, L5 medial branch/dorsal rami radiofrequency ablation/neurotomy procedure.  The patient has had significant relief with two sets of diagnostic injections and would like to proceed with definitive treatment today.  Imaging reviewed.  The procedure of lumbar radiofrequency ablation/neurotomy was discussed in detail along with th risks, " including but not limited to:   nerve injury, infection, bleeding, allergic reaction,  worsening of pain along with risk of stroke, paralysis and death.  The effectiveness of this procedure is approximately 80% per the medical literature, meaning that 20% of patients will not have any relief, despite having good response to the medial branch blocks.  If effective, the patient may receive pain relief for 6-9 months or longer.  The patient decided to proceed and signed informed consent.  The patient denies any symptoms of an active infection and denies taking antibiotics. The patient denies taking any prescription blood thinning medications. The patient denies any allergies to iodine or iodine contrast.  The patient was placed  in the prone position in the fluoroscopy table.  A procedural pause was performed to verify patient identify, site location and side for the procedure.   The low back was prepped and draped in the usual sterile fashion.  After anesthetizing the skin with 1% lidocaine, a 100 mm,18 gauge spinal needle was introduced at the aforementioned levels on the left side using fluoroscopic guidance.  Lateral views were used to confirm placement.  After fluoroscopic views confirmed placement, motor stimulation was performed.  Please refer to the nursing intra-procedure note for details.  No radicular symptoms were appreciated by the patient during stimulation and there was observable muscle twitching during motor stimulation.  After aspiration for blood was negative, 1.5 mL of 2% lidocaine was injected was injected at each level.  After waiting 2 minutes for the local anesthetic to take effect, the probes were brought to a final temperature of 80 degrees celsius and burning occurred for 90 seconds. The needles were then rotated 180 degrees and then the probes were again brought to a temperature of 80 degrees celsius for 90 seconds.  The needles were then flushed with 1 cc of 0.25% bupivacaine was they were  with drawn.   Pre-procedure pain score:  5/10 Post-procedure pain score:  0/10  The patient tolerated the procedure well. The patient was instructed to call if there are any questions/concerns after the procedure.  After a short period of observation, the patient was discharged.  Patient will follow-up with Dr. Rice in 6 weeks.     PAIN Medial Branch Block Lumbar Two Levels Left    Result Date: 8/13/2024  DIAGNOSTIC LUMBAR MEDIAL BRANCH BLOCKS WITH FLUOROSCOPIC GUIDANCE Performed on: 8/13/24 Pre Procedure Diagnosis:  Lumbar spondylosis, LBP, Lumbar Facet Syndrome Post Procedure Diagnosis:  Same Procedure Performed:  Diagnostic Lumbar Medial Branch Block with Fluoroscopic Guidance  Clinical Scenario:  As per office notes Anesthesia/Fluids:  As per intra-procedure documentation Vital Signs:  As per intra-procedure documentation Level Injected:  L3, L4, and L5 Side Injected: Left CC: Kelly Harris is a 53 year-old female who presents today for left L3, L4, L5 medial branch blocks.   The patient complains of  low back pain without significant radicular symptoms.  The patient understands that these are diagnostic injections only and will not provide any long lasting relief.  Further treatment options can potentially be offered, depending on the amount of pain relief with today's injections.  She wanted to proceed.  Advanced imaging is reviewed today.  The procedure of lumbar medial branch block was discussed in detail along with the attendant risks, including but not limited to:  back pain, nerve injury, infection, bleeding, worsened pain, allergic reaction.  The patient was also informed that there may be the need for additional injections (or other interventions, such as: facet joint injections,radiofrequency procedures..) depending on response to the injections today.  The patient decided to proceed and signed informed consent. The patient denies any symptoms of an active infection and denies taking antibiotics.  The patient denies taking any prescription blood thinning medications. The patient denies any allergies to iodine or iodine contrast.  The patient was placed  in the prone position in the fluoroscopy table. A procedural pause was performed to verify the patient's name and  as well as the site and side of the injection. The low back was then  prepped and draped in the usual sterile fashion.  After localizing the anatomy under fluoroscopy 3.5 inch,25 gauge spinal needles were introduced into the left L3, L4, L5 medial branch levels mentioned above under fluoroscopic guidance.   After aspiration was negative for blood or CSF, approximately 0.1 ml of Omnipaque 300 was injected at each site.  There was no vascular uptake.  Subsequently, 0.5 ml of 2% lidocaine was injected at each level.  The needles were withdrawn without flushing.  Pre-procedure pain score:  6/10 Post-procedure pain score:  0/10 Pain diary given. The patient tolerated the procedure well and instructed in the use of a pain diary.  If there are questions or concerns that arise, the patient was instructed to call the spine clinic.  After a short period of observation, the patient was discharged.  The patient will be contacted with the next steps in care after the pain diary is received and reviewed.       The longitudinal plan of care for the diagnosis(es)/condition(s) as documented were addressed during this visit. Due to the added complexity in care, I will continue to support Kelly in the subsequent management and with ongoing continuity of care.    Signed by: JAIME Garcia CNP

## 2024-09-11 NOTE — LETTER
9/11/2024      Kelly Harris  [address]  [place] [address]      Dear Colleague,    Thank you for referring your patient, Kelly Harris, to the [facility] [place]. Please see a copy of my visit note below.    [facility] Progress Note    Patient: Kelly Harris  MRN: [id number]  Date of Service: Sep 11, 2024          Reason for Visit    Chief Complaint   Patient presents with     Oncology Clinic Visit     3 month follow up with labs       Assessment and Plan     Cancer Staging   No matching staging information was found for the patient.    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  Does have pain flareups but generally increases hydration and uses Tylenol.  She has been having them more recently and needing more narcotic pain medication to control her pain(tramadol).  She was started on Hydrea in April 2024.  Initial dose was 1000 mg daily.  Taking folic acid daily.  Her hemoglobin has been running roughly in the 11-12 range.  Some risk with her hemoglobin improving once she is on Hydrea and it might get with risk of hyperviscosity syndrome.  Dr. Palafox would like to keep a close eye on her hemoglobin and make sure she does not require therapeutic phlebotomies if it starts getting elevated.  Hemoglobin is stable at this point.  She overall is tolerating the Hydrea and she does feel like her pain has lessened since being on it so we will continue the plan for now.  We will see her back in clinic with labs in 3 to 4 months.  Refill tramadol today.  Encouraged her to only take that when she is having severe pain.    History of ovarian vein thrombosis and bilateral PEs: Patient did take Eliquis after her mild bilateral PEs.  The ovarian vein thrombosis was quite remote in a little unclear so at this time she is off anticoagulation.    Mild nausea and rash with Hydrea: This is well-managed.  She does take antiemetics intermittently.  No  change in her weight.  Continue to monitor and use antiemetics and moisturizers as needed.    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 Palafox.  Previous patient of Dr. Bartlett    Diagnosis: Hemoglobin SC disease.  Bilateral pulmonary embolism, diagnosed July 2023.  Remote history of ovarian vein thrombosis    Treatment: Observation initially  - April 2024 when she started Hydrea 1000 mg a day.    Interim history:  Patient is here today for 3-month follow-up visit.  She states she is doing okay.  She said that she does have some side effects from the Hydrea but overall it is managed.  She says she does feel like her pain does flareup a little bit here and there but overall she feels like that happens less often since she is been taking the Hydrea.  She uses Tylenol for the most part but does use tramadol very intermittently.  Patient requesting a refill of that today.  She denies any new issues but states when she has the pain is pretty much in her joints and kind of fluctuates between which ones but mainly ankles and knees.    Review of Systems    Pertinent items are  "noted in HPI.    Past History    Past Medical History:   Diagnosis Date     Asthma      Benign essential hypertension 7/15/2024     Sickle cell pain crisis (H) 07/16/2023       PHYSICAL EXAM  BP (!) 140/81 (BP Location: Left arm, Patient Position: Sitting, Cuff Size: Adult Regular)   Pulse 70   Temp 97.8  F (36.6  C) (Tympanic)   Resp 20   Ht 1.6 m (5' 3\")   Wt 70.3 kg (155 lb)   LMP 04/12/2017   SpO2 100%   BMI 27.46 kg/m      GENERAL: no acute distress. Cooperative in conversation. Alone in clinic  RESP: Regular respiratory rate. No expiratory wheezes   NEURO: non focal. Alert and oriented x3.   PSYCH: within normal limits. No depression or anxiety.  SKIN: exposed skin is dry intact.     Lab Results    Recent Results (from the past 168 hour(s))   CBC with platelets and differential   Result Value Ref Range    WBC Count 6.5 4.0 - 11.0 10e3/uL    RBC Count 3.34 (L) 3.80 - 5.20 10e6/uL    Hemoglobin 11.6 (L) 11.7 - 15.7 g/dL    Hematocrit 31.6 (L) 35.0 - 47.0 %    MCV 95 78 - 100 fL    MCH 34.7 (H) 26.5 - 33.0 pg    MCHC 36.7 (H) 31.5 - 36.5 g/dL    RDW 13.4 10.0 - 15.0 %    Platelet Count 293 150 - 450 10e3/uL    NRBCs per 100 WBC 11 (H) <1 /100    Absolute NRBCs 0.7 10e3/uL       Imaging    PAIN Radiofreq Ablation Lumbar/Sacral Facet Joint Nerve Two Levels Left    Result Date: 9/6/2024  LUMBAR RADIOFREQUENCY ABLATION/NEUROTOMY PROCEDURE Performed on: 9/6/24 Pre Procedure Diagnosis: Lumbar spondylosis, Low back pain, Lumbar facet syndrome Post Procedure Diagnosis:  Same Procedure Performed:  Lumbar Radiofrequency ablation/neurotomy procedure Clinical Scenario:  As per office notes Anesthesia/Fluids:  As per intra-procedure documentation Vital Signs:  As per intra-procedure documentation Level Injected: L3, L4, L5 medial branch and dorsal rami Side Injected: Left CC: Kelly Harris  is a 53 year-old female who presents today for a left L3, L4, L5 medial branch/dorsal rami radiofrequency ablation/neurotomy " procedure.  The patient has had significant relief with two sets of diagnostic injections and would like to proceed with definitive treatment today.  Imaging reviewed.  The procedure of lumbar radiofrequency ablation/neurotomy was discussed in detail along with th risks, including but not limited to:   nerve injury, infection, bleeding, allergic reaction,  worsening of pain along with risk of stroke, paralysis and death.  The effectiveness of this procedure is approximately 80% per the medical literature, meaning that 20% of patients will not have any relief, despite having good response to the medial branch blocks.  If effective, the patient may receive pain relief for 6-9 months or longer.  The patient decided to proceed and signed informed consent.  The patient denies any symptoms of an active infection and denies taking antibiotics. The patient denies taking any prescription blood thinning medications. The patient denies any allergies to iodine or iodine contrast.  The patient was placed  in the prone position in the fluoroscopy table.  A procedural pause was performed to verify patient identify, site location and side for the procedure.   The low back was prepped and draped in the usual sterile fashion.  After anesthetizing the skin with 1% lidocaine, a 100 mm,18 gauge spinal needle was introduced at the aforementioned levels on the left side using fluoroscopic guidance.  Lateral views were used to confirm placement.  After fluoroscopic views confirmed placement, motor stimulation was performed.  Please refer to the nursing intra-procedure note for details.  No radicular symptoms were appreciated by the patient during stimulation and there was observable muscle twitching during motor stimulation.  After aspiration for blood was negative, 1.5 mL of 2% lidocaine was injected was injected at each level.  After waiting 2 minutes for the local anesthetic to take effect, the probes were brought to a final temperature  of 80 degrees celsius and burning occurred for 90 seconds. The needles were then rotated 180 degrees and then the probes were again brought to a temperature of 80 degrees celsius for 90 seconds.  The needles were then flushed with 1 cc of 0.25% bupivacaine was they were with drawn.   Pre-procedure pain score:  5/10 Post-procedure pain score:  0/10  The patient tolerated the procedure well. The patient was instructed to call if there are any questions/concerns after the procedure.  After a short period of observation, the patient was discharged.  Patient will follow-up with Dr. Rice in 6 weeks.     PAIN Medial Branch Block Lumbar Two Levels Left    Result Date: 8/13/2024  DIAGNOSTIC LUMBAR MEDIAL BRANCH BLOCKS WITH FLUOROSCOPIC GUIDANCE Performed on: 8/13/24 Pre Procedure Diagnosis:  Lumbar spondylosis, LBP, Lumbar Facet Syndrome Post Procedure Diagnosis:  Same Procedure Performed:  Diagnostic Lumbar Medial Branch Block with Fluoroscopic Guidance  Clinical Scenario:  As per office notes Anesthesia/Fluids:  As per intra-procedure documentation Vital Signs:  As per intra-procedure documentation Level Injected:  L3, L4, and L5 Side Injected: Left CC: Kelly Harris is a 53 year-old female who presents today for left L3, L4, L5 medial branch blocks.   The patient complains of  low back pain without significant radicular symptoms.  The patient understands that these are diagnostic injections only and will not provide any long lasting relief.  Further treatment options can potentially be offered, depending on the amount of pain relief with today's injections.  She wanted to proceed.  Advanced imaging is reviewed today.  The procedure of lumbar medial branch block was discussed in detail along with the attendant risks, including but not limited to:  back pain, nerve injury, infection, bleeding, worsened pain, allergic reaction.  The patient was also informed that there may be the need for additional injections (or other  interventions, such as: facet joint injections,radiofrequency procedures..) depending on response to the injections today.  The patient decided to proceed and signed informed consent. The patient denies any symptoms of an active infection and denies taking antibiotics. The patient denies taking any prescription blood thinning medications. The patient denies any allergies to iodine or iodine contrast.  The patient was placed  in the prone position in the fluoroscopy table. A procedural pause was performed to verify the patient's name and  as well as the site and side of the injection. The low back was then  prepped and draped in the usual sterile fashion.  After localizing the anatomy under fluoroscopy 3.5 inch,25 gauge spinal needles were introduced into the left L3, L4, L5 medial branch levels mentioned above under fluoroscopic guidance.   After aspiration was negative for blood or CSF, approximately 0.1 ml of Omnipaque 300 was injected at each site.  There was no vascular uptake.  Subsequently, 0.5 ml of 2% lidocaine was injected at each level.  The needles were withdrawn without flushing.  Pre-procedure pain score:  6/10 Post-procedure pain score:  0/10 Pain diary given. The patient tolerated the procedure well and instructed in the use of a pain diary.  If there are questions or concerns that arise, the patient was instructed to call the spine clinic.  After a short period of observation, the patient was discharged.  The patient will be contacted with the next steps in care after the pain diary is received and reviewed.       The longitudinal plan of care for the diagnosis(es)/condition(s) as documented were addressed during this visit. Due to the added complexity in care, I will continue to support Kelly in the subsequent management and with ongoing continuity of care.    Signed by: JAIME Garcia CNP    Oncology Rooming Note    2024 8:23 AM   Kelly Harris is a 53 year old female  "who presents for:    Chief Complaint   Patient presents with     Oncology Clinic Visit     3 month follow up with labs     Initial Vitals: BP (!) 140/81 (BP Location: Left arm, Patient Position: Sitting, Cuff Size: Adult Regular)   Pulse 70   Temp 97.8  F (36.6  C) (Tympanic)   Resp 20   Ht 1.6 m (5' 3\")   Wt 70.3 kg (155 lb)   LMP 04/12/2017   SpO2 100%   BMI 27.46 kg/m   Estimated body mass index is 27.46 kg/m  as calculated from the following:    Height as of this encounter: 1.6 m (5' 3\").    Weight as of this encounter: 70.3 kg (155 lb). Body surface area is 1.77 meters squared.  Severe Pain (7) Comment: Data Unavailable   Patient's last menstrual period was 04/12/2017.  Allergies reviewed: Yes  Medications reviewed: Yes    Medications: Medication refills not needed today.  Pharmacy name entered into ZeroDesktop:    [id number] [place], [place] - [address] [place] [address] [place] [address]    Frailty Screening:   Is the patient here for a new oncology consult visit in cancer care? 2. No      Clinical concerns:       SUZETTE NEWSOME CMA              Again, thank you for allowing me to participate in the care of your patient.        Sincerely,        JAIME Garcia CNP  "

## 2024-09-20 ENCOUNTER — PATIENT OUTREACH (OUTPATIENT)
Dept: ONCOLOGY | Facility: HOSPITAL | Age: 54
End: 2024-09-20
Payer: COMMERCIAL

## 2024-09-20 NOTE — PROGRESS NOTES
Situation: Patient chart reviewed by care coordinator.    Background: Hemoglobin SC disease seen in clinic last week for 3 month follow up.  Remote hx of ovarian vein VTE.    Assessment: On Hydrea since April 2024. Tolerating with minimal side effects. Appears to have less pain while on this.   Now off anticoagulation.    Plan/Recommendations: Recheck labs and visit in 3 months.     Ricarda Munoz RN   Cancer Care Nurse Coordinator  [facility]  [phone]

## 2024-10-14 ENCOUNTER — TELEPHONE (OUTPATIENT)
Dept: FAMILY MEDICINE | Facility: CLINIC | Age: 54
End: 2024-10-14
Payer: COMMERCIAL

## 2024-10-14 NOTE — TELEPHONE ENCOUNTER
Patient Quality Outreach    Patient is due for the following:   Hypertension -  BP check and Hypertension follow-up visit    Next Steps:   Patient was scheduled for 10/18/2024    Type of outreach:    Phone, spoke to patient/parent. Patient agree to schedule ofv    Next Steps:  Reach out within 90 days via Phone.    Max number of attempts reached: No. Will try again in 90 days if patient still on fail list.    Questions for provider review:    None           Trisha Sheridan CMA  Chart routed to N/A.

## 2024-10-15 ENCOUNTER — VIRTUAL VISIT (OUTPATIENT)
Dept: OTOLARYNGOLOGY | Facility: CLINIC | Age: 54
End: 2024-10-15
Payer: COMMERCIAL

## 2024-10-15 DIAGNOSIS — R49.0 DYSPHONIA: Primary | ICD-10-CM

## 2024-10-15 DIAGNOSIS — J38.7 LARYNGEAL HYPERFUNCTION: ICD-10-CM

## 2024-10-15 PROCEDURE — 92507 TX SP LANG VOICE COMM INDIV: CPT | Mod: GN | Performed by: SPEECH-LANGUAGE PATHOLOGIST

## 2024-10-15 NOTE — PATIENT INSTRUCTIONS
Resonant Voice Therapy Exercises    Targets:   Feel vibration in mid face or in mouth (lips, teeth, cheek bones, nose, etc.) - Maximize the vibration   Make it easy - Minimize vocal effort       Spend as much time as you need on each of the following levels to have a consistently clear voice - when it is consistently clear and across variety in that step move on to the next level. You don t necessarily have to spend much time, spend as much or as little time as you need for each level. Except for Step 6 (reading aloud), spend 2-5 minutes reading out loud maintaining the clear voice.         Do all exercises twice daily, spending 5-10 minutes total on average across the exercises.  Sound level humming ( m  sound) or holding  n,   ng,   z,   v,   oh  or  oo  with lip buzz, etc, at your normal speaking pitch and normal speaking loudness.  Syllable repetition level -  maikol maikol maikol   momomomo   moomoomoo   mamamama , etc. or  n  + vowels, etc.  Word repetition level - money money money, Monday Monday Monday, many, many, many, many (see list of /m/ words/phrases and pick a few words)  Phrase level - Many men on the moon. Mail my money. One Monday morning, etc. (see list of /m/ words/phrases and pick a few phrases). Start with chanting and gradually add more natural intonation.  Automatic speech (over learned/rote) - counting, days of week, months of the year, ABCs, nursery rhymes, lyrics to songs you know well, etc. Start with chanting if needed as you did with exercise 4.  Read out loud - any material will do: newspaper, magazine, book, junk mail, etc. Try to spend the majority of the practice time with reading but ensure you re maintaining the improved/strong voice throughout.       Throughout the day:  Notice your voice as you re saying yes/no responses (right, all right, sure, okay, yes, no, etc.) and pull-out/fix the voice if needed using the same yes/no responses as spring boards to improve the voice. If needed, sip  water, yawn, shift how you re standing/sitting, stretch, etc.        Additional /m/-loaded sentences:  Many miles  Mail my money  Trisha  Rafita.  Mean Marge made me mad.  Ale marinates meat.  Maybe Trisha measured.  Mazin makes much money.  My mom made marmalade  Many munchkins made merry.  Fonseca mongrels may maul mice.  Meager meals made my mom moan.  More mayhem made managers mad.  Move my mom s many magazines.  Major migraines made me miserable.  My meddling may mean major misery.  Madam Barber mangled magnolias.  Mercy me!  Meet my mother.  Lopez made moccasins.  Raymundo Barber met Max.  My mother makes muffins.  Jeana marched many miles.  My mother mailed me merchandise.  Make more frantz marmalade.  My mother makes much money.  Monday morning must mean mishaps.  Jesus memorized measurements.  Mosquitoes may mean malaria.  Mend Chelsi s many mismatched mittens.  Mass media mocks many matters.  Modest mannerisms make me marvel.  Molten magma might migrate many miles.  Misconduct may mean many misdemeanors.

## 2024-10-15 NOTE — PROGRESS NOTES
"[facility]  VOICE / UPPER AIRWAY TREATMENT NOTE (CPT 72888)      Patient's name: Kelly Harris  Date of Session: 10/15/24  Providing SLP: Emily Loaiza MS CCC-SLP  Referring Provider: Lyubov Ruelas MD MPH  Insurance Coverage: [employer]  Total # of SLP Visits: 5  # of SLP Therapy Sessions: 4  Session Location: Kelly was seen via telehealth today.     The patient has been notified and verbally consented to the following statements:   This video visit will be conducted between you and your provider.  Patient has opted to conduct today's video visit vs an in-person appointment, and is not able to attend due to possible exposure to COVID-19.    If during the course of the call the provider feels a video visit is not appropriate, you will not be charged for this service.     Provider has received verbal consent for billable virtual visit from the patient? Yes  Preferred method for receiving information: Mevvy  Call initiated at: 2:57 PM  Call ended at: 3:33 PM  Platform used to conduct today's virtual appointment: [employer] Well Video  Location of provider: Residence   Location of patient: Residence       Impressions from most recent evaluation on 7/22/24 by Emily Loaiza MS CCC-SLP:   Kelly is a 53-year-old female presenting today with dysphonia R49.0 secondary to irritable larynx syndrome J38.7 and laryngeal hyperfunction \" \". Perceptually, her voice could present normally at times with times where significant breathiness/roughness and strain were noted. Laryngoscopy today demonstrated 4-way supraglottic compression with decreased vocal fold entrainment when inappropriate voice quality was observed due to inappropriate balance and coordination of respiration and phonation. Therefore, a course of voice therapy has been recommended, as this was previously successful for her in 2017. Kelly is in agreement with this plan of care.     Additionally, this patient appears to be a candidate for " "participation in our current research studies within the department. A warm introduction was not provided today.       SUBJECTIVE:  Voice has been doing better  Losing voice was once per week for a few days prior to starting this course of voice therapy  Not happening much anymore  Last time was last Saturday and only lasting for a couple of hours  Having oral surgery next week  Incisions under her tongue  Had this once before  Impacted her ability to speak  Unsure if she can use a straw      OBJECTIVE/ASSESSMENT:    Patient Supplied Answers To Last 2 VHI Questionnaires      9/1/2024    12:12 PM 10/14/2024     8:26 PM   Voice Handicap Index (VHI-10)   My voice makes it difficult for people to hear me 2 2   People have difficulty understanding me in a noisy room 2 2   My voice difficulties restrict my personal and social life.  0 0   I feel left out of conversations because of my voice 0 0   My voice problem causes me to lose income 0 0   I feel as though I have to strain to produce voice 2 2   The clarity of my voice is unpredictable 2 2   My voice problem upsets me 0 2   My voice makes me feel handicapped 0 0   People ask, \"What's wrong with your voice?\" 2 2   VHI-10 10 12     Patient Supplied Answers To Last 2 CSI Questionnaires      8/6/2024     8:14 AM   Cough Severity Index (CSI)   My cough is worse when I lie down 2   My coughing problem causes me to restrict my personal and social life 2   I tend to avoid places because of my cough problem 2   I feel embarrassed because of my coughing problem 0   People ask, ''What's wrong?'' because I cough a lot 2   I run out of air when I cough 2   My coughing problem affects my voice 3   My coughing problem limits my physical activity 2   My coughing problem upsets me 0   People ask me if I am sick because I cough a lot 2   CSI Score 17       THERAPEUTIC ACTIVITIES:  Resonant Voice Therapy (RVT) involves training voice-disordered individuals to produce voice in an easier, " more resonant and forward-focused manner throughout the speech hierarchy. The objective of this approach is to achieve the strongest possible voice with the least effort and impact stress between the vocal folds to minimize the likelihood of injury. These exercises were reviewed today from the phoneme to automatic speech level, and Kelly performed them with 70% accuracy with minimal clinician cueing and modeling. Adjustments were made to slightly reduce effort and maintain forward-focused trajectory of airflow. She generally presented with a strong and clear voice quality at baseline, leading to minimal adjustments needed to improve voice quality while performing these techniques      IMPRESSIONS:   Dysphonia R49.0 secondary to laryngeal hyperfunction J38.7    Kelly's voice has been doing very well with significant reductions in the frequency and duration of dysphonia. She will be undergoing a procedure to the floor of her mouth, which she has had before, and endorses concerns about her ability to perform straw phonation exercises with the incisions and healing. RVT techniques were reviewed as an alternative.      PLAN:  Kelly will perform straw phonation without water resistance exercises twice daily for 10 repetitions of her maximum phonation time at modal pitch and loudness or RVT techniques while healing from oral surgery in the coming weeks  Kelly will perform circumlaryngeal massage techniques as needed in the thyrohyoid space and suprahyoid musculature between sessions  Kelly will perform rescue breathing techniques twice daily while asymptomatic, as well as at the immediate onset of dyspnea until it has resolved  Written instructions were provided to aid home programming via [employer]icebooks  Kelly continues to demonstrate adequate progress toward long- and short-term goals.  Continued voice therapy services are recommended. Kelly will follow-up for additional sessions on 11/20/24. Current goals will  continue to be addressed.  Kelly is in agreement with this plan of care.      SLP PLAN:  Voice SLP presence at next appointment with laryngologist (e.g. Dr. Padgett, Dr. Ruelas, Dr. Snowden) is likely not needed given her progress in therapy thus far.  Next scheduled MD appointment is N/A.      BILLING SUMMARY:  Total treatment time: 36 minutes (including 5 minutes of chart review and preparation, interpretation of testing and therapeutic maneuvers, and document writing)  Speech Pathology Treatment ([id number])      Emily Loaiza MS CCC-SLP  Speech-Language Pathologist  [facility]  Department of Otolaryngology - Head and Neck Surgery  [facility]  [email or web address]  Direct: [phone]  [phone]    *This note may have been completed using [id number] dictation software, so errors may exist. Please contact me for clarification if needed*

## 2024-10-15 NOTE — LETTER
"10/15/2024       RE: Kelly Harris  [address] [facility] [place] [address]     Dear Colleague,    Thank you for referring your patient, Kelly Harris, to the [facility] [place] [facility]. Please see a copy of my visit note below.    [facility]  VOICE / UPPER AIRWAY TREATMENT NOTE (CPT [id number])      Patient's name: Kelly Harris  Date of Session: 10/15/24  Providing SLP: Emily Loaiza MS CCC-SLP  Referring Provider: Lyubov Ruelas MD MPH  Insurance Coverage: [employer]  Total # of SLP Visits: 5  # of SLP Therapy Sessions: 4  Session Location: Kelly was seen via telehealth today.     The patient has been notified and verbally consented to the following statements:   This video visit will be conducted between you and your provider.  Patient has opted to conduct today's video visit vs an in-person appointment, and is not able to attend due to possible exposure to COVID-19.    If during the course of the call the provider feels a video visit is not appropriate, you will not be charged for this service.     Provider has received verbal consent for billable virtual visit from the patient? Yes  Preferred method for receiving information: [employer]  Call initiated at: 2:57 PM  Call ended at: 3:33 PM  Platform used to conduct today's virtual appointment: [employer] Video  Location of provider: Residence   Location of patient: Residence       Impressions from most recent evaluation on 7/22/24 by Emily Loaiza MS CCC-SLP:   Kelly is a 53-year-old female presenting today with dysphonia R49.0 secondary to irritable larynx syndrome J38.7 and laryngeal hyperfunction \" \". Perceptually, her voice could present normally at times with times where significant breathiness/roughness and strain were noted. Laryngoscopy today demonstrated 4-way supraglottic compression with decreased vocal fold entrainment when inappropriate voice quality was " "observed due to inappropriate balance and coordination of respiration and phonation. Therefore, a course of voice therapy has been recommended, as this was previously successful for her in 2017. Kelly is in agreement with this plan of care.     Additionally, this patient appears to be a candidate for participation in our current research studies within the department. A warm introduction was not provided today.       SUBJECTIVE:  Voice has been doing better  Losing voice was once per week for a few days prior to starting this course of voice therapy  Not happening much anymore  Last time was last Saturday and only lasting for a couple of hours  Having oral surgery next week  Incisions under her tongue  Had this once before  Impacted her ability to speak  Unsure if she can use a straw      OBJECTIVE/ASSESSMENT:    Patient Supplied Answers To Last 2 VHI Questionnaires      9/1/2024    12:12 PM 10/14/2024     8:26 PM   Voice Handicap Index (VHI-10)   My voice makes it difficult for people to hear me 2 2   People have difficulty understanding me in a noisy room 2 2   My voice difficulties restrict my personal and social life.  0 0   I feel left out of conversations because of my voice 0 0   My voice problem causes me to lose income 0 0   I feel as though I have to strain to produce voice 2 2   The clarity of my voice is unpredictable 2 2   My voice problem upsets me 0 2   My voice makes me feel handicapped 0 0   People ask, \"What's wrong with your voice?\" 2 2   VHI-10 10 12     Patient Supplied Answers To Last 2 CSI Questionnaires      8/6/2024     8:14 AM   Cough Severity Index (CSI)   My cough is worse when I lie down 2   My coughing problem causes me to restrict my personal and social life 2   I tend to avoid places because of my cough problem 2   I feel embarrassed because of my coughing problem 0   People ask, ''What's wrong?'' because I cough a lot 2   I run out of air when I cough 2   My coughing problem " affects my voice 3   My coughing problem limits my physical activity 2   My coughing problem upsets me 0   People ask me if I am sick because I cough a lot 2   CSI Score 17       THERAPEUTIC ACTIVITIES:  Resonant Voice Therapy (RVT) involves training voice-disordered individuals to produce voice in an easier, more resonant and forward-focused manner throughout the speech hierarchy. The objective of this approach is to achieve the strongest possible voice with the least effort and impact stress between the vocal folds to minimize the likelihood of injury. These exercises were reviewed today from the phoneme to automatic speech level, and Kelly performed them with 70% accuracy with minimal clinician cueing and modeling. Adjustments were made to slightly reduce effort and maintain forward-focused trajectory of airflow. She generally presented with a strong and clear voice quality at baseline, leading to minimal adjustments needed to improve voice quality while performing these techniques      IMPRESSIONS:   Dysphonia R49.0 secondary to laryngeal hyperfunction J38.7    Kelly's voice has been doing very well with significant reductions in the frequency and duration of dysphonia. She will be undergoing a procedure to the floor of her mouth, which she has had before, and endorses concerns about her ability to perform straw phonation exercises with the incisions and healing. RVT techniques were reviewed as an alternative.      PLAN:  Kelly will perform straw phonation without water resistance exercises twice daily for 10 repetitions of her maximum phonation time at modal pitch and loudness or RVT techniques while healing from oral surgery in the coming weeks  Kelly will perform circumlaryngeal massage techniques as needed in the thyrohyoid space and suprahyoid musculature between sessions  Kelly will perform rescue breathing techniques twice daily while asymptomatic, as well as at the immediate onset of dyspnea  until it has resolved  Written instructions were provided to aid home programming via [employer]  Kelly continues to demonstrate adequate progress toward long- and short-term goals.  Continued voice therapy services are recommended. Kelly will follow-up for additional sessions on 24. Current goals will continue to be addressed.  Kelly is in agreement with this plan of care.      SLP PLAN:  Voice SLP presence at next appointment with laryngologist (e.g. Dr. Padgett, Dr. Ruelas, Dr. Snowden) is likely not needed given her progress in therapy thus far.  Next scheduled MD appointment is N/A.      BILLING SUMMARY:  Total treatment time: 36 minutes (including 5 minutes of chart review and preparation, interpretation of testing and therapeutic maneuvers, and document writing)  Speech Pathology Treatment (26365)      Emily Loaiza MS CCC-SLP  Speech-Language Pathologist  [facility]  Department of Otolaryngology - Head and Neck Surgery  [facility]  [email or web address]  Direct: [phone]  [phone]    *This note may have been completed using [id number] dictation software, so errors may exist. Please contact me for clarification if needed*      Again, thank you for allowing me to participate in the care of your patient.      Sincerely,    Emily Loaiza, SAFIA

## 2024-10-18 ENCOUNTER — OFFICE VISIT (OUTPATIENT)
Dept: FAMILY MEDICINE | Facility: CLINIC | Age: 54
End: 2024-10-18
Payer: COMMERCIAL

## 2024-10-18 VITALS
HEIGHT: 64 IN | HEART RATE: 95 BPM | DIASTOLIC BLOOD PRESSURE: 87 MMHG | BODY MASS INDEX: 26.32 KG/M2 | RESPIRATION RATE: 20 BRPM | TEMPERATURE: 97.5 F | OXYGEN SATURATION: 100 % | WEIGHT: 154.2 LBS | SYSTOLIC BLOOD PRESSURE: 133 MMHG

## 2024-10-18 DIAGNOSIS — M25.561 ARTHRALGIA OF RIGHT LOWER LEG: ICD-10-CM

## 2024-10-18 DIAGNOSIS — Z23 NEED FOR PROPHYLACTIC VACCINATION AND INOCULATION AGAINST INFLUENZA: ICD-10-CM

## 2024-10-18 DIAGNOSIS — Z56.9 ADVERSE EXPOSURE IN WORKPLACE: ICD-10-CM

## 2024-10-18 DIAGNOSIS — D57.20 SICKLE-CELL/HB-C DISEASE WITHOUT CRISIS (H): ICD-10-CM

## 2024-10-18 DIAGNOSIS — Z12.31 VISIT FOR SCREENING MAMMOGRAM: ICD-10-CM

## 2024-10-18 DIAGNOSIS — J45.40 MODERATE PERSISTENT ASTHMA WITHOUT COMPLICATION: Primary | ICD-10-CM

## 2024-10-18 LAB
BASOPHILS # BLD MANUAL: 0 10E3/UL (ref 0–0.2)
BASOPHILS NFR BLD MANUAL: 0 %
DACRYOCYTES BLD QL SMEAR: SLIGHT
EOSINOPHIL # BLD MANUAL: 0 10E3/UL (ref 0–0.7)
EOSINOPHIL NFR BLD MANUAL: 0 %
ERYTHROCYTE [DISTWIDTH] IN BLOOD BY AUTOMATED COUNT: 14 % (ref 10–15)
HCT VFR BLD AUTO: 34.3 % (ref 35–47)
HGB BLD-MCNC: 12.6 G/DL (ref 11.7–15.7)
LYMPHOCYTES # BLD MANUAL: 5 10E3/UL (ref 0.8–5.3)
LYMPHOCYTES NFR BLD MANUAL: 65 %
MCH RBC QN AUTO: 34.8 PG (ref 26.5–33)
MCHC RBC AUTO-ENTMCNC: 36.7 G/DL (ref 31.5–36.5)
MCV RBC AUTO: 95 FL (ref 78–100)
MONOCYTES # BLD MANUAL: 0.5 10E3/UL (ref 0–1.3)
MONOCYTES NFR BLD MANUAL: 7 %
NEUTROPHILS # BLD MANUAL: 2.2 10E3/UL (ref 1.6–8.3)
NEUTROPHILS NFR BLD MANUAL: 28 %
NRBC # BLD AUTO: 1.3 10E3/UL
NRBC BLD MANUAL-RTO: 17 %
PLAT MORPH BLD: ABNORMAL
PLATELET # BLD AUTO: 305 10E3/UL (ref 150–450)
POLYCHROMASIA BLD QL SMEAR: SLIGHT
RBC # BLD AUTO: 3.62 10E6/UL (ref 3.8–5.2)
RBC MORPH BLD: ABNORMAL
TARGETS BLD QL SMEAR: ABNORMAL
VARIANT LYMPHS BLD QL SMEAR: PRESENT
WBC # BLD AUTO: 7.7 10E3/UL (ref 4–11)

## 2024-10-18 PROCEDURE — 90471 IMMUNIZATION ADMIN: CPT | Performed by: FAMILY MEDICINE

## 2024-10-18 PROCEDURE — 99215 OFFICE O/P EST HI 40 MIN: CPT | Mod: 25 | Performed by: FAMILY MEDICINE

## 2024-10-18 PROCEDURE — 20610 DRAIN/INJ JOINT/BURSA W/O US: CPT | Performed by: FAMILY MEDICINE

## 2024-10-18 PROCEDURE — 86481 TB AG RESPONSE T-CELL SUSP: CPT | Performed by: FAMILY MEDICINE

## 2024-10-18 PROCEDURE — 90746 HEPB VACCINE 3 DOSE ADULT IM: CPT | Performed by: FAMILY MEDICINE

## 2024-10-18 PROCEDURE — 90656 IIV3 VACC NO PRSV 0.5 ML IM: CPT | Performed by: FAMILY MEDICINE

## 2024-10-18 PROCEDURE — 85007 BL SMEAR W/DIFF WBC COUNT: CPT | Performed by: FAMILY MEDICINE

## 2024-10-18 PROCEDURE — 85027 COMPLETE CBC AUTOMATED: CPT | Performed by: FAMILY MEDICINE

## 2024-10-18 PROCEDURE — 90472 IMMUNIZATION ADMIN EACH ADD: CPT | Performed by: FAMILY MEDICINE

## 2024-10-18 PROCEDURE — 36415 COLL VENOUS BLD VENIPUNCTURE: CPT | Performed by: FAMILY MEDICINE

## 2024-10-18 RX ORDER — BUPIVACAINE HYDROCHLORIDE 5 MG/ML
4 INJECTION, SOLUTION PERINEURAL ONCE
Status: COMPLETED | OUTPATIENT
Start: 2024-10-18 | End: 2024-10-18

## 2024-10-18 RX ORDER — TRIAMCINOLONE ACETONIDE 40 MG/ML
40 INJECTION, SUSPENSION INTRA-ARTICULAR; INTRAMUSCULAR ONCE
Status: COMPLETED | OUTPATIENT
Start: 2024-10-18 | End: 2024-10-18

## 2024-10-18 RX ORDER — PREDNISONE 20 MG/1
40 TABLET ORAL DAILY
Qty: 10 TABLET | Refills: 0 | Status: SHIPPED | OUTPATIENT
Start: 2024-10-18 | End: 2024-10-23

## 2024-10-18 RX ADMIN — TRIAMCINOLONE ACETONIDE 40 MG: 40 INJECTION, SUSPENSION INTRA-ARTICULAR; INTRAMUSCULAR at 12:20

## 2024-10-18 RX ADMIN — BUPIVACAINE HYDROCHLORIDE 20 MG: 5 INJECTION, SOLUTION PERINEURAL at 12:20

## 2024-10-18 SDOH — ECONOMIC STABILITY - INCOME SECURITY: UNSPECIFIED PROBLEMS RELATED TO EMPLOYMENT: Z56.9

## 2024-10-18 ASSESSMENT — ASTHMA QUESTIONNAIRES
QUESTION_2 LAST FOUR WEEKS HOW OFTEN HAVE YOU HAD SHORTNESS OF BREATH: ONCE OR TWICE A WEEK
QUESTION_3 LAST FOUR WEEKS HOW OFTEN DID YOUR ASTHMA SYMPTOMS (WHEEZING, COUGHING, SHORTNESS OF BREATH, CHEST TIGHTNESS OR PAIN) WAKE YOU UP AT NIGHT OR EARLIER THAN USUAL IN THE MORNING: ONCE OR TWICE
QUESTION_5 LAST FOUR WEEKS HOW WOULD YOU RATE YOUR ASTHMA CONTROL: SOMEWHAT CONTROLLED
ACT_TOTALSCORE: 17
QUESTION_1 LAST FOUR WEEKS HOW MUCH OF THE TIME DID YOUR ASTHMA KEEP YOU FROM GETTING AS MUCH DONE AT WORK, SCHOOL OR AT HOME: SOME OF THE TIME
ACT_TOTALSCORE: 17
QUESTION_4 LAST FOUR WEEKS HOW OFTEN HAVE YOU USED YOUR RESCUE INHALER OR NEBULIZER MEDICATION (SUCH AS ALBUTEROL): TWO OR THREE TIMES PER WEEK

## 2024-10-18 NOTE — LETTER
RETURN TO WORK/SCHOOL FORM    10/18/2024    Re: Kelly Harris  1970      To Whom It May Concern:     Kelly Harris was seen in clinic today.  She may return to work with restrictions below on 10/18/24 but able to work 8 hours per day, 5 days per week but no more than this amount.  Restrictions are effective indefinitely.        Restrictions:    Needs time off (approximately half day every 2 weeks) to attend MD visits  limited activities at work: no lifting more than 20 pounds; no climbing ladders      Onur Abebe MD  10/18/2024 11:12 AM

## 2024-10-18 NOTE — Clinical Note
Dear hematology colleague, I note that this patient has had 3 different prescriptions for tramadol this calendar year: 2 from my clinic and 1 from years.  I have suggested to her that she should decide where she should get future refills.  I am prepared to continue prescribing these if that is agreeable to your team, Onur ulrich, PCP

## 2024-10-18 NOTE — PROGRESS NOTES
ASSESSMENT/PLAN:  Kelly was seen today for recheck medication, imm/inj, letter for school/work and knee pain.    Diagnoses and all orders for this visit:    Moderate persistent asthma without complication  -     predniSONE (DELTASONE) 20 MG tablet; Take 2 tablets (40 mg) by mouth daily for 5 days.    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  I reviewed her previous [facility] paperwork and updated this to reflect that she should work 8 hours/day, 5 days/week.  I wrote a separate letter also outlining this and including her to work restrictions indicating that these should be valid indefinitely.    If she continues to have knee pain, I have encouraged her to follow-up and we can consider imaging.    We can offer chronic pain management at this clinic and I will copy my notes to her hematology team so that we can decide who is prescribing her narcotics.  She tells me she does not need a refill at this time and tries to use tramadol sparingly.  She previously took oxycodone but could not tolerate it.    I reviewed her health maintenance and verified that she has had 3 normal mammograms apparently since an inconclusive test in 2021 and can therefore return to screening frequency every 1 to 2 years.    She additionally requests a  QuantiFERON gold test since this is required annually by her employer.    Total visit time with patient was 40 mins, all of which was face to face MD time, and over 50% of this time was spent in counseling and coordination of care.  Including post-encounter documentation and orders on the date of service, total encounter time EXCLUDING 3 MINUTES FOR JOINT INJECTION was 45 mins.    The longitudinal plan of care for the diagnosis(es)/condition(s) as documented were addressed during this visit. Due to the added complexity in care, I will continue to support Kelly in the subsequent management and with ongoing continuity of care.      Andrew Mendoza is a 53 year old, presenting for the following health issues:  Recheck Medication (Med ck - bp ), Imm/Inj (Flu and hep B  - Flu Shot), Letter for School/Work (Letter for work - talk about her work restrictions. ), and Knee Pain (Right knee - pain and swelling - affecting her walking. - ck right knee )     This is a 53-year-old well-known to me who attends with multiple concerns.  Indeed, she was advised that she has too many concerns for a single visit however due to cancellations her concerns were able to be accommodated and addressed in a long visit today.  1.  She wants to review her medications and update her med list.  She attends hematology and has been prescribed hydroxyurea for which she is having some side effects.  She has also received 3 prescriptions of tramadol this year, most recently from hematology.  We discussed that if she is regularly receiving narcotics she should ideally obtain these from 1 source.  I will copy my notes to her hematology providers.    2.  She reports that her asthma is more symptomatic at present and she is attributing this to allergies.  She says she was quite symptomatic last night with shortness of breath and would have taken a burst of prednisone if she had it.  She is adherent with all her meds.    3.  She has been having  "right knee pain for about 5 weeks.  She denies any injury.  She has sickle cell SC disease and also questions whether this could be causing her pain.    4.  At last visit I had completed a lengthy FMLA form on her behalf.  In this form she had asked me to indicate that she could only work 6 hours/day because invariably she works longer than this.  She says her employers were unhappy with this restriction especially since she is actually working 8 hours/day 5 days/week.  They would like paperwork to document this.  Review of Systems     Social: Is  x 3 years.  Has 2 daughters in their early 20s.  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    Objective    Physical Exam   /87   Pulse 95   Temp 97.5  F (36.4  C) (Tympanic)   Resp 20   Ht 1.613 m (5' 3.5\")   Wt 69.9 kg (154 lb 3.2 oz)   LMP 04/12/2017   SpO2 100%   BMI 26.89 kg/m       Vitals stable  Well nourished and in no distress    Right knee examined - stable, no ligamentous laxity.  Small effusion appreciated when compared to left.  No warmth.  Offered cortisone injection - accepted.    No lower extremity edema     PROCEDURE NOTE:  Informed consent obtained  Antiseptic applied  Local anesthetic spray administered and 1cc kenalog with 4cc bupivicaine injected into RIGHT KNEE without complication  Hemostasis achieved  Dressing applied    I spent 15 minutes reviewing her medications with her and reconciling our med list.  She reports at this time that she does not believe she needs any refills.    Results for orders placed or performed in visit on 10/18/24   Quantiferon TB Gold Plus     Status: None (In process)    Specimen: Peripheral Blood    Narrative    The following orders were created for panel order Quantiferon TB Gold Plus.  Procedure                               Abnormality         Status                     ---------                               -----------         ------                   "   Quantiferon TB Gold Plus...[[id number]]                      In process                 Quantiferon TB Gold Plus...[[id number]]                      In process                 Quantiferon TB Gold Plus...[[id number]]                      In process                 Quantiferon TB Gold Plus...[[id number]]                      In process                   Please view results for these tests on the individual orders.   CBC with Platelets & Differential     Status: None (In process)    Narrative    The following orders were created for panel order CBC with Platelets & Differential.  Procedure                               Abnormality         Status                     ---------                               -----------         ------                     CBC with platelets and d...[[id number]]                      In process                   Please view results for these tests on the individual orders.             6/29/2024    10:55 AM 7/15/2024     9:08 AM 10/18/2024     9:53 AM   ACT Total Scores   ACT TOTAL SCORE (Goal Greater than or Equal to 20) 12 11 17   In the past 12 months, how many times did you visit the emergency room for your asthma without being admitted to the hospital? 0 0 0   In the past 12 months, how many times were you hospitalized overnight because of your asthma? 0 0 0

## 2024-10-18 NOTE — PROGRESS NOTES
Prior to immunization administration, verified patients identity using patient s name and date of birth. Please see Immunization Activity for additional information.     Screening Questionnaire for Adult Immunization    Are you sick today?   No   Do you have allergies to medications, food, a vaccine component or latex?   Yes   Have you ever had a serious reaction after receiving a vaccination?   No   Do you have a long-term health problem with heart, lung, kidney, or metabolic disease (e.g., diabetes), asthma, a blood disorder, no spleen, complement component deficiency, a cochlear implant, or a spinal fluid leak?  Are you on long-term aspirin therapy?   Yes   Do you have cancer, leukemia, HIV/AIDS, or any other immune system problem?   No   Do you have a parent, brother, or sister with an immune system problem?   No   In the past 3 months, have you taken medications that affect  your immune system, such as prednisone, other steroids, or anticancer drugs; drugs for the treatment of rheumatoid arthritis, Crohn s disease, or psoriasis; or have you had radiation treatments?   Yes   Have you had a seizure, or a brain or other nervous system problem?   No   During the past year, have you received a transfusion of blood or blood    products, or been given immune (gamma) globulin or antiviral drug?   No   For women: Are you pregnant or is there a chance you could become       pregnant during the next month?   No   Have you received any vaccinations in the past 4 weeks?   No     Immunization questionnaire was positive for at least one answer.  Notified Dr. Miguel.      Patient instructed to remain in clinic for 15 minutes afterwards, and to report any adverse reactions.     Screening performed by Heidy Quintanilla MA on 10/18/2024 at 10:58 AM.

## 2024-10-20 LAB
GAMMA INTERFERON BACKGROUND BLD IA-ACNC: 0.07 IU/ML
M TB IFN-G BLD-IMP: NEGATIVE
M TB IFN-G CD4+ BCKGRND COR BLD-ACNC: 9.93 IU/ML
MITOGEN IGNF BCKGRD COR BLD-ACNC: 0 IU/ML
MITOGEN IGNF BCKGRD COR BLD-ACNC: 0.02 IU/ML
QUANTIFERON MITOGEN: 10 IU/ML
QUANTIFERON NIL TUBE: 0.07 IU/ML
QUANTIFERON TB1 TUBE: 0.07 IU/ML
QUANTIFERON TB2 TUBE: 0.09

## 2024-11-05 ENCOUNTER — OFFICE VISIT (OUTPATIENT)
Dept: PHYSICAL MEDICINE AND REHAB | Facility: CLINIC | Age: 54
End: 2024-11-05
Payer: COMMERCIAL

## 2024-11-05 VITALS — DIASTOLIC BLOOD PRESSURE: 69 MMHG | HEART RATE: 73 BPM | SYSTOLIC BLOOD PRESSURE: 116 MMHG

## 2024-11-05 DIAGNOSIS — M47.816 LUMBAR FACET ARTHROPATHY: Primary | ICD-10-CM

## 2024-11-05 DIAGNOSIS — M43.16 SPONDYLOLISTHESIS OF LUMBAR REGION: ICD-10-CM

## 2024-11-05 DIAGNOSIS — M79.18 MYOFASCIAL PAIN: ICD-10-CM

## 2024-11-05 ASSESSMENT — PAIN SCALES - GENERAL: PAINLEVEL_OUTOF10: MILD PAIN (2)

## 2024-11-05 NOTE — LETTER
11/5/2024      Kelly Harris  [address]  [place] [address]      Dear Colleague,    Thank you for referring your patient, Kelly Harris, to the [facility]. Please see a copy of my visit note below.    Assessment/Plan:      Kelly was seen today for back pain.    Diagnoses and all orders for this visit:    Lumbar facet arthropathy    Spondylolisthesis of lumbar region    Myofascial pain         Assessment: Pleasant 53 year old female with a history of asthma ,pulmonary embolism no longer on Eliquis, sickle cell disease with:     1.  Approximate 1 year history of left much worse than right lumbar spine and gluteal pain.  Most of her pain is at the lumbosacral junction left gluteal region consistent with facet arthropathy with severe facet arthritis L4-5 with spondylolisthesis and severe facet arthropathy L5-S1.  She has had some lumbar radicular pain likely related to foraminal stenosis on the left at L5-S1.  She has had pain despite physical therapy, Osteopathic manipulative medicine oxycodone and tramadol along with Medrol Dosepak trials.  She has had approximately 8 visits of physical therapy per her report.  She has L4-5 trace spondylolisthesis with severe facet arthropathy and moderate severe facet arthropathy L5-S1  Very good relief for only 1 day and then the pain significantly worsened following bilateral SI joint injections.  Persistent pain in the left lumbar spine PSIS left gluteal region posterior lateral left leg and calf feels pulling down her leg. The MRI she reveals moderate foraminal stenosis at L5-S1 on the left this could represent L5 radiculopathy.  Normal  EMG of the left lower extremity.  A month and a half of good improvement with lumbar epidural steroid injection L5-S1 TFESI however pain is returned and more of her pain is in the back of the leg.   Gabapentin is the most helpful taking 300 mg in the morning and at bedtime occasional only 600 mg at  bedtime.  Meloxicam minimally helpful because slight increase in blood pressure has stopped.      Markedly improved over 70% status post left  L3, 4, 5 medial branch radiofrequency ablation.     2.  Poor sleep related to pain no longer taking gabapentin as has had improvement with RF.      Discussion:    1.  Overall she is doing quite well after radiofrequency ablation.  Her pain is markedly reduced and not taking any additional pain medication.  We discussed she should continue with core strengthening with lunges or squats as well as any home exercises previously provided by PT to continue with core strengthening.    2.  Follow-up with me as needed.      It was our pleasure caring for your patient today, if there any questions or concerns please do not hesitate to contact us.      Subjective:   Patient ID: Kelly Harris is a 53 year old female.    History of Present Illness: Patient presents for follow-up of lumbar spine pain after radiofrequency ablation.  Overall doing quite well.  Over 70% improvement.  Not having the constant pain and sleeping better.  Able to stop gabapentin.  Still has pain meds such as Tylenol or tramadol she has sickle cell and has crisis intermittently and has intermittent flares of joint pain still has some pain with prolonged standing or bending but overall very pleased with her progress 2/10 pain today.  No longer having shooting pain down the legs.      Imaging: Lumbar MRI from December 2023 images personally reviewed again today revealing mild spondylolisthesis L4 and L5 with mild degenerative disc disease L4-5 L5-S1.  Moderate to severe facet arthropathy L4-5 moderate L5-S1    Review of Systems: Pertinent positives: None.  Pertinent negatives: No numbness, tingling or weakness.  No bowel or bladder incontinence.  No urinary retention.  No fevers, unintentional weight loss, balance changes, headaches, frequent falling, difficulty swallowing, or coordination difficulties.  All others  reviewed are negative.         Current Outpatient Medications   Medication Sig Dispense Refill     acetaminophen (TYLENOL) 325 MG tablet Take 3 tablets (975 mg) by mouth every 8 hours 120 tablet 1     albuterol (PROAIR HFA/PROVENTIL HFA/VENTOLIN HFA) 108 (90 Base) MCG/ACT inhaler Inhale 2 puffs into the lungs every 6 hours as needed for shortness of breath, wheezing or cough 18 g 12     amLODIPine (NORVASC) 5 MG tablet Take 1 tablet (5 mg) by mouth daily 90 tablet 1     baclofen (LIORESAL) 10 MG tablet TAKE 1/2-1 TABLET (5-10 MG) BY MOUTH DAILY AS NEEDED FOR MUSCLE SPASMS 90 tablet 1     cetirizine (ZYRTEC) 10 MG tablet TAKE 1 TABLET BY MOUTH EVERY DAY 90 tablet 3     fluticasone (FLONASE) 50 MCG/ACT nasal spray Spray 1 spray into both nostrils daily 32 mL 4     fluticasone-vilanterol (BREO ELLIPTA) 200-25 MCG/ACT inhaler TAKE 1 PUFF BY MOUTH EVERY DAY 60 each 12     FOLIC ACID PO Take 1 tablet by mouth daily       hydroxyurea (HYDREA) 500 MG capsule Take 2 capsules (1,000 mg) by mouth daily 60 capsule 2     montelukast (SINGULAIR) 10 MG tablet TAKE 1 TABLET BY MOUTH EVERYDAY AT BEDTIME 90 tablet 1     naloxone (NARCAN) 4 MG/0.1ML nasal spray Spray 1 spray (4 mg) into one nostril alternating nostrils as needed for opioid reversal every 2-3 minutes until assistance arrives (Patient not taking: Reported on 9/11/2024) 0.2 mL 3     prochlorperazine (COMPAZINE) 10 MG tablet Take 1 tablet (10 mg) by mouth every 6 hours as needed for nausea or vomiting 30 tablet 1     tiotropium (SPIRIVA RESPIMAT) 2.5 MCG/ACT inhaler INHALE 2 PUFFS BY MOUTH INTO THE LUNGS DAILY 4 g 12     traMADol (ULTRAM) 50 MG tablet Take 1 tablet (50 mg) by mouth every 6 hours as needed for severe pain. 60 tablet 0     No current facility-administered medications for this visit.     Facility-Administered Medications Ordered in Other Visits   Medication Dose Route Frequency Provider Last Rate Last Admin     iohexol (OMNIPAQUE) 300 mg/mL injection    Once  PRN Ananth Jenkins, DO   0.5 mL at 02/13/24 0950     lidocaine (PF) (XYLOCAINE) 1 % injection    Once PRN Ananth Jenkins DO   2 mL at 02/13/24 0949     methylPREDNISolone (DEPO-Medrol) injection    Once PRN GregoryAnanth ryan, DO   40 mg at 02/13/24 0950     ROPivacaine (NAROPIN) 0.5% injection    Once PRN Ananth Jenkins, DO   5 mL at 02/13/24 0950       Past Medical History:   Diagnosis Date     Asthma      Benign essential hypertension 7/15/2024     Sickle cell pain crisis (H) 07/16/2023       The following portions of the patient's history were reviewed and updated as appropriate: allergies, current medications, past family history, past medical history, past social history, past surgical history and problem list.           Objective:   Physical Exam:    /69   Pulse 73   LMP 04/12/2017   There is no height or weight on file to calculate BMI.      General: Alert and oriented with normal affect. Attention, knowledge, memory, and language are intact. No acute distress.   Eyes: Sclerae are clear.  Respirations: Unlabored. CV: No lower extremity edema.        Sensation is intact to light touch throughout the  lower extremities.       Manual muscle testing reveals:  Right /Left out of 5     5/5 knee flexors  5/5 knee extensors  5/5 ankle plantar flexors  5/5 ankle dorsiflexors  5/5   ankle evertors      Again, thank you for allowing me to participate in the care of your patient.        Sincerely,        Orlando Rice DO

## 2024-11-05 NOTE — PROGRESS NOTES
Assessment/Plan:      Kelly was seen today for back pain.    Diagnoses and all orders for this visit:    Lumbar facet arthropathy    Spondylolisthesis of lumbar region    Myofascial pain         Assessment: Pleasant 53 year old female with a history of asthma ,pulmonary embolism no longer on Eliquis, sickle cell disease with:     1.  Approximate 1 year history of left much worse than right lumbar spine and gluteal pain.  Most of her pain is at the lumbosacral junction left gluteal region consistent with facet arthropathy with severe facet arthritis L4-5 with spondylolisthesis and severe facet arthropathy L5-S1.  She has had some lumbar radicular pain likely related to foraminal stenosis on the left at L5-S1.  She has had pain despite physical therapy, Osteopathic manipulative medicine oxycodone and tramadol along with Medrol Dosepak trials.  She has had approximately 8 visits of physical therapy per her report.  She has L4-5 trace spondylolisthesis with severe facet arthropathy and moderate severe facet arthropathy L5-S1  Very good relief for only 1 day and then the pain significantly worsened following bilateral SI joint injections.  Persistent pain in the left lumbar spine PSIS left gluteal region posterior lateral left leg and calf feels pulling down her leg. The MRI she reveals moderate foraminal stenosis at L5-S1 on the left this could represent L5 radiculopathy.  Normal  EMG of the left lower extremity.  A month and a half of good improvement with lumbar epidural steroid injection L5-S1 TFESI however pain is returned and more of her pain is in the back of the leg.   Gabapentin is the most helpful taking 300 mg in the morning and at bedtime occasional only 600 mg at bedtime.  Meloxicam minimally helpful because slight increase in blood pressure has stopped.      Markedly improved over 70% status post left  L3, 4, 5 medial branch radiofrequency ablation.     2.  Poor sleep related to pain no longer taking  gabapentin as has had improvement with RF.      Discussion:    1.  Overall she is doing quite well after radiofrequency ablation.  Her pain is markedly reduced and not taking any additional pain medication.  We discussed she should continue with core strengthening with lunges or squats as well as any home exercises previously provided by PT to continue with core strengthening.    2.  Follow-up with me as needed.      It was our pleasure caring for your patient today, if there any questions or concerns please do not hesitate to contact us.      Subjective:   Patient ID: Kelly Harris is a 53 year old female.    History of Present Illness: Patient presents for follow-up of lumbar spine pain after radiofrequency ablation.  Overall doing quite well.  Over 70% improvement.  Not having the constant pain and sleeping better.  Able to stop gabapentin.  Still has pain meds such as Tylenol or tramadol she has sickle cell and has crisis intermittently and has intermittent flares of joint pain still has some pain with prolonged standing or bending but overall very pleased with her progress 2/10 pain today.  No longer having shooting pain down the legs.      Imaging: Lumbar MRI from December 2023 images personally reviewed again today revealing mild spondylolisthesis L4 and L5 with mild degenerative disc disease L4-5 L5-S1.  Moderate to severe facet arthropathy L4-5 moderate L5-S1    Review of Systems: Pertinent positives: None.  Pertinent negatives: No numbness, tingling or weakness.  No bowel or bladder incontinence.  No urinary retention.  No fevers, unintentional weight loss, balance changes, headaches, frequent falling, difficulty swallowing, or coordination difficulties.  All others reviewed are negative.         Current Outpatient Medications   Medication Sig Dispense Refill    acetaminophen (TYLENOL) 325 MG tablet Take 3 tablets (975 mg) by mouth every 8 hours 120 tablet 1    albuterol (PROAIR HFA/PROVENTIL HFA/VENTOLIN  HFA) 108 (90 Base) MCG/ACT inhaler Inhale 2 puffs into the lungs every 6 hours as needed for shortness of breath, wheezing or cough 18 g 12    amLODIPine (NORVASC) 5 MG tablet Take 1 tablet (5 mg) by mouth daily 90 tablet 1    baclofen (LIORESAL) 10 MG tablet TAKE 1/2-1 TABLET (5-10 MG) BY MOUTH DAILY AS NEEDED FOR MUSCLE SPASMS 90 tablet 1    cetirizine (ZYRTEC) 10 MG tablet TAKE 1 TABLET BY MOUTH EVERY DAY 90 tablet 3    fluticasone (FLONASE) 50 MCG/ACT nasal spray Spray 1 spray into both nostrils daily 32 mL 4    fluticasone-vilanterol (BREO ELLIPTA) 200-25 MCG/ACT inhaler TAKE 1 PUFF BY MOUTH EVERY DAY 60 each 12    FOLIC ACID PO Take 1 tablet by mouth daily      hydroxyurea (HYDREA) 500 MG capsule Take 2 capsules (1,000 mg) by mouth daily 60 capsule 2    montelukast (SINGULAIR) 10 MG tablet TAKE 1 TABLET BY MOUTH EVERYDAY AT BEDTIME 90 tablet 1    naloxone (NARCAN) 4 MG/0.1ML nasal spray Spray 1 spray (4 mg) into one nostril alternating nostrils as needed for opioid reversal every 2-3 minutes until assistance arrives (Patient not taking: Reported on 9/11/2024) 0.2 mL 3    prochlorperazine (COMPAZINE) 10 MG tablet Take 1 tablet (10 mg) by mouth every 6 hours as needed for nausea or vomiting 30 tablet 1    tiotropium (SPIRIVA RESPIMAT) 2.5 MCG/ACT inhaler INHALE 2 PUFFS BY MOUTH INTO THE LUNGS DAILY 4 g 12    traMADol (ULTRAM) 50 MG tablet Take 1 tablet (50 mg) by mouth every 6 hours as needed for severe pain. 60 tablet 0     No current facility-administered medications for this visit.     Facility-Administered Medications Ordered in Other Visits   Medication Dose Route Frequency Provider Last Rate Last Admin    iohexol (OMNIPAQUE) 300 mg/mL injection    Once PRN Ananth Jenkins, DO   0.5 mL at 02/13/24 0950    lidocaine (PF) (XYLOCAINE) 1 % injection    Once PRN Ananth Jenkins, DO   2 mL at 02/13/24 0949    methylPREDNISolone (DEPO-Medrol) injection    Once PRN Ananth Jenkins, DO   40 mg at  02/13/24 0950    ROPivacaine (NAROPIN) 0.5% injection    Once PRN Ananth Jenkins, DO   5 mL at 02/13/24 0950       Past Medical History:   Diagnosis Date    Asthma     Benign essential hypertension 7/15/2024    Sickle cell pain crisis (H) 07/16/2023       The following portions of the patient's history were reviewed and updated as appropriate: allergies, current medications, past family history, past medical history, past social history, past surgical history and problem list.           Objective:   Physical Exam:    /69   Pulse 73   LMP 04/12/2017   There is no height or weight on file to calculate BMI.      General: Alert and oriented with normal affect. Attention, knowledge, memory, and language are intact. No acute distress.   Eyes: Sclerae are clear.  Respirations: Unlabored. CV: No lower extremity edema.        Sensation is intact to light touch throughout the  lower extremities.       Manual muscle testing reveals:  Right /Left out of 5     5/5 knee flexors  5/5 knee extensors  5/5 ankle plantar flexors  5/5 ankle dorsiflexors  5/5   ankle evertors

## 2024-11-19 NOTE — PROGRESS NOTES
"[facility] VOICE CLINIC  VOICE / UPPER AIRWAY TREATMENT NOTE (CPT 91016)      Patient's name: Kelly Harris  Date of Session: 11/20/24  Providing SLP: Emily Loaiza MS CCC-SLP  Referring Provider: Lyubov Ruelas MD MPH  Insurance Coverage: [employer]  Total # of SLP Visits: 6  # of SLP Therapy Sessions: 5  Session Location: Kelly was seen via telehealth today.     The patient has been notified and verbally consented to the following statements:   This video visit will be conducted between you and your provider.  Patient has opted to conduct today's video visit vs an in-person appointment, and is not able to attend due to possible exposure to COVID-19.    If during the course of the call the provider feels a video visit is not appropriate, you will not be charged for this service.     Provider has received verbal consent for billable virtual visit from the patient? Yes  Preferred method for receiving information: MomentCam  Call initiated at: 2:30 PM  Call ended at: 2:52 PM  Platform used to conduct today's virtual appointment: [employer] Video  Location of provider: Residence   Location of patient: Residence       Impressions from most recent evaluation on 7/22/24 by Emily Loaiza MS CCC-SLP:   Kelly is a 53-year-old female presenting today with dysphonia R49.0 secondary to irritable larynx syndrome J38.7 and laryngeal hyperfunction \" \". Perceptually, her voice could present normally at times with times where significant breathiness/roughness and strain were noted. Laryngoscopy today demonstrated 4-way supraglottic compression with decreased vocal fold entrainment when inappropriate voice quality was observed due to inappropriate balance and coordination of respiration and phonation. Therefore, a course of voice therapy has been recommended, as this was previously successful for her in 2017. Kelly is in agreement with this plan of care.     Additionally, this patient appears to be a candidate for " "participation in our current research studies within the department. A warm introduction was not provided today.       SUBJECTIVE:  Healing well from her oral surgery but has quite a bit of facial tenderness  Was able to do some straw exercises  Voice has been doing well  Using her inhalers more due to asthma flare with season change, and this has lead to some throat discomfort and dryness despite rinsing her mouth afterwards      OBJECTIVE/ASSESSMENT:    Patient Supplied Answers To Last 2 VHI Questionnaires      10/14/2024     8:26 PM 11/19/2024     3:35 PM   Voice Handicap Index (VHI-10)   My voice makes it difficult for people to hear me 2  2    People have difficulty understanding me in a noisy room 2  2    My voice difficulties restrict my personal and social life.  0  0    I feel left out of conversations because of my voice 0  0    My voice problem causes me to lose income 0  1    I feel as though I have to strain to produce voice 2  2    The clarity of my voice is unpredictable 2  3    My voice problem upsets me 2  0    My voice makes me feel handicapped 0  0    People ask, \"What's wrong with your voice?\" 2  2    VHI-10 12 12        Patient-reported     Patient Supplied Answers To Last 2 CSI Questionnaires      8/6/2024     8:14 AM 11/19/2024     3:35 PM   Cough Severity Index (CSI)   My cough is worse when I lie down 2  2    My coughing problem causes me to restrict my personal and social life 2  1    I tend to avoid places because of my cough problem 2  1    I feel embarrassed because of my coughing problem 0  1    People ask, ''What's wrong?'' because I cough a lot 2  2    I run out of air when I cough 2  2    My coughing problem affects my voice 3  2    My coughing problem limits my physical activity 2  1    My coughing problem upsets me 0  1    People ask me if I am sick because I cough a lot 2  2    CSI Score 17 15        Patient-reported       THERAPEUTIC ACTIVITIES:  Vocal hygiene concepts were " discussed with the patient to optimize vocal health. Systemic and topical hydration was emphasized. It has been recommended that she incorporate salt water gargling and humidification to aid with topical hydration, particularly during this asthma flare when she is using more inhalers. Counseling was provided regarding throat discomfort that could be secondary to laryngeal thrush and recommended she seek care if this worsens/occurs.      IMPRESSIONS:   Dysphonia R49.0 secondary to laryngeal hyperfunction J38.7    Kelly's voice has been doing very well despite her recent oral surgery. She is dealing with a flare in her asthma currently and has been using more inhalers, which she feels has impacted her voice and throat dryness lately. Vocal hygiene recommendations were provided      PLAN:  Kelly will perform straw phonation without water resistance exercises twice daily for 10 repetitions of her maximum phonation time at modal pitch and loudness  Kelly will increase vocal hygiene, particularly topical hydration  Kelly will perform circumlaryngeal massage techniques as needed in the thyrohyoid space and suprahyoid musculature between sessions  Kelly will perform rescue breathing techniques twice daily while asymptomatic, as well as at the immediate onset of dyspnea until it has resolved  Written instructions were provided to aid home programming via SlideJar  Kelly continues to demonstrate adequate progress toward long- and short-term goals.  Continued voice therapy services are recommended. Kelly will follow-up for additional sessions PRN. Current goals will continue to be addressed.  Kelly is in agreement with this plan of care.      SLP PLAN:  Voice SLP presence at next appointment with laryngologist (e.g. Dr. Padgett, Dr. Ruelas, Dr. Snowden) is likely not needed given her progress in therapy thus far.  Next scheduled MD appointment is N/A.      BILLING SUMMARY:  Total treatment time: 22 minutes  (including 7 minutes of chart review and preparation, interpretation of testing and therapeutic maneuvers, and document writing)  Speech Pathology Treatment ([id number])      Emily Loaiza MS CCC-SLP  Speech-Language Pathologist  [facility]  Department of Otolaryngology - Head and Neck Surgery  [facility]  [email or web address]  Direct: [phone]  [phone]    *This note may have been completed using [id number] dictation software, so errors may exist. Please contact me for clarification if needed*

## 2024-11-20 ENCOUNTER — VIRTUAL VISIT (OUTPATIENT)
Dept: OTOLARYNGOLOGY | Facility: CLINIC | Age: 54
End: 2024-11-20
Payer: COMMERCIAL

## 2024-11-20 DIAGNOSIS — R49.0 DYSPHONIA: Primary | ICD-10-CM

## 2024-11-20 DIAGNOSIS — J38.7 LARYNGEAL HYPERFUNCTION: ICD-10-CM

## 2024-11-20 NOTE — PATIENT INSTRUCTIONS
Use a humidifier at night in your bedroom and/or during the day in frequently used rooms    Saline gargle recipe: up to 4x/day if you want to   8 oz water  1/4 tsp of salt  1/4 tsp of baking soda

## 2024-11-20 NOTE — LETTER
"11/20/2024       RE: Kelly Harris  [address] [facility] [place] [address]     Dear Colleague,    Thank you for referring your patient, Kelly Harris, to the [facility]ICE [facility] [place] at [facility]. Please see a copy of my visit note below.    [facility]  VOICE / UPPER AIRWAY TREATMENT NOTE (CPT 64073)      Patient's name: Kelly Harris  Date of Session: 11/20/24  Providing SLP: Emily Loaiza MS CCC-SLP  Referring Provider: Lyubov Ruelas MD MPH  Insurance Coverage: [employer]  Total # of SLP Visits: 6  # of SLP Therapy Sessions: 5  Session Location: Kelly was seen via telehealth today.     The patient has been notified and verbally consented to the following statements:   This video visit will be conducted between you and your provider.  Patient has opted to conduct today's video visit vs an in-person appointment, and is not able to attend due to possible exposure to COVID-19.    If during the course of the call the provider feels a video visit is not appropriate, you will not be charged for this service.     Provider has received verbal consent for billable virtual visit from the patient? Yes  Preferred method for receiving information: Pivto  Call initiated at: 2:30 PM  Call ended at: 2:52 PM  Platform used to conduct today's virtual appointment: AM Well Video  Location of provider: Residence   Location of patient: Residence       Impressions from most recent evaluation on 7/22/24 by Emily Loaiza MS CCC-SLP:   Kelly is a 53-year-old female presenting today with dysphonia R49.0 secondary to irritable larynx syndrome J38.7 and laryngeal hyperfunction \" \". Perceptually, her voice could present normally at times with times where significant breathiness/roughness and strain were noted. Laryngoscopy today demonstrated 4-way supraglottic compression with decreased vocal fold entrainment when inappropriate voice quality was " "observed due to inappropriate balance and coordination of respiration and phonation. Therefore, a course of voice therapy has been recommended, as this was previously successful for her in 2017. Kelly is in agreement with this plan of care.     Additionally, this patient appears to be a candidate for participation in our current research studies within the department. A warm introduction was not provided today.       SUBJECTIVE:  Healing well from her oral surgery but has quite a bit of facial tenderness  Was able to do some straw exercises  Voice has been doing well  Using her inhalers more due to asthma flare with season change, and this has lead to some throat discomfort and dryness despite rinsing her mouth afterwards      OBJECTIVE/ASSESSMENT:    Patient Supplied Answers To Last 2 VHI Questionnaires      10/14/2024     8:26 PM 11/19/2024     3:35 PM   Voice Handicap Index (VHI-10)   My voice makes it difficult for people to hear me 2  2    People have difficulty understanding me in a noisy room 2  2    My voice difficulties restrict my personal and social life.  0  0    I feel left out of conversations because of my voice 0  0    My voice problem causes me to lose income 0  1    I feel as though I have to strain to produce voice 2  2    The clarity of my voice is unpredictable 2  3    My voice problem upsets me 2  0    My voice makes me feel handicapped 0  0    People ask, \"What's wrong with your voice?\" 2  2    VHI-10 12 12        Patient-reported     Patient Supplied Answers To Last 2 CSI Questionnaires      8/6/2024     8:14 AM 11/19/2024     3:35 PM   Cough Severity Index (CSI)   My cough is worse when I lie down 2  2    My coughing problem causes me to restrict my personal and social life 2  1    I tend to avoid places because of my cough problem 2  1    I feel embarrassed because of my coughing problem 0  1    People ask, ''What's wrong?'' because I cough a lot 2  2    I run out of air when I cough 2  2 "    My coughing problem affects my voice 3  2    My coughing problem limits my physical activity 2  1    My coughing problem upsets me 0  1    People ask me if I am sick because I cough a lot 2  2    CSI Score 17 15        Patient-reported       THERAPEUTIC ACTIVITIES:  Vocal hygiene concepts were discussed with the patient to optimize vocal health. Systemic and topical hydration was emphasized. It has been recommended that she incorporate salt water gargling and humidification to aid with topical hydration, particularly during this asthma flare when she is using more inhalers. Counseling was provided regarding throat discomfort that could be secondary to laryngeal thrush and recommended she seek care if this worsens/occurs.      IMPRESSIONS:   Dysphonia R49.0 secondary to laryngeal hyperfunction J38.7    Kelly's voice has been doing very well despite her recent oral surgery. She is dealing with a flare in her asthma currently and has been using more inhalers, which she feels has impacted her voice and throat dryness lately. Vocal hygiene recommendations were provided      PLAN:  Kelly will perform straw phonation without water resistance exercises twice daily for 10 repetitions of her maximum phonation time at modal pitch and loudness  Kelly will increase vocal hygiene, particularly topical hydration  Kelly will perform circumlaryngeal massage techniques as needed in the thyrohyoid space and suprahyoid musculature between sessions  Kelly will perform rescue breathing techniques twice daily while asymptomatic, as well as at the immediate onset of dyspnea until it has resolved  Written instructions were provided to aid home programming via NetHooks  Kelly continues to demonstrate adequate progress toward long- and short-term goals.  Continued voice therapy services are recommended. Kelly will follow-up for additional sessions PRN. Current goals will continue to be addressed.  Kelly is in agreement with  this plan of care.      SLP PLAN:  Voice SLP presence at next appointment with laryngologist (e.g. Dr. Padgett, Dr. Ruelas, Dr. Snowden) is likely not needed given her progress in therapy thus far.  Next scheduled MD appointment is N/A.      BILLING SUMMARY:  Total treatment time: 22 minutes (including 7 minutes of chart review and preparation, interpretation of testing and therapeutic maneuvers, and document writing)  Speech Pathology Treatment (73908)      Emily Loaiza MS CCC-SLP  Speech-Language Pathologist  [facility]  Department of Otolaryngology - Head and Neck Surgery  [facility]  [email or web address]  Direct: [phone]  [phone]    *This note may have been completed using xhlygv-hy-[id number] dictation software, so errors may exist. Please contact me for clarification if needed*      Again, thank you for allowing me to participate in the care of your patient.      Sincerely,    Emily Loaiza, SLP

## 2024-12-04 ASSESSMENT — ASTHMA QUESTIONNAIRES
QUESTION_4 LAST FOUR WEEKS HOW OFTEN HAVE YOU USED YOUR RESCUE INHALER OR NEBULIZER MEDICATION (SUCH AS ALBUTEROL): ONE OR TWO TIMES PER DAY
ACT_TOTALSCORE: 17
QUESTION_2 LAST FOUR WEEKS HOW OFTEN HAVE YOU HAD SHORTNESS OF BREATH: ONCE OR TWICE A WEEK
QUESTION_3 LAST FOUR WEEKS HOW OFTEN DID YOUR ASTHMA SYMPTOMS (WHEEZING, COUGHING, SHORTNESS OF BREATH, CHEST TIGHTNESS OR PAIN) WAKE YOU UP AT NIGHT OR EARLIER THAN USUAL IN THE MORNING: ONCE OR TWICE
ACT_TOTALSCORE: 17
QUESTION_5 LAST FOUR WEEKS HOW WOULD YOU RATE YOUR ASTHMA CONTROL: SOMEWHAT CONTROLLED
QUESTION_1 LAST FOUR WEEKS HOW MUCH OF THE TIME DID YOUR ASTHMA KEEP YOU FROM GETTING AS MUCH DONE AT WORK, SCHOOL OR AT HOME: A LITTLE OF THE TIME

## 2024-12-05 ENCOUNTER — OFFICE VISIT (OUTPATIENT)
Dept: PULMONOLOGY | Facility: CLINIC | Age: 54
End: 2024-12-05
Attending: INTERNAL MEDICINE
Payer: COMMERCIAL

## 2024-12-05 VITALS
DIASTOLIC BLOOD PRESSURE: 62 MMHG | HEART RATE: 68 BPM | BODY MASS INDEX: 27.38 KG/M2 | OXYGEN SATURATION: 100 % | SYSTOLIC BLOOD PRESSURE: 120 MMHG | WEIGHT: 157 LBS

## 2024-12-05 DIAGNOSIS — J20.9 ACUTE BRONCHITIS, UNSPECIFIED ORGANISM: ICD-10-CM

## 2024-12-05 DIAGNOSIS — J45.51 SEVERE PERSISTENT ASTHMA WITH EXACERBATION (H): Primary | ICD-10-CM

## 2024-12-05 DIAGNOSIS — J45.40 MODERATE PERSISTENT ASTHMA WITHOUT COMPLICATION: ICD-10-CM

## 2024-12-05 RX ORDER — PREDNISONE 10 MG/1
TABLET ORAL
Qty: 30 TABLET | Refills: 0 | Status: SHIPPED | OUTPATIENT
Start: 2024-12-05 | End: 2024-12-17

## 2024-12-05 RX ORDER — AZITHROMYCIN 250 MG/1
TABLET, FILM COATED ORAL
Qty: 6 TABLET | Refills: 0 | Status: SHIPPED | OUTPATIENT
Start: 2024-12-05 | End: 2024-12-10

## 2024-12-05 RX ORDER — TIOTROPIUM BROMIDE INHALATION SPRAY 3.12 UG/1
SPRAY, METERED RESPIRATORY (INHALATION)
Qty: 4 G | Refills: 12 | Status: SHIPPED | OUTPATIENT
Start: 2024-12-05

## 2024-12-05 NOTE — PATIENT INSTRUCTIONS
Prednisone taper  Zpack  Continue BREO 200/25 one puff daily , rinse your mouth with water after each use  Continue SPIRIVA one puff daily   Albuterol HFA two puffs every 4 hours as needed  Avoid eating close to bedtime  Raise the head to the bed  Follow up 6 months

## 2024-12-05 NOTE — PROGRESS NOTES
PULMONARY OUTPATIENT FOLLOW UP NOTE        Assessment:     Severe persistent asthma with acute exacerbation  Hx asthma as a child. No tobacco use.  Normal lung function test.  Systemic steroids and taper  Continue ICS/LABA/LAMA   Acute bronchitis   Zpack  Hx pulmonary embolism 7/2023  S/p six months anticoagulation treatment.   Sickle cell disease      Plan:      Prednisone taper  Zpack  Continue BREO 200/25 one puff daily , rinse your mouth with water after each use  Continue SPIRIVA one puff daily   Albuterol HFA two puffs every 4 hours as needed  Avoid eating close to bedtime  Raise the head to the bed  Follow up 6 months         Jay Rueda  Pulmonary / Critical Care  12/05/24       CC:     Chief Complaint   Patient presents with    Follow Up     Asthma       HPI:      Kelly Harris is a 53 year old female who presents for follow up appointment.  Patient has history of Sickle cell disease, bilateral pulmonary embolism 7/2023, asthma.  Reports worsening shortness of breath, wheezes and mild productive cough of white sputum.   Denies fever, chills or night sweats.   Sleep is disrupted due to cough. Reports chest tightness.  Denies postnasal drip,  headaches, or sinus tenderness.   Uses LABA/ICS, LAMA and albuterol INH two to three times a day  Denies chest pain, orthopnea, PND, or swelling of lower extremities.   Denies acid reflux symptoms   Feels refresh in AM.  No tobacco use in the past.         7/15/2024     9:08 AM 10/18/2024     9:53 AM 12/4/2024     9:51 AM   ACT Total Scores   ACT TOTAL SCORE (Goal Greater than or Equal to 20) 11 17 17    In the past 12 months, how many times did you visit the emergency room for your asthma without being admitted to the hospital? 0 0  0    In the past 12 months, how many times were you hospitalized overnight because of your asthma? 0 0  0        Patient-reported       Past Medical History :     Past Medical History:   Diagnosis Date    Asthma      Benign essential hypertension 7/15/2024    Sickle cell pain crisis (H) 07/16/2023        Medications:     Current Outpatient Medications   Medication Sig Dispense Refill    acetaminophen (TYLENOL) 325 MG tablet Take 3 tablets (975 mg) by mouth every 8 hours 120 tablet 1    albuterol (PROAIR HFA/PROVENTIL HFA/VENTOLIN HFA) 108 (90 Base) MCG/ACT inhaler Inhale 2 puffs into the lungs every 6 hours as needed for shortness of breath, wheezing or cough 18 g 12    amLODIPine (NORVASC) 5 MG tablet Take 1 tablet (5 mg) by mouth daily 90 tablet 1    azithromycin (ZITHROMAX) 250 MG tablet Take 2 tablets (500 mg) by mouth daily for 1 day, THEN 1 tablet (250 mg) daily for 4 days. 6 tablet 0    baclofen (LIORESAL) 10 MG tablet TAKE 1/2-1 TABLET (5-10 MG) BY MOUTH DAILY AS NEEDED FOR MUSCLE SPASMS 90 tablet 1    cetirizine (ZYRTEC) 10 MG tablet TAKE 1 TABLET BY MOUTH EVERY DAY 90 tablet 3    fluticasone (FLONASE) 50 MCG/ACT nasal spray Spray 1 spray into both nostrils daily 32 mL 4    fluticasone-vilanterol (BREO ELLIPTA) 200-25 MCG/ACT inhaler TAKE 1 PUFF BY MOUTH EVERY DAY 60 each 12    FOLIC ACID PO Take 1 tablet by mouth daily      hydroxyurea (HYDREA) 500 MG capsule Take 2 capsules (1,000 mg) by mouth daily 60 capsule 2    montelukast (SINGULAIR) 10 MG tablet TAKE 1 TABLET BY MOUTH EVERYDAY AT BEDTIME 90 tablet 1    naloxone (NARCAN) 4 MG/0.1ML nasal spray Spray 1 spray (4 mg) into one nostril alternating nostrils as needed for opioid reversal every 2-3 minutes until assistance arrives 0.2 mL 3    predniSONE (DELTASONE) 10 MG tablet Take 4 tablets (40 mg) by mouth daily for 3 days, THEN 3 tablets (30 mg) daily for 3 days, THEN 2 tablets (20 mg) daily for 3 days, THEN 1 tablet (10 mg) daily for 3 days. 30 tablet 0    prochlorperazine (COMPAZINE) 10 MG tablet Take 1 tablet (10 mg) by mouth every 6 hours as needed for nausea or vomiting 30 tablet 1    tiotropium (SPIRIVA RESPIMAT) 2.5 MCG/ACT inhaler INHALE 2 PUFFS BY MOUTH INTO  THE LUNGS DAILY 4 g 12    traMADol (ULTRAM) 50 MG tablet Take 1 tablet (50 mg) by mouth every 6 hours as needed for severe pain. 60 tablet 0     No current facility-administered medications for this visit.     Facility-Administered Medications Ordered in Other Visits   Medication Dose Route Frequency Provider Last Rate Last Admin    iohexol (OMNIPAQUE) 300 mg/mL injection    Once PRN Ananth Jenkins, DO   0.5 mL at 02/13/24 0950    lidocaine (PF) (XYLOCAINE) 1 % injection    Once PRN Ananth Jenkins, DO   2 mL at 02/13/24 0949    methylPREDNISolone (DEPO-Medrol) injection    Once PRN Ananth Jenkins, DO   40 mg at 02/13/24 0950    ROPivacaine (NAROPIN) 0.5% injection    Once PRN Ananth Jenkins, DO   5 mL at 02/13/24 0950          Social History :     Social History     Socioeconomic History    Marital status: Single     Spouse name: Not on file    Number of children: Not on file    Years of education: Not on file    Highest education level: Not on file   Occupational History    Not on file   Tobacco Use    Smoking status: Never     Passive exposure: Never    Smokeless tobacco: Never   Vaping Use    Vaping status: Never Used   Substance and Sexual Activity    Alcohol use: No    Drug use: No    Sexual activity: Yes     Partners: Male   Other Topics Concern    Not on file   Social History Narrative    Not on file     Social Drivers of Health     Financial Resource Strain: Low Risk  (7/14/2024)    Financial Resource Strain     Within the past 12 months, have you or your family members you live with been unable to get utilities (heat, electricity) when it was really needed?: No   Food Insecurity: Low Risk  (7/14/2024)    Food Insecurity     Within the past 12 months, did you worry that your food would run out before you got money to buy more?: No     Within the past 12 months, did the food you bought just not last and you didn t have money to get more?: No   Transportation Needs: Low Risk  (7/14/2024)     Transportation Needs     Within the past 12 months, has lack of transportation kept you from medical appointments, getting your medicines, non-medical meetings or appointments, work, or from getting things that you need?: No   Physical Activity: Insufficiently Active (7/14/2024)    Exercise Vital Sign     Days of Exercise per Week: 3 days     Minutes of Exercise per Session: 30 min   Stress: No Stress Concern Present (7/14/2024)    [employer] [place] [employer] - Occupational Stress Questionnaire     Feeling of Stress : Only a little   Social Connections: Unknown (7/14/2024)    Social Connection and Isolation Panel [NHANES]     Frequency of Communication with Friends and Family: Not on file     Frequency of Social Gatherings with Friends and Family: Once a week     Attends [employer] Services: Not on file     Active Member of Clubs or Organizations: Not on file     Attends Club or Organization Meetings: Not on file     Marital Status: Not on file   Interpersonal Safety: Low Risk  (7/15/2024)    Interpersonal Safety     Do you feel physically and emotionally safe where you currently live?: Yes     Within the past 12 months, have you been hit, slapped, kicked or otherwise physically hurt by someone?: No     Within the past 12 months, have you been humiliated or emotionally abused in other ways by your partner or ex-partner?: No   Housing Stability: Low Risk  (7/14/2024)    Housing Stability     Do you have housing? : Yes     Are you worried about losing your housing?: No          Family History :     Family History   Problem Relation Age of Onset    Diabetes Father     Coronary Artery Disease Father     Hypertension Father     Asthma Daughter     Sickle Cell Trait Daughter         S/C    Asthma Daughter     Sickle Cell Trait Daughter         S/C    Heart Disease Daughter         Heart valve regurgitation, abnormal vein with shunt    Hypertension Mother     Lung Cancer Paternal Cousin     Cancer No family hx  of        Review of Systems  A 12 point comprehensive review of systems was negative except as noted.        Objective:     /62   Pulse 68   Wt 71.2 kg (157 lb)   LMP 04/12/2017   SpO2 100%   BMI 27.38 kg/m      Gen: awake, alert, no distress  HEENT: pink conjunctiva, moist mucosa, Mallampati II/IV  Neck: no thyromegaly, masses or JVD  Lungs: ronchi and wheezes both HT  CV: regular, no murmurs or gallops appreciated  Abdomen: soft, NT, BS wnl  Ext: no edema  Neuro: CN II-XII intact, non focal      Diagnostic tests:         PFTs: (11/14/2023)     FVC 1.60 L (53%)  FEV1 1.22 L (50%)  FEV1/FVC 76  Significant post bronchodilator response  TLC 4.22 L (88%)  RV 1.84 L (104%)  DLCO 96%  Normal flow volume loop    IMAGES:     CT CHEST PULMONARY EMBOLISM W CONTRAST  LOCATION: [facility]  DATE: 1/16/2024  INDICATION: chest pain, dyspnea; hx of PE  COMPARISON: 10/30/2023  FINDINGS:  ANGIOGRAM CHEST: Pulmonary arteries are normal caliber and negative for pulmonary emboli. Thoracic aorta is negative for dissection. No CT evidence of right heart strain.  LUNGS AND PLEURA: Biapical pleural parenchymal scarring. Dependent groundglass opacities bilaterally, likely atelectasis. No lobar consolidation. Trace left pleural effusion.   MEDIASTINUM/AXILLAE: No significant mediastinal or hilar adenopathy.  CORONARY ARTERY CALCIFICATION: None.  UPPER ABDOMEN: No acute abnormalities  MUSCULOSKELETAL: No acute bony abnormalities.                                         IMPRESSION:  1.  No evidence of pulmonary embolus.  2.  Trace left pleural effusion and bibasilar likely atelectasis.

## 2024-12-11 ENCOUNTER — ONCOLOGY VISIT (OUTPATIENT)
Dept: ONCOLOGY | Facility: HOSPITAL | Age: 54
End: 2024-12-11
Attending: INTERNAL MEDICINE
Payer: COMMERCIAL

## 2024-12-11 ENCOUNTER — LAB (OUTPATIENT)
Dept: INFUSION THERAPY | Facility: HOSPITAL | Age: 54
End: 2024-12-11
Attending: INTERNAL MEDICINE
Payer: COMMERCIAL

## 2024-12-11 VITALS
TEMPERATURE: 97.9 F | OXYGEN SATURATION: 98 % | SYSTOLIC BLOOD PRESSURE: 132 MMHG | HEIGHT: 63 IN | BODY MASS INDEX: 27.32 KG/M2 | RESPIRATION RATE: 16 BRPM | DIASTOLIC BLOOD PRESSURE: 62 MMHG | HEART RATE: 74 BPM | WEIGHT: 154.2 LBS

## 2024-12-11 DIAGNOSIS — D57.20 SICKLE-CELL/HB-C DISEASE WITHOUT CRISIS (H): Primary | ICD-10-CM

## 2024-12-11 DIAGNOSIS — D57.20 SICKLE-CELL/HB-C DISEASE WITHOUT CRISIS (H): ICD-10-CM

## 2024-12-11 LAB
BASOPHILS # BLD MANUAL: 0 10E3/UL (ref 0–0.2)
BASOPHILS NFR BLD MANUAL: 0 %
EOSINOPHIL # BLD MANUAL: 0 10E3/UL (ref 0–0.7)
EOSINOPHIL NFR BLD MANUAL: 0 %
ERYTHROCYTE [DISTWIDTH] IN BLOOD BY AUTOMATED COUNT: 13.6 % (ref 10–15)
HCT VFR BLD AUTO: 32.7 % (ref 35–47)
HGB BLD-MCNC: 12.2 G/DL (ref 11.7–15.7)
HOWELL-JOLLY BOD BLD QL SMEAR: PRESENT
LYMPHOCYTES # BLD MANUAL: 5.3 10E3/UL (ref 0.8–5.3)
LYMPHOCYTES NFR BLD MANUAL: 38 %
MCH RBC QN AUTO: 35.3 PG (ref 26.5–33)
MCHC RBC AUTO-ENTMCNC: 37.3 G/DL (ref 31.5–36.5)
MCV RBC AUTO: 95 FL (ref 78–100)
METAMYELOCYTES # BLD MANUAL: 0.1 10E3/UL
METAMYELOCYTES NFR BLD MANUAL: 1 %
MONOCYTES # BLD MANUAL: 0.8 10E3/UL (ref 0–1.3)
MONOCYTES NFR BLD MANUAL: 6 %
NEUTROPHILS # BLD MANUAL: 7.7 10E3/UL (ref 1.6–8.3)
NEUTROPHILS NFR BLD MANUAL: 55 %
NRBC # BLD AUTO: 2.7 10E3/UL
NRBC BLD MANUAL-RTO: 19 %
PATH REV: ABNORMAL
PLAT MORPH BLD: ABNORMAL
PLATELET # BLD AUTO: 295 10E3/UL (ref 150–450)
POLYCHROMASIA BLD QL SMEAR: SLIGHT
RBC # BLD AUTO: 3.46 10E6/UL (ref 3.8–5.2)
RBC MORPH BLD: ABNORMAL
TARGETS BLD QL SMEAR: ABNORMAL
WBC # BLD AUTO: 14 10E3/UL (ref 4–11)

## 2024-12-11 PROCEDURE — G2211 COMPLEX E/M VISIT ADD ON: HCPCS | Performed by: INTERNAL MEDICINE

## 2024-12-11 PROCEDURE — 36415 COLL VENOUS BLD VENIPUNCTURE: CPT

## 2024-12-11 PROCEDURE — 99213 OFFICE O/P EST LOW 20 MIN: CPT | Performed by: INTERNAL MEDICINE

## 2024-12-11 PROCEDURE — 85007 BL SMEAR W/DIFF WBC COUNT: CPT

## 2024-12-11 PROCEDURE — 85014 HEMATOCRIT: CPT

## 2024-12-11 ASSESSMENT — PAIN SCALES - GENERAL: PAINLEVEL_OUTOF10: MODERATE PAIN (5)

## 2024-12-11 NOTE — PROGRESS NOTES
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  At that time she was hospitalized with a pain episode thought to be related to hemoglobin SC disease.  She has done fairly well on Hydrea.  No significant side effects except for some mild skin rash.  Her hemoglobin has been pretty steady.  In fact it went up to 12.6 and October.  Labs from today reviewed.  Hemoglobin is stable at 12.2.  Recently she was diagnosed with pneumonia.  Is finishing up antibiotics.  Also is on some prednisone.  Total white count is elevated presumably from prednisone.  She also has asthma.  Normal platelet counts.  Overall I think she is doing well.  I am not sure what is causing her bilateral knee and ankle effusions.  She also has pain in those areas at that time.  Again this waxes and wanes.  Typically patients with hemoglobin SC disease have a milder clinical course and are seldom noted to have significant pain episodes.  My plan is to continue Hydrea at the current dose now and carefully monitor her hemoglobin levels every 2 months.  Patients with SC disease are slightly at risk for hyperviscosity from increasing hemoglobin levels so we have to keep a close eye on it.  If it goes above 13 then probably I would stop or decrease Hydrea dose.  She is agreeable to the plan.    History of ovarian vein thrombosis and bilateral PEs: Patient did take Eliquis after her mild bilateral PEs.  The ovarian vein thrombosis was quite remote in a little unclear so at this time she is off anticoagulation.  We had held off on resuming anticoagulation.  But will  Standard Progress Note


Progress Notes/Assess & Plan


Date Seen by Provider:  May 24, 2018


Time Seen by Provider:  09:17


Progress/Assessment & Plan


5/23/18:pain control inadequate.  Choices of medications limited due to various 

allergies.  Vital signs stable.  Incisions dry.  Requires help even to sit up 

and get out of bed.  She would benefit from IV analgesics and another night in 

the hospital.





5/24/18 : Pain control better. Ambulating independently. Home


Final Diagnosis


Ventral hernia











CONRAD QUACH MD May 24, 2018 09:18 closely monitor for any signs or symptoms of recurrent VTE.        ECOG Performance    0 - Independent    Distress Screening (within last 30 days)    No data recorded     Pain       Problem List    Patient Active Problem List   Diagnosis    Sickle-cell/Hb-C disease without crisis (H)    Chronic bilateral low back pain with bilateral sciatica    Thrombosis of ovarian vein    Moderate persistent asthma without complication    Vocal cord dysfunction    Seasonal allergic rhinitis due to other allergic trigger    Retinal hemorrhage of both eyes    Laceration of left ankle    Abnormal Pap smear of cervix    Sickle cell pain crisis (H)    Dyspnea, unspecified type    Left-sided chest pain    Benign essential hypertension        ______________________________________________________________________________    History of Present Illness    Hematologist: Dr. Palafox.  Previous patient of Dr. Bartlett    Diagnosis: Hemoglobin SC disease.  Bilateral pulmonary embolism, diagnosed July 2023.  Remote history of ovarian vein thrombosis    Treatment: Observation initially  - April 2024 when she started Hydrea 1000 mg a day.    Interim history:  She is here for follow-up.  She has been on Hydrea 1000 mg daily since April of this year.  Hemoglobin has been pretty stable around 11-12.  Has not required any transfusion.  She continues to have bilateral knee and ankle swelling with some pain.  Has had some cortisone injections to them in the past.  Also uses Tylenol and tramadol at times.  Denies any significant bleeding issues.  Denies any headaches or blurred vision.  She is here with repeat labs.    Review of Systems    Pertinent items are noted in HPI.    Past History    Past Medical History:   Diagnosis Date    Asthma     Benign essential hypertension 7/15/2024    Sickle cell pain crisis (H) 07/16/2023       PHYSICAL EXAM  LMP 04/12/2017     GENERAL: no acute distress. Cooperative in conversation. Alone in clinic  RESP: Clear to  auscultation bilaterally  NEURO: non focal. Alert and oriented x3.       Lab Results    No results found for this or any previous visit (from the past week).      Imaging    No results found.    The longitudinal plan of care for the diagnosis(es)/condition(s) as documented were addressed during this visit. Due to the added complexity in care, I will continue to support Kelly in the subsequent management and with ongoing continuity of care.      I personally reviewed each unique lab study and independently interpreted the results.    Signed by: Yo Palafox MD

## 2024-12-11 NOTE — PROGRESS NOTES
"Oncology Rooming Note    December 11, 2024 11:03 AM   Kelly Harris is a 53 year old female who presents for:    Chief Complaint   Patient presents with    Hematology     Sickle cell/HB-C disease without crisis      Initial Vitals: /62   Pulse 74   Temp 97.9  F (36.6  C)   Resp 16   Ht 1.6 m (5' 3\")   Wt 69.9 kg (154 lb 3.2 oz)   LMP 04/12/2017   SpO2 98%   BMI 27.32 kg/m   Estimated body mass index is 27.32 kg/m  as calculated from the following:    Height as of this encounter: 1.6 m (5' 3\").    Weight as of this encounter: 69.9 kg (154 lb 3.2 oz). Body surface area is 1.76 meters squared.  Moderate Pain (5) Comment: Data Unavailable   Patient's last menstrual period was 04/12/2017.  Allergies reviewed: Yes  Medications reviewed: Yes    Medications: Medication refills not needed today.  Pharmacy name entered into P4RC:    [id number] - [place], [place] - [address] & [facility] [address] [place] [address]    Frailty Screening:   Is the patient here for a new oncology consult visit in cancer care? 2. No      Clinical concerns: Feeling nauseated. R knee and ankle been swelling for the last two weeks now.       Giovanna Álvarez LPN             "

## 2024-12-11 NOTE — LETTER
12/11/2024      Kelly Harris  [address]  [place] [address]      Dear Colleague,    Thank you for referring your patient, Kelly Harris, to the [facility]. Please see a copy of my visit note below.    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  At that time she was hospitalized with a pain episode thought to be related to hemoglobin SC disease.  She has done fairly well on Hydrea.  No significant side effects except for some mild skin rash.  Her hemoglobin has been pretty steady.  In fact it went up to 12.6 and October.  Labs from today reviewed.  Hemoglobin is stable at 12.2.  Recently she was diagnosed with pneumonia.  Is finishing up antibiotics.  Also is on some prednisone.  Total white count is elevated presumably from prednisone.  She also has asthma.  Normal platelet counts.  Overall I think she is doing well.  I am not sure what is causing her bilateral knee and ankle effusions.  She also has pain in those areas at that time.  Again this waxes and wanes.  Typically patients with hemoglobin SC disease have a milder clinical course and are seldom noted to have significant pain episodes.  My plan is to continue Hydrea at the current dose now and carefully monitor her hemoglobin levels every 2 months.  Patients with SC disease are slightly at risk for hyperviscosity from increasing hemoglobin levels so we have to keep a close eye on it.  If it goes above 13 then probably I would stop or decrease Hydrea dose.  She is agreeable to the plan.    History of ovarian vein  thrombosis and bilateral PEs: Patient did take Eliquis after her mild bilateral PEs.  The ovarian vein thrombosis was quite remote in a little unclear so at this time she is off anticoagulation.  We had held off on resuming anticoagulation.  But will closely monitor for any signs or symptoms of recurrent VTE.        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 Palafox.  Previous patient of Dr. Bartlett    Diagnosis: Hemoglobin SC disease.  Bilateral pulmonary embolism, diagnosed July 2023.  Remote history of ovarian vein thrombosis    Treatment: Observation initially  - April 2024 when she started Hydrea 1000 mg a day.    Interim history:  She is here for follow-up.  She has been on Hydrea 1000 mg daily since April of this year.  Hemoglobin has been pretty stable around 11-12.  Has not required any transfusion.  She continues to have bilateral knee and ankle swelling with some pain.  Has had some cortisone injections to them in the past.  Also uses Tylenol and tramadol at times.  Denies any significant bleeding issues.  Denies any headaches or blurred vision.  She is here with repeat labs.    Review of Systems    Pertinent items are noted in HPI.    Past  "History    Past Medical History:   Diagnosis Date     Asthma      Benign essential hypertension 7/15/2024     Sickle cell pain crisis (H) 07/16/2023       PHYSICAL EXAM  LMP 04/12/2017     GENERAL: no acute distress. Cooperative in conversation. Alone in clinic  RESP: Clear to auscultation bilaterally  NEURO: non focal. Alert and oriented x3.       Lab Results    No results found for this or any previous visit (from the past week).      Imaging    No results found.    The longitudinal plan of care for the diagnosis(es)/condition(s) as documented were addressed during this visit. Due to the added complexity in care, I will continue to support Kelly in the subsequent management and with ongoing continuity of care.      I personally reviewed each unique lab study and independently interpreted the results.    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    Weight as of this encounter: 69.9 kg (154 lb 3.2 oz). Body surface area is 1.76 meters squared.  Moderate Pain (5) Comment: Data Unavailable   Patient's last menstrual period was 04/12/2017.  Allergies reviewed: Yes  Medications reviewed: Yes    Medications: Medication refills not needed today.  Pharmacy name entered into [employer]:    [id number] - [place], [place] - [address] [place] [id number] [place] [address] [address]    Frailty Screening:   Is the patient here for a new oncology " consult visit in cancer care? 2. No      Clinical concerns: Feeling nauseated. R knee and ankle been swelling for the last two weeks now.       Giovanna Álvarez LPN                 Again, thank you for allowing me to participate in the care of your patient.        Sincerely,        Yo Palafox MD

## 2025-01-06 DIAGNOSIS — I10 BENIGN ESSENTIAL HYPERTENSION: ICD-10-CM

## 2025-01-06 RX ORDER — AMLODIPINE BESYLATE 5 MG/1
5 TABLET ORAL DAILY
Qty: 90 TABLET | Refills: 1 | Status: SHIPPED | OUTPATIENT
Start: 2025-01-06

## 2025-01-06 NOTE — TELEPHONE ENCOUNTER
Name from pharmacy: AMLODIPINE BESYLATE 5 MG TAB          Will file in chart as: amLODIPine (NORVASC) 5 MG tablet    Sig: TAKE 1 TABLET BY MOUTH EVERY DAY    Disp: 90 tablet    Refills: 1    Start: 1/6/2025    Class: E-Prescribe    Non-formulary For: Benign essential hypertension    Last ordered: 5 months ago (7/15/2024) by Onur Abebe MD    Last refill: 10/12/2024    Rx #: [id number]    Calcium Channel Blockers Protocol  [id number]/06/2025 12:29 AM   Protocol Details Most recent blood pressure under 140/90 in past 12 months    Medication is active on med list    Medication is indicated for associated diagnosis    GFR is on file in the past 12 months and most recent GFR is normal    Recent (12 mo) or future (90 days) visit within the authorizing provider's specialty    Patient is age 18 or older    No active pregnancy on record    No positive pregnancy test in past 12 months        BP Readings from Last 3 Encounters:   12/27/24 137/73   12/11/24 132/62   12/05/24 120/62     GFR Estimate   Date Value Ref Range Status   08/08/2024 76 >60 mL/min/1.73m2 Final     Comment:     eGFR calculated using 2021 CKD-EPI equation.   11/09/2015 >60 >60 mL/min/1.73m2 Final     Prescription approved per [facility] Refill Protocol.  ENDY Perez, BSN

## 2025-01-07 DIAGNOSIS — D57.20 SICKLE-CELL/HB-C DISEASE WITHOUT CRISIS (H): ICD-10-CM

## 2025-01-08 RX ORDER — HYDROXYUREA 500 MG/1
1000 CAPSULE ORAL DAILY
Qty: 120 CAPSULE | Refills: 2 | Status: SHIPPED | OUTPATIENT
Start: 2025-01-08

## 2025-02-13 NOTE — PATIENT INSTRUCTIONS
loratadine (Claritin) or fexofenadine (Allegra) over the counter and try that instead of cetirizine (Zyrtec).   You can use Flonase twice daily (morning and evening) if needed   Patient was seen in UC for UTI on 1/28/25 and prescribed Macrobid. Pt returned to UC on 2/9/25 for UTI and prescribed Keflex. Last day of Keflex is tomorrow 2/13/25.     Pt is having symptoms of a yeast infection (vaginal itching) and is requesting treatment.     Please advise.

## 2025-03-10 ENCOUNTER — PATIENT OUTREACH (OUTPATIENT)
Dept: CARE COORDINATION | Facility: CLINIC | Age: 55
End: 2025-03-10
Payer: COMMERCIAL

## 2025-04-11 ENCOUNTER — ANCILLARY PROCEDURE (OUTPATIENT)
Dept: GENERAL RADIOLOGY | Facility: CLINIC | Age: 55
End: 2025-04-11
Attending: FAMILY MEDICINE
Payer: COMMERCIAL

## 2025-04-11 ENCOUNTER — ANCILLARY PROCEDURE (OUTPATIENT)
Dept: MAMMOGRAPHY | Facility: CLINIC | Age: 55
End: 2025-04-11
Attending: FAMILY MEDICINE
Payer: COMMERCIAL

## 2025-04-11 DIAGNOSIS — Z12.31 VISIT FOR SCREENING MAMMOGRAM: ICD-10-CM

## 2025-04-11 DIAGNOSIS — M25.572 PAIN IN JOINT INVOLVING ANKLE AND FOOT, LEFT: ICD-10-CM

## 2025-04-11 DIAGNOSIS — D57.20 SICKLE-CELL/HB-C DISEASE WITHOUT CRISIS (H): ICD-10-CM

## 2025-04-11 PROCEDURE — 77067 SCR MAMMO BI INCL CAD: CPT | Mod: TC | Performed by: RADIOLOGY

## 2025-04-11 PROCEDURE — 73610 X-RAY EXAM OF ANKLE: CPT | Mod: TC | Performed by: INTERNAL MEDICINE

## 2025-04-11 PROCEDURE — 77063 BREAST TOMOSYNTHESIS BI: CPT | Mod: TC | Performed by: RADIOLOGY

## 2025-04-14 NOTE — RESULT ENCOUNTER NOTE
Geovany Mendoza, I hope that  you are feeling better.  The X-ray did not show anything concerning or that explains your pain.  Follow up as needed - Onur Abebe

## 2025-05-12 ENCOUNTER — OFFICE VISIT (OUTPATIENT)
Dept: FAMILY MEDICINE | Facility: CLINIC | Age: 55
End: 2025-05-12
Payer: COMMERCIAL

## 2025-05-12 VITALS
HEIGHT: 63 IN | TEMPERATURE: 98.7 F | OXYGEN SATURATION: 100 % | WEIGHT: 149.6 LBS | SYSTOLIC BLOOD PRESSURE: 138 MMHG | RESPIRATION RATE: 16 BRPM | DIASTOLIC BLOOD PRESSURE: 77 MMHG | BODY MASS INDEX: 26.51 KG/M2 | HEART RATE: 95 BPM

## 2025-05-12 DIAGNOSIS — H11.003 PTERYGIUM OF BOTH EYES: ICD-10-CM

## 2025-05-12 DIAGNOSIS — M25.50 MULTIPLE JOINT PAIN: ICD-10-CM

## 2025-05-12 DIAGNOSIS — D57.20 SICKLE-CELL/HB-C DISEASE WITHOUT CRISIS (H): ICD-10-CM

## 2025-05-12 DIAGNOSIS — J45.41 MODERATE PERSISTENT ASTHMA WITH ACUTE EXACERBATION: Primary | ICD-10-CM

## 2025-05-12 PROCEDURE — 3078F DIAST BP <80 MM HG: CPT | Performed by: FAMILY MEDICINE

## 2025-05-12 PROCEDURE — 99214 OFFICE O/P EST MOD 30 MIN: CPT | Mod: 25 | Performed by: FAMILY MEDICINE

## 2025-05-12 PROCEDURE — 3075F SYST BP GE 130 - 139MM HG: CPT | Performed by: FAMILY MEDICINE

## 2025-05-12 PROCEDURE — 90677 PCV20 VACCINE IM: CPT | Performed by: FAMILY MEDICINE

## 2025-05-12 PROCEDURE — G2211 COMPLEX E/M VISIT ADD ON: HCPCS | Performed by: FAMILY MEDICINE

## 2025-05-12 PROCEDURE — 90471 IMMUNIZATION ADMIN: CPT | Performed by: FAMILY MEDICINE

## 2025-05-12 RX ORDER — PREDNISONE 20 MG/1
40 TABLET ORAL DAILY
Qty: 10 TABLET | Refills: 2 | Status: SHIPPED | OUTPATIENT
Start: 2025-05-12 | End: 2025-05-17

## 2025-05-12 RX ORDER — OLOPATADINE HYDROCHLORIDE 1 MG/ML
1 SOLUTION OPHTHALMIC 2 TIMES DAILY
Qty: 5 ML | Refills: 3 | Status: SHIPPED | OUTPATIENT
Start: 2025-05-12

## 2025-05-12 RX ORDER — MONTELUKAST SODIUM 10 MG/1
1 TABLET ORAL AT BEDTIME
Qty: 90 TABLET | Refills: 1 | Status: SHIPPED | OUTPATIENT
Start: 2025-05-12

## 2025-05-12 ASSESSMENT — ASTHMA QUESTIONNAIRES
QUESTION_1 LAST FOUR WEEKS HOW MUCH OF THE TIME DID YOUR ASTHMA KEEP YOU FROM GETTING AS MUCH DONE AT WORK, SCHOOL OR AT HOME: MOST OF THE TIME
QUESTION_4 LAST FOUR WEEKS HOW OFTEN HAVE YOU USED YOUR RESCUE INHALER OR NEBULIZER MEDICATION (SUCH AS ALBUTEROL): ONE OR TWO TIMES PER DAY
QUESTION_2 LAST FOUR WEEKS HOW OFTEN HAVE YOU HAD SHORTNESS OF BREATH: ONCE A DAY
QUESTION_3 LAST FOUR WEEKS HOW OFTEN DID YOUR ASTHMA SYMPTOMS (WHEEZING, COUGHING, SHORTNESS OF BREATH, CHEST TIGHTNESS OR PAIN) WAKE YOU UP AT NIGHT OR EARLIER THAN USUAL IN THE MORNING: TWO OR THREE NIGHTS A WEEK
QUESTION_5 LAST FOUR WEEKS HOW WOULD YOU RATE YOUR ASTHMA CONTROL: SOMEWHAT CONTROLLED
ACT_TOTALSCORE: 11

## 2025-05-12 NOTE — PATIENT INSTRUCTIONS
Start taking the montelukast daily again, at bedtime. Use the Patanol eye drops as instructed, 2 times daily. Take prednisone 40 mg (two 20 mg tablets) daily for 5 days. You are due for a pap smear, please schedule an appointment for that as well. You should be contacted for scheduling a visit with the psychologist soon.      Call clinic to schedule complete physical with pap smear in July. Trisha Sheridan CMA

## 2025-05-12 NOTE — PROGRESS NOTES
To be completed in Nursing note:    Please reference list for forms that require a visit for completion.  Please remind patients that providers are given 3-5 business days to complete and return forms.      Form type: [facility]    Date form received: 5/12/25    Date form completed by Physician: 5/12/25    How was form returned to patient (mailed, faxed, or at  for patient to ): Give to patient     Date form mailed/faxed/left at  for patient and sent to HIM for scanning: Form completed, original and extra copy made for patient, a copy made for patient's chart.       Once form is left for patient, faxed, or mailed PCS will then close the documentation only encounter.

## 2025-05-12 NOTE — PROGRESS NOTES
Assessment & Plan     Kelly was seen today for asthma and forms.    Diagnoses and all orders for this visit:    Moderate persistent asthma with complication  Patient presents with 1-2 weeks of worsened asthma symptoms, including shortness of breath, wheezing, and chest tightness. 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 She has needed to take albuterol 3 times during the day to manage her symptoms, especially while at work. Plan for a 5 day course of prednisone.    Patient states she had not been taking her Montelukast for a while. With increased asthma and allergy symptoms, I recommended she start taking this again daily.  -     predniSONE (DELTASONE) 20 MG tablet; Take 2 tablets (40 mg) by mouth daily for 5 days.  -     montelukast (SINGULAIR) 10 MG tablet; Take 1 tablet (10 mg) by mouth at bedtime.    Pterygium of both eyes  -     olopatadine (PATANOL) 0.1 % ophthalmic solution; Place 1 drop into both eyes 2 times daily.    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 She states Oncology instructed her to continue taking hydroxyurea until their next appt in June. The patient feels her current symptoms have made it very difficult for her to work as normal. I will plan to connect with Oncology to discuss the possibility of the patient stopping the hydroxyurea.  -     Adult Mental Health  Referral; Future    Multiple joint pain  Patient's ongoing symptoms seem to be detrimentally affecting her ability to cope with the demands of her job. We discussed possible benefits of addressing mental health needs, and patient agrees.  -     Adult Mental Health  Referral; Future    Other orders  -     Pneumococcal 20 Valent Conjugate (PCV20)    Preceptor Attestation:   I was present with the medical student who participated  "in the service and in the documentation of this note. I have verified the history and personally performed the physical exam and medical decision making. I have verified the content of the note, which accurately reflects my assessment of the patient and the plan of care.   Supervising Physician:  Onur Abebe MD.    Total visit time with patient was 25 mins, all of which was face to face MD time, and over 50% of this time was spent in counseling and coordination of care.  Including post-encounter documentation - completion of FMLA paperwork taking 10 minutes - and orders on the date of service, total encounter time was 35 mins.    The longitudinal plan of care for the diagnosis(es)/condition(s) as documented were addressed during this visit. Due to the added complexity in care, I will continue to support Kelly in the subsequent management and with ongoing continuity of care.      BMI  Estimated body mass index is 26.5 kg/m  as calculated from the following:    Height as of this encounter: 1.6 m (5' 3\").    Weight as of this encounter: 67.9 kg (149 lb 9.6 oz).   Weight management plan: Discussed healthy diet and exercise guidelines      Follow-up   Patient Instructions   Start taking the montelukast daily again, at bedtime. Use the Patanol eye drops as instructed, 2 times daily. Take prednisone 40 mg (two 20 mg tablets) daily for 5 days. You are due for a pap smear, please schedule an appointment for that as well. You should be contacted for scheduling a visit with the psychologist soon.      Call clinic to schedule complete physical with pap smear in July. Trisha Sheridan CMA          Andrew Mendoza is a 54 year old, presenting for the following health issues:  Asthma (Asthma flaring up, worse with pollen, but painting of building worsen symptoms) and Forms (FMLA, taking some time off work to care for self and healing)        5/12/2025     9:33 AM   Additional Questions   Roomed by Trisha   Accompanied by " patient         5/12/2025    Information    services provided? No     HPI      Kelly Harris is a 54 year old female with history of Sickle Cell C disease, asthma, hypertension, who presents today for evaluation of worsened asthma symptoms.    The patient has longstanding history of asthma, for which she has been taking medications for many years. This has been fairly well controlled recently until 2 weeks ago, when her workplace started having painting done. Since then, she has had significantly worsened shortness of breath, wheezing, chest tightness, and difficulty breathing. She has needed to take her albuterol inhaler 3 times daily for management of symptoms. She notes that she has needed to leave work a few times because of this over the past 2 weeks. She continues to take Advair and Spiriva daily as well.     ROS:  The patient also is concerned about worsening fatigue, malaise, joint aches, and headaches over the past several weeks. She feels these may be due to the hydroxyurea she has been taking. She initially did not have many side effects with it when she started taking it about 1 year ago. She sometimes has nausea with it, which she manages with other medication. The patient reports that she does typically have joint aches and pain with her pain crises, but her current symptoms feel much different. She has been taking ibuprofen 600 mg 2x daily and Hydrocodone up to 3 pills weekly for management of her pain. These are effective in managing her pain for up to a couple hours.    The patient requests FMLA for work for a few weeks for all of the above reasons. She states that she was told by Oncology that she should continue taking Hydroxyurea without pausing, until she is seen by them again later in June (at her upcoming appt). The patient does report feeling very overwhelmed with her job.    The patient denies chest pain, fever, abdominal pain, back pain, vomiting, diarrhea, or any  "other associated symptoms today.    Shantelle Garcia, MS3  [employer]        Objective    /77 (BP Location: Left arm, Patient Position: Sitting, Cuff Size: Adult Regular)   Pulse 95   Temp 98.7  F (37.1  C) (Oral)   Resp 16   Ht 1.6 m (5' 3\")   Wt 67.9 kg (149 lb 9.6 oz)   LMP 04/12/2017   SpO2 100%   BMI 26.50 kg/m    Body mass index is 26.5 kg/m .      12/4/2024     9:51 AM 4/10/2025     1:49 PM 5/12/2025     9:17 AM   ACT Total Scores   ACT TOTAL SCORE (Goal Greater than or Equal to 20) 17  18  11    In the past 12 months, how many times did you visit the emergency room for your asthma without being admitted to the hospital? 0 0 0   In the past 12 months, how many times were you hospitalized overnight because of your asthma? 0 0 0       Patient-reported       Physical Exam   GENERAL: alert and no distress  EYES: PERRL. Erythema noted to the medial corners of eyes bilaterally, consistent with pterygium just starting to invade cornea  RESP: Expiratory wheezes heard in all lung fields, with inspiratory wheezes in bilateral upper lung fields. no rales, rhonchi   CV: regular rate and rhythm, normal S1 S2, no S3 or S4, no murmur, click or rub, no peripheral edema  MS: no gross musculoskeletal defects noted, no edema. Some blue-jewel discoloration noted to multiple fingernails and toenails- patient attributes to Hydroxyurea - images              Signed Electronically by: Onur Abebe MD      "

## 2025-05-12 NOTE — PROGRESS NOTES
Prior to immunization administration, verified patients identity using patient s name and date of birth. Please see Immunization Activity for additional information.     Screening Questionnaire for Adult Immunization    Are you sick today?   No   Do you have allergies to medications, food, a vaccine component or latex?   No   Have you ever had a serious reaction after receiving a vaccination?   No   Do you have a long-term health problem with heart, lung, kidney, or metabolic disease (e.g., diabetes), asthma, a blood disorder, no spleen, complement component deficiency, a cochlear implant, or a spinal fluid leak?  Are you on long-term aspirin therapy?   Yes   Do you have cancer, leukemia, HIV/AIDS, or any other immune system problem?   No   Do you have a parent, brother, or sister with an immune system problem?   No   In the past 3 months, have you taken medications that affect  your immune system, such as prednisone, other steroids, or anticancer drugs; drugs for the treatment of rheumatoid arthritis, Crohn s disease, or psoriasis; or have you had radiation treatments?   Yes   Have you had a seizure, or a brain or other nervous system problem?   No   During the past year, have you received a transfusion of blood or blood    products, or been given immune (gamma) globulin or antiviral drug?   No   For women: Are you pregnant or is there a chance you could become       pregnant during the next month?   No   Have you received any vaccinations in the past 4 weeks?   No     Immunization questionnaire was positive for at least one answer.  Notified Dr. Abebe.      Patient instructed to remain in clinic for 15 minutes afterwards, and to report any adverse reactions.     Screening performed by Trisha Sheridan CMA on 5/12/2025 at 11:03 AM.

## 2025-05-15 ENCOUNTER — PATIENT OUTREACH (OUTPATIENT)
Dept: ONCOLOGY | Facility: HOSPITAL | Age: 55
End: 2025-05-15
Payer: COMMERCIAL

## 2025-05-15 NOTE — Clinical Note
Dr. Abebe, thank you for the message about Kelly's symptoms. We welcome the collaboration!  Ricarda Munoz RN  Cancer Care Nurse Coordinator (working with Dr. Palafox) [facility] [phone], #3, then #2.

## 2025-05-15 NOTE — PROGRESS NOTES
[facility]: Cancer Care                                                                                      RN Cancer Care Coordinator called patient in response to message from Dr. Abebe. Writer let her know that Dr. Palafox and Yasmeen Conteh CNP, had received his message about the symptoms she has been having - the ridging in her nails, and the nausea and malaise. Writer let her know that I discussed these with Dr. Palafox, and he did not feel that this was necessarily from the Hydroxyurea medication. That said, we do want to know how she is doing, and if any of these symptoms get worse.     Writer had her get a pen and provided detailed instructions with phone numbers to call our triage nurse line - including after hours, as we have a doctor on call. We confirmed that she has an appointment with Dr. Palafox on June 20, but that she will see Dr. Abebe again on May 22.     Asked if writer could help her with anything else today, she said no. Reinforced that we welcome calls and questions - via phone or MyChart. She voiced understanding.    Signature:  Ricarda Munoz RN

## 2025-05-22 ENCOUNTER — RESULTS FOLLOW-UP (OUTPATIENT)
Dept: FAMILY MEDICINE | Facility: CLINIC | Age: 55
End: 2025-05-22

## 2025-05-22 NOTE — LETTER
May 27, 2025      Kelly Steven  [address]  [place] [address]        Dear ,    We are writing to inform you of your test results.    Urine result is as expected Kelly - Take care!     Resulted Orders   Urine Drug Screen Clinic   Result Value Ref Range    Cannabinoids (92-lhw-2-carboxy-9-THC) Not Detected Not Detected, Indeterminate      Comment:      Cutoff for a negative cannabinoid is 50 ng/mL or less.    Phencyclidine Not Detected Not Detected, Indeterminate      Comment:      Cutoff for a negative PCP is 25 ng/mL or less.    Cocaine (Benzoylecgonine) Not Detected Not Detected, Indeterminate      Comment:      Cutoff for a negative cocaine is 150 ng/ml or less.    Methamphetamine (d-Methamphetamine) Not Detected Not Detected, Indeterminate      Comment:      Cutoff for a negative methamphetamine is 500 ng/ml or less.    Opiates (Morphine) Not Detected Not Detected, Indeterminate      Comment:      Cutoff for a negative opiate is 100 ng/ml or less.    Amphetamine (d-Amphetamine) Not Detected Not Detected, Indeterminate      Comment:      Cutoff for a negative amphetamine is 500 ng/mL or less.    Benzodiazepines (Nordiazepam) Not Detected Not Detected, Indeterminate      Comment:      Cutoff for a negative benzodiazepine is 150 ng/ml or less.    Tricyclic Antidepressants (Desipramine) Not Detected Not Detected, Indeterminate      Comment:      Cutoff for a negative tricyclic antidepressant is 300 ng/ml or less.    Methadone Not Detected Not Detected, Indeterminate      Comment:      Cutoff for a negative methadone is 200 ng/ml or less.    Barbiturates (Butalbital) Not Detected Not Detected, Indeterminate      Comment:      Cutoff for a negative barbituate is 200 ng/ml or less.    Oxycodone Not Detected Not Detected, Indeterminate      Comment:      Cutoff for a negative oxycodone is 100 ng/mL or less.    Buprenorphine Not Detected Not Detected, Indeterminate      Comment:      Cutoff for a  negative buprenorphine is 10 ng/ml or less.       If you have any questions or concerns, please call the clinic at the number listed above.       Sincerely,      Onur Abebe MD    Electronically signed

## 2025-05-29 DIAGNOSIS — M25.572 PAIN IN JOINT INVOLVING ANKLE AND FOOT, LEFT: ICD-10-CM

## 2025-05-29 DIAGNOSIS — D57.20 SICKLE-CELL/HB-C DISEASE WITHOUT CRISIS (H): ICD-10-CM

## 2025-05-29 RX ORDER — IBUPROFEN 600 MG/1
600 TABLET, FILM COATED ORAL EVERY 6 HOURS PRN
Qty: 90 TABLET | Refills: 1 | Status: SHIPPED | OUTPATIENT
Start: 2025-05-29

## 2025-05-29 NOTE — TELEPHONE ENCOUNTER
Name from pharmacy: IBUPROFEN 600 MG TABLET          Will file in chart as: ibuprofen (ADVIL/MOTRIN) 600 MG tablet    Sig: TAKE 1 TABLET BY MOUTH EVERY 6 HOURS AS NEEDED FOR MODERATE PAIN    Disp: 90 tablet    Refills: 1    Start: 5/29/2025    Class: E-Prescribe    Non-formulary For: Sickle-cell/Hb-C disease without crisis (H); Pain in joint involving ankle and foot, left    Last ordered: 1 month ago (4/11/2025) by Onur Abebe MD    Last refill: 5/2/2025    Rx #: [id number]    NSAID Medications [id number]/29/2025 12:29 AM   Protocol Details Normal CBC on file in past 12 months    Always Fail Criteria - Chart Review Required          Mehran Encinas, RN, MSN

## 2025-06-16 ENCOUNTER — PATIENT OUTREACH (OUTPATIENT)
Dept: CARE COORDINATION | Facility: CLINIC | Age: 55
End: 2025-06-16
Payer: COMMERCIAL

## 2025-06-20 ENCOUNTER — ONCOLOGY VISIT (OUTPATIENT)
Dept: ONCOLOGY | Facility: HOSPITAL | Age: 55
End: 2025-06-20
Payer: COMMERCIAL

## 2025-06-20 VITALS
TEMPERATURE: 97.9 F | RESPIRATION RATE: 16 BRPM | WEIGHT: 148 LBS | BODY MASS INDEX: 26.22 KG/M2 | OXYGEN SATURATION: 99 % | DIASTOLIC BLOOD PRESSURE: 73 MMHG | HEIGHT: 63 IN | SYSTOLIC BLOOD PRESSURE: 141 MMHG | HEART RATE: 65 BPM

## 2025-06-20 DIAGNOSIS — D57.20 SICKLE-CELL/HB-C DISEASE WITHOUT CRISIS (H): Primary | ICD-10-CM

## 2025-06-20 DIAGNOSIS — R52 DIFFUSE PAIN: ICD-10-CM

## 2025-06-20 ASSESSMENT — PAIN SCALES - GENERAL: PAINLEVEL_OUTOF10: SEVERE PAIN (7)

## 2025-06-20 NOTE — LETTER
Oncology Clinic Visit     6 month follow up with labs       Assessment and Plan     Cancer Staging   No matching staging information was found for the patient.    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    At that time she was hospitalized with a pain episode thought to be related to hemoglobin SC disease.  She has done fairly well on Hydrea.      Has had some issues recently with significant diffuse pain and not feeling overall well.  Also has significant fatigue.    Results  - Hemoglobin: 11.6 g/dL, within patient's typical range (11-12 g/dL)  - White blood cell count: normal  - Platelet count: normal  - Urinalysis: normal  - I reviewed lab results personally and independently interpreted the results.    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 No significant changes in white count or platelet count. The disease is not in crisis, and the hemoglobin level is being maintained by hydroxyurea.  Continue current dose of hydroxyurea, 2 pills daily. Consider reducing the dose to 1 pill on weekends if concerned about side effects. Keep hemoglobin below 12.5 to avoid complications. Check inflammatory markers during the primary care appointment on June 25th. Maintain adequate hydration.    Diffuse pain:  Diffuse pain may be related to fibromyalgia, characterized by neurosensitivity and trigger points. No joint swelling observed, but swelling in feet and knees occurs with prolonged standing.   Use hydrocodone for pain management. Consider trying tramadol if hydrocodone causes significant itching. Explore alternative pain management strategies if symptoms persist. Check inflammatory markers during the primary care appointment on June 25th. Consider heavy metal screening due to potential diffuse muscle aches.  Risks and side effects: Hydrocodone may cause itching due to histamine release.    Itching:  Itching may be  related to sickle-cell/Hb-C disease or as a side effect of hydrocodone. Possible cholinergic urticaria due to hot showers.  Try low-dose aspirin daily with food to manage itching. Use Benadryl at night for itching and sleep aid. Continue taking Zyrtec for antihistamine effect. Consider changing the timing of hydroxyurea intake to see if it affects symptoms. If itching persists, take a break from hydroxyurea for a few days to assess impact.     History of ovarian vein thrombosis and bilateral PEs:   Patient did take Eliquis after her mild bilateral PEs.  The ovarian vein thrombosis was quite remote in a little unclear so at this time she is off anticoagulation.  We had held off on resuming anticoagulation.  But will closely monitor for any signs or symptoms of recurrent VTE.        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 Palafox.  Previous patient of Dr. Bartlett    Diagnosis: Hemoglobin SC disease.  Bilateral pulmonary embolism, diagnosed July 2023.  Remote history of ovarian vein thrombosis    Treatment: Observation initially  - April 2024 when she started  Hydrea 1000 mg a day.    Interim history:  She is here for follow-up.  She has been on Hydrea 1000 mg daily since April of 2024.    She reports not feeling well and experiencing persistent pain and fatigue. She describes a significant increase in the time it takes to get ready in the morning, from 30 minutes to 2 hours. She has been experiencing pain throughout her body, particularly in her joints, which sometimes becomes severe enough to require her to lie down. She visited her primary care physician due to these symptoms and was given time off work. She has been unable to take her pain medications because they cause drowsiness, affecting her ability to drive.    Kelly experiences pain in all her joints, with occasional severe episodes. She reports tingling sensations in her hands and feet, but not numbness. She also experiences itching when taking showers, which is alleviated by using very warm water. She previously took low-dose aspirin but discontinued it after experiencing blood clots. She has a history of hemoglobin SC disease, with hemoglobin levels typically ranging between 11 and 12. She has been taking hydroxyurea, two pills daily, which initially improved her condition but now seems less effective. She also takes folic acid and hydrocodone for pain management. Hydrocodone causes itching, which she manages with Zyrtec.    Kelly reports swelling and puffiness in her feet and knees, particularly after standing for extended periods, which improves with elevation. She has been menopausal for some time and does not experience joint swelling or redness. She has no family history of autoimmune disorders such as rheumatoid arthritis or lupus. She has experienced abdominal pain on the left side occasionally. Kelly noticed dark lines on her nails and tingling in her toes about a month after starting hydroxyurea. She has been advised to keep hydrated to manage pain episodes.     Review of Systems    Pertinent  "items are noted in HPI.    Past History    Past Medical History:   Diagnosis Date     Asthma      Benign essential hypertension 7/15/2024     Sickle cell pain crisis (H) 07/16/2023       PHYSICAL EXAM  BP (!) 141/73 (BP Location: Left arm, Patient Position: Sitting, Cuff Size: Adult Regular)   Pulse 65   Temp 97.9  F (36.6  C) (Tympanic)   Resp 16   Ht 1.6 m (5' 3\")   Wt 67.1 kg (148 lb)   LMP 04/12/2017   SpO2 99%   BMI 26.22 kg/m      GENERAL: no acute distress. Cooperative in conversation. Alone in clinic  RESP: Clear to auscultation bilaterally  NEURO: non focal. Alert and oriented x3.       Lab Results    Recent Results (from the past week)   ESR: Erythrocyte sedimentation rate   Result Value Ref Range    Erythrocyte Sedimentation Rate 20 0 - 30 mm/hr   CRP, inflammation   Result Value Ref Range    CRP Inflammation 3.50 <5.00 mg/L   Comprehensive metabolic panel (BMP + Alb, Alk Phos, ALT, AST, Total. Bili, TP)   Result Value Ref Range    Sodium 143 135 - 145 mmol/L    Potassium 3.9 3.4 - 5.3 mmol/L    Carbon Dioxide (CO2) 24 22 - 29 mmol/L    Anion Gap 11 7 - 15 mmol/L    Urea Nitrogen 10.3 6.0 - 20.0 mg/dL    Creatinine 0.92 0.51 - 0.95 mg/dL    GFR Estimate 73 >60 mL/min/1.73m2    Calcium 9.5 8.8 - 10.4 mg/dL    Chloride 108 (H) 98 - 107 mmol/L    Glucose 88 70 - 99 mg/dL    Alkaline Phosphatase 102 40 - 150 U/L    AST 21 0 - 45 U/L    ALT 17 0 - 50 U/L    Protein Total 7.1 6.4 - 8.3 g/dL    Albumin 4.4 3.5 - 5.2 g/dL    Bilirubin Total 0.5 <=1.2 mg/dL   Blood metal panel   Result Value Ref Range    Arsenic <10.0 <=12.0 ug/L    Lead Venous Blood 2.2 <=4.9 ug/dL    Mercury 9.8 <=10.0 ug/L   CBC with platelets and differential   Result Value Ref Range    WBC Count 7.2 4.0 - 11.0 10e3/uL    RBC Count 3.54 (L) 3.80 - 5.20 10e6/uL    Hemoglobin 12.0 11.7 - 15.7 g/dL    Hematocrit 33.2 (L) 35.0 - 47.0 %    MCV 94 78 - 100 fL    MCH 33.9 (H) 26.5 - 33.0 pg    MCHC 36.1 31.5 - 36.5 g/dL    RDW 13.3 10.0 - " 15.0 %    Platelet Count 298 150 - 450 10e3/uL   Manual Differential   Result Value Ref Range    % Neutrophils 36 %    % Lymphocytes 53 %    % Monocytes 10 %    % Eosinophils 1 %    % Basophils 0 %    Absolute Neutrophils 2.6 1.6 - 8.3 10e3/uL    Absolute Lymphocytes 3.8 0.8 - 5.3 10e3/uL    Absolute Monocytes 0.7 0.0 - 1.3 10e3/uL    Absolute Eosinophils 0.1 0.0 - 0.7 10e3/uL    Absolute Basophils 0.0 0.0 - 0.2 10e3/uL    NRBCs per 100 WBC 6 %    Absolute NRBCs 0.4 10e3/uL   RBC and Platelet Morphology   Result Value Ref Range    RBC Morphology Confirmed RBC Indices     Platelet Assessment  Automated Count Confirmed. Platelet morphology is normal.     Automated Count Confirmed. Platelet morphology is normal.    Polychromasia Slight (A) None Seen    RBC agglutination Present (A) None Seen    Target Cells Slight (A) None Seen         Imaging    No results found.    The longitudinal plan of care for the diagnosis(es)/condition(s) as documented were addressed during this visit. Due to the added complexity in care, I will continue to support Kelly in the subsequent management and with ongoing continuity of care.    A total of 40 min was spent today on this visit which included face to face conversation with the patient, EMR review, counseling and co-ordination of care and medical documentation.      Signed by: Yo Palafox MD    Again, thank you for allowing me to participate in the care of your patient.        Sincerely,        Yo Palafox MD    Electronically signed

## 2025-06-20 NOTE — Clinical Note
"6/20/2025      Kelly Harris  [address] [place] [address]      Dear Colleague,    Thank you for referring your patient, Kelly Harris, to the [facility]. Please see a copy of my visit note below.    Oncology Rooming Note    June 20, 2025 2:28 PM   Kelly Harris is a 54 year old female who presents for:    Chief Complaint   Patient presents with    Oncology Clinic Visit     6 month follow up with labs     Initial Vitals: BP (!) 141/73 (BP Location: Left arm, Patient Position: Sitting, Cuff Size: Adult Regular)   Pulse 65   Temp 97.9  F (36.6  C) (Tympanic)   Resp 16   Ht 1.6 m (5' 3\")   Wt 67.1 kg (148 lb)   LMP 04/12/2017   SpO2 99%   BMI 26.22 kg/m   Estimated body mass index is 26.22 kg/m  as calculated from the following:    Height as of this encounter: 1.6 m (5' 3\").    Weight as of this encounter: 67.1 kg (148 lb). Body surface area is 1.73 meters squared.  Severe Pain (7) Comment: Data Unavailable   Patient's last menstrual period was 04/12/2017.  Allergies reviewed: Yes  Medications reviewed: Yes    Medications: MEDICATION REFILLS NEEDED TODAY. Provider was notified.  Pharmacy name entered into Mesh[employer]:    [id number] - [place], [place] - [address] [facility] [id number] [place] [address] [place]    Frailty Screening:   Is the patient here for a new oncology consult visit in cancer care? 2. No    PHQ9:  Did this patient require a PHQ9?: No      Clinical concerns: pt has been out on FMLA per PCP since 05/12/25 due to increasing pain. Pt was doing well on hydroxyurea and then pain got worse and is continuing to get worse Javy was notified.      SUZETTE NEWSOME CMA                Again, thank you for allowing me to participate in the care of your patient.        Sincerely,        Yo Palafox MD    Electronically signed"

## 2025-06-20 NOTE — PROGRESS NOTES
"Oncology Rooming Note    June 20, 2025 2:28 PM   Kelly Harris is a 54 year old female who presents for:    Chief Complaint   Patient presents with    Oncology Clinic Visit     6 month follow up with labs     Initial Vitals: BP (!) 141/73 (BP Location: Left arm, Patient Position: Sitting, Cuff Size: Adult Regular)   Pulse 65   Temp 97.9  F (36.6  C) (Tympanic)   Resp 16   Ht 1.6 m (5' 3\")   Wt 67.1 kg (148 lb)   LMP 04/12/2017   SpO2 99%   BMI 26.22 kg/m   Estimated body mass index is 26.22 kg/m  as calculated from the following:    Height as of this encounter: 1.6 m (5' 3\").    Weight as of this encounter: 67.1 kg (148 lb). Body surface area is 1.73 meters squared.  Severe Pain (7) Comment: Data Unavailable   Patient's last menstrual period was 04/12/2017.  Allergies reviewed: Yes  Medications reviewed: Yes    Medications: MEDICATION REFILLS NEEDED TODAY. Provider was notified.  Pharmacy name entered into allyve:    [id number] - [place], [place] - [address] [facility] [id number] [place] [address] [place]    Frailty Screening:   Is the patient here for a new oncology consult visit in cancer care? 2. No    PHQ9:  Did this patient require a PHQ9?: No      Clinical concerns: pt has been out on FMLA per PCP since 05/12/25 due to increasing pain. Pt was doing well on hydroxyurea and then pain got worse and is continuing to get worse Javy was notified.      SUZETTE NEWSOME CMA            "

## 2025-06-21 ENCOUNTER — HEALTH MAINTENANCE LETTER (OUTPATIENT)
Age: 55
End: 2025-06-21

## 2025-06-29 DIAGNOSIS — I10 BENIGN ESSENTIAL HYPERTENSION: ICD-10-CM

## 2025-06-30 ENCOUNTER — LAB (OUTPATIENT)
Dept: INFUSION THERAPY | Facility: HOSPITAL | Age: 55
End: 2025-06-30
Attending: INTERNAL MEDICINE
Payer: COMMERCIAL

## 2025-06-30 DIAGNOSIS — D57.20 SICKLE-CELL/HB-C DISEASE WITHOUT CRISIS (H): ICD-10-CM

## 2025-06-30 DIAGNOSIS — R52 DIFFUSE PAIN: ICD-10-CM

## 2025-06-30 LAB
ALBUMIN SERPL BCG-MCNC: 4.4 G/DL (ref 3.5–5.2)
ALP SERPL-CCNC: 102 U/L (ref 40–150)
ALT SERPL W P-5'-P-CCNC: 17 U/L (ref 0–50)
ANION GAP SERPL CALCULATED.3IONS-SCNC: 11 MMOL/L (ref 7–15)
AST SERPL W P-5'-P-CCNC: 21 U/L (ref 0–45)
BASOPHILS # BLD MANUAL: 0 10E3/UL (ref 0–0.2)
BASOPHILS NFR BLD MANUAL: 0 %
BILIRUB SERPL-MCNC: 0.5 MG/DL
BUN SERPL-MCNC: 10.3 MG/DL (ref 6–20)
CALCIUM SERPL-MCNC: 9.5 MG/DL (ref 8.8–10.4)
CHLORIDE SERPL-SCNC: 108 MMOL/L (ref 98–107)
CREAT SERPL-MCNC: 0.92 MG/DL (ref 0.51–0.95)
CRP SERPL-MCNC: 3.5 MG/L
EGFRCR SERPLBLD CKD-EPI 2021: 73 ML/MIN/1.73M2
EOSINOPHIL # BLD MANUAL: 0.1 10E3/UL (ref 0–0.7)
EOSINOPHIL NFR BLD MANUAL: 1 %
ERYTHROCYTE [DISTWIDTH] IN BLOOD BY AUTOMATED COUNT: 13.3 % (ref 10–15)
ERYTHROCYTE [SEDIMENTATION RATE] IN BLOOD BY WESTERGREN METHOD: 20 MM/HR (ref 0–30)
GLUCOSE SERPL-MCNC: 88 MG/DL (ref 70–99)
HCO3 SERPL-SCNC: 24 MMOL/L (ref 22–29)
HCT VFR BLD AUTO: 33.2 % (ref 35–47)
HGB BLD-MCNC: 12 G/DL (ref 11.7–15.7)
LYMPHOCYTES # BLD MANUAL: 3.8 10E3/UL (ref 0.8–5.3)
LYMPHOCYTES NFR BLD MANUAL: 53 %
MCH RBC QN AUTO: 33.9 PG (ref 26.5–33)
MCHC RBC AUTO-ENTMCNC: 36.1 G/DL (ref 31.5–36.5)
MCV RBC AUTO: 94 FL (ref 78–100)
MONOCYTES # BLD MANUAL: 0.7 10E3/UL (ref 0–1.3)
MONOCYTES NFR BLD MANUAL: 10 %
NEUTROPHILS # BLD MANUAL: 2.6 10E3/UL (ref 1.6–8.3)
NEUTROPHILS NFR BLD MANUAL: 36 %
NRBC # BLD AUTO: 0.4 10E3/UL
NRBC BLD MANUAL-RTO: 6 %
PLAT MORPH BLD: ABNORMAL
PLATELET # BLD AUTO: 298 10E3/UL (ref 150–450)
POLYCHROMASIA BLD QL SMEAR: SLIGHT
POTASSIUM SERPL-SCNC: 3.9 MMOL/L (ref 3.4–5.3)
PROT SERPL-MCNC: 7.1 G/DL (ref 6.4–8.3)
RBC # BLD AUTO: 3.54 10E6/UL (ref 3.8–5.2)
RBC AGGLUT BLD QL: PRESENT
RBC MORPH BLD: ABNORMAL
SODIUM SERPL-SCNC: 143 MMOL/L (ref 135–145)
TARGETS BLD QL SMEAR: SLIGHT
WBC # BLD AUTO: 7.2 10E3/UL (ref 4–11)

## 2025-06-30 PROCEDURE — 85014 HEMATOCRIT: CPT

## 2025-06-30 PROCEDURE — 86140 C-REACTIVE PROTEIN: CPT

## 2025-06-30 PROCEDURE — 84155 ASSAY OF PROTEIN SERUM: CPT

## 2025-06-30 PROCEDURE — 83825 ASSAY OF MERCURY: CPT

## 2025-06-30 PROCEDURE — 85652 RBC SED RATE AUTOMATED: CPT

## 2025-06-30 PROCEDURE — 85007 BL SMEAR W/DIFF WBC COUNT: CPT

## 2025-06-30 PROCEDURE — 36415 COLL VENOUS BLD VENIPUNCTURE: CPT

## 2025-06-30 RX ORDER — AMLODIPINE BESYLATE 5 MG/1
5 TABLET ORAL DAILY
Qty: 90 TABLET | Refills: 1 | Status: SHIPPED | OUTPATIENT
Start: 2025-06-30

## 2025-06-30 RX ORDER — HYDROXYUREA 500 MG/1
1000 CAPSULE ORAL DAILY
Qty: 120 CAPSULE | Refills: 2 | Status: SHIPPED | OUTPATIENT
Start: 2025-06-30

## 2025-06-30 NOTE — TELEPHONE ENCOUNTER
Name from pharmacy: AMLODIPINE BESYLATE 5 MG TAB          Will file in chart as: amLODIPine (NORVASC) 5 MG tablet    Sig: TAKE 1 TABLET BY MOUTH EVERY DAY    Disp: 90 tablet    Refills: 1    Start: 6/29/2025    Class: E-Prescribe    Non-formulary For: Benign essential hypertension    Last ordered: 5 months ago (1/6/2025) by Onur Abebe MD    Last refill: 4/3/2025    Rx #: [id number]    Calcium Channel Blockers Protocol  [id number]/29/2025 07:14 AM   Protocol Details Most recent blood pressure under 140/90 in past 12 months        Chris RN, BSN

## 2025-06-30 NOTE — TELEPHONE ENCOUNTER
Last Written Prescription Date:  1/8/25  Last Fill Quantity: 120,  # refills: 2   Last office visit provider:  6/20/25 with Dr. Palafox     Requested Prescriptions   Pending Prescriptions Disp Refills    hydroxyurea (HYDREA) 500 MG capsule 120 capsule 2     Sig: Take 2 capsules (1,000 mg) by mouth daily       There is no refill protocol information for this order          Mirella Hood RN 06/30/25 1:00 PM

## 2025-07-02 LAB
ARSENIC BLD-MCNC: <10 UG/L
LEAD BLDV-MCNC: 2.2 UG/DL
MERCURY BLD-MCNC: 9.8 UG/L

## 2025-07-04 NOTE — PROGRESS NOTES
[facility] Hematology and Oncology Progress Note    Patient: Kelly Harris  MRN: [id number]  Date of Service: Jun 20, 2025          Reason for Visit    Chief Complaint   Patient presents with    Oncology Clinic Visit     6 month follow up with labs       Assessment and Plan     Cancer Staging   No matching staging information was found for the patient.    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    At that time she was hospitalized with a pain episode thought to be related to hemoglobin SC disease.  She has done fairly well on Hydrea.      Has had some issues recently with significant diffuse pain and not feeling overall well.  Also has significant fatigue.    Results  - Hemoglobin: 11.6 g/dL, within patient's typical range (11-12 g/dL)  - White blood cell count: normal  - Platelet count: normal  - Urinalysis: normal  - I reviewed lab results personally and independently interpreted the results.    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 No significant changes in white count or platelet count. The disease is not in crisis, and the hemoglobin level is being maintained by hydroxyurea.  Continue current dose of hydroxyurea, 2 pills daily. Consider reducing the dose to 1 pill on weekends if concerned about side effects. Keep hemoglobin below 12.5 to avoid complications. Check inflammatory markers during the primary care appointment on June 25th. Maintain adequate hydration.    Diffuse pain:  Diffuse pain may be related to fibromyalgia, characterized by neurosensitivity and trigger points. No joint swelling observed, but swelling in feet and knees occurs with prolonged standing.   Use hydrocodone for pain management. Consider trying tramadol if hydrocodone causes significant itching. Explore alternative pain management strategies if symptoms persist. Check inflammatory markers during the primary care  appointment on June 25th. Consider heavy metal screening due to potential diffuse muscle aches.  Risks and side effects: Hydrocodone may cause itching due to histamine release.    Itching:  Itching may be related to sickle-cell/Hb-C disease or as a side effect of hydrocodone. Possible cholinergic urticaria due to hot showers.  Try low-dose aspirin daily with food to manage itching. Use Benadryl at night for itching and sleep aid. Continue taking Zyrtec for antihistamine effect. Consider changing the timing of hydroxyurea intake to see if it affects symptoms. If itching persists, take a break from hydroxyurea for a few days to assess impact.     History of ovarian vein thrombosis and bilateral PEs:   Patient did take Eliquis after her mild bilateral PEs.  The ovarian vein thrombosis was quite remote in a little unclear so at this time she is off anticoagulation.  We had held off on resuming anticoagulation.  But will closely monitor for any signs or symptoms of recurrent VTE.        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 Palafox.  Previous patient of   [facility]    Diagnosis: Hemoglobin SC disease.  Bilateral pulmonary embolism, diagnosed July 2023.  Remote history of ovarian vein thrombosis    Treatment: Observation initially  - April 2024 when she started Hydrea 1000 mg a day.    Interim history:  She is here for follow-up.  She has been on Hydrea 1000 mg daily since April of 2024.    She reports not feeling well and experiencing persistent pain and fatigue. She describes a significant increase in the time it takes to get ready in the morning, from 30 minutes to 2 hours. She has been experiencing pain throughout her body, particularly in her joints, which sometimes becomes severe enough to require her to lie down. She visited her primary care physician due to these symptoms and was given time off work. She has been unable to take her pain medications because they cause drowsiness, affecting her ability to drive.    Kelly experiences pain in all her joints, with occasional severe episodes. She reports tingling sensations in her hands and feet, but not numbness. She also experiences itching when taking showers, which is alleviated by using very warm water. She previously took low-dose aspirin but discontinued it after experiencing blood clots. She has a history of hemoglobin SC disease, with hemoglobin levels typically ranging between 11 and 12. She has been taking hydroxyurea, two pills daily, which initially improved her condition but now seems less effective. She also takes folic acid and hydrocodone for pain management. Hydrocodone causes itching, which she manages with Zyrtec.    Kelly reports swelling and puffiness in her feet and knees, particularly after standing for extended periods, which improves with elevation. She has been menopausal for some time and does not experience joint swelling or redness. She has no family history of autoimmune disorders such as rheumatoid arthritis or lupus. She has experienced abdominal pain on the left side  "occasionally. Kelly noticed dark lines on her nails and tingling in her toes about a month after starting hydroxyurea. She has been advised to keep hydrated to manage pain episodes.     Review of Systems    Pertinent items are noted in HPI.    Past History    Past Medical History:   Diagnosis Date    Asthma     Benign essential hypertension 7/15/2024    Sickle cell pain crisis (H) 07/16/2023       PHYSICAL EXAM  BP (!) 141/73 (BP Location: Left arm, Patient Position: Sitting, Cuff Size: Adult Regular)   Pulse 65   Temp 97.9  F (36.6  C) (Tympanic)   Resp 16   Ht 1.6 m (5' 3\")   Wt 67.1 kg (148 lb)   LMP 04/12/2017   SpO2 99%   BMI 26.22 kg/m      GENERAL: no acute distress. Cooperative in conversation. Alone in clinic  RESP: Clear to auscultation bilaterally  NEURO: non focal. Alert and oriented x3.       Lab Results    Recent Results (from the past week)   ESR: Erythrocyte sedimentation rate   Result Value Ref Range    Erythrocyte Sedimentation Rate 20 0 - 30 mm/hr   CRP, inflammation   Result Value Ref Range    CRP Inflammation 3.50 <5.00 mg/L   Comprehensive metabolic panel (BMP + Alb, Alk Phos, ALT, AST, Total. Bili, TP)   Result Value Ref Range    Sodium 143 135 - 145 mmol/L    Potassium 3.9 3.4 - 5.3 mmol/L    Carbon Dioxide (CO2) 24 22 - 29 mmol/L    Anion Gap 11 7 - 15 mmol/L    Urea Nitrogen 10.3 6.0 - 20.0 mg/dL    Creatinine 0.92 0.51 - 0.95 mg/dL    GFR Estimate 73 >60 mL/min/1.73m2    Calcium 9.5 8.8 - 10.4 mg/dL    Chloride 108 (H) 98 - 107 mmol/L    Glucose 88 70 - 99 mg/dL    Alkaline Phosphatase 102 40 - 150 U/L    AST 21 0 - 45 U/L    ALT 17 0 - 50 U/L    Protein Total 7.1 6.4 - 8.3 g/dL    Albumin 4.4 3.5 - 5.2 g/dL    Bilirubin Total 0.5 <=1.2 mg/dL   Blood metal panel   Result Value Ref Range    Arsenic <10.0 <=12.0 ug/L    Lead Venous Blood 2.2 <=4.9 ug/dL    Mercury 9.8 <=10.0 ug/L   CBC with platelets and differential   Result Value Ref Range    WBC Count 7.2 4.0 - 11.0 10e3/uL    " RBC Count 3.54 (L) 3.80 - 5.20 10e6/uL    Hemoglobin 12.0 11.7 - 15.7 g/dL    Hematocrit 33.2 (L) 35.0 - 47.0 %    MCV 94 78 - 100 fL    MCH 33.9 (H) 26.5 - 33.0 pg    MCHC 36.1 31.5 - 36.5 g/dL    RDW 13.3 10.0 - 15.0 %    Platelet Count 298 150 - 450 10e3/uL   Manual Differential   Result Value Ref Range    % Neutrophils 36 %    % Lymphocytes 53 %    % Monocytes 10 %    % Eosinophils 1 %    % Basophils 0 %    Absolute Neutrophils 2.6 1.6 - 8.3 10e3/uL    Absolute Lymphocytes 3.8 0.8 - 5.3 10e3/uL    Absolute Monocytes 0.7 0.0 - 1.3 10e3/uL    Absolute Eosinophils 0.1 0.0 - 0.7 10e3/uL    Absolute Basophils 0.0 0.0 - 0.2 10e3/uL    NRBCs per 100 WBC 6 %    Absolute NRBCs 0.4 10e3/uL   RBC and Platelet Morphology   Result Value Ref Range    RBC Morphology Confirmed RBC Indices     Platelet Assessment  Automated Count Confirmed. Platelet morphology is normal.     Automated Count Confirmed. Platelet morphology is normal.    Polychromasia Slight (A) None Seen    RBC agglutination Present (A) None Seen    Target Cells Slight (A) None Seen         Imaging    No results found.    The longitudinal plan of care for the diagnosis(es)/condition(s) as documented were addressed during this visit. Due to the added complexity in care, I will continue to support Kelly in the subsequent management and with ongoing continuity of care.    A total of 40 min was spent today on this visit which included face to face conversation with the patient, EMR review, counseling and co-ordination of care and medical documentation.      Signed by: Yo Palafox MD